# Patient Record
Sex: FEMALE | Race: BLACK OR AFRICAN AMERICAN | Employment: OTHER | URBAN - METROPOLITAN AREA
[De-identification: names, ages, dates, MRNs, and addresses within clinical notes are randomized per-mention and may not be internally consistent; named-entity substitution may affect disease eponyms.]

---

## 2017-01-17 ENCOUNTER — OFFICE VISIT (OUTPATIENT)
Dept: CARDIOLOGY CLINIC | Age: 82
End: 2017-01-17

## 2017-01-17 DIAGNOSIS — Z79.01 LONG TERM (CURRENT) USE OF ANTICOAGULANTS: ICD-10-CM

## 2017-01-17 DIAGNOSIS — I48.91 ATRIAL FIBRILLATION, UNSPECIFIED TYPE (HCC): Primary | ICD-10-CM

## 2017-02-08 ENCOUNTER — HOSPITAL ENCOUNTER (OUTPATIENT)
Dept: LAB | Age: 82
Discharge: HOME | End: 2017-02-08

## 2017-02-08 PROCEDURE — 99001 SPECIMEN HANDLING PT-LAB: CPT | Performed by: INTERNAL MEDICINE

## 2017-02-21 ENCOUNTER — OFFICE VISIT (OUTPATIENT)
Dept: CARDIOLOGY CLINIC | Age: 82
End: 2017-02-21

## 2017-02-21 DIAGNOSIS — Z95.0 CARDIAC PACEMAKER IN SITU: Primary | ICD-10-CM

## 2017-02-22 ENCOUNTER — CLINICAL SUPPORT (OUTPATIENT)
Dept: CARDIOLOGY CLINIC | Age: 82
End: 2017-02-22

## 2017-02-22 DIAGNOSIS — I48.91 ATRIAL FIBRILLATION, UNSPECIFIED TYPE (HCC): Primary | ICD-10-CM

## 2017-02-22 DIAGNOSIS — Z79.01 LONG TERM (CURRENT) USE OF ANTICOAGULANTS: ICD-10-CM

## 2017-02-22 LAB
INR BLD: NORMAL
INR, EXTERNAL: 3.7 (ref 2–3)
PT POC: NORMAL SEC
VALID INTERNAL CONTROL?: YES

## 2017-02-22 NOTE — PROGRESS NOTES

## 2017-03-18 ENCOUNTER — HOSPITAL ENCOUNTER (EMERGENCY)
Age: 82
Discharge: HOME | End: 2017-03-19
Attending: INTERNAL MEDICINE | Admitting: INTERNAL MEDICINE
Payer: MEDICARE

## 2017-03-18 ENCOUNTER — APPOINTMENT (OUTPATIENT)
Dept: GENERAL RADIOLOGY | Age: 82
End: 2017-03-18
Attending: PHYSICIAN ASSISTANT
Payer: MEDICARE

## 2017-03-18 DIAGNOSIS — J20.9 ACUTE BRONCHITIS, UNSPECIFIED ORGANISM: Primary | ICD-10-CM

## 2017-03-18 DIAGNOSIS — T45.511A COUMADIN TOXICITY, ACCIDENTAL OR UNINTENTIONAL, INITIAL ENCOUNTER: ICD-10-CM

## 2017-03-18 DIAGNOSIS — D68.9 COAGULOPATHY (HCC): ICD-10-CM

## 2017-03-18 LAB
ALBUMIN SERPL BCP-MCNC: 3.3 G/DL (ref 3.5–5)
ALBUMIN/GLOB SERPL: 1 {RATIO} (ref 1.1–2.2)
ALP SERPL-CCNC: 62 U/L (ref 45–117)
ALT SERPL-CCNC: 18 U/L (ref 12–78)
ANION GAP BLD CALC-SCNC: 7 MMOL/L (ref 5–15)
AST SERPL W P-5'-P-CCNC: 19 U/L (ref 15–37)
BASOPHILS # BLD AUTO: 0 K/UL (ref 0–0.1)
BASOPHILS # BLD: 0 % (ref 0–1)
BILIRUB SERPL-MCNC: 0.4 MG/DL (ref 0.2–1)
BNP SERPL-MCNC: 132 PG/ML (ref 0–450)
BUN SERPL-MCNC: 10 MG/DL (ref 6–20)
BUN/CREAT SERPL: 10 (ref 12–20)
CALCIUM SERPL-MCNC: 7.9 MG/DL (ref 8.5–10.1)
CHLORIDE SERPL-SCNC: 103 MMOL/L (ref 97–108)
CK MB CFR SERPL CALC: 1.5 % (ref 0–2.5)
CK MB SERPL-MCNC: 2.2 NG/ML (ref 5–25)
CK SERPL-CCNC: 144 U/L (ref 26–192)
CO2 SERPL-SCNC: 28 MMOL/L (ref 21–32)
CREAT SERPL-MCNC: 0.99 MG/DL (ref 0.55–1.02)
EOSINOPHIL # BLD: 0.1 K/UL (ref 0–0.4)
EOSINOPHIL NFR BLD: 2 % (ref 0–7)
ERYTHROCYTE [DISTWIDTH] IN BLOOD BY AUTOMATED COUNT: 14.8 % (ref 11.5–14.5)
FLUAV AG NPH QL IA: NEGATIVE
FLUBV AG NOSE QL IA: NEGATIVE
GLOBULIN SER CALC-MCNC: 3.4 G/DL (ref 2–4)
GLUCOSE SERPL-MCNC: 101 MG/DL (ref 65–100)
HCT VFR BLD AUTO: 34.8 % (ref 35–47)
HGB BLD-MCNC: 12.2 G/DL (ref 11.5–16)
INR PPP: 6.6 (ref 0.9–1.1)
LYMPHOCYTES # BLD AUTO: 33 % (ref 12–49)
LYMPHOCYTES # BLD: 1.8 K/UL (ref 0.8–3.5)
MCH RBC QN AUTO: 27.9 PG (ref 26–34)
MCHC RBC AUTO-ENTMCNC: 35.1 G/DL (ref 30–36.5)
MCV RBC AUTO: 79.5 FL (ref 80–99)
MONOCYTES # BLD: 0.8 K/UL (ref 0–1)
MONOCYTES NFR BLD AUTO: 15 % (ref 5–13)
NEUTS SEG # BLD: 2.8 K/UL (ref 1.8–8)
NEUTS SEG NFR BLD AUTO: 50 % (ref 32–75)
PLATELET # BLD AUTO: 184 K/UL (ref 150–400)
POTASSIUM SERPL-SCNC: 3.9 MMOL/L (ref 3.5–5.1)
PROT SERPL-MCNC: 6.7 G/DL (ref 6.4–8.2)
PROTHROMBIN TIME: 68.3 SEC (ref 9–11.1)
RBC # BLD AUTO: 4.38 M/UL (ref 3.8–5.2)
SODIUM SERPL-SCNC: 138 MMOL/L (ref 136–145)
TROPONIN I SERPL-MCNC: <0.04 NG/ML
WBC # BLD AUTO: 5.5 K/UL (ref 3.6–11)

## 2017-03-18 PROCEDURE — 82550 ASSAY OF CK (CPK): CPT | Performed by: PHYSICIAN ASSISTANT

## 2017-03-18 PROCEDURE — 93005 ELECTROCARDIOGRAM TRACING: CPT

## 2017-03-18 PROCEDURE — 85610 PROTHROMBIN TIME: CPT | Performed by: PHYSICIAN ASSISTANT

## 2017-03-18 PROCEDURE — 99283 EMERGENCY DEPT VISIT LOW MDM: CPT

## 2017-03-18 PROCEDURE — 83880 ASSAY OF NATRIURETIC PEPTIDE: CPT | Performed by: PHYSICIAN ASSISTANT

## 2017-03-18 PROCEDURE — 36415 COLL VENOUS BLD VENIPUNCTURE: CPT | Performed by: PHYSICIAN ASSISTANT

## 2017-03-18 PROCEDURE — 80053 COMPREHEN METABOLIC PANEL: CPT | Performed by: PHYSICIAN ASSISTANT

## 2017-03-18 PROCEDURE — 84484 ASSAY OF TROPONIN QUANT: CPT | Performed by: PHYSICIAN ASSISTANT

## 2017-03-18 PROCEDURE — 85025 COMPLETE CBC W/AUTO DIFF WBC: CPT | Performed by: PHYSICIAN ASSISTANT

## 2017-03-18 PROCEDURE — 87804 INFLUENZA ASSAY W/OPTIC: CPT | Performed by: PHYSICIAN ASSISTANT

## 2017-03-18 PROCEDURE — 71020 XR CHEST PA LAT: CPT

## 2017-03-18 RX ORDER — SODIUM CHLORIDE 0.9 % (FLUSH) 0.9 %
5-10 SYRINGE (ML) INJECTION AS NEEDED
Status: DISCONTINUED | OUTPATIENT
Start: 2017-03-18 | End: 2017-03-19 | Stop reason: HOSPADM

## 2017-03-19 VITALS
SYSTOLIC BLOOD PRESSURE: 128 MMHG | TEMPERATURE: 98.4 F | HEART RATE: 71 BPM | DIASTOLIC BLOOD PRESSURE: 71 MMHG | OXYGEN SATURATION: 99 % | RESPIRATION RATE: 18 BRPM

## 2017-03-19 DIAGNOSIS — I48.20 CHRONIC ATRIAL FIBRILLATION (HCC): ICD-10-CM

## 2017-03-19 PROCEDURE — 74011250637 HC RX REV CODE- 250/637: Performed by: INTERNAL MEDICINE

## 2017-03-19 RX ORDER — AMOXICILLIN 875 MG/1
875 TABLET, FILM COATED ORAL 2 TIMES DAILY
Qty: 14 TAB | Refills: 0 | Status: SHIPPED | OUTPATIENT
Start: 2017-03-19 | End: 2017-03-26

## 2017-03-19 RX ORDER — AMOXICILLIN AND CLAVULANATE POTASSIUM 875; 125 MG/1; MG/1
1 TABLET, FILM COATED ORAL
Status: COMPLETED | OUTPATIENT
Start: 2017-03-19 | End: 2017-03-19

## 2017-03-19 RX ADMIN — AMOXICILLIN AND CLAVULANATE POTASSIUM 1 TABLET: 875; 125 TABLET, FILM COATED ORAL at 00:20

## 2017-03-19 NOTE — DISCHARGE INSTRUCTIONS
Accidental Overdose of Medicine: Care Instructions  Your Care Instructions  Almost any medicine can cause harm if you take too much of it. You have had treatment to help your body get rid of an overdose of a medicine. This may have been an over-the-counter medicine. Or it might be one that a doctor prescribed. It may even have been a vitamin or a supplement. During treatment, the doctor may have given you fluids and medicine. You also may have had lab tests. Then the doctor made sure that you were well enough to go home. The doctor has checked you carefully, but problems can develop later. If you notice any problems or new symptoms, get medical treatment right away. Follow-up care is a key part of your treatment and safety. Be sure to make and go to all appointments, and call your doctor if you are having problems. It's also a good idea to know your test results and keep a list of the medicines you take. How can you care for yourself at home? Home care  · Drink plenty of fluids. If you have kidney, heart, or liver disease and have to limit fluids, talk with your doctor before you increase the amount of fluids you drink. · If you normally take medicines, ask your doctor when you can start taking them again. · Read the information that comes with any medicine. If you have questions, ask your doctor or pharmacist.  Prevention  · Be safe with medicines. Take your medicines exactly as prescribed or as the label directs. Call your doctor if you think you are having a problem with your medicine. · Keep your medicines in the containers they came in. Keep them with the original labels. · Find out what to do if you miss a dose of your medicine. When should you call for help? Call 911 anytime you think you may need emergency care. For example, call if:  · You passed out (lost consciousness). Call your doctor now or seek immediate medical care if:  · You have a sudden change in behavior.   · You are very confused or can't think clearly. · You have new symptoms, such as vomiting or belly pain. Watch closely for changes in your health, and be sure to contact your doctor if:  · You do not get better as expected. Where can you learn more? Go to http://saurav-roni.info/. Enter C165 in the search box to learn more about \"Accidental Overdose of Medicine: Care Instructions. \"  Current as of: August 14, 2016  Content Version: 11.1  © 2712-0985 [employer]. Care instructions adapted under license by [employer] (which disclaims liability or warranty for this information). If you have questions about a medical condition or this instruction, always ask your healthcare professional. [address] any warranty or liability for your use of this information. Bronchitis: Care Instructions  Your Care Instructions    Bronchitis is inflammation of the bronchial tubes, which carry air to the lungs. The tubes swell and produce mucus, or phlegm. The mucus and inflamed bronchial tubes make you cough. You may have trouble breathing. Most cases of bronchitis are caused by viruses like those that cause colds. Antibiotics usually do not help and they may be harmful. Bronchitis usually develops rapidly and lasts about 2 to 3 weeks in otherwise healthy people. Follow-up care is a key part of your treatment and safety. Be sure to make and go to all appointments, and call your doctor if you are having problems. It's also a good idea to know your test results and keep a list of the medicines you take. How can you care for yourself at home? · Take all medicines exactly as prescribed. Call your doctor if you think you are having a problem with your medicine. · Get some extra rest.  · Take an over-the-counter pain medicine, such as acetaminophen (Tylenol), ibuprofen (Advil, Motrin), or naproxen (Aleve) to reduce fever and relieve body aches.  Read and follow all instructions on the label. · Do not take two or more pain medicines at the same time unless the doctor told you to. Many pain medicines have acetaminophen, which is Tylenol. Too much acetaminophen (Tylenol) can be harmful. · Take an over-the-counter cough medicine that contains dextromethorphan to help quiet a dry, hacking cough so that you can sleep. Avoid cough medicines that have more than one active ingredient. Read and follow all instructions on the label. · Breathe moist air from a humidifier, hot shower, or sink filled with hot water. The heat and moisture will thin mucus so you can cough it out. · Do not smoke. Smoking can make bronchitis worse. If you need help quitting, talk to your doctor about stop-smoking programs and medicines. These can increase your chances of quitting for good. When should you call for help? Call 911 anytime you think you may need emergency care. For example, call if:  · You have severe trouble breathing. Call your doctor now or seek immediate medical care if:  · You have new or worse trouble breathing. · You cough up dark brown or bloody mucus (sputum). · You have a new or higher fever. · You have a new rash. Watch closely for changes in your health, and be sure to contact your doctor if:  · You cough more deeply or more often, especially if you notice more mucus or a change in the color of your mucus. · You are not getting better as expected. Where can you learn more? Go to http://saurav-roni.info/. Enter H333 in the search box to learn more about \"Bronchitis: Care Instructions. \"  Current as of: May 23, 2016  Content Version: 11.1  © 7767-9013 [employer]. Care instructions adapted under license by [employer] (which disclaims liability or warranty for this information).  If you have questions about a medical condition or this instruction, always ask your healthcare professional. Alon Canales any warranty or liability for your use of this information.

## 2017-03-19 NOTE — ED PROVIDER NOTES
Patient is a 80 y.o. female presenting with cough and hip pain. The history is provided by the patient. Cough   This is a new problem. Associated symptoms include myalgias (points to thighs as sight of \"muscles hurt\"). Pertinent negatives include no chest pain, no chills, no headaches, no rhinorrhea, no nausea, no vomiting and no confusion. Hip Injury    Pertinent negatives include no neck pain. 79 yo Female PMH Afib, CAD, cor pulmonale, gout, presents to ED with family member. She lives alone. Notes \"very bothersome\" non productive cough for past week. She feels generally poorly noting that her legs feel \"spasming and a little weak or shaky\". Denies any focal weakness. Has been getting around as usual with her cane. Denies any trauma. Sts has been taking her scheduled medication regularly. Denies h/o lung problems and heart failure. Notes  [facility] is her cardiologist. Has pacemaker. Past Medical History:   Diagnosis Date    Aortic valve disorders 4/29/2012    Atrial fibrillation ([address] 3/22/2011    CAD (coronary artery disease)     Cor pulmonale, chronic (HCC) 4/29/2012    Dyslipidemia (high LDL; low HDL) 4/29/2012    Gout 4/30/2012    Gout, arthritis 2/8/2012    Gout, joint     Hypertension     Hyperuricemia 4/30/2012    Obstructive sleep apnea 4/29/2012    Recurrent epistaxis 4/29/2012    Sinoatrial node dysfunction ([facility] [address] 3/22/2011       Past Surgical History:   Procedure Laterality Date    BREAST SURGERY PROCEDURE UNLISTED      lumpectomy    GEN INSERT/REPLACE ONLY DUAL  4/28/2014         HX PACEMAKER           History reviewed. No pertinent family history. Social History     Social History    Marital status:      Spouse name: N/A    Number of children: N/A    Years of education: N/A     Occupational History    Not on file.      Social History Main Topics    Smoking status: Never Smoker    Smokeless tobacco: Never Used    Alcohol use No    Drug use: No    Sexual activity: No     Other Topics Concern    Not on file     Social History Narrative         ALLERGIES: Review of patient's allergies indicates no known allergies. Review of Systems   Constitutional: Negative for chills, diaphoresis and fever. Appetite change: \"I am eating - just a little light\" Fatigue: \"a little tired\"   HENT: Positive for postnasal drip. Negative for congestion, mouth sores, rhinorrhea and voice change. Eyes: Visual disturbance: cataracts B with some chronic diminished vision. Respiratory: Positive for cough. Cardiovascular: Negative for chest pain, palpitations and leg swelling. Gastrointestinal: Negative for abdominal pain, diarrhea, nausea and vomiting. Genitourinary: Negative for dysuria and hematuria. Musculoskeletal: Positive for myalgias (points to thighs as sight of \"muscles hurt\"). Negative for neck pain. Neurological: Negative for speech difficulty and headaches. Weakness: generalized, but non focal weakness. Psychiatric/Behavioral: Negative for confusion. All other systems reviewed and are negative. Vitals:    03/18/17 2228   BP: 148/84   Pulse: 67   Resp: 20   Temp: 98.4 °F (36.9 °C)   SpO2: 99%            Physical Exam   Constitutional: She is oriented to person, place, and time. She appears well-developed and well-nourished. No distress. For age. HENT:   Head: Normocephalic. Right Ear: External ear normal.   Left Ear: External ear normal.   Mouth/Throat: Oropharynx is clear and moist.   Eyes: Pupils are equal, round, and reactive to light. Bilateral cataracts. Neck: Normal range of motion. No JVD present. No tracheal deviation present. Cardiovascular: Normal rate. Pulmonary/Chest: Effort normal and breath sounds normal. No respiratory distress. She has no wheezes. She has no rales. Abdominal: Soft. There is no tenderness. There is no rebound. Musculoskeletal:   Arthritic changes, enlarged joints to fingers B, knees.    Hips FROM, painless, B. Neurological: She is alert and oriented to person, place, and time. Skin: Skin is warm and dry. She is not diaphoretic. Psychiatric: She has a normal mood and affect. Her behavior is normal.   Nursing note and vitals reviewed. MDM  Number of Diagnoses or Management Options  Diagnosis management comments: DDX:  URI, bronchitis, influenza, PNA, CHF        Amount and/or Complexity of Data Reviewed  Clinical lab tests: ordered and reviewed  Tests in the radiology section of CPT®: ordered and reviewed  Discuss the patient with other providers: (D/W Dr. Sofia Wayne)      ED Course       Procedures    Discussed case with Dr. Sofia Wayne, care transferred to him.

## 2017-03-19 NOTE — ED NOTES
Patient presented to the ED today for complaints of moist sounding cough and chronic hip pain. Patient says cough  symptoms started a week ago. Respirations are even and unlabored. Skin is dry,warm, and intact.

## 2017-03-19 NOTE — ED NOTES
Patient educated on discharge instructions and two prescriptions . Patient verbalized understanding of eduction. Patient given discharge instructions and two prescriptions. Patient ambulated out of ED with her son. No acute distress noted. Emergency Department Nursing Plan of Care       The Nursing Plan of Care is developed from the Nursing assessment and Emergency Department Attending provider initial evaluation. The plan of care may be reviewed in the ED Provider note.     The Plan of Care was developed with the following considerations:   Patient / Family readiness to learn indicated by:verbalized understanding  Persons(s) to be included in education: patient and family  Barriers to Learning/Limitations:No    Signed     Gurdeep Emery RN    3/19/2017   12:34 AM

## 2017-03-20 LAB
ATRIAL RATE: 61 BPM
ATRIAL RATE: 65 BPM
CALCULATED P AXIS, ECG09: 1 DEGREES
CALCULATED P AXIS, ECG09: 13 DEGREES
CALCULATED R AXIS, ECG10: 40 DEGREES
CALCULATED R AXIS, ECG10: 43 DEGREES
CALCULATED T AXIS, ECG11: 11 DEGREES
CALCULATED T AXIS, ECG11: 31 DEGREES
DIAGNOSIS, 93000: NORMAL
DIAGNOSIS, 93000: NORMAL
P-R INTERVAL, ECG05: 202 MS
P-R INTERVAL, ECG05: 204 MS
Q-T INTERVAL, ECG07: 416 MS
Q-T INTERVAL, ECG07: 424 MS
QRS DURATION, ECG06: 72 MS
QRS DURATION, ECG06: 72 MS
QTC CALCULATION (BEZET), ECG08: 418 MS
QTC CALCULATION (BEZET), ECG08: 440 MS
VENTRICULAR RATE, ECG03: 61 BPM
VENTRICULAR RATE, ECG03: 65 BPM

## 2017-03-20 RX ORDER — ATENOLOL 100 MG/1
TABLET ORAL
Qty: 30 TAB | Refills: 4 | Status: SHIPPED | OUTPATIENT
Start: 2017-03-20 | End: 2017-10-09 | Stop reason: SDUPTHER

## 2017-03-23 ENCOUNTER — PATIENT OUTREACH (OUTPATIENT)
Dept: CARDIOLOGY CLINIC | Age: 82
End: 2017-03-23

## 2017-03-23 ENCOUNTER — CLINICAL SUPPORT (OUTPATIENT)
Dept: CARDIOLOGY CLINIC | Age: 82
End: 2017-03-23

## 2017-03-23 DIAGNOSIS — I48.0 PAROXYSMAL ATRIAL FIBRILLATION (HCC): Primary | ICD-10-CM

## 2017-03-23 DIAGNOSIS — Z79.01 LONG TERM (CURRENT) USE OF ANTICOAGULANTS: ICD-10-CM

## 2017-03-23 LAB
INR BLD: NORMAL
INR, EXTERNAL: 5.5 (ref 2–3)
PT POC: NORMAL SEC
VALID INTERNAL CONTROL?: YES

## 2017-03-23 NOTE — PROGRESS NOTES
This note will not be viewable in [address] Nurse navigator note - cardiology  Call to and reached pt - who went to the ER - for chest congestion and hip pain - she was given an antibiotic and a cough medicine and said she is doing better  -   Appetite is not good - eating oatmeal- encouraged fluids - she is using Tylenol - and she asked me to call her son - [phone]./ Angelica Gregg. Reached her son, Barbara Marino - at [phone] - who said, Catheline Fothergill seems better - she was diagnosed with bronchitis - she is still coughing up some congestion - she is on Amoxicillin and Robitussin- she had her INR check today -\"; the INR in the ER was 6.6 - and historically - her INR has been stable - Barbara Marino is not sure the issue to cause this - we discussed that he oversee - to make sure she is not taking extra -     He sets out her meds - and will watch - she is holding her Coumadin - had recheck today in office -  She will restart 2.5 mg Coumadin on Mendoza 3/26 - she takes her therapy in the morning. NN requested that coumadin clinic nurse call alisha with result today - and with appt for next week -  _______________________________________________________________________________________    Emergency room physician note -  Patient is a 80 y.o. female presenting with cough and hip pain. The history is provided by the patient. Cough   This is a new problem. Associated symptoms include myalgias (points to thighs as sight of \"muscles hurt\"). Pertinent negatives include no chest pain, no chills, no headaches, no rhinorrhea, no nausea, no vomiting and no confusion. Hip Injury    Pertinent negatives include no neck pain. 81 yo Female PMH Afib, CAD, cor pulmonale, gout, presents to ED with family member. She lives alone. Notes \"very bothersome\" non productive cough for past week. She feels generally poorly noting that her legs feel \"spasming and a little weak or shaky\". Denies any focal weakness.  Has been getting around as usual with her cane. Denies any trauma. Sts has been taking her scheduled medication regularly. Denies h/o lung problems and heart failure. Notes Dr. Malick Miller is her cardiologist. Has pacemaker. Ekg:atrial paced, rate=65, normal axis,ns t changes, normal qrs interval.  ______________________________________________________________________________________________    Plan: Rest/ fluids/ steam, warm fluids -    Call pcp for follow up if symptoms don't improve or worsen - time for bronchitis discussed   Son to monitor her medication administration - re: Coumadin    Moderate greens and steady intake - pt with stable INR until this. MD on call 24/7 at [phone].    Coumadin clinic nurse to call sonJuma Asp [phone]/ per his request.    Audra Anderson RN , [facility], [facility]   [address]  [phone]

## 2017-03-27 ENCOUNTER — TELEPHONE (OUTPATIENT)
Dept: CARDIOLOGY CLINIC | Age: 82
End: 2017-03-27

## 2017-03-27 NOTE — TELEPHONE ENCOUNTER
----- Message from Alcon Robbins LPN sent at 4/97/6429 10:51 AM EDT -----  Regarding: FW: I talked with her son, Barbara Counter - he could not remember INR today - I could not find      ----- Message -----     From: Shasta Dunn RN     Sent: 3/23/2017   5:37 PM       To: Tami Leyden, RN, Alcon Robbins LPN  Subject: I talked with her son, Barbara Counter - he could not#    Luiz Drafts - would you give him a quick call with INR today - and he wants to schedule time for check next week -   He said she is to hold Coumadin Fri, Sat - restart at 2.5 on Sunday -   His # [phone] -   Thanks!  t    Anticoagulation Summary as of 3/23/2017   INR goal 2.0-3.0  Today's INR   Maintenance plan 2.5 mg (2.5 mg x 1) every day  Weekly total 17.5 mg  Plan last modified Alcon Robbins LPN (9/10/4291)  Next INR check 3/28/2017  Target end date Indefinite

## 2017-03-29 ENCOUNTER — TELEPHONE (OUTPATIENT)
Dept: CARDIOLOGY CLINIC | Age: 82
End: 2017-03-29

## 2017-03-29 NOTE — TELEPHONE ENCOUNTER
I called Mr Mitzi Gutierrez because Mrs Robins,his mother, had an appointment with me this morning. His wife had a medical emergency this morning and he was unable to bring his mother. He isn't able to reschedule for sometime this week. He will call me on Monday to reschedule appointment. Recommended his mom stay on the current dosing she is on for now.

## 2017-03-30 ENCOUNTER — CLINICAL SUPPORT (OUTPATIENT)
Dept: CARDIOLOGY CLINIC | Age: 82
End: 2017-03-30

## 2017-03-30 DIAGNOSIS — I48.0 PAROXYSMAL ATRIAL FIBRILLATION (HCC): Primary | ICD-10-CM

## 2017-03-30 DIAGNOSIS — Z79.01 LONG TERM (CURRENT) USE OF ANTICOAGULANTS: ICD-10-CM

## 2017-03-30 LAB
INR BLD: NORMAL
INR, EXTERNAL: 1.6 (ref 2–3)
PT POC: NORMAL SEC
VALID INTERNAL CONTROL?: YES

## 2017-04-03 ENCOUNTER — CLINICAL SUPPORT (OUTPATIENT)
Dept: CARDIOLOGY CLINIC | Age: 82
End: 2017-04-03

## 2017-04-03 DIAGNOSIS — Z79.01 LONG TERM (CURRENT) USE OF ANTICOAGULANTS: ICD-10-CM

## 2017-04-03 DIAGNOSIS — I48.0 PAROXYSMAL ATRIAL FIBRILLATION (HCC): Primary | ICD-10-CM

## 2017-04-03 LAB
INR BLD: NORMAL
INR, EXTERNAL: 2 (ref 2–3)
PT POC: NORMAL SEC
VALID INTERNAL CONTROL?: YES

## 2017-05-22 ENCOUNTER — CLINICAL SUPPORT (OUTPATIENT)
Dept: CARDIOLOGY CLINIC | Age: 82
End: 2017-05-22

## 2017-05-22 DIAGNOSIS — I48.0 PAROXYSMAL ATRIAL FIBRILLATION (HCC): Primary | ICD-10-CM

## 2017-05-22 DIAGNOSIS — Z79.01 LONG TERM (CURRENT) USE OF ANTICOAGULANTS: ICD-10-CM

## 2017-05-22 LAB
INR BLD: NORMAL
INR, EXTERNAL: 2.8 (ref 2–3)
PT POC: NORMAL SECONDS
VALID INTERNAL CONTROL?: YES

## 2017-05-23 ENCOUNTER — OFFICE VISIT (OUTPATIENT)
Dept: CARDIOLOGY CLINIC | Age: 82
End: 2017-05-23

## 2017-05-23 DIAGNOSIS — Z95.0 CARDIAC PACEMAKER IN SITU: Primary | ICD-10-CM

## 2017-05-25 RX ORDER — AMLODIPINE BESYLATE 5 MG/1
5 TABLET ORAL DAILY
Qty: 30 TAB | Refills: 6 | Status: SHIPPED | OUTPATIENT
Start: 2017-05-25 | End: 2017-12-03 | Stop reason: SDUPTHER

## 2017-06-30 RX ORDER — SIMVASTATIN 20 MG/1
TABLET, FILM COATED ORAL
Qty: 30 TAB | Refills: 6 | Status: SHIPPED | OUTPATIENT
Start: 2017-06-30 | End: 2018-01-20 | Stop reason: SDUPTHER

## 2017-07-06 ENCOUNTER — CLINICAL SUPPORT (OUTPATIENT)
Dept: CARDIOLOGY CLINIC | Age: 82
End: 2017-07-06

## 2017-07-06 ENCOUNTER — TELEPHONE (OUTPATIENT)
Dept: CARDIOLOGY CLINIC | Age: 82
End: 2017-07-06

## 2017-07-06 DIAGNOSIS — I48.0 PAROXYSMAL ATRIAL FIBRILLATION (HCC): ICD-10-CM

## 2017-07-06 DIAGNOSIS — Z79.01 LONG TERM (CURRENT) USE OF ANTICOAGULANTS: Primary | ICD-10-CM

## 2017-07-06 LAB
INR BLD: NORMAL
INR, EXTERNAL: 2.6 (ref 2–3)
PT POC: NORMAL SECONDS
VALID INTERNAL CONTROL?: YES

## 2017-07-06 RX ORDER — WARFARIN 2.5 MG/1
TABLET ORAL
Qty: 90 TAB | Refills: 1 | Status: SHIPPED | OUTPATIENT
Start: 2017-07-06 | End: 2017-12-09 | Stop reason: SDUPTHER

## 2017-07-06 NOTE — PROGRESS NOTES

## 2017-07-06 NOTE — TELEPHONE ENCOUNTER
Patient's son called to verify that patient has coumadin 2.5 mg tablets. Currently taking  2.5 mg daily.

## 2017-07-06 NOTE — TELEPHONE ENCOUNTER
Requested Prescriptions     Signed Prescriptions Disp Refills    warfarin (COUMADIN) 2.5 mg tablet 90 Tab 1     Sig: TAKE 1 TABLET (2.5MG) DAILY     Authorizing Provider: HAY WALTON     Ordering User: Reynold Martin     Verbal order per Dr. Spike Shaikh.

## 2017-08-03 ENCOUNTER — OFFICE VISIT (OUTPATIENT)
Dept: CARDIOLOGY CLINIC | Age: 82
End: 2017-08-03

## 2017-08-03 DIAGNOSIS — I48.0 PAROXYSMAL ATRIAL FIBRILLATION (HCC): ICD-10-CM

## 2017-08-03 DIAGNOSIS — Z79.01 LONG TERM (CURRENT) USE OF ANTICOAGULANTS: Primary | ICD-10-CM

## 2017-08-03 LAB
INR BLD: NORMAL
INR, EXTERNAL: 2.6 (ref 2–3)
PT POC: NORMAL SECONDS
VALID INTERNAL CONTROL?: YES

## 2017-08-29 ENCOUNTER — OFFICE VISIT (OUTPATIENT)
Dept: CARDIOLOGY CLINIC | Age: 82
End: 2017-08-29

## 2017-08-29 DIAGNOSIS — Z95.0 CARDIAC PACEMAKER IN SITU: Primary | ICD-10-CM

## 2017-08-31 ENCOUNTER — CLINICAL SUPPORT (OUTPATIENT)
Dept: CARDIOLOGY CLINIC | Age: 82
End: 2017-08-31

## 2017-08-31 DIAGNOSIS — Z79.01 LONG TERM (CURRENT) USE OF ANTICOAGULANTS: Primary | ICD-10-CM

## 2017-08-31 DIAGNOSIS — I48.0 PAROXYSMAL ATRIAL FIBRILLATION (HCC): ICD-10-CM

## 2017-08-31 LAB
INR BLD: NORMAL
INR, EXTERNAL: 1 (ref 2–3)
INR, EXTERNAL: 1.9 (ref 2–3)
PT POC: NORMAL SECONDS
VALID INTERNAL CONTROL?: YES

## 2017-08-31 RX ORDER — ATENOLOL 50 MG/1
TABLET ORAL
Refills: 1 | COMMUNITY
Start: 2017-06-22 | End: 2017-08-31 | Stop reason: DRUGHIGH

## 2017-08-31 RX ORDER — TIMOLOL MALEATE 5 MG/ML
SOLUTION/ DROPS OPHTHALMIC
Refills: 1 | COMMUNITY
Start: 2017-07-31 | End: 2018-12-20

## 2017-08-31 NOTE — PROGRESS NOTES
A full discussion of the nature of anticoagulants has been carried out. A benefit risk analysis has been presented to the patient, so that they understand the justification for choosing anticoagulation at this time. The need for frequent and regular monitoring, precise dosage adjustment and compliance is stressed. Side effects of potential bleeding are discussed. The patient should avoid any OTC items containing aspirin or ibuprofen, and should avoid great swings in general diet. Avoid alcohol consumption. Call if any signs of abnormal bleeding. Next PT/INR test in 1 month. No change in dose. States possible increase in vitamin K rich foods. Discussed consistency/moderation and rationale. Son present.

## 2017-09-28 ENCOUNTER — CLINICAL SUPPORT (OUTPATIENT)
Dept: CARDIOLOGY CLINIC | Age: 82
End: 2017-09-28

## 2017-09-28 DIAGNOSIS — Z79.01 LONG TERM (CURRENT) USE OF ANTICOAGULANTS: Primary | ICD-10-CM

## 2017-09-28 DIAGNOSIS — I48.0 PAROXYSMAL ATRIAL FIBRILLATION (HCC): ICD-10-CM

## 2017-09-28 LAB — INR, EXTERNAL: 1.6 (ref 2–3)

## 2017-09-29 LAB
INR BLD: NORMAL
PT POC: NORMAL SECONDS
VALID INTERNAL CONTROL?: YES

## 2017-09-29 NOTE — PROGRESS NOTES
A full discussion of the nature of anticoagulants has been carried out. A benefit risk analysis has been presented to the patient, so that they understand the justification for choosing anticoagulation at this time. The need for frequent and regular monitoring, precise dosage adjustment and compliance is stressed. Side effects of potential bleeding are discussed. The patient should avoid any OTC items containing aspirin or ibuprofen, and should avoid great swings in general diet. Avoid alcohol consumption. Call if any signs of abnormal bleeding. Next PT/INR test in 10 days. Son with patient. To take extra 2.5 mg today then resume schedule of 2.5 mg daily.

## 2017-10-09 ENCOUNTER — CLINICAL SUPPORT (OUTPATIENT)
Dept: CARDIOLOGY CLINIC | Age: 82
End: 2017-10-09

## 2017-10-09 DIAGNOSIS — I48.20 CHRONIC ATRIAL FIBRILLATION (HCC): ICD-10-CM

## 2017-10-09 DIAGNOSIS — I48.0 PAROXYSMAL ATRIAL FIBRILLATION (HCC): ICD-10-CM

## 2017-10-09 DIAGNOSIS — Z79.01 LONG-TERM (CURRENT) USE OF ANTICOAGULANTS: Primary | ICD-10-CM

## 2017-10-09 LAB
INR BLD: NORMAL
INR, EXTERNAL: 2 (ref 2–3)
PT POC: NORMAL SECONDS
VALID INTERNAL CONTROL?: YES

## 2017-10-09 RX ORDER — ATENOLOL 100 MG/1
TABLET ORAL
Qty: 30 TAB | Refills: 0 | Status: SHIPPED | OUTPATIENT
Start: 2017-10-09 | End: 2017-10-14 | Stop reason: ALTCHOICE

## 2017-10-09 NOTE — PROGRESS NOTES

## 2017-10-14 DIAGNOSIS — I48.20 CHRONIC ATRIAL FIBRILLATION (HCC): ICD-10-CM

## 2017-10-14 DIAGNOSIS — I48.0 PAROXYSMAL ATRIAL FIBRILLATION (HCC): Primary | ICD-10-CM

## 2017-10-14 DIAGNOSIS — I10 ESSENTIAL HYPERTENSION, BENIGN: ICD-10-CM

## 2017-10-14 RX ORDER — ATENOLOL 25 MG/1
100 TABLET ORAL DAILY
Qty: 60 TAB | Refills: 12 | Status: SHIPPED | OUTPATIENT
Start: 2017-10-14 | End: 2018-02-07 | Stop reason: DRUGHIGH

## 2017-10-14 NOTE — PROGRESS NOTES
Unclear why pharmacy wants atenolol reordered as 50 mg twice daily (100 mg on back order). This substitution seems to be within their scope of practice.

## 2017-10-30 ENCOUNTER — CLINICAL SUPPORT (OUTPATIENT)
Dept: CARDIOLOGY CLINIC | Age: 82
End: 2017-10-30

## 2017-10-30 DIAGNOSIS — Z79.01 LONG-TERM (CURRENT) USE OF ANTICOAGULANTS: Primary | ICD-10-CM

## 2017-10-30 DIAGNOSIS — I48.0 PAROXYSMAL ATRIAL FIBRILLATION (HCC): ICD-10-CM

## 2017-10-30 LAB
INR BLD: NORMAL
INR, EXTERNAL: 2.3 (ref 2–3)
PT POC: NORMAL SECONDS
VALID INTERNAL CONTROL?: YES

## 2017-10-30 NOTE — PROGRESS NOTES
A full discussion of the nature of anticoagulants has been carried out. A benefit risk analysis has been presented to the patient, so that they understand the justification for choosing anticoagulation at this time. The need for frequent and regular monitoring, precise dosage adjustment and compliance is stressed. Side effects of potential bleeding are discussed. The patient should avoid any OTC items containing aspirin or ibuprofen, and should avoid great swings in general diet. Avoid alcohol consumption. Call if any signs of abnormal bleeding. Next PT/INR test on 11-30-17 with Dr. Sana Hairston and pacemaker appointment. No change in dosing regimen. Son with patient.

## 2017-11-30 ENCOUNTER — CLINICAL SUPPORT (OUTPATIENT)
Dept: CARDIOLOGY CLINIC | Age: 82
End: 2017-11-30

## 2017-11-30 ENCOUNTER — OFFICE VISIT (OUTPATIENT)
Dept: CARDIOLOGY CLINIC | Age: 82
End: 2017-11-30

## 2017-11-30 VITALS
BODY MASS INDEX: 29.98 KG/M2 | SYSTOLIC BLOOD PRESSURE: 144 MMHG | WEIGHT: 169.2 LBS | DIASTOLIC BLOOD PRESSURE: 70 MMHG | HEIGHT: 63 IN

## 2017-11-30 DIAGNOSIS — I48.0 PAROXYSMAL ATRIAL FIBRILLATION (HCC): ICD-10-CM

## 2017-11-30 DIAGNOSIS — I47.1 PAT (PAROXYSMAL ATRIAL TACHYCARDIA) (HCC): ICD-10-CM

## 2017-11-30 DIAGNOSIS — Z95.0 CARDIAC PACEMAKER IN SITU: Primary | ICD-10-CM

## 2017-11-30 DIAGNOSIS — Z79.01 ANTICOAGULATED ON COUMADIN: ICD-10-CM

## 2017-11-30 DIAGNOSIS — Z79.01 LONG-TERM (CURRENT) USE OF ANTICOAGULANTS: Primary | ICD-10-CM

## 2017-11-30 DIAGNOSIS — I49.5 SINOATRIAL NODE DYSFUNCTION (HCC): ICD-10-CM

## 2017-11-30 LAB
INR BLD: NORMAL
INR, EXTERNAL: 2 (ref 2–3)
PT POC: NORMAL SECONDS
VALID INTERNAL CONTROL?: YES

## 2017-11-30 NOTE — PROGRESS NOTES
A full discussion of the nature of anticoagulants has been carried out. A benefit risk analysis has been presented to the patient, so that they understand the justification for choosing anticoagulation at this time. The need for frequent and regular monitoring, precise dosage adjustment and compliance is stressed. Side effects of potential bleeding are discussed. The patient should avoid any OTC items containing aspirin or ibuprofen, and should avoid great swings in general diet. Avoid alcohol consumption. Call if any signs of abnormal bleeding. Next PT/INR test in 1 month. No change in dosing regimen. Son with patient. Office visit with Dr. Eldon Gasca and pacemaker check.

## 2017-11-30 NOTE — MR AVS SNAPSHOT
Visit Information Date & Time Provider Department Dept. Phone Encounter #  
 11/30/2017  3:20 PM Sharlene Melvin MD [facility] [phone] [id number] Follow-up Instructions Return in about 1 year (around 11/30/2018). Your Appointments 11/30/2017  3:40 PM  
COUMADIN CLINIC with KAREN HENNING [facility] (CHARI SCHEDULING) Appt Note: 1 month INR check [address] [facility] [address]  
[address] [facility] [address]  
  
    
 12/1/2017 11:00 AM  
New Patient with Lilia Cherry MD  
[address] [facility]) Appt Note: np est pcp 11.21.17  
 [address] [facility]  
  
   
 [address] [address]  
  
    
 12/20/2018  1:30 PM  
PACEMAKER with Rabia Yan [facility] (CHARI SCHEDULING) Appt Note: sjm threshold/rc/Britt annual b [facility] [address]  
[address] [facility]  
  
    
 12/20/2018  1:40 PM  
ESTABLISHED PATIENT with Sharlene Melvin MD  
[facility] ([facility]) Appt Note: sjm threshold/rc/Britt annual b [place] [address] [address]  
[address] [facility] [address]  
  
    
  
 3/5/2018  2:30 PM  
REMOTE OFFICE VISIT with Maame Montoya [facility] ([facility]I SCHEDULING) Appt Note: merlin PPI/rc b 11-30-17  
 [address]  
[address] [facility] [address] [address]  
  
    
 6/11/2018 11:30 AM  
REMOTE OFFICE VISIT with Maame Montoya [facility] (CHARI SCHEDULING) Appt Note: merlin PPI/rc b  
 [address] [place] [address]  
[phone]  
  
    
 9/17/2018  8:15 AM  
REMOTE OFFICE VISIT with Kt Lemus [facility] (CHARI SCHEDULING) Appt Note: merlin PPI/rc b  
 [address] [place] [address]  
[phone] Upcoming Health Maintenance Date Due DTaP/Tdap/Td series (1 - Tdap) 12/24/1941 ZOSTER VACCINE AGE 60> 10/24/1980 GLAUCOMA SCREENING Q2Y 12/24/1985 Pneumococcal 65+ Low/Medium Risk (1 of 2 - PCV13) 12/24/1985 MEDICARE YEARLY EXAM 12/24/1985 Influenza Age 5 to Adult 8/1/2017 Allergies as of 11/30/2017  Review Complete On: 11/30/2017 By: Jesús Sher MD  
 No Known Allergies Current Immunizations  Never Reviewed No immunizations on file. Not reviewed this visit You Were Diagnosed With   
  
 Codes Comments Cardiac pacemaker in situ    -  Primary ICD-10-CM: Z95.0 ICD-9-CM: V45.01 Paroxysmal atrial fibrillation (HCC)     ICD-10-CM: I48.0 ICD-9-CM: 427.31 Anticoagulated on Coumadin     ICD-10-CM: Z51.81, Z79.01 
ICD-9-CM: V58.83, V58.61 Sinoatrial node dysfunction (HCC)     ICD-10-CM: I49.5 ICD-9-CM: 427.81 PAT (paroxysmal atrial tachycardia) (HCC)     ICD-10-CM: I47.1 ICD-9-CM: 427.0 Vitals BP Height(growth percentile) Weight(growth percentile) BMI OB Status Smoking Status 144/70 (BP 1 Location: Left arm, BP Patient Position: Sitting) 5' 3\" (1.6 m) 169 lb 3.2 oz (76.7 kg) 29.97 kg/m2 Postmenopausal Never Smoker Vitals History BMI and BSA Data Body Mass Index Body Surface Area  
 29.97 kg/m 2 1.85 m 2 Preferred Pharmacy Pharmacy Name Phone [id number], [place] - [address] [phone] Your Updated Medication List  
  
   
This list is accurate as of: 11/30/17  3:23 PM.  Always use your most recent med list.  
  
  
  
  
 allopurinol 100 mg tablet Commonly known as:  Hamilton Minor  
 Take 1 Tab by mouth daily. ALPHAGAN P 0.1 % ophthalmic solution Generic drug:  brimonidine Administer 1 Drop to both eyes two (2) times a day. amLODIPine 5 mg tablet Commonly known as:  Jessica Spruce Take 1 Tab by mouth daily. atenolol 25 mg tablet Commonly known as:  TENORMIN Take 4 Tabs by mouth daily. dorzolamide 2 % ophthalmic solution Commonly known as:  TRUSOPT Administer 2 Drops to both eyes three (3) times daily. EVISTA 60 mg tablet Generic drug:  raloxifene TAKE ONE TABLET BY MOUTH DAILY  
  
 magnesium oxide 400 mg tablet Commonly known as:  MAG-OXIDE Take 1 Tab by mouth daily. At bedtime  
  
 simvastatin 20 mg tablet Commonly known as:  ZOCOR  
TAKE 1 TABLET BY MOUTH ONCE DAILY AT NIGHT  
  
 timolol 0.5 % ophthalmic solution Commonly known as:  TIMOPTIC INSTILL 1 DROP INTO BOTH EYES BID TYLENOL PO Take  by mouth as needed. 1 TABLET  
  
 warfarin 2.5 mg tablet Commonly known as:  COUMADIN  
TAKE 1 TABLET (2.5MG) DAILY Follow-up Instructions Return in about 1 year (around 11/30/2018). Patient Instructions You will need to follow up in clinic with Dr. Jennifer Mary in 1 year. Introducing [facility]! Alisa Burr introduces vivio patient portal. Now you can access parts of your medical record, email your doctor's office, and request medication refills online. 1. In your internet browser, go to [email or web address] 2. Click on the First Time User? Click Here link in the Sign In box. You will see the New Member Sign Up page. 3. Enter your vivio Access Code exactly as it appears below. You will not need to use this code after youve completed the sign-up process. If you do not sign up before the expiration date, you must request a new code. · vivio Access Code: [email or web address] Expires: 2/28/2018  3:20 PM 
 
4.  Enter the last four digits of your Social Security Number (xxxx) and Date of Birth (mm/dd/yyyy) as indicated and click Submit. You will be taken to the next sign-up page. 5. Create a SpotFodo ID. This will be your SpotFodo login ID and cannot be changed, so think of one that is secure and easy to remember. 6. Create a SpotFodo password. You can change your password at any time. 7. Enter your Password Reset Question and Answer. This can be used at a later time if you forget your password. 8. Enter your e-mail address. You will receive e-mail notification when new information is available in [address] 9. Click Sign Up. You can now view and download portions of your medical record. 10. Click the Download Summary menu link to download a portable copy of your medical information. If you have questions, please visit the Frequently Asked Questions section of the SpotFodo website. Remember, SpotFodo is NOT to be used for urgent needs. For medical emergencies, dial 911. Now available from your iPhone and Android! Please provide this summary of care documentation to your next provider. Your primary care clinician is listed as Baby Boning. If you have any questions after today's visit, please call [phone].

## 2017-11-30 NOTE — PROGRESS NOTES
Cardiac Electrophysiology Office Note     Subjective:      Elicia Sharma is a 80 y.o. woman who is here to follow up  Son is with her  The patient and her son said she is doing very well and turning 80 soon. She does have occasional palpitations and the [employer] pacemaker does show paroxysmal atrial fibrillation/PAT   The ventricular rate was controlled under 100 beats per minute most of the time by the pacemaker. Dr Sharon Cornell placed the original pacer 4/6/2006. It is replaced in 2014. It is [employer]. She has past medical history of sinus node dysfunction-s/p ppm (4/2006), atrial fibrillation, hypertension, hyperlipidemia, palpitations, and gout. Patient Active Problem List    Diagnosis Date Noted    Encounter for long-term (current) use of high-risk medication 04/07/2014    Gout 04/30/2012    Hyperuricemia 04/30/2012    Paroxysmal atrial fibrillation ([facility] 03/22/2011    Essential hypertension, benign 03/22/2011    Palpitations 03/22/2011    Sinoatrial node dysfunction ([facility] [address] [facility] 03/22/2011    Cardiac pacemaker in situ 11/17/2010     Current Outpatient Prescriptions   Medication Sig Dispense Refill    atenolol (TENORMIN) 25 mg tablet Take 4 Tabs by mouth daily. 60 Tab 12    timolol (TIMOPTIC) 0.5 % ophthalmic solution INSTILL 1 DROP INTO BOTH EYES BID  1    warfarin (COUMADIN) 2.5 mg tablet TAKE 1 TABLET (2.5MG) DAILY 90 Tab 1    simvastatin (ZOCOR) 20 mg tablet TAKE 1 TABLET BY MOUTH ONCE DAILY AT NIGHT 30 Tab 6    amLODIPine (NORVASC) 5 mg tablet Take 1 Tab by mouth daily. 30 Tab 6    ACETAMINOPHEN (TYLENOL PO) Take  by mouth as needed. 1 TABLET      allopurinol (ZYLOPRIM) 100 mg tablet Take 1 Tab by mouth daily. (Patient taking differently: Take 100 mg by mouth as needed.) 90 Tab 3    dorzolamide (TRUSOPT) 2 % ophthalmic solution Administer 2 Drops to both eyes three (3) times daily.  brimonidine (ALPHAGAN P) 0.1 % ophthalmic solution Administer 1 Drop to both eyes two (2) times a day.  magnesium oxide (MAG-OXIDE) 400 mg tablet Take 1 Tab by mouth daily. At bedtime 30 Tab 12    EVISTA 60 mg tablet TAKE ONE TABLET BY MOUTH DAILY 30 Tab 11    dextromethorphan-guaiFENesin (ROBITUSSIN-DM)  mg/5 mL syrup Take 5 mL by mouth every four (4) hours as needed for Cough. 1 Bottle 0     No Known Allergies  Past Medical History:   Diagnosis Date    Aortic valve disorders 4/29/2012    Atrial fibrillation ([facility] [address] 3/22/2011    CAD (coronary artery disease)     Cor pulmonale, chronic (HCC) 4/29/2012    Dyslipidemia (high LDL; low HDL) 4/29/2012    Gout 4/30/2012    Gout, arthritis 2/8/2012    Gout, joint     Hypertension     Hyperuricemia 4/30/2012    Obstructive sleep apnea 4/29/2012    Recurrent epistaxis 4/29/2012    [facility] 3/22/2011     Past Surgical History:   Procedure Laterality Date    BREAST SURGERY PROCEDURE UNLISTED      lumpectomy    GEN INSERT/REPLACE ONLY DUAL  4/28/2014         HX PACEMAKER         Social History   Substance Use Topics    Smoking status: Never Smoker    Smokeless tobacco: Never Used    Alcohol use No        Review of Systems:   Constitutional: Negative for fever, chills, weight loss, malaise/fatigue. HEENT: Negative for nosebleeds, vision changes. Respiratory: Negative for cough, hemoptysis, sputum production, and wheezing. Cardiovascular: Negative for chest pain, leg swelling, syncope, and PND. Gastrointestinal: Negative for nausea, vomiting, diarrhea, blood in stool and melena. Genitourinary: Negative for dysuria, and hematuria. Musculoskeletal: Negative for myalgias, + arthralgia. Skin: Negative for rash. Heme: Does not bleed or bruise easily.    Neurological: Negative for speech change and focal weakness     Objective:     Visit Vitals    /70 (BP 1 Location: Left arm, BP Patient Position: Sitting)    Ht 5' 3\" (1.6 m)    Wt 169 lb 3.2 oz (76.7 kg)    BMI 29.97 kg/m2      Physical Exam: Constitutional: well-developed and well-nourished. No distress. Head: Normocephalic and atraumatic. Eyes: Pupils are equal, round  Neck: supple. No JVD present. No bruits   Cardiovascular: Normal rate, regular rhythm and normal heart sounds. Exam reveals no gallop and no friction rub. No murmur heard. Pulmonary/Chest: Effort normal. No wheezes. Abdominal: Soft, no tenderness. Musculoskeletal: no edema. Neurological: alert,oriented. Skin: Skin is warm and dry. Left sided ppm incision healed   Psychiatric: normal mood and affect. Judgment and thought content normal.          Assessment/Plan:       ICD-10-CM ICD-9-CM    1. Cardiac pacemaker in situ Z95.0 V45.01    2. Paroxysmal atrial fibrillation (HCC) I48.0 427.31    3. Anticoagulated on Coumadin Z51.81 V58.83     Z79.01 V58.61    4. Sinoatrial node dysfunction (HCC) I49.5 427.81    5. PAT (paroxysmal atrial tachycardia) ([facility]) I47.1 427.0    As always, her son is with her and she is doing well. She walks with a cane but is very young for her stated age. I explained to her and her son about pacemaker function  The pacemaker check today showed proper function and the patient is mostly asymptomatic with her atrial fibrillation. She is taking Coumadin without bleeding, INR to be checked today. She and her son agree to continue with the same plan. Follow-up Disposition:  Return in about 1 year (around 11/30/2018). Thank you for involving me in this patient's care and please call with further concerns or questions. Leanna Orona M.D.   Electrophysiology/Cardiology  [facility] [place]  [address] [place] [address]  [phone]

## 2017-12-01 ENCOUNTER — OFFICE VISIT (OUTPATIENT)
Dept: INTERNAL MEDICINE CLINIC | Age: 82
End: 2017-12-01

## 2017-12-01 VITALS
HEART RATE: 66 BPM | TEMPERATURE: 97.8 F | DIASTOLIC BLOOD PRESSURE: 78 MMHG | SYSTOLIC BLOOD PRESSURE: 137 MMHG | OXYGEN SATURATION: 97 % | RESPIRATION RATE: 16 BRPM | BODY MASS INDEX: 29.86 KG/M2 | WEIGHT: 168.5 LBS | HEIGHT: 63 IN

## 2017-12-01 DIAGNOSIS — E79.0 HYPERURICEMIA: ICD-10-CM

## 2017-12-01 DIAGNOSIS — R79.9 ABNORMAL FINDING OF BLOOD CHEMISTRY: ICD-10-CM

## 2017-12-01 DIAGNOSIS — R00.2 PALPITATIONS: ICD-10-CM

## 2017-12-01 DIAGNOSIS — Z95.0 PACEMAKER: ICD-10-CM

## 2017-12-01 DIAGNOSIS — Z00.00 MEDICARE ANNUAL WELLNESS VISIT, SUBSEQUENT: Primary | ICD-10-CM

## 2017-12-01 DIAGNOSIS — Z95.0 CARDIAC PACEMAKER IN SITU: ICD-10-CM

## 2017-12-01 DIAGNOSIS — I48.0 PAROXYSMAL ATRIAL FIBRILLATION (HCC): ICD-10-CM

## 2017-12-01 DIAGNOSIS — Z13.31 SCREENING FOR DEPRESSION: ICD-10-CM

## 2017-12-01 DIAGNOSIS — I10 ESSENTIAL HYPERTENSION, BENIGN: ICD-10-CM

## 2017-12-01 DIAGNOSIS — Z13.39 SCREENING FOR ALCOHOLISM: ICD-10-CM

## 2017-12-01 DIAGNOSIS — I49.5 SINOATRIAL NODE DYSFUNCTION (HCC): ICD-10-CM

## 2017-12-01 NOTE — MR AVS SNAPSHOT
Visit Information Date & Time Provider Department Dept. Phone Encounter #  
 12/1/2017 11:00 AM Jesenia Hull 80 [facility] [phone] [id number] Follow-up Instructions Return in about 3 months (around 3/1/2018). Follow-up and Disposition History Your Appointments 12/28/2017 10:00 AM  
COUMADIN CLINIC with KAREN HENNING [facility] (CHARI SCHEDULING) Appt Note: 1 month INR check [address] [place] [address]  
[address] [address]  
  
    
 12/20/2018  1:30 PM  
PACEMAKER with Lilibeth Stone [facility] (CHARI SCHEDULING) Appt Note: sjm threshold/rc[facility]Britt [facility] [address] [place]  
[address] [facility]   
  
    
 12/20/2018  1:40 PM  
ESTABLISHED PATIENT with Hubert Cho MD  
[facility] ([facility]) Appt Note: sjm threshold/rc/Britt annual b [place] [address] [address]  
[address] [address]  
  
    
  
 3/5/2018  2:30 PM  
REMOTE OFFICE VISIT with Red Patino [facility] (CHARI SCHEDULING) Appt Note: merlin PPI/rc b 11-30-17  
 [address] [place] [address]  
[address] [address]  
  
    
 6/11/2018 11:30 AM  
REMOTE OFFICE VISIT with Red Patino [facility] (CHARI SCHEDULING) Appt Note: merlin PPI/rc b  
 [address] [place] [address]  
[phone]  
  
    
 9/17/2018  8:15 AM  
REMOTE OFFICE VISIT with Red Patino [facility] (CHARI SCHEDULING) Appt Note: merlin PPI/rc b  
 [address] [place] [address]  
[phone] Upcoming Health Maintenance Date Due DTaP/Tdap/Td series (1 - Tdap) [birth year]/24/1941 ZOSTER VACCINE AGE 60> 10/24/1980 GLAUCOMA SCREENING Q2Y 12/24/1985 Pneumococcal 65+ Low/Medium Risk (1 of 2 - PCV13) 12/24/1985 MEDICARE YEARLY EXAM 12/24/1985 Allergies as of 12/1/2017  Review Complete On: 12/1/2017 By: Barrington Orosco MD  
 No Known Allergies Current Immunizations  Never Reviewed No immunizations on file. Not reviewed this visit You Were Diagnosed With   
  
 Codes Comments Medicare annual wellness visit, subsequent    -  Primary ICD-10-CM: Z00.00 ICD-9-CM: V70.0 Screening for alcoholism     ICD-10-CM: Z13.89 ICD-9-CM: V79.1 Screening for depression     ICD-10-CM: Z13.89 ICD-9-CM: V79.0 Paroxysmal atrial fibrillation (HCC)     ICD-10-CM: I48.0 ICD-9-CM: 427.31 Sinoatrial node dysfunction (HCC)     ICD-10-CM: I49.5 ICD-9-CM: 427.81 Cardiac pacemaker in situ     ICD-10-CM: Z95.0 ICD-9-CM: V45.01 Essential hypertension, benign     ICD-10-CM: I10 
ICD-9-CM: 401.1 Palpitations     ICD-10-CM: R00.2 ICD-9-CM: 785.1 Hyperuricemia     ICD-10-CM: E79.0 ICD-9-CM: 790.6 Pacemaker     ICD-10-CM: Z95.0 ICD-9-CM: V45.01 Abnormal finding of blood chemistry     ICD-10-CM: R79.9 ICD-9-CM: 790.6 Vitals BP Pulse Temp Resp Height(growth percentile) Weight(growth percentile)  
 137/78 66 97.8 °F (36.6 °C) (Oral) 16 5' 3\" (1.6 m) 168 lb 8 oz (76.4 kg) SpO2 BMI OB Status Smoking Status 97% 29.85 kg/m2 Postmenopausal Never Smoker Vitals History BMI and BSA Data Body Mass Index Body Surface Area  
 29.85 kg/m 2 1.84 m 2 Preferred Pharmacy Pharmacy Name Phone [facility] [address] [facility] [phone] Your Updated Medication List  
  
   
 This list is accurate as of: 12/1/17  1:28 PM.  Always use your most recent med list.  
  
  
  
  
 allopurinol 100 mg tablet Commonly known as:  Gonzella Cotta Take 1 Tab by mouth daily. ALPHAGAN P 0.1 % ophthalmic solution Generic drug:  brimonidine Administer 1 Drop to both eyes two (2) times a day. amLODIPine 5 mg tablet Commonly known as:  Tad Viki Take 1 Tab by mouth daily. atenolol 25 mg tablet Commonly known as:  TENORMIN Take 4 Tabs by mouth daily. dorzolamide 2 % ophthalmic solution Commonly known as:  TRUSOPT Administer 2 Drops to both eyes three (3) times daily. EVISTA 60 mg tablet Generic drug:  raloxifene TAKE ONE TABLET BY MOUTH DAILY  
  
 magnesium oxide 400 mg tablet Commonly known as:  MAG-OXIDE Take 1 Tab by mouth daily. At bedtime  
  
 simvastatin 20 mg tablet Commonly known as:  ZOCOR  
TAKE 1 TABLET BY MOUTH ONCE DAILY AT NIGHT  
  
 timolol 0.5 % ophthalmic solution Commonly known as:  TIMOPTIC INSTILL 1 DROP INTO BOTH EYES BID TYLENOL PO Take  by mouth as needed. 1 TABLET  
  
 warfarin 2.5 mg tablet Commonly known as:  COUMADIN  
TAKE 1 TABLET (2.5MG) DAILY We Performed the Following CBC WITH AUTOMATED DIFF [32796 CPT(R)] [address] [[id number] HCPCS] HEMOGLOBIN A1C WITH EAG [01192 CPT(R)] LIPID PANEL [07789 CPT(R)] METABOLIC PANEL, COMPREHENSIVE [68001 CPT(R)] WI ANNUAL ALCOHOL SCREEN 15 MIN [id number] HCPCS] WI COLLECTION VENOUS BLOOD,VENIPUNCTURE [id number] CPT(R)] REFERRAL TO OPHTHALMOLOGY [REF57 Custom] TSH 3RD GENERATION [38428 CPT(R)] URINALYSIS W/ RFLX MICROSCOPIC [22628 CPT(R)] Follow-up Instructions Return in about 3 months (around 3/1/2018). To-Do List   
 12/01/2017 Imaging:  SERAFIN MAMMO BI SCREENING INCL CAD Referral Information  Referral ID Referred By Referred To  
  
 [id number] Arron Acevedo MD   
 [address] [place] [address] Miko Degroot Phone: [phone] Fax: [phone] Visits Status Start Date End Date 1 New Request 12/1/17 12/1/18 If your referral has a status of pending review or denied, additional information will be sent to support the outcome of this decision. Patient Instructions Medicare Wellness Visit, Female The best way to live healthy is to have a healthy lifestyle by eating a well-balanced diet, exercising regularly, limiting alcohol and stopping smoking. Regular physical exams and screening tests are another way to keep healthy. Preventive exams provided by your health care provider can find health problems before they become diseases or illnesses. Preventive services including immunizations, screening tests, monitoring and exams can help you take care of your own health. All people over age 72 should have a pneumovax  and and a prevnar shot to prevent pneumonia. These are once in a lifetime unless you and your provider decide differently. All people over 65 should have a yearly flu shot and a tetanus vaccine every 10 years. A bone mass density to screen for osteoporosis or thinning of the bones should be done every 2 years after 65. Screening for diabetes mellitus with a blood sugar test should be done every year. Glaucoma is a disease of the eye due to increased ocular pressure that can lead to blindness and it should be done every year by an eye professional. 
 
Cardiovascular screening tests that check for elevated lipids (fatty part of blood) which can lead to heart disease and strokes should be done every 5 years. Colorectal screening that evaluates for blood or polyps in your colon should be done yearly as a stool test or every five years as a flexible sigmoidoscope or every 10 years as a colonoscopy up to age 76.  
 
Breast cancer screening with a mammogram is recommended biennially  for women age 54-69. Screening for cervical cancer with a pap smear and pelvic exam is recommended for women after age 72 years every 2 years up to age 79 or when the provider and patient decide to stop. If there is a history of cervical abnormalities or other increased risk for cancer then the test is recommended yearly. Hepatitis C screening is also recommended for anyone born between 80 through [address]. A shingles vaccine is also recommended once in a lifetime after age 61. Your Medicare Wellness Exam is recommended annually. Here is a list of your current Health Maintenance items with a due date: 
Health Maintenance Due Topic Date Due  
 DTaP/Tdap/Td  (1 - Tdap) 12/24/1941  Shingles Vaccine  10/24/1980  Glaucoma Screening   12/24/1985  Pneumococcal Vaccine (1 of 2 - PCV13) 12/24/1985 [address] [place] [address] Annual Well Visit  12/24/1985 Introducing [facility]! Sheri Sanders introduces Crystalsol patient portal. Now you can access parts of your medical record, email your doctor's office, and request medication refills online. 1. In your internet browser, go to [email or web address] 2. Click on the First Time User? Click Here link in the Sign In box. You will see the New Member Sign Up page. 3. Enter your Crystalsol Access Code exactly as it appears below. You will not need to use this code after youve completed the sign-up process. If you do not sign up before the expiration date, you must request a new code. · Crystalsol Access Code: [email or web address] Expires: 2/28/2018  3:20 PM 
 
4. Enter the last four digits of your Social Security Number (xxxx) and Date of Birth (mm/dd/yyyy) as indicated and click Submit. You will be taken to the next sign-up page. 5. Create a Crystalsol ID. This will be your Crystalsol login ID and cannot be changed, so think of one that is secure and easy to remember. 6. Create a Crystalsol password. You can change your password at any time. 7. Enter your Password Reset Question and Answer. This can be used at a later time if you forget your password. 8. Enter your e-mail address. You will receive e-mail notification when new information is available in [address] 9. Click Sign Up. You can now view and download portions of your medical record. 10. Click the Download Summary menu link to download a portable copy of your medical information. If you have questions, please visit the Frequently Asked Questions section of the WellTrackOne website. Remember, WellTrackOne is NOT to be used for urgent needs. For medical emergencies, dial 911. Now available from your iPhone and Android! Please provide this summary of care documentation to your next provider. Your primary care clinician is listed as Jd Khan. If you have any questions after today's visit, please call [phone].

## 2017-12-01 NOTE — PROGRESS NOTES
This is a Subsequent Medicare Annual Wellness Exam (AWV) (Performed 12 months after IPPE or effective date of Medicare Part B enrollment)    I have reviewed the patient's medical history in detail and updated the computerized patient record. History     Past Medical History:   Diagnosis Date    Aortic valve disorders 4/29/2012    Atrial fibrillation ([facility] [address] 3/22/2011    CAD (coronary artery disease)     Cor pulmonale, chronic (HCC) 4/29/2012    Dyslipidemia (high LDL; low HDL) 4/29/2012    Gout 4/30/2012    Gout, arthritis 2/8/2012    Gout, joint     Hypertension     Hyperuricemia 4/30/2012    Obstructive sleep apnea 4/29/2012    Pacemaker 04/02/2006    Dr Funmi Merino placed the original pacer 4/6/2006. It is replaced in 2014. It is [employer]    Recurrent epistaxis 4/29/2012    Sinoatrial node dysfunction ([facility] [address] 3/22/2011      Past Surgical History:   Procedure Laterality Date    BREAST SURGERY PROCEDURE UNLISTED      lumpectomy    GEN INSERT/REPLACE ONLY DUAL  4/28/2014         HX PACEMAKER       Current Outpatient Prescriptions   Medication Sig Dispense Refill    atenolol (TENORMIN) 25 mg tablet Take 4 Tabs by mouth daily. 60 Tab 12    warfarin (COUMADIN) 2.5 mg tablet TAKE 1 TABLET (2.5MG) DAILY 90 Tab 1    simvastatin (ZOCOR) 20 mg tablet TAKE 1 TABLET BY MOUTH ONCE DAILY AT NIGHT 30 Tab 6    amLODIPine (NORVASC) 5 mg tablet Take 1 Tab by mouth daily. 30 Tab 6    ACETAMINOPHEN (TYLENOL PO) Take  by mouth as needed. 1 TABLET      allopurinol (ZYLOPRIM) 100 mg tablet Take 1 Tab by mouth daily. (Patient taking differently: Take 100 mg by mouth as needed.) 90 Tab 3    dorzolamide (TRUSOPT) 2 % ophthalmic solution Administer 2 Drops to both eyes three (3) times daily.  brimonidine (ALPHAGAN P) 0.1 % ophthalmic solution Administer 1 Drop to both eyes two (2) times a day.  magnesium oxide (MAG-OXIDE) 400 mg tablet Take 1 Tab by mouth daily.  At bedtime 30 Tab 12    EVISTA 60 mg tablet TAKE ONE TABLET BY MOUTH DAILY 30 Tab 11    timolol (TIMOPTIC) 0.5 % ophthalmic solution INSTILL 1 DROP INTO BOTH EYES BID  1     No Known Allergies  No family history on file. Social History   Substance Use Topics    Smoking status: Never Smoker    Smokeless tobacco: Never Used    Alcohol use No     Patient Active Problem List   Diagnosis Code    Cardiac pacemaker in situ Z95.0    Paroxysmal atrial fibrillation (HCC) I48.0    Essential hypertension, benign I10    Palpitations R00.2    Sinoatrial node dysfunction (HCC) I49.5    Gout M10.9    Hyperuricemia E79.0    Encounter for long-term (current) use of high-risk medication Z79.899    Pacemaker Z95.0       Depression Risk Factor Screening:     PHQ over the last two weeks 12/1/2017   Little interest or pleasure in doing things Not at all   Feeling down, depressed or hopeless Not at all   Total Score PHQ 2 0     Alcohol Risk Factor Screening: You do not drink alcohol or very rarely. Functional Ability and Level of Safety:   Hearing Loss  Hearing is good. Activities of Daily Living  The home contains: no safety equipment. Patient does total self care    Fall Risk  Fall Risk Assessment, last 12 mths 12/1/2017   Able to walk? Yes   Fall in past 12 months? No       Abuse Screen  Patient is not abused    Cognitive Screening   Evaluation of Cognitive Function:  Has your family/caregiver stated any concerns about your memory: no  Normal    Patient Care Team   Patient Care Team:  Jocelin Rogers MD as PCP - General (Internal Medicine)  Sheryle Miguel, MD as Physician (Cardiology)  Mic Aguilera RN as [address] [place] [address] (Cardiology)  Jesús Sher MD as Physician (Cardiology)    Assessment/Plan   Education and counseling provided:  Are appropriate based on today's review and evaluation    Diagnoses and all orders for this visit:    1. Medicare annual wellness visit, subsequent    2.  Screening for alcoholism  -     Annual  Alcohol Screen 15 min ()    3. Screening for depression  -     Depression Screen Annual    4. Paroxysmal atrial fibrillation (HCC)  -     REFERRAL TO OPHTHALMOLOGY  -     URINALYSIS W/ RFLX MICROSCOPIC  -     CBC WITH AUTOMATED DIFF  -     METABOLIC PANEL, COMPREHENSIVE  -     LIPID PANEL  -     TSH 3RD GENERATION  -     [place] COLLECTION VENOUS BLOOD,VENIPUNCTURE  -     HEMOGLOBIN A1C WITH EAG  -     SERAFIN MAMMO BI SCREENING INCL CAD; Future    5. Sinoatrial node dysfunction (HCC)    6. Cardiac pacemaker in situ    7. Essential hypertension, benign    8. Palpitations    9. Hyperuricemia    10. Pacemaker    11. Abnormal finding of blood chemistry   -     LIPID PANEL  -     HEMOGLOBIN A1C WITH EAG        Health Maintenance Due   Topic Date Due    DTaP/Tdap/Td series (1 - Tdap) 12/24/1941    ZOSTER VACCINE AGE 60>  10/24/1980    GLAUCOMA SCREENING Q2Y  12/24/1985    Pneumococcal 65+ Low/Medium Risk (1 of 2 - PCV13) 12/24/1985    MEDICARE YEARLY EXAM  12/24/1985   Advance Care Planning (ACP) Provider Conversation Snapshot    Date of ACP Conversation: 12/01/17  Persons included in Conversation:  patient  Length of ACP Conversation in minutes:  <16 minutes (Non-Billable)    Authorized Decision Maker (if patient is incapable of making informed decisions): This person is:   Healthcare Agent/Medical Power of  under Advance Directive  Son         For Patients with Decision Making Capacity:   Values/Goals: Exploration of values, goals, and preferences if recovery is not expected, even with continued medical treatment in the event of:  Imminent death  Severe, permanent brain injury  \"In these circumstances, what matters most to you? \"  Care focused more on comfort or quality of life.     Conversation Outcomes / Follow-Up Plan:   Recommended completion of Advance Directive form after review of ACP materials and conversation with prospective healthcare agent     [address], [place], [place], [place] [address] [facility] [address]  Phone:  [phone]  Fax: [phone]    Chief Complaint   Patient presents with    Annual Wellness Visit       SUBJECTIVE:    Diane Ca is a 80 y.o. female Patient comes in today as a new patient to us, who was previously followed by Kirk Cottrell. More recently, that is yesterday, she was seen by Grey Bain M.D., with her son, complaining of palpitations and the [employer] pacemaker revealed paroxysmal atrial fibrillation - PAT. Ventricular rate was controlled at 100 bpm most of the time by the pacemaker. We note that Dr. Darshana Diehl placed the original pacemaker on , and it was replaced in 2014 at [address] [place] [address]. She has a medical history of sinus node dysfunction, atrial fibrillation, hypertension, dyslipidemia, palpitations and gout. For the atrial fibrillation she is on Warfarin with therapeutic INR. She has a history of hypertension, dyslipidemia, coronary artery disease and is seen for evaluation. Current Outpatient Prescriptions   Medication Sig Dispense Refill    atenolol (TENORMIN) 25 mg tablet Take 4 Tabs by mouth daily. 60 Tab 12    warfarin (COUMADIN) 2.5 mg tablet TAKE 1 TABLET (2.5MG) DAILY 90 Tab 1    simvastatin (ZOCOR) 20 mg tablet TAKE 1 TABLET BY MOUTH ONCE DAILY AT NIGHT 30 Tab 6    amLODIPine (NORVASC) 5 mg tablet Take 1 Tab by mouth daily. 30 Tab 6    ACETAMINOPHEN (TYLENOL PO) Take  by mouth as needed. 1 TABLET      allopurinol (ZYLOPRIM) 100 mg tablet Take 1 Tab by mouth daily. (Patient taking differently: Take 100 mg by mouth as needed.) 90 Tab 3    dorzolamide (TRUSOPT) 2 % ophthalmic solution Administer 2 Drops to both eyes three (3) times daily.  brimonidine (ALPHAGAN P) 0.1 % ophthalmic solution Administer 1 Drop to both eyes two (2) times a day.  magnesium oxide (MAG-OXIDE) 400 mg tablet Take 1 Tab by mouth daily.  At bedtime 30 Tab 12    EVISTA 60 mg tablet TAKE ONE TABLET BY MOUTH DAILY 30 Tab 11    timolol (TIMOPTIC) 0.5 % ophthalmic solution INSTILL 1 DROP INTO BOTH EYES BID  1     Past Medical History:   Diagnosis Date    Aortic valve disorders 4/29/2012    Atrial fibrillation ([address] 3/22/2011    CAD (coronary artery disease)     Cor pulmonale, chronic (HCC) 4/29/2012    Dyslipidemia (high LDL; low HDL) 4/29/2012    Gout 4/30/2012    Gout, arthritis 2/8/2012    Gout, joint     Hypertension     Hyperuricemia 4/30/2012    Obstructive sleep apnea 4/29/2012    Pacemaker 04/02/2006    Dr Sylwia Rueda placed the original pacer 4/6/2006. It is replaced in 2014.   It is [facility] [employer]    Recurrent epistaxis 4/29/2012    Sinoatrial node dysfunction ([facility] [address] 3/22/2011     Past Surgical History:   Procedure Laterality Date    BREAST SURGERY PROCEDURE UNLISTED      lumpectomy    GEN INSERT/REPLACE ONLY DUAL  4/28/2014         HX PACEMAKER       No Known Allergies    REVIEW OF SYSTEMS:  General: negative for - chills or fever  ENT: negative for - headaches, nasal congestion or tinnitus  Respiratory: negative for - cough, hemoptysis, shortness of breath or wheezing  Cardiovascular : negative for - chest pain, edema, palpitations or shortness of breath  Gastrointestinal: negative for - abdominal pain, blood in stools, heartburn or nausea/vomiting  Genito-Urinary: no dysuria, trouble voiding, or hematuria  Musculoskeletal: negative for - gait disturbance, joint pain, joint stiffness or joint swelling  Neurological: no TIA or stroke symptoms  Hematologic: no bruises, no bleeding, no swollen glands  Integument: no lumps, mole changes, nail changes or rash  Endocrine:no malaise/lethargy or unexpected weight changes      Social History     Social History    Marital status:      Spouse name: N/A    Number of children: N/A    Years of education: N/A     Social History Main Topics    Smoking status: Never Smoker    Smokeless tobacco: Never Used    Alcohol use No    Drug use: No    Sexual activity: No     Other Topics Concern    Not on file     Social History Narrative     No family history on file. Habits:  She is a lifetime nonsmoker, non drinker, non drug abuser. Social History:  The patient is . She lives with her son. 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 She is a member of [employer]. Family History:  Father and mother , unknown ages and causes. Two sisters  of unknown causes. OBJECTIVE:     Visit Vitals    /78    Pulse 66    Temp 97.8 °F (36.6 °C) (Oral)    Resp 16    Ht 5' 3\" (1.6 m)    Wt 168 lb 8 oz (76.4 kg)    SpO2 97%    BMI 29.85 kg/m2     CONSTITUTIONAL: well , well nourished, appears age appropriate  EYES: perrla, eom intact  ENMT:moist mucous membranes, pharynx clear  NECK: supple. Thyroid normal  RESPIRATORY: Chest: clear bilaterally  CARDIOVASCULAR: Heart: regular rate and rhythm  GASTROINTESTINAL: Abdomen: soft, bowel sounds active  HEMATOLOGIC: no pathological lymph nodes palpated  MUSCULOSKELETAL: Extremities: no edema, pulse 1+   INTEGUMENT: No unusual rashes or suspicious skin lesions noted.  Nails appear normal.  NEUROLOGIC: non-focal exam   MENTAL STATUS: alert and oriented, appropriate affect     Clinical Support on 2017   Component Date Value Ref Range Status    INR, External 2017 2.0  2.0 - 3.0 Final    VALID INTERNAL CONTROL POC 2017 Yes   Final   Clinical Support on 10/30/2017   Component Date Value Ref Range Status    INR, External 10/30/2017 2.3  2.0 - 3.0 Final    VALID INTERNAL CONTROL POC 10/30/2017 Yes   Final   Clinical Support on 10/09/2017   Component Date Value Ref Range Status    INR, External 10/09/2017 2.0  2.0 - 3.0 Final    VALID INTERNAL CONTROL POC 10/09/2017 Yes   Final   Clinical Support on 2017   Component Date Value Ref Range Status    INR, External 2017 1.6* 2.0 - 3.0 Final    VALID INTERNAL CONTROL POC 2017 Yes   Final       ASSESSMENT:   1. Medicare annual wellness visit, subsequent    2. Screening for alcoholism    3. Screening for depression    4. Paroxysmal atrial fibrillation (HCC)    5. Sinoatrial node dysfunction (HCC)    6. Cardiac pacemaker in situ    7. Essential hypertension, benign    8. Palpitations    9. Hyperuricemia    10. Pacemaker    11. Abnormal finding of blood chemistry       Patient's medical status is stable, blood pressure control is at goal.  She has her INRs [facility] at [facility] through Dr. Blade Snow. Her BMI is acceptable. Will make arrangements for her to see Dr. Braxton Zarate for follow up of her vision. She'll return to see us every three or four months. Her son is reminded that she can walk in and see us at any time should she not be able to get an appointment if she has an urgency. PLAN:  .  Orders Placed This Encounter    Depression Screen Annual    SERAFIN MAMMO BI SCREENING INCL CAD    URINALYSIS W/ RFLX MICROSCOPIC    CBC WITH AUTOMATED DIFF    METABOLIC PANEL, COMPREHENSIVE    LIPID PANEL    TSH 3RD GENERATION    HEMOGLOBIN A1C WITH EAG    REFERRAL TO OPHTHALMOLOGY       Follow-up Disposition:  Return in about 3 months (around 3/1/2018). ATTENTION:   This medical record was transcribed using an electronic medical records system. Although proofread, it may and can contain electronic and spelling errors. Other human spelling and other errors may be present. Corrections may be executed at a later time. Please feel free to contact us for any clarifications as needed.

## 2017-12-01 NOTE — PATIENT INSTRUCTIONS

## 2017-12-02 LAB
ALBUMIN SERPL-MCNC: 4.1 G/DL (ref 3.2–4.6)
ALBUMIN/GLOB SERPL: 1.5 {RATIO} (ref 1.2–2.2)
ALP SERPL-CCNC: 78 IU/L (ref 39–117)
ALT SERPL-CCNC: 9 IU/L (ref 0–32)
APPEARANCE UR: CLEAR
AST SERPL-CCNC: 13 IU/L (ref 0–40)
BASOPHILS # BLD AUTO: 0 X10E3/UL (ref 0–0.2)
BASOPHILS NFR BLD AUTO: 1 %
BILIRUB SERPL-MCNC: 0.6 MG/DL (ref 0–1.2)
BILIRUB UR QL STRIP: NEGATIVE
BUN SERPL-MCNC: 14 MG/DL (ref 10–36)
BUN/CREAT SERPL: 15 (ref 12–28)
CALCIUM SERPL-MCNC: 9.1 MG/DL (ref 8.7–10.3)
CHLORIDE SERPL-SCNC: 101 MMOL/L (ref 96–106)
CHOLEST SERPL-MCNC: 134 MG/DL (ref 100–199)
CO2 SERPL-SCNC: 24 MMOL/L (ref 18–29)
COLOR UR: YELLOW
CREAT SERPL-MCNC: 0.94 MG/DL (ref 0.57–1)
EOSINOPHIL # BLD AUTO: 0.1 X10E3/UL (ref 0–0.4)
EOSINOPHIL NFR BLD AUTO: 2 %
ERYTHROCYTE [DISTWIDTH] IN BLOOD BY AUTOMATED COUNT: 16.7 % (ref 12.3–15.4)
EST. AVERAGE GLUCOSE BLD GHB EST-MCNC: 120 MG/DL
GFR SERPLBLD CREATININE-BSD FMLA CKD-EPI: 51 ML/MIN/1.73
GFR SERPLBLD CREATININE-BSD FMLA CKD-EPI: 59 ML/MIN/1.73
GLOBULIN SER CALC-MCNC: 2.8 G/DL (ref 1.5–4.5)
GLUCOSE SERPL-MCNC: 83 MG/DL (ref 65–99)
GLUCOSE UR QL: NEGATIVE
HBA1C MFR BLD: 5.8 % (ref 4.8–5.6)
HCT VFR BLD AUTO: 39 % (ref 34–46.6)
HDLC SERPL-MCNC: 64 MG/DL
HGB BLD-MCNC: 12.6 G/DL (ref 11.1–15.9)
HGB UR QL STRIP: NEGATIVE
IMM GRANULOCYTES # BLD: 0 X10E3/UL (ref 0–0.1)
IMM GRANULOCYTES NFR BLD: 0 %
KETONES UR QL STRIP: NEGATIVE
LDLC SERPL CALC-MCNC: 56 MG/DL (ref 0–99)
LEUKOCYTE ESTERASE UR QL STRIP: NEGATIVE
LYMPHOCYTES # BLD AUTO: 1.9 X10E3/UL (ref 0.7–3.1)
LYMPHOCYTES NFR BLD AUTO: 35 %
MCH RBC QN AUTO: 27.8 PG (ref 26.6–33)
MCHC RBC AUTO-ENTMCNC: 32.3 G/DL (ref 31.5–35.7)
MCV RBC AUTO: 86 FL (ref 79–97)
MICRO URNS: NORMAL
MONOCYTES # BLD AUTO: 0.6 X10E3/UL (ref 0.1–0.9)
MONOCYTES NFR BLD AUTO: 10 %
NEUTROPHILS # BLD AUTO: 2.8 X10E3/UL (ref 1.4–7)
NEUTROPHILS NFR BLD AUTO: 52 %
NITRITE UR QL STRIP: NEGATIVE
PH UR STRIP: 7 [PH] (ref 5–7.5)
PLATELET # BLD AUTO: 191 X10E3/UL (ref 150–379)
POTASSIUM SERPL-SCNC: 4.3 MMOL/L (ref 3.5–5.2)
PROT SERPL-MCNC: 6.9 G/DL (ref 6–8.5)
PROT UR QL STRIP: NEGATIVE
RBC # BLD AUTO: 4.54 X10E6/UL (ref 3.77–5.28)
SODIUM SERPL-SCNC: 141 MMOL/L (ref 134–144)
SP GR UR: 1.01 (ref 1–1.03)
TRIGL SERPL-MCNC: 72 MG/DL (ref 0–149)
TSH SERPL DL<=0.005 MIU/L-ACNC: 1.22 UIU/ML (ref 0.45–4.5)
UROBILINOGEN UR STRIP-MCNC: 0.2 MG/DL (ref 0.2–1)
VLDLC SERPL CALC-MCNC: 14 MG/DL (ref 5–40)
WBC # BLD AUTO: 5.4 X10E3/UL (ref 3.4–10.8)

## 2017-12-04 RX ORDER — AMLODIPINE BESYLATE 5 MG/1
TABLET ORAL
Qty: 30 TAB | Refills: 0 | Status: SHIPPED | OUTPATIENT
Start: 2017-12-04 | End: 2018-01-12 | Stop reason: SDUPTHER

## 2017-12-04 NOTE — TELEPHONE ENCOUNTER
Pt has not been seen by Dr. Katherine Manzo or in this office since 7/2016. Pt encouraged to make follow up for further medication refills.      Samantha Bashir PA-C

## 2017-12-11 RX ORDER — WARFARIN 2.5 MG/1
TABLET ORAL
Qty: 90 TAB | Refills: 0 | Status: SHIPPED | OUTPATIENT
Start: 2017-12-11 | End: 2018-03-20 | Stop reason: SDUPTHER

## 2017-12-11 NOTE — TELEPHONE ENCOUNTER
Request for coumadin 2.5mg as directed. Last INR 11/30/17, next INR 12/28/17. Refills per verbal order from Dr. Ivory Mcarthur.

## 2017-12-28 ENCOUNTER — CLINICAL SUPPORT (OUTPATIENT)
Dept: CARDIOLOGY CLINIC | Age: 82
End: 2017-12-28

## 2017-12-28 DIAGNOSIS — Z79.01 LONG-TERM (CURRENT) USE OF ANTICOAGULANTS: Primary | ICD-10-CM

## 2017-12-28 DIAGNOSIS — I48.0 PAROXYSMAL ATRIAL FIBRILLATION (HCC): ICD-10-CM

## 2017-12-28 LAB
INR BLD: NORMAL
INR, EXTERNAL: 2.3 (ref 2–3)
PT POC: NORMAL SECONDS
VALID INTERNAL CONTROL?: YES

## 2017-12-28 NOTE — PROGRESS NOTES
A full discussion of the nature of anticoagulants has been carried out. A benefit risk analysis has been presented to the patient, so that they understand the justification for choosing anticoagulation at this time. The need for frequent and regular monitoring, precise dosage adjustment and compliance is stressed. Side effects of potential bleeding are discussed. The patient should avoid any OTC items containing aspirin or ibuprofen, and should avoid great swings in general diet. Avoid alcohol consumption. Call if any signs of abnormal bleeding. Next PT/INR test in 1 month; telephone follow up thereafter. INR 2.3. Continue current dosing. Verbal instructions given to patient and son.

## 2018-01-04 ENCOUNTER — TELEPHONE (OUTPATIENT)
Dept: CARDIOLOGY CLINIC | Age: 83
End: 2018-01-04

## 2018-01-04 RX ORDER — AMLODIPINE BESYLATE 5 MG/1
TABLET ORAL
Qty: 30 TAB | Refills: 0 | OUTPATIENT
Start: 2018-01-04

## 2018-01-04 NOTE — TELEPHONE ENCOUNTER
Spoke to pt . Verified 2 pt id. Told pt request for medication refused by (VESTA morrison and she needs to schedule an appt to be seen . Pt verbalized understanding and states her son will call the office to make an appointment if I can call him at [phone] . Told pt I I would call her son and give him the number to call to book her appt. Spoke to pt son verified 2 pt id. Told pt to call [phone] to book an appt for his mother césar. Son verbalized understanding.

## 2018-01-08 ENCOUNTER — OFFICE VISIT (OUTPATIENT)
Dept: CARDIOLOGY CLINIC | Age: 83
End: 2018-01-08

## 2018-01-08 ENCOUNTER — APPOINTMENT (OUTPATIENT)
Dept: CT IMAGING | Age: 83
End: 2018-01-08
Attending: EMERGENCY MEDICINE
Payer: MEDICARE

## 2018-01-08 ENCOUNTER — HOSPITAL ENCOUNTER (EMERGENCY)
Age: 83
Discharge: HOME | End: 2018-01-08
Attending: EMERGENCY MEDICINE
Payer: MEDICARE

## 2018-01-08 VITALS
HEART RATE: 58 BPM | HEIGHT: 63 IN | SYSTOLIC BLOOD PRESSURE: 122 MMHG | DIASTOLIC BLOOD PRESSURE: 76 MMHG | BODY MASS INDEX: 29.59 KG/M2 | WEIGHT: 167 LBS | OXYGEN SATURATION: 99 % | RESPIRATION RATE: 16 BRPM

## 2018-01-08 VITALS
HEIGHT: 63 IN | DIASTOLIC BLOOD PRESSURE: 81 MMHG | SYSTOLIC BLOOD PRESSURE: 117 MMHG | WEIGHT: 168 LBS | TEMPERATURE: 98.4 F | BODY MASS INDEX: 29.77 KG/M2 | HEART RATE: 78 BPM | OXYGEN SATURATION: 100 % | RESPIRATION RATE: 15 BRPM

## 2018-01-08 DIAGNOSIS — R06.00 DYSPNEA, UNSPECIFIED TYPE: ICD-10-CM

## 2018-01-08 DIAGNOSIS — R25.1 OCCASIONAL TREMORS: Primary | ICD-10-CM

## 2018-01-08 DIAGNOSIS — M62.838 MUSCLE SPASM: ICD-10-CM

## 2018-01-08 DIAGNOSIS — I48.0 PAROXYSMAL ATRIAL FIBRILLATION (HCC): ICD-10-CM

## 2018-01-08 DIAGNOSIS — R00.2 PALPITATIONS: Primary | ICD-10-CM

## 2018-01-08 DIAGNOSIS — Z95.0 CARDIAC PACEMAKER IN SITU: ICD-10-CM

## 2018-01-08 DIAGNOSIS — I10 ESSENTIAL HYPERTENSION, BENIGN: ICD-10-CM

## 2018-01-08 DIAGNOSIS — Z79.01 LONG-TERM (CURRENT) USE OF ANTICOAGULANTS: ICD-10-CM

## 2018-01-08 LAB
ALBUMIN SERPL-MCNC: 3.6 G/DL (ref 3.5–5)
ALBUMIN/GLOB SERPL: 1 {RATIO} (ref 1.1–2.2)
ALP SERPL-CCNC: 79 U/L (ref 45–117)
ALT SERPL-CCNC: 15 U/L (ref 12–78)
ANION GAP SERPL CALC-SCNC: 6 MMOL/L (ref 5–15)
APPEARANCE UR: CLEAR
AST SERPL-CCNC: 16 U/L (ref 15–37)
BACTERIA URNS QL MICRO: NEGATIVE /HPF
BASOPHILS # BLD: 0 K/UL (ref 0–0.1)
BASOPHILS NFR BLD: 0 % (ref 0–1)
BILIRUB SERPL-MCNC: 0.5 MG/DL (ref 0.2–1)
BILIRUB UR QL: NEGATIVE
BUN SERPL-MCNC: 13 MG/DL (ref 6–20)
BUN/CREAT SERPL: 13 (ref 12–20)
CALCIUM SERPL-MCNC: 8.6 MG/DL (ref 8.5–10.1)
CHLORIDE SERPL-SCNC: 105 MMOL/L (ref 97–108)
CO2 SERPL-SCNC: 30 MMOL/L (ref 21–32)
COLOR UR: ABNORMAL
CREAT SERPL-MCNC: 0.98 MG/DL (ref 0.55–1.02)
EOSINOPHIL # BLD: 0.2 K/UL (ref 0–0.4)
EOSINOPHIL NFR BLD: 2 % (ref 0–7)
EPITH CASTS URNS QL MICRO: ABNORMAL /LPF
ERYTHROCYTE [DISTWIDTH] IN BLOOD BY AUTOMATED COUNT: 15.7 % (ref 11.5–14.5)
GLOBULIN SER CALC-MCNC: 3.6 G/DL (ref 2–4)
GLUCOSE SERPL-MCNC: 99 MG/DL (ref 65–100)
GLUCOSE UR STRIP.AUTO-MCNC: NEGATIVE MG/DL
HCT VFR BLD AUTO: 37.3 % (ref 35–47)
HGB BLD-MCNC: 12.6 G/DL (ref 11.5–16)
HGB UR QL STRIP: NEGATIVE
INR PPP: 2.1 (ref 0.9–1.1)
KETONES UR QL STRIP.AUTO: NEGATIVE MG/DL
LEUKOCYTE ESTERASE UR QL STRIP.AUTO: ABNORMAL
LYMPHOCYTES # BLD: 1.9 K/UL (ref 0.8–3.5)
LYMPHOCYTES NFR BLD: 29 % (ref 12–49)
MCH RBC QN AUTO: 27.5 PG (ref 26–34)
MCHC RBC AUTO-ENTMCNC: 33.8 G/DL (ref 30–36.5)
MCV RBC AUTO: 81.3 FL (ref 80–99)
MONOCYTES # BLD: 0.8 K/UL (ref 0–1)
MONOCYTES NFR BLD: 12 % (ref 5–13)
NEUTS SEG # BLD: 3.7 K/UL (ref 1.8–8)
NEUTS SEG NFR BLD: 57 % (ref 32–75)
NITRITE UR QL STRIP.AUTO: NEGATIVE
PH UR STRIP: 7 [PH] (ref 5–8)
PLATELET # BLD AUTO: 200 K/UL (ref 150–400)
POTASSIUM SERPL-SCNC: 3.9 MMOL/L (ref 3.5–5.1)
PROT SERPL-MCNC: 7.2 G/DL (ref 6.4–8.2)
PROT UR STRIP-MCNC: NEGATIVE MG/DL
PROTHROMBIN TIME: 21.4 SEC (ref 9–11.1)
RBC # BLD AUTO: 4.59 M/UL (ref 3.8–5.2)
RBC #/AREA URNS HPF: ABNORMAL /HPF (ref 0–5)
SODIUM SERPL-SCNC: 141 MMOL/L (ref 136–145)
SP GR UR REFRACTOMETRY: 1.01 (ref 1–1.03)
TROPONIN I SERPL-MCNC: <0.04 NG/ML
UA: UC IF INDICATED,UAUC: ABNORMAL
UROBILINOGEN UR QL STRIP.AUTO: 1 EU/DL (ref 0.2–1)
WBC # BLD AUTO: 6.6 K/UL (ref 3.6–11)
WBC URNS QL MICRO: ABNORMAL /HPF (ref 0–4)

## 2018-01-08 PROCEDURE — 85025 COMPLETE CBC W/AUTO DIFF WBC: CPT | Performed by: EMERGENCY MEDICINE

## 2018-01-08 PROCEDURE — 36415 COLL VENOUS BLD VENIPUNCTURE: CPT | Performed by: EMERGENCY MEDICINE

## 2018-01-08 PROCEDURE — 82652 VIT D 1 25-DIHYDROXY: CPT | Performed by: EMERGENCY MEDICINE

## 2018-01-08 PROCEDURE — 93005 ELECTROCARDIOGRAM TRACING: CPT

## 2018-01-08 PROCEDURE — 81001 URINALYSIS AUTO W/SCOPE: CPT | Performed by: EMERGENCY MEDICINE

## 2018-01-08 PROCEDURE — 96360 HYDRATION IV INFUSION INIT: CPT

## 2018-01-08 PROCEDURE — 74011250636 HC RX REV CODE- 250/636: Performed by: EMERGENCY MEDICINE

## 2018-01-08 PROCEDURE — 84484 ASSAY OF TROPONIN QUANT: CPT | Performed by: EMERGENCY MEDICINE

## 2018-01-08 PROCEDURE — 70450 CT HEAD/BRAIN W/O DYE: CPT

## 2018-01-08 PROCEDURE — 74176 CT ABD & PELVIS W/O CONTRAST: CPT

## 2018-01-08 PROCEDURE — 99284 EMERGENCY DEPT VISIT MOD MDM: CPT

## 2018-01-08 PROCEDURE — 80053 COMPREHEN METABOLIC PANEL: CPT | Performed by: EMERGENCY MEDICINE

## 2018-01-08 PROCEDURE — 85610 PROTHROMBIN TIME: CPT | Performed by: EMERGENCY MEDICINE

## 2018-01-08 RX ORDER — SODIUM CHLORIDE 0.9 % (FLUSH) 0.9 %
5-10 SYRINGE (ML) INJECTION AS NEEDED
Status: DISCONTINUED | OUTPATIENT
Start: 2018-01-08 | End: 2018-01-08 | Stop reason: HOSPADM

## 2018-01-08 RX ORDER — SODIUM CHLORIDE 9 MG/ML
125 INJECTION, SOLUTION INTRAVENOUS CONTINUOUS
Status: DISCONTINUED | OUTPATIENT
Start: 2018-01-08 | End: 2018-01-08 | Stop reason: HOSPADM

## 2018-01-08 RX ORDER — SODIUM CHLORIDE 0.9 % (FLUSH) 0.9 %
5-10 SYRINGE (ML) INJECTION EVERY 8 HOURS
Status: DISCONTINUED | OUTPATIENT
Start: 2018-01-08 | End: 2018-01-08 | Stop reason: HOSPADM

## 2018-01-08 RX ORDER — LEVOBUNOLOL HYDROCHLORIDE 5 MG/ML
SOLUTION/ DROPS OPHTHALMIC
Refills: 3 | COMMUNITY
Start: 2017-12-13

## 2018-01-08 RX ORDER — LATANOPROST 50 UG/ML
SOLUTION/ DROPS OPHTHALMIC
Refills: 2 | COMMUNITY
Start: 2017-12-12

## 2018-01-08 RX ADMIN — SODIUM CHLORIDE 125 ML/HR: 900 INJECTION, SOLUTION INTRAVENOUS at 18:24

## 2018-01-08 NOTE — MR AVS SNAPSHOT
[address] [facility] 
 
 
 [address] [place] 
[phone] Patient: Kentrell Cristina MRN: [id number] CCO:[id number]/81/[id number] Visit Information Date & Time Provider Department Dept. Phone Encounter #  
 1/8/2018  3:40 PM Jordan Navarrete MD [facility] at [address] [place] [address] [phone] Follow-up Instructions Routing History Your Appointments 2/9/2018 11:20 AM  
COUMADIN CLINIC with KAREN HENNING [facility] (CHARI SCHEDULING) Appt Note: INR check rescheduled. [address] Count includes the [facility] [address]  
[address] [place] [id number]  
  
    
 4/6/2018  9:45 AM  
Any with Ariadne Sinclair MD  
[address] [place] [address] and Primary Care [facility] [facility]) Appt Note: 4 month f/uhtn  
 [address] [facility] [place]  
  
   
 [address] [id number]  
  
    
 12/20/2018  1:30 PM  
PACEMAKER with Ricardo Davis [facility] (CHARI SCHEDULING) Appt Note: sjm threshold/rc/Britt annual b [address] [place] [address] [place]  
[address] [facility]  
  
    
 12/20/2018  1:40 PM  
ESTABLISHED PATIENT with Ed Bragg MD  
[facility] ([address]) Appt Note: sjm threshold/rc/Britt annual b [place] [address] [address]  
[address] [facility] [address] [address]  
  
    
  
 3/5/2018  2:30 PM  
REMOTE OFFICE VISIT with Cata Ortiz [facility] (CHARI SCHEDULING) Appt Note: merlin PPI/rc b 11-30-17  
 [address] [place]  
[address] [address]  
  
    
 6/11/2018 11:30 AM  
 REMOTE OFFICE VISIT with Cata Ortiz [facility] (CHARI SCHEDULING) Appt Note: merlin PPI/rc b  
 [address] [place] [address]  
[phone]  
  
    
 9/17/2018  8:15 AM  
REMOTE OFFICE VISIT with Cata Ortiz [facility] (CHARI SCHEDULING) Appt Note: merlin PPI/rc b  
 [address] [place] [address]  
[phone] Upcoming Health Maintenance Date Due DTaP/Tdap/Td series (1 - Tdap) 12/24/1941 ZOSTER VACCINE AGE 60> 10/24/1980 GLAUCOMA SCREENING Q2Y 12/24/1985 Pneumococcal 65+ Low/Medium Risk (1 of 2 - PCV13) 12/24/1985 MEDICARE YEARLY EXAM 12/2/2018 Allergies as of 1/8/2018  Review Complete On: 1/8/2018 By: Yogesh Carrasco RN No Known Allergies Current Immunizations  Never Reviewed No immunizations on file. Not reviewed this visit You Were Diagnosed With   
  
 Codes Comments Palpitations    -  Primary ICD-10-CM: R00.2 ICD-9-CM: 785.1 Long-term (current) use of anticoagulants     ICD-10-CM: Z79.01 
ICD-9-CM: V58.61 Paroxysmal atrial fibrillation (HCC)     ICD-10-CM: I48.0 ICD-9-CM: 427.31 Cardiac pacemaker in situ     ICD-10-CM: Z95.0 ICD-9-CM: V45.01 Essential hypertension, benign     ICD-10-CM: I10 
ICD-9-CM: 401.1 Dyspnea, unspecified type     ICD-10-CM: R06.00 
ICD-9-CM: 786.09   
 Muscle spasm     ICD-10-CM: A87.751 ICD-9-CM: 728.85 Vitals BP Pulse Resp Height(growth percentile) Weight(growth percentile) SpO2  
 122/76 (BP 1 Location: Left arm, BP Patient Position: Sitting) (!) 58 16 5' 3\" (1.6 m) 167 lb (75.8 kg) 99% BMI OB Status Smoking Status 29.58 kg/m2 Postmenopausal Never Smoker Vitals History BMI and BSA Data Body Mass Index Body Surface Area  
 29.58 kg/m 2 1.84 m 2 Preferred Pharmacy Pharmacy Name Phone [address] [facility] [phone] Your Updated Medication List  
  
   
This list is accurate as of: 1/8/18 11:59 PM.  Always use your most recent med list.  
  
  
  
  
 allopurinol 100 mg tablet Commonly known as:  [place] Felicita Take 1 Tab by mouth daily. ALPHAGAN P 0.1 % ophthalmic solution Generic drug:  brimonidine Administer 1 Drop to both eyes two (2) times a day. amLODIPine 5 mg tablet Commonly known as:  [place] TAKE 1 TABLET BY MOUTH EVERY DAY  
  
 atenolol 100 mg tablet Commonly known as:  TENORMIN  
TAKE 1 TABLET EVERY DAY  
  
 dorzolamide 2 % ophthalmic solution Commonly known as:  TRUSOPT Administer 2 Drops to both eyes three (3) times daily. EVISTA 60 mg tablet Generic drug:  raloxifene TAKE ONE TABLET BY MOUTH DAILY  
  
 latanoprost 0.005 % ophthalmic solution Commonly known as:  XALATAN  
INT 1 GTT IN OU QD HS  
  
 levobunolol 0.5 % ophthalmic solution Commonly known as:  BETAGAN  
INSTILL 1 GTT OU BID  
  
 magnesium oxide 400 mg tablet Commonly known as:  MAG-OXIDE Take 1 Tab by mouth daily. At bedtime  
  
 simvastatin 20 mg tablet Commonly known as:  ZOCOR  
TAKE 1 TABLET BY MOUTH ONCE DAILY AT NIGHT  
  
 timolol 0.5 % ophthalmic solution Commonly known as:  TIMOPTIC INSTILL 1 DROP INTO BOTH EYES BID TYLENOL PO Take  by mouth as needed. 1 TABLET  
  
 warfarin 2.5 mg tablet Commonly known as:  COUMADIN  
TAKE 1 TABLET(2.5 MG) BY MOUTH DAILY We Performed the Following AMB POC EKG ROUTINE W/ 12 LEADS, INTER & REP [27544 CPT(R)] Patient Instructions Based on the involuntary limb movements I will transport you to the emergency room for further assessment Introducing [facility]!    
 [employer] introduces [employer] patient portal. Now you can access parts of your medical record, email your doctor's office, and request medication refills online. 1. In your internet browser, go to [email or web address] 2. Click on the First Time User? Click Here link in the Sign In box. You will see the New Member Sign Up page. 3. Enter your [employer] Access Code exactly as it appears below. You will not need to use this code after youve completed the sign-up process. If you do not sign up before the expiration date, you must request a new code. · [employer] Access Code: [email or web address] Expires: 2/28/2018  3:20 PM 
 
4. Enter the last four digits of your Social Security Number (xxxx) and Date of Birth (mm/dd/yyyy) as indicated and click Submit. You will be taken to the next sign-up page. 5. Create a [employer] ID. This will be your [employer] login ID and cannot be changed, so think of one that is secure and easy to remember. 6. Create a [employer] password. You can change your password at any time. 7. Enter your Password Reset Question and Answer. This can be used at a later time if you forget your password. 8. Enter your e-mail address. You will receive e-mail notification when new information is available in [address] 9. Click Sign Up. You can now view and download portions of your medical record. 10. Click the Download Summary menu link to download a portable copy of your medical information. If you have questions, please visit the Frequently Asked Questions section of the [employer] website. Remember, [employer] is NOT to be used for urgent needs. For medical emergencies, dial 911. Now available from your iPhone and Android! Please provide this summary of care documentation to your next provider. Your primary care clinician is listed as Yoli Knapp. If you have any questions after today's visit, please call [phone].

## 2018-01-08 NOTE — CONSULTS
Cardiology consultation:    I referred Mrs. Samira Bennett from my office to the ED because of new onset about 7 days ago of painful episodes of muscle spasm with arching of her back, coming and going in a matter of seconds, accompanied by saggital headache, and by her account, intermittent facial muscle spasm, also momentary. She denies carpal spasm but describes episodes of numbness and tingling in all finger tips. In the office [place] was negative. She is areflexic from the waste down. Upper extremity tendon reflexes are normal without repetition. Gross neuro exam is symmetrical, speech is normal. Voice is at baseline. Lungs are clear, cardiac rhythm is regular. Pulses are intact. Chest auscultation is normal. BP is 131/74. The twitching and related pain disappeared after admission to the ED. 12 lead EKG shows sinus rhythm with appropriate absence of pacemaker activity. There is no acute change. T waves are diffusely flat. QTc is normal.     Serum albumin is 3.6. Serum calcium is 8.6. Calculated corrected calcium is almost 9. Cardiac markers are negative. CBC is normal. Renal function is stable with low creatinine. Brain CT is negative. She has been taking Evista for a long time. She has been taking a magnesium supplement but not calcium or vitamin D. Impression: Skeletal muscle irritability, source unclear. Possible vitamin D deficiency    No evidence of neurologic event and no acute metabolic derangement    Plan:  Follow up with Dr. Brittani Box (she [id number] after Dr. Aury Kang' snf.)    Hold Evista until she sees Dr. Tom Hill D level to be drawn before discharge from the ED    Continue all existing cardiac meds    Tums one tablet BID    OK to be discharged.     Kaz Howell MD [facility] [place]

## 2018-01-08 NOTE — ED PROVIDER NOTES
EMERGENCY DEPARTMENT HISTORY AND PHYSICAL EXAM      Date: 1/8/2018  Patient Name: Amairani Fajardo    History of Presenting Illness     Chief Complaint   Patient presents with    Spasms       History Provided By: Patient    HPI: Amairani Fajardo, 80 y.o. female with PMHx significant for a fib, with pacemaker and on Coumadin, HTN, HLD, and aortic valve disorders, presents to the ED, from an appointment with Dr. Arland Castleman, cardiology, with cc of intermittent, symmetrical spasms of her arms, that are painful in nature. Pt had an episode of this in Dr. Alonso Gonzáles office and he sent her to the ED. Pt endorses severe dizziness at times, and also intermittent left sided abdominal pain that improves with urination. She denies CP, SOB, HA. Pt denies any recent exposure to animals or animal bites. PCP: [place], MD    There are no other complaints, changes, or physical findings at this time. Current Facility-Administered Medications   Medication Dose Route Frequency Provider Last Rate Last Dose    sodium chloride (NS) flush 5-10 mL  5-10 mL IntraVENous Q8H Mray Redman MD        sodium chloride (NS) flush 5-10 mL  5-10 mL IntraVENous PRN Mary Redman MD        0.9% sodium chloride infusion  125 mL/hr IntraVENous CONTINUOUS Mary Redman  mL/hr at 01/08/18 1824 125 mL/hr at 01/08/18 1824     Current Outpatient Prescriptions   Medication Sig Dispense Refill    latanoprost (XALATAN) 0.005 % ophthalmic solution INT 1 GTT IN OU QD HS  2    levobunolol (BETAGAN) 0.5 % ophthalmic solution INSTILL 1 GTT OU BID  3    warfarin (COUMADIN) 2.5 mg tablet TAKE 1 TABLET(2.5 MG) BY MOUTH DAILY 90 Tab 0    amLODIPine (NORVASC) 5 mg tablet TAKE 1 TABLET BY MOUTH EVERY DAY 30 Tab 0    atenolol (TENORMIN) 25 mg tablet Take 4 Tabs by mouth daily.  60 Tab 12    timolol (TIMOPTIC) 0.5 % ophthalmic solution INSTILL 1 DROP INTO BOTH EYES BID  1    simvastatin (ZOCOR) 20 mg tablet TAKE 1 TABLET BY MOUTH ONCE DAILY AT NIGHT 30 Tab 6    ACETAMINOPHEN (TYLENOL PO) Take  by mouth as needed. 1 TABLET      allopurinol (ZYLOPRIM) 100 mg tablet Take 1 Tab by mouth daily. (Patient taking differently: Take 100 mg by mouth as needed.) 90 Tab 3    dorzolamide (TRUSOPT) 2 % ophthalmic solution Administer 2 Drops to both eyes three (3) times daily.  brimonidine (ALPHAGAN P) 0.1 % ophthalmic solution Administer 1 Drop to both eyes two (2) times a day.  magnesium oxide (MAG-OXIDE) 400 mg tablet Take 1 Tab by mouth daily. At bedtime (Patient taking differently: Take 250 mg by mouth every Monday, Wednesday, Friday. At bedtime  ) 30 Tab 12    EVISTA 60 mg tablet TAKE ONE TABLET BY MOUTH DAILY 30 Tab 11       Past History     Past Medical History:  Past Medical History:   Diagnosis Date    Aortic valve disorders 4/29/2012    Atrial fibrillation ([facility] [address] 3/22/2011    CAD (coronary artery disease)     Cor pulmonale, chronic (HCC) 4/29/2012    Dyslipidemia (high LDL; low HDL) 4/29/2012    Gout 4/30/2012    Gout, arthritis 2/8/2012    Gout, joint     Hypertension     Hyperuricemia 4/30/2012    Obstructive sleep apnea 4/29/2012    Pacemaker 04/02/2006    Dr Claudia Waggoner placed the original pacer 4/6/2006. It is replaced in 2014. It is [facility]    Recurrent epistaxis 4/29/2012    Sinoatrial node dysfunction ([facility] [address] 3/22/2011       Past Surgical History:  Past Surgical History:   Procedure Laterality Date    BREAST SURGERY PROCEDURE UNLISTED      lumpectomy    GEN INSERT/REPLACE ONLY DUAL  4/28/2014         HX PACEMAKER         Family History:  No family history on file. Social History:  Social History   Substance Use Topics    Smoking status: Never Smoker    Smokeless tobacco: Never Used    Alcohol use No       Allergies:  No Known Allergies      Review of Systems   Review of Systems   Constitutional: Negative for fever. HENT: Negative for sore throat. Eyes: Negative for photophobia and redness.    Respiratory: Negative for shortness of breath and wheezing. Cardiovascular: Negative for chest pain and leg swelling. Gastrointestinal: Positive for abdominal pain. Negative for blood in stool, nausea and vomiting. Genitourinary: Negative for difficulty urinating, dysuria, hematuria, menstrual problem and vaginal bleeding. Musculoskeletal: Negative for back pain and joint swelling. Neurological: Positive for dizziness and tremors. Negative for seizures, syncope, speech difficulty, weakness, numbness and headaches. Hematological: Negative for adenopathy. Psychiatric/Behavioral: Negative for agitation, confusion and suicidal ideas. The patient is not nervous/anxious. Physical Exam   Physical Exam   Constitutional: She is oriented to person, place, and time. She appears well-developed and well-nourished. No distress. HENT:   Head: Normocephalic and atraumatic. Mouth/Throat: Oropharynx is clear and moist. No oropharyngeal exudate. Eyes: Conjunctivae and EOM are normal. Pupils are equal, round, and reactive to light. Left eye exhibits no discharge. Neck: Normal range of motion. Neck supple. No JVD present. Cardiovascular: Normal rate, regular rhythm, normal heart sounds and intact distal pulses. Pulmonary/Chest: Effort normal and breath sounds normal. No respiratory distress. She has no wheezes. Abdominal: Soft. Bowel sounds are normal. She exhibits no distension. There is tenderness in the left lower quadrant. There is no rebound and no guarding. Musculoskeletal: Normal range of motion. She exhibits no edema or tenderness. Lymphadenopathy:     She has no cervical adenopathy. Neurological: She is alert and oriented to person, place, and time. She has normal reflexes. No cranial nerve deficit. Skin: Skin is warm and dry. No rash noted. Psychiatric: She has a normal mood and affect. Her behavior is normal.   Nursing note and vitals reviewed.         Diagnostic Study Results     Labs -     Recent Results (from the past 12 hour(s))   CBC WITH AUTOMATED DIFF    Collection Time: 01/08/18  4:57 PM   Result Value Ref Range    WBC 6.6 3.6 - 11.0 K/uL    RBC 4.59 3.80 - 5.20 M/uL    HGB 12.6 11.5 - 16.0 g/dL    HCT 37.3 35.0 - 47.0 %    MCV 81.3 80.0 - 99.0 FL    MCH 27.5 26.0 - 34.0 PG    MCHC 33.8 30.0 - 36.5 g/dL    RDW 15.7 (H) 11.5 - 14.5 %    PLATELET 703 429 - 510 K/uL    NEUTROPHILS 57 32 - 75 %    LYMPHOCYTES 29 12 - 49 %    MONOCYTES 12 5 - 13 %    EOSINOPHILS 2 0 - 7 %    BASOPHILS 0 0 - 1 %    ABS. NEUTROPHILS 3.7 1.8 - 8.0 K/UL    ABS. LYMPHOCYTES 1.9 0.8 - 3.5 K/UL    ABS. MONOCYTES 0.8 0.0 - 1.0 K/UL    ABS. EOSINOPHILS 0.2 0.0 - 0.4 K/UL    ABS. BASOPHILS 0.0 0.0 - 0.1 K/UL   METABOLIC PANEL, COMPREHENSIVE    Collection Time: 01/08/18  4:57 PM   Result Value Ref Range    Sodium 141 136 - 145 mmol/L    Potassium 3.9 3.5 - 5.1 mmol/L    Chloride 105 97 - 108 mmol/L    CO2 30 21 - 32 mmol/L    Anion gap 6 5 - 15 mmol/L    Glucose 99 65 - 100 mg/dL    BUN 13 6 - 20 MG/DL    Creatinine 0.98 0.55 - 1.02 MG/DL    BUN/Creatinine ratio 13 12 - 20      GFR est AA >60 >60 ml/min/1.73m2    GFR est non-AA 53 (L) >60 ml/min/1.73m2    Calcium 8.6 8.5 - 10.1 MG/DL    Bilirubin, total 0.5 0.2 - 1.0 MG/DL    ALT (SGPT) 15 12 - 78 U/L    AST (SGOT) 16 15 - 37 U/L    Alk.  phosphatase 79 45 - 117 U/L    Protein, total 7.2 6.4 - 8.2 g/dL    Albumin 3.6 3.5 - 5.0 g/dL    Globulin 3.6 2.0 - 4.0 g/dL    A-G Ratio 1.0 (L) 1.1 - 2.2     TROPONIN I    Collection Time: 01/08/18  4:57 PM   Result Value Ref Range    Troponin-I, Qt. <0.04 <0.05 ng/mL   PROTHROMBIN TIME + INR    Collection Time: 01/08/18  4:57 PM   Result Value Ref Range    INR 2.1 (H) 0.9 - 1.1      Prothrombin time 21.4 (H) 9.0 - 11.1 sec   EKG, 12 LEAD, INITIAL    Collection Time: 01/08/18  4:58 PM   Result Value Ref Range    Ventricular Rate 60 BPM    Atrial Rate 60 BPM    P-R Interval 226 ms    QRS Duration 72 ms    Q-T Interval 398 ms    QTC Calculation (Bezet) 398 ms Calculated P Axis 35 degrees    Calculated R Axis 45 degrees    Calculated T Axis 56 degrees    Diagnosis       Atrial-paced rhythm with prolonged AV conduction  T wave abnormality, consider anterior ischemia  Abnormal ECG  When compared with ECG of 18-MAR-2017 23:12,  premature atrial complexes are no longer present  Nonspecific T wave abnormality, worse in Lateral leads     URINALYSIS W/ REFLEX CULTURE    Collection Time: 01/08/18  5:33 PM   Result Value Ref Range    Color YELLOW/STRAW      Appearance CLEAR CLEAR      Specific gravity 1.015 1.003 - 1.030      pH (UA) 7.0 5.0 - 8.0      Protein NEGATIVE  NEG mg/dL    Glucose NEGATIVE  NEG mg/dL    Ketone NEGATIVE  NEG mg/dL    Bilirubin NEGATIVE  NEG      Blood NEGATIVE  NEG      Urobilinogen 1.0 0.2 - 1.0 EU/dL    Nitrites NEGATIVE  NEG      Leukocyte Esterase SMALL (A) NEG      WBC 0-4 0 - 4 /hpf    RBC 0-5 0 - 5 /hpf    Epithelial cells FEW FEW /lpf    Bacteria NEGATIVE  NEG /hpf    UA:UC IF INDICATED CULTURE NOT INDICATED BY UA RESULT CNI         Radiologic Studies -     CT Results  (Last 48 hours)               01/08/18 1753  CT HEAD WO CONT Final result    Impression:  IMPRESSION: No evidence of acute process               Narrative:  EXAM:  CT HEAD WO CONT       INDICATION:   dizzy       COMPARISON: 6/17/2013. CONTRAST:  None. TECHNIQUE: Unenhanced CT of the head was performed using 5 mm images. Brain and   bone windows were generated. CT dose reduction was achieved through use of a   standardized protocol tailored for this examination and automatic exposure   control for dose modulation. FINDINGS:   There is mild atrophy and compensatory dilatation of the ventricles that is   expected for age. . There is no significant white matter disease. There is no   intracranial hemorrhage, extra-axial collection, mass, mass effect or midline   shift. The basilar cisterns are open. No acute infarct is identified.  The bone   windows demonstrate no abnormalities. The visualized portions of the paranasal   sinuses and mastoid air cells are clear. 01/08/18 1753  CT ABD PELV WO CONT Final result    Impression:  IMPRESSION:   No evidence of acute process. Incidentals as above. Narrative:  EXAM:  CT ABD PELV WO CONT       INDICATION: LLQ pain       COMPARISON: None       CONTRAST:  None. TECHNIQUE:    Thin axial images were obtained through the abdomen and pelvis. Coronal and   sagittal reconstructions were generated. Oral contrast was not administered. CT   dose reduction was achieved through use of a standardized protocol tailored for   this examination and automatic exposure control for dose modulation. The absence of intravenous contrast material reduces the sensitivity for   evaluation of the solid parenchymal organs of the abdomen. FINDINGS:    LUNG BASES: Clear. INCIDENTALLY IMAGED HEART AND MEDIASTINUM: The heart is normal size. A pacemaker   is in place. LIVER: No mass or biliary dilatation. GALLBLADDER: Gallstones are present. No evidence of cholecystitis. SPLEEN: No mass. PANCREAS: There is partial atrophy of the pancreas. No mass or ductal   dilatation. ADRENALS: Unremarkable. KIDNEYS/URETERS: No mass, calculus, or hydronephrosis. STOMACH: Unremarkable. SMALL BOWEL: No dilatation or wall thickening. COLON: No dilatation or wall thickening. Several diverticula are present. No   evidence of diverticulitis. APPENDIX: The appendix is unremarkable. PERITONEUM: No ascites or pneumoperitoneum. RETROPERITONEUM: No lymphadenopathy or aortic aneurysm. REPRODUCTIVE ORGANS: There is a 2.0 cm partially calcified fibroid off the   dorsal uterine body. URINARY BLADDER: No mass or calculus. BONES: No destructive bone lesion. The bones are osteopenic. Multilevel   spondylosis is present.    ADDITIONAL COMMENTS: N/A                 Medical Decision Making   I am the first provider for this patient. I reviewed the vital signs, available nursing notes, past medical history, past surgical history, family history and social history. Vital Signs-Reviewed the patient's vital signs. Patient Vitals for the past 12 hrs:   Temp Pulse Resp BP SpO2   01/08/18 1821 - 73 16 131/74 99 %   01/08/18 1646 98.4 °F (36.9 °C) 88 16 152/79 100 %       Pulse Oximetry Analysis - 100% on RA    Cardiac Monitor:   Rate: 88 bpm  Rhythm: Paced      EKG interpretation: (Preliminary) 1658  Rhythm: atrial paced; and regular . Rate (approx.): 60; Axis: normal; SD interval: normal; QRS interval: normal ; ST/T wave: normal.  Written by Olvin Rincon ED Scribe, as dictated by Alma Palmer MD.    Records Reviewed: Nursing Notes    Provider Notes (Medical Decision Making):   DDx: diverticulitis, kidney stone, CVA, tremors, electrolyte abnormality    ED Course:   Initial assessment performed. The patients presenting problems have been discussed, and they are in agreement with the care plan formulated and outlined with them. I have encouraged them to ask questions as they arise throughout their visit. Progress Note:  6:40 PM  Pt with unremarkable labs and imaging. She was re-evaluated by Dr. Main Joshi, as well as myself. Dr. Main Joshi agrees that pt is ready for discharge home. Disposition:  DISCHARGE NOTE:  6:40 PM  Pt has been reexamined. Pt has no new complaints, changes, or physical findings. Care plan outlined and precautions discussed. All available results reviewed with pt. All medications reviewed with pt. All of pts questions and concerns addressed. Pt agrees to f/u as instructed and agrees to return to ED upon further deterioration. Pt is ready to go home. Written by Natalie Bedoya ED Scribe as dictated by Alma Palmer MD    PLAN:  1. Current Discharge Medication List        2.    Follow-up Information     Follow up With Details Comments Contact Info    Maria L Weiner MD In 1 week  [id number]  [address] [place] [facility]  [phone]          Return to ED if worse     Diagnosis     Clinical Impression:   1. Occasional tremors        Attestations:    Attestation: This note is prepared by Christine Alonso, acting as Scribe for MD Tish Tolbert MD: The scribe's documentation has been prepared under my direction and personally reviewed by me in its entirety. I confirm that the note above accurately reflects all work, treatment, procedures, and medical decision making performed by me.

## 2018-01-08 NOTE — PROGRESS NOTES
Chief Complaint   Patient presents with   [facility]     pt c/o dizziness. 1. Have you been to the ER, urgent care clinic since your last visit? Hospitalized since your last visit? Yes When: 3/18/17, [facility], cough hip pain    2. Have you seen or consulted any other health care providers outside of the [address] [place] [address] since your last visit? Include any pap smears or colon screening.  Yes When: 1/18, md.hodan, [employer] [facility] orthopedic surgeon ,cortisone injection

## 2018-01-08 NOTE — PROGRESS NOTES
Mrs. Cassie Hidalgo came in for a routine follow-up visit today, but she came in complaining of headache with radiation to the mid back. She is not unusually short of breath, but that is also a prominent complaint today. .  Twelve-lead EKG performed during symptoms shows sinus bradycardia with mild variation in the precordial T waves between studies. There is certainly no acute change. Left atrial conduction appears prolonged. CO interval is upper limit of normal.    As usual, she is a vague historian and it is difficult to pin down duration, location, and severity of symptoms. She often describes some form of chest discomfort by patting her left over chest over the location of her pacemaker and she seems unable to explain herself further in that respect. Her son who always attends the visits with her states that she becomes more anxious around office visits and does not speak of these symptoms the rest of the time. She does not sleep well but she is no longer having nightmares about  relatives as she had been for a while. She seems to have a diminished sleep requirement and averages 4-5 hours every 24. She denies syncope and laxmi chest pain. She is frequently lightheaded but it sounds postural when more complete description can be teased out. She keeps up with routine pacemaker checks and there are no problems. Her primary physician was Dr. Good Garcia who has retired, and she will be switching to the care of Dr. Landry Muñoz. On examination today, she weighs 168 pounds. At a height of 5 feet 3 inches this yields a body mass index of 29.76. She is alert and conversant with symmetrical motor function and clear speech. Blood pressure is 122/76. There are no audible carotid bruits on either side. Jugular veins are flat in a seated position. Lungs are clear to auscultation. There is a pacemaker pocket in the left shoulder which is stable without any form of swelling or irritation.   There is 1+ ankle edema with no sign of DVT. Peripheral pulses are intact. Gait is stable as long as she has a cane and is permitted to move slowly. Twelve-lead EKG performed today shows sinus rhythm with low-grade inter-atrial conduction abnormality and borderline first-degree AV block. There is variable T-wave inversion in the precordial leads without ST segment depression. Toward the end of the visit mishaps began to demonstrate symmetrical bilateral myoclonic jerks mostly involving upper extremities with some straightening of the legs and arching of the back, throwing her head back each time this happened and complaining that the involved muscles are painful. When questioned further she states this has been happening for several days prior to this visit and that she had neglected to mention it. Given the behavior loosely suggestive of opisthonos, I took her to the emergency room for further assessment to make sure there was not an acute abnormality of serum calcium.

## 2018-01-08 NOTE — Clinical Note
She has a number of complaints that are very difficult to assess. During the office visit she developed tonic contractions of her back and upper extremities and I took her to the emergency room to make sure that she did not have some form of acute calcium or other electrolyte abnormality. There were no abnormal findings after she got there.  Mannie Bishop

## 2018-01-09 LAB
ATRIAL RATE: 60 BPM
CALCULATED P AXIS, ECG09: 35 DEGREES
CALCULATED R AXIS, ECG10: 45 DEGREES
CALCULATED T AXIS, ECG11: 56 DEGREES
DIAGNOSIS, 93000: NORMAL
P-R INTERVAL, ECG05: 226 MS
Q-T INTERVAL, ECG07: 398 MS
QRS DURATION, ECG06: 72 MS
QTC CALCULATION (BEZET), ECG08: 398 MS
VENTRICULAR RATE, ECG03: 60 BPM

## 2018-01-09 NOTE — ED NOTES
Patient came in from Dr. Becky Amaya office following an appointment today, Dr. Hilda Miguel reports that the patient was arching her back with arms extended, he suspected hypocalcemia, sent her for labs and evaluation. I saw the patient shrug both shoulder twice in a row, one time. Patient reports this kind of involuntary muscle movement x 1 week. Emergency Department Nursing Plan of Care       The Nursing Plan of Care is developed from the Nursing assessment and Emergency Department Attending provider initial evaluation. The plan of care may be reviewed in the ED Provider note.     The Plan of Care was developed with the following considerations:   Patient / Family readiness to learn indicated by:verbalized understanding  Persons(s) to be included in education: patient and family  Barriers to Learning/Limitations:No    [address], RN    1/8/2018   7:25 PM

## 2018-01-10 LAB — 1,25(OH)2D3 SERPL-MCNC: 40.8 PG/ML (ref 19.9–79.3)

## 2018-01-15 RX ORDER — AMLODIPINE BESYLATE 5 MG/1
TABLET ORAL
Qty: 30 TAB | Refills: 11 | Status: SHIPPED | OUTPATIENT
Start: 2018-01-15 | End: 2019-01-02 | Stop reason: SDUPTHER

## 2018-01-22 RX ORDER — SIMVASTATIN 20 MG/1
TABLET, FILM COATED ORAL
Qty: 30 TAB | Refills: 11 | Status: SHIPPED | OUTPATIENT
Start: 2018-01-22 | End: 2019-01-10 | Stop reason: SDUPTHER

## 2018-02-01 ENCOUNTER — TELEPHONE (OUTPATIENT)
Dept: CARDIOLOGY CLINIC | Age: 83
End: 2018-02-01

## 2018-02-04 DIAGNOSIS — I48.20 CHRONIC ATRIAL FIBRILLATION (HCC): ICD-10-CM

## 2018-02-07 RX ORDER — ATENOLOL 100 MG/1
TABLET ORAL
Refills: 0 | COMMUNITY
Start: 2018-01-12 | End: 2018-04-12 | Stop reason: DRUGHIGH

## 2018-02-07 NOTE — PATIENT INSTRUCTIONS
Based on the involuntary limb movements I will transport you to the emergency room for further assessment

## 2018-02-09 ENCOUNTER — CLINICAL SUPPORT (OUTPATIENT)
Dept: CARDIOLOGY CLINIC | Age: 83
End: 2018-02-09

## 2018-02-09 DIAGNOSIS — I48.0 PAROXYSMAL ATRIAL FIBRILLATION (HCC): ICD-10-CM

## 2018-02-09 DIAGNOSIS — Z79.01 LONG-TERM (CURRENT) USE OF ANTICOAGULANTS: Primary | ICD-10-CM

## 2018-02-09 LAB
INR BLD: NORMAL
INR, EXTERNAL: 2.5
PT POC: NORMAL SECONDS
VALID INTERNAL CONTROL?: YES

## 2018-02-09 NOTE — PROGRESS NOTES
A full discussion of the nature of anticoagulants has been carried out. A benefit risk analysis has been presented to the patient, so that they understand the justification for choosing anticoagulation at this time. The need for frequent and regular monitoring, precise dosage adjustment and compliance is stressed. Side effects of potential bleeding are discussed. The patient should avoid any OTC items containing aspirin or ibuprofen, and should avoid great swings in general diet. Avoid alcohol consumption. Call if any signs of abnormal bleeding. Next PT/INR test in 1 month. The INR is stable and therapeutic. Continue same dose of coumadin and recheck in 1 month.

## 2018-03-05 ENCOUNTER — OFFICE VISIT (OUTPATIENT)
Dept: CARDIOLOGY CLINIC | Age: 83
End: 2018-03-05

## 2018-03-05 DIAGNOSIS — Z95.0 CARDIAC PACEMAKER IN SITU: Primary | ICD-10-CM

## 2018-03-09 ENCOUNTER — CLINICAL SUPPORT (OUTPATIENT)
Dept: CARDIOLOGY CLINIC | Age: 83
End: 2018-03-09

## 2018-03-09 DIAGNOSIS — Z79.01 LONG-TERM (CURRENT) USE OF ANTICOAGULANTS: Primary | ICD-10-CM

## 2018-03-09 DIAGNOSIS — I48.0 PAROXYSMAL ATRIAL FIBRILLATION (HCC): ICD-10-CM

## 2018-03-09 LAB
INR BLD: NORMAL
INR, EXTERNAL: 2.1
PT POC: NORMAL SECONDS
VALID INTERNAL CONTROL?: YES

## 2018-03-20 RX ORDER — WARFARIN 2.5 MG/1
TABLET ORAL
Qty: 90 TAB | Refills: 0 | Status: SHIPPED | OUTPATIENT
Start: 2018-03-20 | End: 2018-06-21 | Stop reason: SDUPTHER

## 2018-04-06 ENCOUNTER — OFFICE VISIT (OUTPATIENT)
Dept: INTERNAL MEDICINE CLINIC | Age: 83
End: 2018-04-06

## 2018-04-06 VITALS
HEART RATE: 60 BPM | WEIGHT: 175 LBS | TEMPERATURE: 97.3 F | HEIGHT: 63 IN | BODY MASS INDEX: 31.01 KG/M2 | RESPIRATION RATE: 16 BRPM | OXYGEN SATURATION: 97 % | DIASTOLIC BLOOD PRESSURE: 75 MMHG | SYSTOLIC BLOOD PRESSURE: 126 MMHG

## 2018-04-06 DIAGNOSIS — I48.0 PAROXYSMAL ATRIAL FIBRILLATION (HCC): ICD-10-CM

## 2018-04-06 DIAGNOSIS — Z51.81 ENCOUNTER FOR MONITORING COUMADIN THERAPY: Primary | ICD-10-CM

## 2018-04-06 DIAGNOSIS — Z78.0 POST-MENOPAUSAL: ICD-10-CM

## 2018-04-06 DIAGNOSIS — E79.0 HYPERURICEMIA: ICD-10-CM

## 2018-04-06 DIAGNOSIS — I10 ESSENTIAL HYPERTENSION, BENIGN: ICD-10-CM

## 2018-04-06 DIAGNOSIS — Z79.01 ENCOUNTER FOR MONITORING COUMADIN THERAPY: Primary | ICD-10-CM

## 2018-04-06 DIAGNOSIS — Z95.0 PACEMAKER: ICD-10-CM

## 2018-04-06 DIAGNOSIS — I49.5 SINOATRIAL NODE DYSFUNCTION (HCC): ICD-10-CM

## 2018-04-06 LAB
INR BLD: 1.9
PT POC: 23.3 SECONDS
VALID INTERNAL CONTROL?: YES

## 2018-04-06 NOTE — PATIENT INSTRUCTIONS
Body Mass Index: Care Instructions  Your Care Instructions    Body mass index (BMI) can help you see if your weight is raising your risk for health problems. It uses a formula to compare how much you weigh with how tall you are. · A BMI lower than 18.5 is considered underweight. · A BMI between 18.5 and 24.9 is considered healthy. · A BMI between 25 and 29.9 is considered overweight. A BMI of 30 or higher is considered obese. If your BMI is in the normal range, it means that you have a lower risk for weight-related health problems. If your BMI is in the overweight or obese range, you may be at increased risk for weight-related health problems, such as high blood pressure, heart disease, stroke, arthritis or joint pain, and diabetes. If your BMI is in the underweight range, you may be at increased risk for health problems such as fatigue, lower protection (immunity) against illness, muscle loss, bone loss, hair loss, and hormone problems. BMI is just one measure of your risk for weight-related health problems. You may be at higher risk for health problems if you are not active, you eat an unhealthy diet, or you drink too much alcohol or use tobacco products. Follow-up care is a key part of your treatment and safety. Be sure to make and go to all appointments, and call your doctor if you are having problems. It's also a good idea to know your test results and keep a list of the medicines you take. How can you care for yourself at home? · Practice healthy eating habits. This includes eating plenty of fruits, vegetables, whole grains, lean protein, and low-fat dairy. · If your doctor recommends it, get more exercise. Walking is a good choice. Bit by bit, increase the amount you walk every day. Try for at least 30 minutes on most days of the week. · Do not smoke. Smoking can increase your risk for health problems. If you need help quitting, talk to your doctor about stop-smoking programs and medicines. These can increase your chances of quitting for good. · Limit alcohol to 2 drinks a day for men and 1 drink a day for women. Too much alcohol can cause health problems. If you have a BMI higher than 25  · Your doctor may do other tests to check your risk for weight-related health problems. This may include measuring the distance around your waist. A waist measurement of more than 40 inches in men or 35 inches in women can increase the risk of weight-related health problems. · Talk with your doctor about steps you can take to stay healthy or improve your health. You may need to make lifestyle changes to lose weight and stay healthy, such as changing your diet and getting regular exercise. If you have a BMI lower than 18.5  · Your doctor may do other tests to check your risk for health problems. · Talk with your doctor about steps you can take to stay healthy or improve your health. You may need to make lifestyle changes to gain or maintain weight and stay healthy, such as getting more healthy foods in your diet and doing exercises to build muscle. Where can you learn more? Go to http://saurav-roni.info/. Enter S176 in the search box to learn more about \"Body Mass Index: Care Instructions. \"  Current as of: October 13, 2016  Content Version: 11.4  © 8214-5430 [employer]. Care instructions adapted under license by [employer] (which disclaims liability or warranty for this information). If you have questions about a medical condition or this instruction, always ask your healthcare professional. [address] any warranty or liability for your use of this information.

## 2018-04-06 NOTE — ASSESSMENT & PLAN NOTE
This condition is managed by Specialist.  Key CAD CHF Meds             warfarin (COUMADIN) 2.5 mg tablet  (Taking) TAKE 1 TABLET(2.5 MG) BY MOUTH DAILY    atenolol (TENORMIN) 100 mg tablet  (Taking) TAKE 1 TABLET EVERY DAY    simvastatin (ZOCOR) 20 mg tablet  (Taking) TAKE 1 TABLET BY MOUTH ONCE DAILY AT NIGHT    amLODIPine (NORVASC) 5 mg tablet  (Taking) TAKE 1 TABLET BY MOUTH EVERY DAY        [id number](BNP,BNPP,BNPPPOC,HSCRP,NA,NAPOC,K,KPOCT,CHOL,CHOLPOCT,HDL,HDLPOC,LDLCHOL,LDLPOCT,LDLCPOC,LDLC,LDL,LDLCEXT,TRIGL,TGLPOCT,INR,INREXT,INRPOC,PTP,PTINR,PTEXT,DIG)@

## 2018-04-06 NOTE — ASSESSMENT & PLAN NOTE
Key CAD CHF Meds             warfarin (COUMADIN) 2.5 mg tablet  (Taking) TAKE 1 TABLET(2.5 MG) BY MOUTH DAILY    atenolol (TENORMIN) 100 mg tablet  (Taking) TAKE 1 TABLET EVERY DAY    simvastatin (ZOCOR) 20 mg tablet  (Taking) TAKE 1 TABLET BY MOUTH ONCE DAILY AT NIGHT    amLODIPine (NORVASC) 5 mg tablet  (Taking) TAKE 1 TABLET BY MOUTH EVERY DAY        [id number](BNP,BNPP,BNPPPOC,HSCRP,NA,NAPOC,K,KPOCT,CHOL,CHOLPOCT,HDL,HDLPOC,LDLCHOL,LDLPOCT,LDLCPOC,LDLC,LDL,LDLCEXT,TRIGL,TGLPOCT,INR,INREXT,INRPOC,PTP,PTINR,PTEXT,DIG)@

## 2018-04-06 NOTE — MR AVS SNAPSHOT
[address] [facility]. [address] [address] 
[phone] Patient: Jp Cherry MRN: [id number] PIF:[birth year] Visit Information Date & Time Provider Department Dept. Phone Encounter #  
 4/6/2018  9:45 AM MD Chandrika Diaz [facility] [phone] [id number] Follow-up Instructions Return in about 3 months (around 7/6/2018). Follow-up and Disposition History Your Appointments 4/9/2018 10:00 AM  
COUMADIN CLINIC with KAREN HENN[facility] ([facility] SCHEDULING) Appt Note: 1 month inr check; 1 month inr check 3.12.18 pt son rs 4.6.18  
 [address]  
[address] [facility] [address]  
  
    
 12/20/2018  1:30 PM  
PACEMAKER with Kev Crocker [facility] ([facility] SCHEDULING) Appt Note: sjm threshold/rc/Britt annual b [place] [address]  
[address] [place]  
  
    
 12/20/2018  1:40 PM  
ESTABLISHED PATIENT with Shelley Washington MD  
[facility] ([address]) Appt Note: sjm threshold/rc/Britt annual b [place] [address]  
[address] [facility] [address]  
  
    
  
 6/11/2018 11:30 AM  
REMOTE OFFICE VISIT with Ranjit Lagunas [facility] ([facility] SCHEDULING) Appt Note: merlin PPI/rc b  
 [address] [phone]  
[address] [address]  
  
    
 9/17/2018  8:15 AM  
REMOTE OFFICE VISIT with Ranjit Lagunas [facility] ([facility] SCHEDULING) Appt Note: merlin PPI/rc b  
 [address]  
[phone] Upcoming Health Maintenance Date Due DTaP/Tdap/Td series (1 - Tdap) 12/24/1941 ZOSTER VACCINE AGE 60> 10/24/1980 GLAUCOMA SCREENING Q2Y 12/24/1985 Bone Densitometry (Dexa) Screening 12/24/1985 Pneumococcal 65+ Low/Medium Risk (1 of 2 - PCV13) 12/24/1985 MEDICARE YEARLY EXAM 12/2/2018 Allergies as of 4/6/2018  Review Complete On: 4/6/2018 By: Bill Yip MD  
 No Known Allergies Current Immunizations  Never Reviewed No immunizations on file. Not reviewed this visit You Were Diagnosed With   
  
 Codes Comments Encounter for monitoring coumadin therapy    -  Primary ICD-10-CM: Z51.81, Z79.01 
ICD-9-CM: V58.83, V58.61 Sinoatrial node dysfunction (HCC)     ICD-10-CM: I49.5 ICD-9-CM: 427.81 Paroxysmal atrial fibrillation (HCC)     ICD-10-CM: I48.0 ICD-9-CM: 427.31 Essential hypertension, benign     ICD-10-CM: I10 
ICD-9-CM: 401.1 Hyperuricemia     ICD-10-CM: E79.0 ICD-9-CM: 790.6 Pacemaker     ICD-10-CM: Z95.0 ICD-9-CM: V45.01 Post-menopausal     ICD-10-CM: Z78.0 ICD-9-CM: V49.81 Vitals BP Pulse Temp Resp Height(growth percentile) Weight(growth percentile) 126/75 60 97.3 °F (36.3 °C) (Oral) 16 5' 3\" (1.6 m) 175 lb (79.4 kg) SpO2 BMI OB Status Smoking Status 97% 31 kg/m2 Postmenopausal Never Smoker Vitals History BMI and BSA Data Body Mass Index Body Surface Area  
 31 kg/m 2 1.88 m 2 [facility]erred Pharm[facility] [address] [facility] [phone] [facility] [phone] Your Updated Medication List  
  
   
This list is accurate as of 4/6/18 11:24 AM.  Always use your most recent med list.  
  
  
  
  
 allopurinol 100 mg tablet Commonly known as:  Josefina Brand Take 1 Tab by mouth daily. ALPHAGAN P 0.1 % ophthalmic solution Generic drug:  brimonidine Administer 1 Drop to both eyes two (2) times a day. amLODIPine 5 mg tablet Commonly known as:  Alethea Pall TAKE 1 TABLET BY MOUTH EVERY DAY  
  
 atenolol 100 mg tablet Commonly known as:  TENORMIN  
TAKE 1 TABLET EVERY DAY  
  
 dorzolamide 2 % ophthalmic solution Commonly known as:  TRUSOPT Administer 2 Drops to both eyes three (3) times daily. EVISTA 60 mg tablet Generic drug:  raloxifene TAKE ONE TABLET BY MOUTH DAILY  
  
 latanoprost 0.005 % ophthalmic solution Commonly known as:  XALATAN  
INT 1 GTT IN OU QD HS  
  
 levobunolol 0.5 % ophthalmic solution Commonly known as:  BETAGAN  
INSTILL 1 GTT OU BID  
  
 magnesium oxide 400 mg tablet Commonly known as:  MAG-OXIDE Take 1 Tab by mouth daily. At bedtime  
  
 simvastatin 20 mg tablet Commonly known as:  ZOCOR  
TAKE 1 TABLET BY MOUTH ONCE DAILY AT NIGHT  
  
 timolol 0.5 % ophthalmic solution Commonly known as:  TIMOPTIC INSTILL 1 DROP INTO BOTH EYES BID TYLENOL PO Take  by mouth as needed. 1 TABLET  
  
 warfarin 2.5 mg tablet Commonly known as:  COUMADIN  
TAKE 1 TABLET(2.5 MG) BY MOUTH DAILY We Performed the Following AMB POC PT/INR [10778 CPT(R)] COLLECTION VENOUS BLOOD,VENIPUNCTURE [id number] CPT(R)] METABOLIC PANEL, BASIC [42075 CPT(R)] URIC ACID [id number] CPT(R)] Follow-up Instructions Return in about 3 months (around 7/6/2018). To-Do List   
 04/06/2018 Imaging:  DEXA BONE DENSITY APPENDICULAR Patient Instructions Body Mass Index: Care Instructions Your Care Instructions Body mass index (BMI) can help you see if your weight is raising your risk for health problems. It uses a formula to compare how much you weigh with how tall you are. · A BMI lower than 18.5 is considered underweight. · A BMI between 18.5 and 24.9 is considered healthy. · A BMI between 25 and 29.9 is considered overweight. A BMI of 30 or higher is considered obese. If your BMI is in the normal range, it means that you have a lower risk for weight-related health problems. If your BMI is in the overweight or obese range, you may be at increased risk for weight-related health problems, such as high blood pressure, heart disease, stroke, arthritis or joint pain, and diabetes. If your BMI is in the underweight range, you may be at increased risk for health problems such as fatigue, lower protection (immunity) against illness, muscle loss, bone loss, hair loss, and hormone problems. BMI is just one measure of your risk for weight-related health problems. You may be at higher risk for health problems if you are not active, you eat an unhealthy diet, or you drink too much alcohol or use tobacco products. Follow-up care is a key part of your treatment and safety. Be sure to make and go to all appointments, and call your doctor if you are having problems. It's also a good idea to know your test results and keep a list of the medicines you take. How can you care for yourself at home? · Practice healthy eating habits. This includes eating plenty of fruits, vegetables, whole grains, lean protein, and low-fat dairy. · If your doctor recommends it, get more exercise. Walking is a good choice. Bit by bit, increase the amount you walk every day. Try for at least 30 minutes on most days of the week. · Do not smoke. Smoking can increase your risk for health problems. If you need help quitting, talk to your doctor about stop-smoking programs and medicines. These can increase your chances of quitting for good. · Limit alcohol to 2 drinks a day for men and 1 drink a day for women. Too much alcohol can cause health problems. If you have a BMI higher than 25 · Your doctor may do other tests to check your risk for weight-related health problems.  This may include measuring the distance around your waist. A waist measurement of more than 40 inches in men or 35 inches in women can increase the risk of weight-related health problems. · Talk with your doctor about steps you can take to stay healthy or improve your health. You may need to make lifestyle changes to lose weight and stay healthy, such as changing your diet and getting regular exercise. If you have a BMI lower than 18.5 · Your doctor may do other tests to check your risk for health problems. · Talk with your doctor about steps you can take to stay healthy or improve your health. You may need to make lifestyle changes to gain or maintain weight and stay healthy, such as getting more healthy foods in your diet and doing exercises to build muscle. Where can you learn more? Go to http://saurav-roni.info/. Enter S176 in the search box to learn more about \"Body Mass Index: Care Instructions. \" Current as of: October 13, 2016 Content Version: 11.4 © 8504-7547 [employer]. Care instructions adapted under license by [employer] (which disclaims liability or warranty for this information). If you have questions about a medical condition or this instruction, always ask your healthcare professional. [address] any warranty or liability for your use of this information. Introducing [facility]! Semaj Calderon introduces [employer] patient portal. Now you can access parts of your medical record, email your doctor's office, and request medication refills online. 1. In your internet browser, go to [email or web address] 2. Click on the First Time User? Click Here link in the Sign In box. You will see the New Member Sign Up page. 3. Enter your [employer] Access Code exactly as it appears below. You will not need to use this code after youve completed the sign-up process. If you do not sign up before the expiration date, you must request a new code. · [employer] Access Code: [email or web address] Expires: 6/3/2018 12:37 PM 
 
 4. Enter the last four digits of your Social Security Number (xxxx) and Date of Birth (mm/dd/yyyy) as indicated and click Submit. You will be taken to the next sign-up page. 5. Create a Ku6 ID. This will be your [employer] login ID and cannot be changed, so think of one that is secure and easy to remember. 6. Create a Ku6 password. You can change your password at any time. 7. Enter your Password Reset Question and Answer. This can be used at a later time if you forget your password. 8. Enter your e-mail address. You will receive e-mail notification when new information is available in [address] 9. Click Sign Up. You can now view and download portions of your medical record. 10. Click the Download Summary menu link to download a portable copy of your medical information. If you have questions, please visit the Frequently Asked Questions section of the Ku[employer] website. Remember, [employer] is NOT to be used for urgent needs. For medical emergencies, dial 911. Now available from your iPhone and Android! Please provide this summary of care documentation to your next provider. Your primary care clinician is listed as Cosmo Farah. If you have any questions after today's visit, please call [phone].

## 2018-04-06 NOTE — PROGRESS NOTES
Diagnoses and all orders for this visit:    1. Encounter for monitoring coumadin therapy  -     AMB POC PT/INR    2. Sinoatrial node dysfunction (HCC)  Assessment & Plan:    Key CAD CHF Meds             warfarin (COUMADIN) 2.5 mg tablet  (Taking) TAKE 1 TABLET(2.5 MG) BY MOUTH DAILY    atenolol (TENORMIN) 100 mg tablet  (Taking) TAKE 1 TABLET EVERY DAY    simvastatin (ZOCOR) 20 mg tablet  (Taking) TAKE 1 TABLET BY MOUTH ONCE DAILY AT NIGHT    amLODIPine (NORVASC) 5 mg tablet  (Taking) TAKE 1 TABLET BY MOUTH EVERY DAY        [id number](BNP,BNPP,BNPPPOC,HSCRP,NA,NAPOC,K,KPOCT,CHOL,CHOLPOCT,HDL,HDLPOC,LDLCHOL,LDLPOCT,LDLCPOC,LDLC,LDL,LDLCEXT,TRIGL,TGLPOCT,INR,INREXT,INRPOC,PTP,PTINR,PTEXT,DIG)@      3. Paroxysmal atrial fibrillation [facility])  Assessment & Plan: This condition is managed by Specialist.  Key CAD CHF Meds             warfarin (COUMADIN) 2.5 mg tablet  (Taking) TAKE 1 TABLET(2.5 MG) BY MOUTH DAILY    atenolol (TENORMIN) 100 mg tablet  (Taking) TAKE 1 TABLET EVERY DAY    simvastatin (ZOCOR) 20 mg tablet  (Taking) TAKE 1 TABLET BY MOUTH ONCE DAILY AT NIGHT    amLODIPine (NORVASC) 5 mg tablet  (Taking) TAKE 1 TABLET BY MOUTH EVERY DAY        [id number](BNP,BNPP,BNPPPOC,HSCRP,NA,NAPOC,K,KPOCT,CHOL,CHOLPOCT,HDL,HDLPOC,LDLCHOL,LDLPOCT,LDLCPOC,LDLC,LDL,LDLCEXT,TRIGL,TGLPOCT,INR,INREXT,INRPOC,PTP,PTINR,PTEXT,DIG)@      4. Essential hypertension, benign    5. Hyperuricemia  -     URIC ACID  -     COLLECTION VENOUS BLOOD,VENIPUNCTURE  -     METABOLIC PANEL, BASIC    6. Pacemaker    7. Post-menopausal  -     DEXA BONE DENSITY APPENDICULAR; Future    SPORTS MEDICINE AND PRIMARY CARE  Gilbert Gale MD, [address] [address]  Phone:  [phone]  Fax: [phone]       Chief Complaint   Patient presents with    Hypertension     f/u   .       SUBJECTIVE:    Graciela Mahoney is a 80 y.o. female Patient returns today after a visit with Dr. Stefanie Kilgore on 01/08 with headaches, radiation to her mid back, and was taken to the ER with spasms and aching of her back. Her findings were that of skeletal muscle irritability of unclear source, possible vitamin D deficiency and it was recommended she hold the Evista and a vitamin D level was drawn and was found to be normal.  Patient is seen for evaluation. Patient has been followed by Dr. Belle Morocho for shoulder discomfort. She saw him on December 29th, as well as again on January 3rd, and had cortisone injections into her shoulders, where she complained of pain with some improvement. Patient also complains of her shoulder and leg on the right jumping at night sometimes. Since we last saw her Dr. Belle Morocho stopped Allopurinol for reasons that are unclear to her son. Will check a uric acid before starting the medication and if indeed the uric acid is elevated we'll send them a note and let them know to reinstitute the Allopurinol. Other new complaints denied. Son tells me the Evista was stopped because he was told the calcium was low and they'd rather have her take Tums. Review of the lab studies indicates calcium was at the lower limits of normal, but still within normal range. She'll continue the Tums. Current Outpatient Prescriptions   Medication Sig Dispense Refill    warfarin (COUMADIN) 2.5 mg tablet TAKE 1 TABLET(2.5 MG) BY MOUTH DAILY 90 Tab 0    atenolol (TENORMIN) 100 mg tablet TAKE 1 TABLET EVERY DAY  0    simvastatin (ZOCOR) 20 mg tablet TAKE 1 TABLET BY MOUTH ONCE DAILY AT NIGHT 30 Tab 11    amLODIPine (NORVASC) 5 mg tablet TAKE 1 TABLET BY MOUTH EVERY DAY 30 Tab 11    latanoprost (XALATAN) 0.005 % ophthalmic solution INT 1 GTT IN OU QD HS  2    levobunolol (BETAGAN) 0.5 % ophthalmic solution INSTILL 1 GTT OU BID  3    ACETAMINOPHEN (TYLENOL PO) Take  by mouth as needed. 1 TABLET      magnesium oxide (MAG-OXIDE) 400 mg tablet Take 1 Tab by mouth daily.  At bedtime (Patient taking differently: Take 250 mg by mouth every Monday, Wednesday, Friday. At bedtime  ) 30 Tab 12    timolol (TIMOPTIC) 0.5 % ophthalmic solution INSTILL 1 DROP INTO BOTH EYES BID  1    allopurinol (ZYLOPRIM) 100 mg tablet Take 1 Tab by mouth daily. (Patient taking differently: Take 100 mg by mouth as needed.) 90 Tab 3    dorzolamide (TRUSOPT) 2 % ophthalmic solution Administer 2 Drops to both eyes three (3) times daily.  brimonidine (ALPHAGAN P) 0.1 % ophthalmic solution Administer 1 Drop to both eyes two (2) times a day.  EVISTA 60 mg tablet TAKE ONE TABLET BY MOUTH DAILY 30 Tab 11     Past Medical History:   Diagnosis Date    Aortic valve disorders 4/29/2012    Atrial fibrillation ([facility] 3/22/2011    CAD (coronary artery disease)     Cor pulmonale, chronic (HCC) 4/29/2012    Dyslipidemia (high LDL; low HDL) 4/29/2012    Gout 4/30/2012    Gout, arthritis 2/8/2012    Gout, joint     Hypertension     Hyperuricemia 4/30/2012    Obstructive sleep apnea 4/29/2012    Pacemaker 04/02/2006    Dr Alyx Marie placed the original pacer 4/6/2006. It is replaced in 2014.   It is [facility]    Recurrent epistaxis 4/29/2012    Sinoatrial node dysfunction ([facility] 3/22/2011     Past Surgical History:   Procedure Laterality Date    BREAST SURGERY PROCEDURE UNLISTED      lumpectomy    GEN INSERT/REPLACE ONLY DUAL  4/28/2014         HX PACEMAKER       No Known Allergies      REVIEW OF SYSTEMS:  General: negative for - chills or fever  ENT: negative for - headaches, nasal congestion or tinnitus  Respiratory: negative for - cough, hemoptysis, shortness of breath or wheezing  Cardiovascular : negative for - chest pain, edema, palpitations or shortness of breath  Gastrointestinal: negative for - abdominal pain, blood in stools, heartburn or nausea/vomiting  Genito-Urinary: no dysuria, trouble voiding, or hematuria  Musculoskeletal: negative for - gait disturbance, joint pain, joint stiffness or joint swelling  Neurological: no TIA or stroke symptoms  Hematologic: no bruises, no bleeding, no swollen glands  Integument: no lumps, mole changes, nail changes or rash  Endocrine: no malaise/lethargy or unexpected weight changes      Social History     Social History    Marital status:      Spouse name: N/A    Number of children: N/A    Years of education: N/A     Social History Main Topics    Smoking status: Never Smoker    Smokeless tobacco: Never Used    Alcohol use No    Drug use: No    Sexual activity: No     Other Topics Concern    None     Social History Narrative    Habits:  She is a lifetime nonsmoker, non drinker, non drug abuser. Social History:  The patient is . She lives with her son. 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 She is a member of [employer]. Family History:  Father and mother , unknown ages and causes. Two sisters  of unknown caus     History reviewed. No pertinent family history. OBJECTIVE:    Visit Vitals    /75    Pulse 60    Temp 97.3 °F (36.3 °C) (Oral)    Resp 16    Ht 5' 3\" (1.6 m)    Wt 175 lb (79.4 kg)    SpO2 97%    BMI 31 kg/m2     CONSTITUTIONAL: well , well nourished, appears age appropriate  EYES: perrla, eom intact  ENMT:moist mucous membranes, pharynx clear  NECK: supple. Thyroid normal  RESPIRATORY: Chest: clear bilaterally   CARDIOVASCULAR: Heart: regular rate and rhythm  GASTROINTESTINAL: Abdomen: soft, bowel sounds active  HEMATOLOGIC: no pathological lymph nodes palpated  MUSCULOSKELETAL: Extremities: no edema, pulse 1+   INTEGUMENT: No unusual rashes or suspicious skin lesions noted. Nails appear normal.  NEUROLOGIC: non-focal exam   MENTAL STATUS: alert and oriented, appropriate affect           ASSESSMENT:  1. Encounter for monitoring coumadin therapy    2. Sinoatrial node dysfunction (HCC)    3. Paroxysmal atrial fibrillation ([address]    4. Essential hypertension, benign    5. Hyperuricemia    6. Pacemaker    7.  Post-menopausal      The question of whether to continue the Evista or not is raised. We suggest a bone DEXA scan to make that determination. If she does have osteoporosis certainly we can continue that or use a bisphosphonate. Will check a uric acid as noted above, and if the uric acid is elevated she'll reinstitute the Allopurinol. If it's completely in a normal range then there is no reason to continue the Allopurinol. Her blood pressure control is at goal.    We encouraged her in physical activity. She'll return to the office in 2-3 months, sooner if she needs to. Discussed the patient's BMI with her. The BMI follow up plan is as follows:     dietary management education, guidance, and counseling  encourage exercise  monitor weight  prescribed dietary intake    An After Visit Summary was printed and given to the patient. PLAN:  .  Orders Placed This Encounter    DEXA BONE DENSITY APPENDICULAR    URIC ACID    METABOLIC PANEL, BASIC    AMB POC PT/INR       Follow-up Disposition:  Return in about 3 months (around 7/6/2018). ATTENTION:   This medical record was transcribed using an electronic medical records system. Although proofread, it may and can contain electronic and spelling errors. Other human spelling and other errors may be present. Corrections may be executed at a later time. Please feel free to contact us for any clarifications as needed.

## 2018-04-06 NOTE — PROGRESS NOTES
1. Have you been to the ER, urgent care clinic since your last visit? Hospitalized since your last visit? Yes When: 1-8-18 Where: [facility] Reason for visit: dizziness    2. Have you seen or consulted any other health care providers outside of the [address] [place] [address] since your last visit? Include any pap smears or colon screening. No     Want to discuss swelling in feet, nerves, and allopurinoll  Pt.  Takes coumadin 2.5 mg 1 tab PO daily

## 2018-04-07 LAB
BUN SERPL-MCNC: 15 MG/DL (ref 10–36)
BUN/CREAT SERPL: 15 (ref 12–28)
CALCIUM SERPL-MCNC: 9.3 MG/DL (ref 8.7–10.3)
CHLORIDE SERPL-SCNC: 102 MMOL/L (ref 96–106)
CO2 SERPL-SCNC: 27 MMOL/L (ref 18–29)
CREAT SERPL-MCNC: 1 MG/DL (ref 0.57–1)
GFR SERPLBLD CREATININE-BSD FMLA CKD-EPI: 47 ML/MIN/1.73
GFR SERPLBLD CREATININE-BSD FMLA CKD-EPI: 55 ML/MIN/1.73
GLUCOSE SERPL-MCNC: 87 MG/DL (ref 65–99)
POTASSIUM SERPL-SCNC: 4.5 MMOL/L (ref 3.5–5.2)
SODIUM SERPL-SCNC: 142 MMOL/L (ref 134–144)
URATE SERPL-MCNC: 6.8 MG/DL (ref 2.5–7.1)

## 2018-04-09 ENCOUNTER — CLINICAL SUPPORT (OUTPATIENT)
Dept: CARDIOLOGY CLINIC | Age: 83
End: 2018-04-09

## 2018-04-09 DIAGNOSIS — I48.0 PAROXYSMAL ATRIAL FIBRILLATION (HCC): ICD-10-CM

## 2018-04-09 DIAGNOSIS — Z79.01 LONG TERM (CURRENT) USE OF ANTICOAGULANTS: Primary | ICD-10-CM

## 2018-04-09 LAB
INR BLD: NORMAL
INR, EXTERNAL: 2.3
PT POC: NORMAL SECONDS
VALID INTERNAL CONTROL?: YES

## 2018-04-10 ENCOUNTER — HOSPITAL ENCOUNTER (OUTPATIENT)
Dept: BONE DENSITY | Age: 83
Discharge: HOME | End: 2018-04-10
Attending: INTERNAL MEDICINE
Payer: MEDICARE

## 2018-04-10 DIAGNOSIS — Z78.0 POST-MENOPAUSAL: ICD-10-CM

## 2018-04-10 PROCEDURE — 77080 DXA BONE DENSITY AXIAL: CPT

## 2018-04-12 DIAGNOSIS — I48.20 CHRONIC ATRIAL FIBRILLATION (HCC): ICD-10-CM

## 2018-04-12 RX ORDER — ATENOLOL 25 MG/1
TABLET ORAL
Refills: 12 | COMMUNITY
Start: 2018-04-04 | End: 2018-04-12 | Stop reason: DRUGHIGH

## 2018-04-12 RX ORDER — ATENOLOL 100 MG/1
100 TABLET ORAL DAILY
Qty: 30 TAB | Refills: 12 | Status: SHIPPED | OUTPATIENT
Start: 2018-04-12 | End: 2019-05-03 | Stop reason: SDUPTHER

## 2018-04-12 RX ORDER — ATENOLOL 100 MG/1
TABLET ORAL
Qty: 30 TAB | Refills: 0 | Status: SHIPPED | OUTPATIENT
Start: 2018-04-12 | End: 2018-04-12 | Stop reason: SDUPTHER

## 2018-05-01 ENCOUNTER — CLINICAL SUPPORT (OUTPATIENT)
Dept: CARDIOLOGY CLINIC | Age: 83
End: 2018-05-01

## 2018-05-01 DIAGNOSIS — I48.0 PAROXYSMAL ATRIAL FIBRILLATION (HCC): ICD-10-CM

## 2018-05-01 DIAGNOSIS — Z79.01 LONG TERM (CURRENT) USE OF ANTICOAGULANTS: Primary | ICD-10-CM

## 2018-05-01 LAB
INR BLD: NORMAL
INR, EXTERNAL: 2.5
PT POC: NORMAL SECONDS
VALID INTERNAL CONTROL?: YES

## 2018-05-01 NOTE — PROGRESS NOTES
A full discussion of the nature of anticoagulants has been carried out. A benefit risk analysis has been presented to the patient, so that they understand the justification for choosing anticoagulation at this time. The need for frequent and regular monitoring, precise dosage adjustment and compliance is stressed. Side effects of potential bleeding are discussed. The patient should avoid any OTC items containing aspirin or ibuprofen, and should avoid great swings in general diet. Avoid alcohol consumption. Call if any signs of abnormal bleeding. Next PT/INR test in 1 month. Pt INR is stable and therapeutic no need for any changes at this time. Pt verbalized understanding and has no questions at this time. If they have any question or concerns they were directed to call the office.

## 2018-05-09 ENCOUNTER — OFFICE VISIT (OUTPATIENT)
Dept: INTERNAL MEDICINE CLINIC | Age: 83
End: 2018-05-09

## 2018-05-09 VITALS
DIASTOLIC BLOOD PRESSURE: 84 MMHG | RESPIRATION RATE: 17 BRPM | OXYGEN SATURATION: 98 % | TEMPERATURE: 97.7 F | SYSTOLIC BLOOD PRESSURE: 145 MMHG | BODY MASS INDEX: 31.27 KG/M2 | HEIGHT: 63 IN | WEIGHT: 176.5 LBS | HEART RATE: 62 BPM

## 2018-05-09 DIAGNOSIS — I87.2 VENOUS INSUFFICIENCY: ICD-10-CM

## 2018-05-09 DIAGNOSIS — Z95.0 CARDIAC PACEMAKER IN SITU: ICD-10-CM

## 2018-05-09 DIAGNOSIS — I10 ESSENTIAL HYPERTENSION, BENIGN: Primary | ICD-10-CM

## 2018-05-09 DIAGNOSIS — Z71.89 ACP (ADVANCE CARE PLANNING): ICD-10-CM

## 2018-05-09 DIAGNOSIS — E79.0 HYPERURICEMIA: ICD-10-CM

## 2018-05-09 NOTE — PROGRESS NOTES
Chief Complaint   Patient presents with    Leg Swelling     patient has edema in both legs and notes it has been going on for a while but it comes and goes     Gout     patient would like to get back on allopurinol      1. Have you been to the ER, urgent care clinic since your last visit? Hospitalized since your last visit? No    2. Have you seen or consulted any other health care providers outside of the [address] [place] [address] since your last visit? Include any pap smears or colon screening.  No

## 2018-05-09 NOTE — ACP (ADVANCE CARE PLANNING)

## 2018-05-09 NOTE — MR AVS SNAPSHOT
Isidoro Boggs 
 
 
 [address] [address] 
[phone] Patient: Alicia Pathak MRN: [id number] Visit Information Date & Time Provider Department Dept. Phone Encounter #  
 5/9/2018  4:45 PM Chantale Moreira  [facility] [phone] [id number] Your Appointments 6/4/2018 10:00 AM  
COUMADIN CLINIC with KAREN HENNING [facility] ([place] SCHEDULING) Appt Note: 1 month INR check [address] [facility] [address]  
[address] [address]  
  
    
 7/6/2018  9:45 AM  
Any with Chantale Moreira MD  
[address] [facility] Primary [facility]) Appt Note: 3 month follow up  
 [address] [facility]  
  
   
 [address] [id number]  
  
    
 12/20/2018  1:30 PM  
PACEMAKER with Joy Laird [facility] ([facility]) Appt Note: sjm threshold/rc/Britt annual b [facility] [address] [place]  
[address] [facility]  
  
    
 12/20/2018  1:40 PM  
ESTABLISHED PATIENT with Livan Barros MD  
[facility] ([facility]) Appt Note: sjm threshold/rc/Britt annual b [place] [address] [place] [address]  
[address] [address]  
  
    
  
 6/11/2018 11:30 AM  
REMOTE OFFICE VISIT with Amparo Post [facility] ([place] SCHEDULING) Appt Note: merlin PPI/rc b  
 [address] [address]  
[address] [address]  
  
    
 9/17/2018  8:15 AM  
REMOTE OFFICE VISIT with Amparo Post [facility] ([place] SCHEDULING) Appt Note: merlin PPI/rc b  
 [address] [place] [address]  
[phone] Upcoming Health Maintenance Date Due DTaP/Tdap/Td series (1 - Tdap) 12/24/1941 ZOSTER VACCINE AGE 60> 10/24/1980 GLAUCOMA SCREENING Q2Y 12/24/1985 Pneumococcal 65+ Low/Medium Risk (1 of 2 - PCV13) 12/24/1985 Influenza Age 5 to Adult 8/1/2018 MEDICARE YEARLY EXAM 12/2/2018 Allergies as of 5/9/2018  Review Complete On: 5/9/2018 By: Nelson Juárez No Known Allergies Current Immunizations  Never Reviewed No immunizations on file. Not reviewed this visit Vitals BP Pulse Temp Resp Height(growth percentile) Weight(growth percentile) 145/84 (BP 1 Location: Right arm, BP Patient Position: Sitting) 62 97.7 °F (36.5 °C) (Oral) 17 5' 3\" (1.6 m) 176 lb 8 oz (80.1 kg) SpO2 BMI OB Status Smoking Status 98% 31.27 kg/m2 Postmenopausal Never Smoker BMI and BSA Data Body Mass Index Body Surface Area  
 31.27 kg/m 2 1.89 m 2 Preferred Pharmacy Pharmacy Name Phone [address] [facility] [phone] Your Updated Medication List  
  
   
This list is accurate as of 5/9/18  5:47 PM.  Always use your most recent med list.  
  
  
  
  
 allopurinol 100 mg tablet Commonly known as:  Danielle Jing Take 1 Tab by mouth daily. ALPHAGAN P 0.1 % ophthalmic solution Generic drug:  brimonidine Administer 1 Drop to both eyes two (2) times a day. amLODIPine 5 mg tablet Commonly known as:  Sundra Keota TAKE 1 TABLET BY MOUTH EVERY DAY  
  
 atenolol 100 mg tablet Commonly known as:  TENORMIN Take 1 Tab by mouth daily. dorzolamide 2 % ophthalmic solution Commonly known as:  TRUSOPT Administer 2 Drops to both eyes three (3) times daily. EVISTA 60 mg tablet Generic drug:  raloxifene TAKE ONE TABLET BY MOUTH DAILY latanoprost 0.005 % ophthalmic solution Commonly known as:  XALATAN  
INT 1 GTT IN OU QD HS  
  
 levobunolol 0.5 % ophthalmic solution Commonly known as:  BETAGAN  
INSTILL 1 GTT OU BID  
  
 magnesium oxide 400 mg tablet Commonly known as:  MAG-OXIDE Take 1 Tab by mouth daily. At bedtime  
  
 simvastatin 20 mg tablet Commonly known as:  ZOCOR  
TAKE 1 TABLET BY MOUTH ONCE DAILY AT NIGHT  
  
 timolol 0.5 % ophthalmic solution Commonly known as:  TIMOPTIC INSTILL 1 DROP INTO BOTH EYES BID TYLENOL PO Take  by mouth as needed. 1 TABLET  
  
 warfarin 2.5 mg tablet Commonly known as:  COUMADIN  
TAKE 1 TABLET(2.5 MG) BY MOUTH DAILY Introducing \[facility]\"" & HEALTH SERVICES! Bridger Mcdaniels introduces [employer] patient portal. Now you can access parts of your medical record, email your doctor's office, and request medication refills online. 1. In your internet browser, go to [email or web address] 2. Click on the First Time User? Click Here link in the Sign In box. You will see the New Member Sign Up page. 3. Enter your [employer] Access Code exactly as it appears below. You will not need to use this code after youve completed the sign-up process. If you do not sign up before the expiration date, you must request a new code. · [employer] Access Code: [email or web address] Expires: 6/3/2018 12:37 PM 
 
4. Enter the last four digits of your Social Security Number (xxxx) and Date of Birth (mm/dd/yyyy) as indicated and click Submit. You will be taken to the next sign-up page. 5. Create a [employer] ID. This will be your [employer] login ID and cannot be changed, so think of one that is secure and easy to remember. 6. Create a [employer] password. You can change your password at any time. 7. Enter your Password Reset Question and Answer. This can be used at a later time if you forget your password. 8. Enter your e-mail address.  You will receive e-mail notification when new information is available in [employer]. 9. Click Sign Up. You can now view and download portions of your medical record. 10. Click the Download Summary menu link to download a portable copy of your medical information. If you have questions, please visit the Frequently Asked Questions section of the [employer] website. Remember, [employer] is NOT to be used for urgent needs. For medical emergencies, dial 911. Now available from your iPhone and Android! Please provide this summary of care documentation to your next provider. Your primary care clinician is listed as Sandra Seymour. If you have any questions after today's visit, please call [phone].

## 2018-05-09 NOTE — PROGRESS NOTES
SPORTS MEDICINE AND PRIMARY CARE  Chantell Burger MD, [address] [address]  Phone:  [phone]  Fax: [phone]       Chief Complaint   Patient presents with    Leg Swelling     patient has edema in both legs and notes it has been going on for a while but it comes and goes     Gout     patient would like to get back on allopurinol    . SUBJECTIVE:    Mitchel Pace is a 80 y.o. female Patient returns today with her son. She has a known history of atrial fibrillation, on Coumadin under cardiology auspices, S/P pacemaker insertion, gout, primary hypertension, obstructive sleep apnea, and is seen for evaluation. She complains of swelling of her ankles, particularly of the right, intermittently. She also continues to complain of a jerk on the right side involving her shoulder and leg, which reviewing the chart she's had for some time. Patient is seen for evaluation. Current Outpatient Prescriptions   Medication Sig Dispense Refill    atenolol (TENORMIN) 100 mg tablet Take 1 Tab by mouth daily. 30 Tab 12    warfarin (COUMADIN) 2.5 mg tablet TAKE 1 TABLET(2.5 MG) BY MOUTH DAILY 90 Tab 0    simvastatin (ZOCOR) 20 mg tablet TAKE 1 TABLET BY MOUTH ONCE DAILY AT NIGHT 30 Tab 11    amLODIPine (NORVASC) 5 mg tablet TAKE 1 TABLET BY MOUTH EVERY DAY 30 Tab 11    latanoprost (XALATAN) 0.005 % ophthalmic solution INT 1 GTT IN OU QD HS  2    levobunolol (BETAGAN) 0.5 % ophthalmic solution INSTILL 1 GTT OU BID  3    ACETAMINOPHEN (TYLENOL PO) Take  by mouth as needed. 1 TABLET      magnesium oxide (MAG-OXIDE) 400 mg tablet Take 1 Tab by mouth daily. At bedtime (Patient taking differently: Take 250 mg by mouth every Monday, Wednesday, Friday. At bedtime  ) 30 Tab 12    timolol (TIMOPTIC) 0.5 % ophthalmic solution INSTILL 1 DROP INTO BOTH EYES BID  1    allopurinol (ZYLOPRIM) 100 mg tablet Take 1 Tab by mouth daily.  (Patient taking differently: Take 100 mg by mouth as needed.) 90 Tab 3    dorzolamide (TRUSOPT) 2 % ophthalmic solution Administer 2 Drops to both eyes three (3) times daily.  brimonidine (ALPHAGAN P) 0.1 % ophthalmic solution Administer 1 Drop to both eyes two (2) times a day.  EVISTA 60 mg tablet TAKE ONE TABLET BY MOUTH DAILY 30 Tab 11     Past Medical History:   Diagnosis Date    Aortic valve disorders 4/29/2012    Atrial fibrillation ([facility] [address] [facility] 03/22/2011    on coumadin followed by cardiology - Dao Luis MD    CAD (coronary artery disease)     Cor pulmonale, chronic ([address] 4/29/2012    Dyslipidemia (high LDL; low HDL) 4/29/2012    Gout 4/30/2012    Gout, arthritis 2/8/2012    Gout, joint     Hypertension     Hyperuricemia 4/30/2012    Obstructive sleep apnea 4/29/2012    Pacemaker 04/02/2006    Dr Sharon Flynn placed the original pacer 4/6/2006. It is replaced in 2014.   It is [facility]    Recurrent epistaxis 4/29/2012    [facility] 3/22/2011    Venous insufficiency      Past Surgical History:   Procedure Laterality Date    BREAST SURGERY PROCEDURE UNLISTED      lumpectomy    GEN INSERT/REPLACE ONLY DUAL  4/28/2014         HX PACEMAKER       No Known Allergies      REVIEW OF SYSTEMS:  General: negative for - chills or fever  ENT: negative for - headaches, nasal congestion or tinnitus  Respiratory: negative for - cough, hemoptysis, shortness of breath or wheezing  Cardiovascular : negative for - chest pain, edema, palpitations or shortness of breath  Gastrointestinal: negative for - abdominal pain, blood in stools, heartburn or nausea/vomiting  Genito-Urinary: no dysuria, trouble voiding, or hematuria  Musculoskeletal: negative for - gait disturbance, joint pain, joint stiffness or joint swelling  Neurological: no TIA or stroke symptoms  Hematologic: no bruises, no bleeding, no swollen glands  Integument: no lumps, mole changes, nail changes or rash  Endocrine: no malaise/lethargy or unexpected weight changes      Social History     Social History    Marital status:      Spouse name: N/A    Number of children: N/A    Years of education: N/A     Social History Main Topics    Smoking status: Never Smoker    Smokeless tobacco: Never Used    Alcohol use No    Drug use: No    Sexual activity: No     Other Topics Concern    None     Social History Narrative    Habits:  She is a lifetime nonsmoker, non drinker, non drug abuser. Social History:  The patient is . She lives with her son. 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 She is a member of [employer]. Family History:  Father and mother , unknown ages and causes. Two sisters  of unknown caus     History reviewed. No pertinent family history. OBJECTIVE:    Visit Vitals    /84 (BP 1 Location: Right arm, BP Patient Position: Sitting)    Pulse 62    Temp 97.7 °F (36.5 °C) (Oral)    Resp 17    Ht 5' 3\" (1.6 m)    Wt 176 lb 8 oz (80.1 kg)    SpO2 98%    BMI 31.27 kg/m2     CONSTITUTIONAL: well , well nourished, appears age appropriate  EYES: perrla, eom intact  ENMT:moist mucous membranes, pharynx clear  NECK: supple. Thyroid normal  RESPIRATORY: Chest: clear bilaterally   CARDIOVASCULAR: Heart: regular rate and rhythm  GASTROINTESTINAL: Abdomen: soft, bowel sounds active  HEMATOLOGIC: no pathological lymph nodes palpated  MUSCULOSKELETAL: Extremities: no edema, pulse 1+   INTEGUMENT: No unusual rashes or suspicious skin lesions noted. Nails appear normal.  NEUROLOGIC: non-focal exam   MENTAL STATUS: alert and oriented, appropriate affect           ASSESSMENT:  1. Essential hypertension, benign    2. Venous insufficiency    3. Hyperuricemia    4. Cardiac pacemaker in situ    5. ACP (advance care planning)      The jerks described are consistent with a myotonic jerk and no further treatment is recommended.     Her blood pressure control is at goal.      The swelling of her right greater than left leg is related to chronic venous insufficiency. This is explained. Advanced care planning discussed with her and her son. She's not made a decision regarding that issue. She'll return to the office in July as previously scheduled. PLAN:  . No orders of the defined types were placed in this encounter. Follow-up Disposition:  Return in about 8 weeks (around 7/6/2018). ATTENTION:   This medical record was transcribed using an electronic medical records system. Although proofread, it may and can contain electronic and spelling errors. Other human spelling and other errors may be present. Corrections may be executed at a later time.   Please feel free to contact us for any clarifications as needed

## 2018-06-04 ENCOUNTER — CLINICAL SUPPORT (OUTPATIENT)
Dept: CARDIOLOGY CLINIC | Age: 83
End: 2018-06-04

## 2018-06-04 DIAGNOSIS — Z79.01 LONG TERM (CURRENT) USE OF ANTICOAGULANTS: Primary | ICD-10-CM

## 2018-06-04 DIAGNOSIS — I48.0 PAROXYSMAL ATRIAL FIBRILLATION (HCC): ICD-10-CM

## 2018-06-04 LAB
INR BLD: NORMAL
INR, EXTERNAL: 2.7
PT POC: NORMAL SECONDS
VALID INTERNAL CONTROL?: YES

## 2018-06-11 ENCOUNTER — OFFICE VISIT (OUTPATIENT)
Dept: CARDIOLOGY CLINIC | Age: 83
End: 2018-06-11

## 2018-06-11 DIAGNOSIS — Z95.0 CARDIAC PACEMAKER IN SITU: Primary | ICD-10-CM

## 2018-07-02 ENCOUNTER — CLINICAL SUPPORT (OUTPATIENT)
Dept: CARDIOLOGY CLINIC | Age: 83
End: 2018-07-02

## 2018-07-02 DIAGNOSIS — Z79.01 LONG TERM (CURRENT) USE OF ANTICOAGULANTS: Primary | ICD-10-CM

## 2018-07-02 DIAGNOSIS — I48.0 PAROXYSMAL ATRIAL FIBRILLATION (HCC): ICD-10-CM

## 2018-07-02 LAB
INR BLD: NORMAL
INR, EXTERNAL: 2.5
PT POC: NORMAL SECONDS
VALID INTERNAL CONTROL?: YES

## 2018-07-06 ENCOUNTER — OFFICE VISIT (OUTPATIENT)
Dept: INTERNAL MEDICINE CLINIC | Age: 83
End: 2018-07-06

## 2018-07-06 VITALS
SYSTOLIC BLOOD PRESSURE: 121 MMHG | HEART RATE: 60 BPM | WEIGHT: 178.5 LBS | DIASTOLIC BLOOD PRESSURE: 73 MMHG | OXYGEN SATURATION: 98 % | HEIGHT: 63 IN | TEMPERATURE: 97.6 F | RESPIRATION RATE: 18 BRPM | BODY MASS INDEX: 31.63 KG/M2

## 2018-07-06 DIAGNOSIS — Z51.81 ENCOUNTER FOR MONITORING COUMADIN THERAPY: Primary | ICD-10-CM

## 2018-07-06 DIAGNOSIS — M1A.9XX0 CHRONIC GOUT WITHOUT TOPHUS, UNSPECIFIED CAUSE, UNSPECIFIED SITE: ICD-10-CM

## 2018-07-06 DIAGNOSIS — I10 ESSENTIAL HYPERTENSION, BENIGN: ICD-10-CM

## 2018-07-06 DIAGNOSIS — I48.0 PAROXYSMAL ATRIAL FIBRILLATION (HCC): ICD-10-CM

## 2018-07-06 DIAGNOSIS — Z79.01 ENCOUNTER FOR MONITORING COUMADIN THERAPY: Primary | ICD-10-CM

## 2018-07-06 DIAGNOSIS — I87.2 VENOUS INSUFFICIENCY: ICD-10-CM

## 2018-07-06 DIAGNOSIS — M10.9 GOUT, ARTHRITIS: ICD-10-CM

## 2018-07-06 DIAGNOSIS — Z95.0 CARDIAC PACEMAKER IN SITU: ICD-10-CM

## 2018-07-06 DIAGNOSIS — I49.5 SINOATRIAL NODE DYSFUNCTION (HCC): ICD-10-CM

## 2018-07-06 LAB
INR BLD: 2.4
PT POC: 29.2 SECONDS
VALID INTERNAL CONTROL?: YES

## 2018-07-06 RX ORDER — ALLOPURINOL 100 MG/1
100 TABLET ORAL DAILY
Qty: 90 TAB | Refills: 3 | Status: SHIPPED | OUTPATIENT
Start: 2018-07-06 | End: 2018-12-20

## 2018-07-06 NOTE — MR AVS SNAPSHOT
[address] [facility] 
 
 
 [address] [id number] 
[phone] Patient: Christi Camejo MRN: [id number] Visit Information Date & Time Provider Department Dept. Phone Encounter #  
 7/6/2018  9:45 AM Sabino Harrell MD [facility] Sports Medicine and Primary Care [phone] [id number] Follow-up Instructions Return in about 4 months (around 11/6/2018). Follow-up and Disposition History Your Appointments 8/7/2018 10:00 AM  
COUMADIN CLINIC with KAREN HENNING [facility] (CHARI SCHEDULING) Appt Note: 1 month inr check; Pt r/s from 08/06/18 1 month inr check [place] [address] [address] [facility] [address]  
[address] [facility] [address]  
  
    
 10/19/2018 10:00 AM  
Any with Arlin Muhammad MD  
[address] [place] [address] and Primary Care [address] [place] [address]) Appt Note: 3 month f/u  
 [address] [facility]  
  
   
 [address] [id number]  
  
    
 12/20/2018  1:30 PM  
PACEMAKER with Osman Gallagher [facility] (CHARI SCHEDULING) Appt Note: sjm threshold/rc/Britt annual b [facility] [address] [place] [address]  
[address] [facility]  
  
    
 12/20/2018  1:40 PM  
ESTABLISHED PATIENT with Petar Soni MD  
[facility] ([address]) Appt Note: sjm threshold/rc/Britt annual b [facility] [address] [address]  
[address] [address]  
  
    
  
 9/17/2018  8:15 AM  
REMOTE OFFICE VISIT with Shaheen Rutherford [facility] (CHARI SCHEDULING) Appt Note: merlin PPI/rc b  
 [address] [facility] [address] [place]  
[phone] [address] [place]  [address] [place] [address] Upcoming Health Maintenance Date Due  
 GLAUCOMA SCREENING Q2Y 12/24/1985 ZOSTER VACCINE AGE 60> 7/6/2019* Pneumococcal 65+ Low/Medium Risk (1 of 2 - PCV13) 7/6/2019* DTaP/Tdap/Td series (1 - Tdap) 7/6/2019* Influenza Age 5 to Adult 8/1/2018 MEDICARE YEARLY EXAM 12/2/2018 *Topic was postponed. The date shown is not the original due date. Allergies as of 7/6/2018  Review Complete On: 7/6/2018 By: Aaron Cabrera MD  
 No Known Allergies Current Immunizations  Never Reviewed No immunizations on file. Not reviewed this visit You Were Diagnosed With   
  
 Codes Comments Encounter for monitoring coumadin therapy    -  Primary ICD-10-CM: Z51.81, Z79.01 
ICD-9-CM: V58.83, V58.61 Gout, arthritis     ICD-10-CM: M10.9 ICD-9-CM: 274.00 Essential hypertension, benign     ICD-10-CM: I10 
ICD-9-CM: 245. 1 Chronic gout without tophus, unspecified cause, unspecified site     ICD-10-CM: M1A. 9XX0 
ICD-9-CM: 274.02 Venous insufficiency     ICD-10-CM: I87.2 ICD-9-CM: 459.81 Cardiac pacemaker in situ     ICD-10-CM: Z95.0 ICD-9-CM: V45.01 Sinoatrial node dysfunction (HCC)     ICD-10-CM: I49.5 ICD-9-CM: 427.81 Paroxysmal atrial fibrillation (HCC)     ICD-10-CM: I48.0 ICD-9-CM: 427.31 Vitals BP Pulse Temp Resp Height(growth percentile) Weight(growth percentile) 121/73 60 97.6 °F (36.4 °C) (Oral) 18 5' 3\" (1.6 m) 178 lb 8 oz (81 kg) SpO2 BMI OB Status Smoking Status 98% 31.62 kg/m2 Postmenopausal Never Smoker Vitals History BMI and BSA Data Body Mass Index Body Surface Area  
 31.62 kg/m 2 1.9 m 2 Preferred Pharmacy Pharmacy Name Phone [place] [address] [facility], [address] [facility] [phone] Your Updated Medication List  
  
   
 This list is accurate as of 7/6/18 11:28 AM.  Always use your most recent med list.  
  
  
  
  
 allopurinol 100 mg tablet Commonly known as:  Risa Dakins Take 1 Tab by mouth daily. ALPHAGAN P 0.1 % ophthalmic solution Generic drug:  brimonidine Administer 1 Drop to both eyes two (2) times a day. amLODIPine 5 mg tablet Commonly known as:  Zaidi Mullet TAKE 1 TABLET BY MOUTH EVERY DAY  
  
 atenolol 100 mg tablet Commonly known as:  TENORMIN Take 1 Tab by mouth daily. dorzolamide 2 % ophthalmic solution Commonly known as:  TRUSOPT Administer 2 Drops to both eyes three (3) times daily. latanoprost 0.005 % ophthalmic solution Commonly known as:  XALATAN  
INT 1 GTT IN OU QD HS  
  
 levobunolol 0.5 % ophthalmic solution Commonly known as:  BETAGAN  
INSTILL 1 GTT OU BID  
  
 magnesium oxide 400 mg tablet Commonly known as:  MAG-OXIDE Take 1 Tab by mouth daily. At bedtime  
  
 simvastatin 20 mg tablet Commonly known as:  ZOCOR  
TAKE 1 TABLET BY MOUTH ONCE DAILY AT NIGHT  
  
 timolol 0.5 % ophthalmic solution Commonly known as:  TIMOPTIC INSTILL 1 DROP INTO BOTH EYES BID TYLENOL PO Take  by mouth as needed. 1 TABLET  
  
 warfarin 2.5 mg tablet Commonly known as:  COUMADIN  
TAKE 1 TABLET(2.5 MG) BY MOUTH DAILY Prescriptions Sent to Pharmacy Refills  
 allopurinol (ZYLOPRIM) 100 mg tablet 3 Sig: Take 1 Tab by mouth daily. Class: Normal  
 Pharmacy: [facility] [place] [address], [address] [place] [address] AT [address] [place] [address] Ph #: [phone] Route: Oral  
  
We Performed the Following AMB POC PT/INR [63507 CPT(R)] METABOLIC PANEL, BASIC [70129 CPT(R)] URIC ACID [id number] CPT(R)] Follow-up Instructions Return in about 4 months (around 11/6/2018). Introducing [facility]!    
 Yumiko Gomez introduces [employer] patient portal. Now you can access parts of your medical record, email your doctor's office, and request medication refills online. 1. In your internet browser, go to [email or web address] 2. Click on the First Time User? Click Here link in the Sign In box. You will see the New Member Sign Up page. 3. Enter your [employer] Access Code exactly as it appears below. You will not need to use this code after youve completed the sign-up process. If you do not sign up before the expiration date, you must request a new code. · [employer] Access Code: [email or web address] Expires: 9/9/2018  1:20 PM 
 
4. Enter the last four digits of your Social Security Number (xxxx) and Date of Birth (mm/dd/yyyy) as indicated and click Submit. You will be taken to the next sign-up page. 5. Create a [employer] ID. This will be your [employer] login ID and cannot be changed, so think of one that is secure and easy to remember. 6. Create a [employer] password. You can change your password at any time. 7. Enter your Password Reset Question and Answer. This can be used at a later time if you forget your password. 8. Enter your e-mail address. You will receive e-mail notification when new information is available in [address] 9. Click Sign Up. You can now view and download portions of your medical record. 10. Click the Download Summary menu link to download a portable copy of your medical information. If you have questions, please visit the Frequently Asked Questions section of the [employer] website. Remember, [employer] is NOT to be used for urgent needs. For medical emergencies, dial 911. Now available from your iPhone and Android! Please provide this summary of care documentation to your next provider. Your primary care clinician is listed as Gualberto Johnson. If you have any questions after today's visit, please call [phone].

## 2018-07-06 NOTE — PROGRESS NOTES
1. Have you been to the ER, urgent care clinic since your last visit? Hospitalized since your last visit? No    2. Have you seen or consulted any other health care providers outside of the [address] [place] [address] since your last visit? Include any pap smears or colon screening.  No     Wants to discuss pain in both shoulders

## 2018-07-06 NOTE — PROGRESS NOTES
SPORTS MEDICINE AND PRIMARY CARE  Arun Flannery MD, [address] [address]  Phone:  [phone]  Fax: [phone]       Chief Complaint   Patient presents with    Hypertension     f/u   . SUBJECTIVE:    Gaviota Carreno is a 80 y.o. female Patient returns today with her son with a known history of gout, atrial fibrillation, on Coumadin, followed by cardiology, coronary artery disease, dyslipidemia, primary hypertension, pacemaker implantation, obstructive sleep apnea, venous insufficiency, seen for evaluation. Complains of bilateral shoulder discomfort. Other new complaints denied and patient is seen for evaluation. Current Outpatient Prescriptions   Medication Sig Dispense Refill    allopurinol (ZYLOPRIM) 100 mg tablet Take 1 Tab by mouth daily. 90 Tab 3    warfarin (COUMADIN) 2.5 mg tablet TAKE 1 TABLET(2.5 MG) BY MOUTH DAILY 90 Tab 1    atenolol (TENORMIN) 100 mg tablet Take 1 Tab by mouth daily. 30 Tab 12    simvastatin (ZOCOR) 20 mg tablet TAKE 1 TABLET BY MOUTH ONCE DAILY AT NIGHT 30 Tab 11    amLODIPine (NORVASC) 5 mg tablet TAKE 1 TABLET BY MOUTH EVERY DAY 30 Tab 11    latanoprost (XALATAN) 0.005 % ophthalmic solution INT 1 GTT IN OU QD HS  2    timolol (TIMOPTIC) 0.5 % ophthalmic solution INSTILL 1 DROP INTO BOTH EYES BID  1    ACETAMINOPHEN (TYLENOL PO) Take  by mouth as needed. 1 TABLET      magnesium oxide (MAG-OXIDE) 400 mg tablet Take 1 Tab by mouth daily. At bedtime (Patient taking differently: Take 250 mg by mouth every Monday, Wednesday, Friday. At bedtime  ) 30 Tab 12    levobunolol (BETAGAN) 0.5 % ophthalmic solution INSTILL 1 GTT OU BID  3    dorzolamide (TRUSOPT) 2 % ophthalmic solution Administer 2 Drops to both eyes three (3) times daily.  brimonidine (ALPHAGAN P) 0.1 % ophthalmic solution Administer 1 Drop to both eyes two (2) times a day.        Past Medical History:   Diagnosis Date    Aortic valve disorders 4/29/2012    Atrial [facility] ([facility] 03/22/2011    on coumadin followed by cardiology - Johny Banks MD    CAD (coronary artery disease)     Cor pulmonale, chronic ([facility] 4/29/2012    Dyslipidemia (high LDL; low HDL) 4/29/2012    Gout 4/30/2012    Gout, arthritis 2/8/2012    Gout, joint     Hypertension     Hyperuricemia 4/30/2012    Obstructive sleep apnea 4/29/2012    Pacemaker 04/02/2006    Dr Meghann Salamanca placed the original pacer 4/6/2006. It is replaced in 2014.   It is [facility]    Recurrent epistaxis 4/29/2012    [facility] 3/22[facility]    Venous insufficiency      Past Surgical History:   Procedure Laterality Date    BREAST SURGERY PROCEDURE UNLISTED      lumpectomy    GEN INSERT/REPLACE ONLY DUAL  4/28/2014         HX PACEMAKER       No Known Allergies      REVIEW OF SYSTEMS:  General: negative for - chills or fever  ENT: negative for - headaches, nasal congestion or tinnitus  Respiratory: negative for - cough, hemoptysis, shortness of breath or wheezing  Cardiovascular : negative for - chest pain, edema, palpitations or shortness of breath  Gastrointestinal: negative for - abdominal pain, blood in stools, heartburn or nausea/vomiting  Genito-Urinary: no dysuria, trouble voiding, or hematuria  Musculoskeletal: negative for - gait disturbance, joint pain, joint stiffness or joint swelling  Neurological: no TIA or stroke symptoms  Hematologic: no bruises, no bleeding, no swollen glands  Integument: no lumps, mole changes, nail changes or rash  Endocrine: no malaise/lethargy or unexpected weight changes      Social History     Social History    Marital status:      Spouse name: N/A    Number of children: N/A    Years of education: N/A     Social History Main Topics    Smoking status: Never Smoker    Smokeless tobacco: Never Used    Alcohol use No    Drug use: No    Sexual activity: No     Other Topics Concern    None     Social History Narrative    Habits:  She is a lifetime nonsmoker, non drinker, non drug abuser. Social History:  The patient is . She lives with her son. 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 She is a member of [employer]. Family History:  Father and mother , unknown ages and causes. Two sisters  of unknown caus     History reviewed. No pertinent family history. OBJECTIVE:    Visit Vitals    /73    Pulse 60    Temp 97.6 °F (36.4 °C) (Oral)    Resp 18    Ht 5' 3\" (1.6 m)    Wt 178 lb 8 oz (81 kg)    SpO2 98%    BMI 31.62 kg/m2     CONSTITUTIONAL: well , well nourished, appears age appropriate  EYES: perrla, eom intact  ENMT:moist mucous membranes, pharynx clear  NECK: supple. Thyroid normal  RESPIRATORY: Chest: clear bilaterally   CARDIOVASCULAR: Heart: regular rate and rhythm  GASTROINTESTINAL: Abdomen: soft, bowel sounds active  HEMATOLOGIC: no pathological lymph nodes palpated  MUSCULOSKELETAL: Extremities: no edema, pulse 1+   INTEGUMENT: No unusual rashes or suspicious skin lesions noted. Nails appear normal.  NEUROLOGIC: non-focal exam   MENTAL STATUS: alert and oriented, appropriate affect           ASSESSMENT:  1. Encounter for monitoring coumadin therapy    2. Gout, arthritis    3. Essential hypertension, benign    4. Chronic gout without tophus, unspecified cause, unspecified site    5. Venous insufficiency    6. Cardiac pacemaker in situ    7. Sinoatrial node dysfunction (HCC)    8. Paroxysmal atrial fibrillation (HCC)      Patient's medical status is stable. Her son is using Biofreeze for the shoulder discomfort intermittently. She asks about Dr. Jared Diaz. We advised that he's retired and wonders if she needs to see an orthopedic doctor. We suggest not. BP control is adequate. INR is therapeutic and is followed by cardiology.   Since there is an interaction with the Coumadin and Allopurinol, she'd like to start the Allopurinol again for prophylaxis for gout and we suggest she start it three or four days before her next PT-INR. She's agreeable to the plan. She'll be back to see us in about 3-4 months, sooner if she needs to. We advised her and her son that they are welcome to walk in and see us at any time if they are unable to get an appointment and have an urgency. PLAN:  .  Orders Placed This Encounter    URIC ACID    METABOLIC PANEL, BASIC    AMB POC PT/INR    allopurinol (ZYLOPRIM) 100 mg tablet       Follow-up Disposition:  Return in about 4 months (around 11/6/2018). ATTENTION:   This medical record was transcribed using an electronic medical records system. Although proofread, it may and can contain electronic and spelling errors. Other human spelling and other errors may be present. Corrections may be executed at a later time. Please feel free to contact us for any clarifications as needed.

## 2018-07-07 LAB
BUN SERPL-MCNC: 12 MG/DL (ref 10–36)
BUN/CREAT SERPL: 12 (ref 12–28)
CALCIUM SERPL-MCNC: 9.2 MG/DL (ref 8.7–10.3)
CHLORIDE SERPL-SCNC: 104 MMOL/L (ref 96–106)
CO2 SERPL-SCNC: 26 MMOL/L (ref 20–29)
CREAT SERPL-MCNC: 0.98 MG/DL (ref 0.57–1)
GLUCOSE SERPL-MCNC: 83 MG/DL (ref 65–99)
POTASSIUM SERPL-SCNC: 4.3 MMOL/L (ref 3.5–5.2)
SODIUM SERPL-SCNC: 144 MMOL/L (ref 134–144)
URATE SERPL-MCNC: 6.3 MG/DL (ref 2.5–7.1)

## 2018-08-07 ENCOUNTER — CLINICAL SUPPORT (OUTPATIENT)
Dept: CARDIOLOGY CLINIC | Age: 83
End: 2018-08-07

## 2018-08-07 DIAGNOSIS — I48.0 PAROXYSMAL ATRIAL FIBRILLATION (HCC): ICD-10-CM

## 2018-08-07 DIAGNOSIS — Z79.01 LONG TERM (CURRENT) USE OF ANTICOAGULANTS: Primary | ICD-10-CM

## 2018-08-07 LAB
INR BLD: NORMAL
INR, EXTERNAL: 3.1
PT POC: NORMAL SECONDS
VALID INTERNAL CONTROL?: YES

## 2018-08-07 NOTE — PROGRESS NOTES
A full discussion of the nature of anticoagulants has been carried out. A benefit risk analysis has been presented to the patient, so that they understand the justification for choosing anticoagulation at this time. The need for frequent and regular monitoring, precise dosage adjustment and compliance is stressed. Side effects of potential bleeding are discussed. The patient should avoid any OTC items containing aspirin or ibuprofen, and should avoid great swings in general diet. Avoid alcohol consumption. Call if any signs of abnormal bleeding. Next PT/INR test in 3 weeks. The INR is 3.1. She was maintained therapeutic on same dose previously. Counseled patient on diet and Vit K, she endorses she did not eat any greens per her usual diet recently. She will make effort to be consistent with regard to the dietary Vit K and will eat a couple servings per week. She will return 3 weeks. The patient verbalized understanding and will call our office with any further questions or concerns.

## 2018-08-28 ENCOUNTER — CLINICAL SUPPORT (OUTPATIENT)
Dept: CARDIOLOGY CLINIC | Age: 83
End: 2018-08-28

## 2018-08-28 DIAGNOSIS — I48.0 PAROXYSMAL ATRIAL FIBRILLATION (HCC): ICD-10-CM

## 2018-08-28 DIAGNOSIS — Z79.01 LONG TERM (CURRENT) USE OF ANTICOAGULANTS: Primary | ICD-10-CM

## 2018-08-28 LAB
INR BLD: NORMAL
INR, EXTERNAL: 2
PT POC: NORMAL SECONDS
VALID INTERNAL CONTROL?: YES

## 2018-08-28 NOTE — PROGRESS NOTES
A full discussion of the nature of anticoagulants has been carried out. A benefit risk analysis has been presented to the patient, so that they understand the justification for choosing anticoagulation at this time. The need for frequent and regular monitoring, precise dosage adjustment and compliance is stressed. Side effects of potential bleeding are discussed. The patient should avoid any OTC items containing aspirin or ibuprofen, and should avoid great swings in general diet. Avoid alcohol consumption. Call if any signs of abnormal bleeding. Next PT/INR test in 3-4 weeks. The INR is therapeutic. Patient was increasing intake Vit K containing foods recently. Will eat usual diet, continue usual dose Coumadin. The patient verbalized understanding and will call our office with any further questions or concerns.

## 2018-09-17 ENCOUNTER — OFFICE VISIT (OUTPATIENT)
Dept: CARDIOLOGY CLINIC | Age: 83
End: 2018-09-17

## 2018-09-17 DIAGNOSIS — Z95.0 CARDIAC PACEMAKER IN SITU: Primary | ICD-10-CM

## 2018-10-01 ENCOUNTER — CLINICAL SUPPORT (OUTPATIENT)
Dept: CARDIOLOGY CLINIC | Age: 83
End: 2018-10-01

## 2018-10-01 DIAGNOSIS — I48.0 PAROXYSMAL ATRIAL FIBRILLATION (HCC): ICD-10-CM

## 2018-10-01 DIAGNOSIS — Z79.01 LONG TERM (CURRENT) USE OF ANTICOAGULANTS: Primary | ICD-10-CM

## 2018-10-01 LAB
INR BLD: NORMAL
INR, EXTERNAL: 2.7
PT POC: NORMAL SECONDS
VALID INTERNAL CONTROL?: YES

## 2018-10-19 ENCOUNTER — OFFICE VISIT (OUTPATIENT)
Dept: INTERNAL MEDICINE CLINIC | Age: 83
End: 2018-10-19

## 2018-10-19 VITALS
HEIGHT: 63 IN | TEMPERATURE: 97.3 F | WEIGHT: 180.4 LBS | RESPIRATION RATE: 18 BRPM | SYSTOLIC BLOOD PRESSURE: 125 MMHG | HEART RATE: 60 BPM | DIASTOLIC BLOOD PRESSURE: 71 MMHG | BODY MASS INDEX: 31.96 KG/M2 | OXYGEN SATURATION: 97 %

## 2018-10-19 DIAGNOSIS — Z79.01 ENCOUNTER FOR MONITORING COUMADIN THERAPY: Primary | ICD-10-CM

## 2018-10-19 DIAGNOSIS — R00.2 PALPITATIONS: ICD-10-CM

## 2018-10-19 DIAGNOSIS — Z95.0 PACEMAKER: ICD-10-CM

## 2018-10-19 DIAGNOSIS — I10 ESSENTIAL HYPERTENSION, BENIGN: ICD-10-CM

## 2018-10-19 DIAGNOSIS — Z51.81 ENCOUNTER FOR MONITORING COUMADIN THERAPY: Primary | ICD-10-CM

## 2018-10-19 DIAGNOSIS — M1A.9XX0 CHRONIC GOUT WITHOUT TOPHUS, UNSPECIFIED CAUSE, UNSPECIFIED SITE: ICD-10-CM

## 2018-10-19 DIAGNOSIS — I48.0 PAROXYSMAL ATRIAL FIBRILLATION (HCC): ICD-10-CM

## 2018-10-19 DIAGNOSIS — Z95.0 CARDIAC PACEMAKER IN SITU: ICD-10-CM

## 2018-10-19 LAB
INR BLD: 2.8
PT POC: 34.2 SECONDS
VALID INTERNAL CONTROL?: YES

## 2018-10-19 NOTE — PROGRESS NOTES
SPORTS MEDICINE AND PRIMARY CARE Leoncio Keyes MD, [place] [address] Phone:  [phone]  Fax: [phone] Chief Complaint Patient presents with  Shoulder Pain Krishshagufta Hammond SUBJECTIVE: 
  Katiuska Baez is a 80 y.o. female Comes in with known history of atrial fibrillation, on Coumadin, followed by cardiology, S/P pacemaker implantation for sinoatrial node dysfunction, gout, primary hypertension, and is seen for evaluation. Patient returns today with her son complaining of her right shoulder discomfort, right knee discomfort, for which she uses Tylenol, and at night sometimes she feels the sensation of her pacemaker. Other new complaints denied. Patient is seen for evaluation. She's had no falls. Current Outpatient Medications Medication Sig Dispense Refill  allopurinol (ZYLOPRIM) 100 mg tablet Take 1 Tab by mouth daily. 90 Tab 3  warfarin (COUMADIN) 2.5 mg tablet TAKE 1 TABLET(2.5 MG) BY MOUTH DAILY 90 Tab 1  
 atenolol (TENORMIN) 100 mg tablet Take 1 Tab by mouth daily. 30 Tab 12  
 simvastatin (ZOCOR) 20 mg tablet TAKE 1 TABLET BY MOUTH ONCE DAILY AT NIGHT 30 Tab 11  
 amLODIPine (NORVASC) 5 mg tablet TAKE 1 TABLET BY MOUTH EVERY DAY 30 Tab 11  
 latanoprost (XALATAN) 0.005 % ophthalmic solution INT 1 GTT IN OU QD HS  2  
 levobunolol (BETAGAN) 0.5 % ophthalmic solution INSTILL 1 GTT OU BID  3  
 timolol (TIMOPTIC) 0.5 % ophthalmic solution INSTILL 1 DROP INTO BOTH EYES BID  1  ACETAMINOPHEN (TYLENOL PO) Take  by mouth as needed. 1 TABLET    
 dorzolamide (TRUSOPT) 2 % ophthalmic solution Administer 2 Drops to both eyes three (3) times daily.  brimonidine (ALPHAGAN P) 0.1 % ophthalmic solution Administer 1 Drop to both eyes two (2) times a day.  magnesium oxide (MAG-OXIDE) 400 mg tablet Take 1 Tab by mouth daily. At bedtime (Patient taking differently: Take 250 mg by mouth every Monday, Wednesday, Friday. At bedtime ) 30 Tab 12 Past Medical History:  
Diagnosis Date  Aortic valve disorders 4/29/2012  Atrial fibrillation ([facility] 03/22/2011  
 on coumadin followed by cardiology - Libby Tyson MD  
 CAD (coronary artery disease)  Cor pulmonale, chronic ([facility] 4/29/2012  Dyslipidemia (high LDL; low HDL) 4/29/2012  Gout 4/30/2012  Gout, arthritis 2/8/2012  Gout, joint  Hypertension  Hyperuricemia 4/30/2012  Obstructive sleep apnea 4/29/2012  Pacemaker 04/02/2006 Dr Layla Pablo placed the original pacer 4/6/2006. It is replaced in 2014. It is [facility]  Recurrent epistaxis 4/29/2012  [facility] 3/22[facility]2011  Venous insufficiency Past Surgical History:  
Procedure Laterality Date  BREAST SURGERY PROCEDURE UNLISTED    
 lumpectomy  GEN INSERT/REPLACE ONLY DUAL  4/28/2014  HX PACEMAKER No Known Allergies REVIEW OF SYSTEMS: 
General: negative for - chills or fever ENT: negative for - headaches, nasal congestion or tinnitus Respiratory: negative for - cough, hemoptysis, shortness of breath or wheezing Cardiovascular : negative for - chest pain, edema, palpitations or shortness of breath Gastrointestinal: negative for - abdominal pain, blood in stools, heartburn or nausea/vomiting Genito-Urinary: no dysuria, trouble voiding, or hematuria Musculoskeletal: negative for - gait disturbance, joint pain, joint stiffness or joint swelling Neurological: no TIA or stroke symptoms Hematologic: no bruises, no bleeding, no swollen glands Integument: no lumps, mole changes, nail changes or rash Endocrine: no malaise/lethargy or unexpected weight changes Social History Socioeconomic History  Marital status:  Spouse name: Not on file  Number of children: Not on file  Years of education: Not on file  Highest education level: Not on file Social Needs  Financial resource strain: Not on file  Food insecurity - worry: Not on file  Food insecurity - inability: Not on file  Transportation needs - medical: Not on file  Transportation needs - non-medical: Not on file Occupational History  Not on file Tobacco Use  Smoking status: Never Smoker  Smokeless tobacco: Never Used Substance and Sexual Activity  Alcohol use: No  
 Drug use: No  
 Sexual activity: No  
Other Topics Concern  Not on file Social History Narrative Habits:  She is a lifetime nonsmoker, non drinker, non drug abuser. Social History:  The patient is . She lives with her son. 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 She is a member of [employer]. Family History:  Father and mother , unknown ages and causes. Two sisters  of unknown caus History reviewed. No pertinent family history. OBJECTIVE: 
 
Visit Vitals /71 Pulse 60 Temp 97.3 °F (36.3 °C) (Oral) Resp 18 Ht 5' 3\" (1.6 m) Wt 180 lb 6.4 oz (81.8 kg) SpO2 97% BMI 31.96 kg/m² CONSTITUTIONAL: well , well nourished, appears age appropriate EYES: perrla, eom intact ENMT:moist mucous membranes, pharynx clear NECK: supple. Thyroid normal 
RESPIRATORY: Chest: clear bilaterally CARDIOVASCULAR: Heart: regular rate and rhythm GASTROINTESTINAL: Abdomen: soft, bowel sounds active HEMATOLOGIC: no pathological lymph nodes palpated MUSCULOSKELETAL: Extremities: no edema, pulse 1+ INTEGUMENT: No unusual rashes or suspicious skin lesions noted. Nails appear normal. 
NEUROLOGIC: non-focal exam  
MENTAL STATUS: alert and oriented, appropriate affect ASSESSMENT: 
1. Encounter for monitoring Coumadin therapy 2. Paroxysmal atrial fibrillation (HCC) 3. Palpitations 4. Pacemaker 5. Chronic gout without tophus, unspecified cause, unspecified site 6. Essential hypertension, benign 7. Cardiac pacemaker in situ Full ROM of the shoulder, therefore Biofreeze, or her son has something that sounds like its equivalent, would be useful for her, in addition to using Tylenol. In addition to that she can also use some warm compresses, but they need to be careful to put a washcloth between her and the heating pad to make her more comfortable. We raise the question of PT, she's not interested at this time. Her cardiologist follows her Coumadin. Her INR continues therapeutic at 2.8. 
 
BP control is excellent at 125/71. In general at 80years old she's doing exceptionally well. She'll have her 89th birthday before long and we congratulate her in advance. She'll be back to see us in six months, sooner if needed. She's advised she can walk in to see us at any time should she have an urgency and unable to get an appointment. I have discussed the diagnosis with the patient and the intended plan as seen in the 
orders above. The patient understands and agees with the plan. The patient has  
received an after visit summary and questions were answered concerning 
future plans Patient labs and/or xrays were reviewed Past records were reviewed. PLAN: 
. Orders Placed This Encounter  AMB POC PT/INR Follow-up Disposition: 
Return in about 3 months (around 1/19/2019). ATTENTION:  
This medical record was transcribed using an electronic medical records system. Although proofread, it may and can contain electronic and spelling errors. Other human spelling and other errors may be present. Corrections may be executed at a later time. Please feel free to contact us for any clarifications as needed.

## 2018-10-19 NOTE — PATIENT INSTRUCTIONS
Body Mass Index: Care Instructions Your Care Instructions Body mass index (BMI) can help you see if your weight is raising your risk for health problems. It uses a formula to compare how much you weigh with how tall you are. · A BMI lower than 18.5 is considered underweight. · A BMI between 18.5 and 24.9 is considered healthy. · A BMI between 25 and 29.9 is considered overweight. A BMI of 30 or higher is considered obese. If your BMI is in the normal range, it means that you have a lower risk for weight-related health problems. If your BMI is in the overweight or obese range, you may be at increased risk for weight-related health problems, such as high blood pressure, heart disease, stroke, arthritis or joint pain, and diabetes. If your BMI is in the underweight range, you may be at increased risk for health problems such as fatigue, lower protection (immunity) against illness, muscle loss, bone loss, hair loss, and hormone problems. BMI is just one measure of your risk for weight-related health problems. You may be at higher risk for health problems if you are not active, you eat an unhealthy diet, or you drink too much alcohol or use tobacco products. Follow-up care is a key part of your treatment and safety. Be sure to make and go to all appointments, and call your doctor if you are having problems. It's also a good idea to know your test results and keep a list of the medicines you take. How can you care for yourself at home? · Practice healthy eating habits. This includes eating plenty of fruits, vegetables, whole grains, lean protein, and low-fat dairy. · If your doctor recommends it, get more exercise. Walking is a good choice. Bit by bit, increase the amount you walk every day. Try for at least 30 minutes on most days of the week. · Do not smoke. Smoking can increase your risk for health problems.  If you need help quitting, talk to your doctor about stop-smoking programs and medicines. These can increase your chances of quitting for good. · Limit alcohol to 2 drinks a day for men and 1 drink a day for women. Too much alcohol can cause health problems. If you have a BMI higher than 25 · Your doctor may do other tests to check your risk for weight-related health problems. This may include measuring the distance around your waist. A waist measurement of more than 40 inches in men or 35 inches in women can increase the risk of weight-related health problems. · Talk with your doctor about steps you can take to stay healthy or improve your health. You may need to make lifestyle changes to lose weight and stay healthy, such as changing your diet and getting regular exercise. If you have a BMI lower than 18.5 · Your doctor may do other tests to check your risk for health problems. · Talk with your doctor about steps you can take to stay healthy or improve your health. You may need to make lifestyle changes to gain or maintain weight and stay healthy, such as getting more healthy foods in your diet and doing exercises to build muscle. Where can you learn more? Go to http://saurav-roni.info/. Enter S176 in the search box to learn more about \"Body Mass Index: Care Instructions. \" Current as of: October 13, 2016 Content Version: 11.4 © 7063-5157 [employer]wise, [employer]orporated. Care instructions adapted under license by [employer] (which disclaims liability or warranty for this information). If you have questions about a medical condition or this instruction, always ask your healthcare professional. [address] any warranty or liability for your use of this information.

## 2018-10-19 NOTE — PROGRESS NOTES
1. Have you been to the ER, urgent care clinic since your last visit? Hospitalized since your last visit? No 
 
2. Have you seen or consulted any other health care providers outside of the [address] [place] [address] since your last visit? Include any pap smears or colon screening. No 
 
 
Complaints of L&R shoulder ache and numbness in finger tips

## 2018-11-05 ENCOUNTER — CLINICAL SUPPORT (OUTPATIENT)
Dept: CARDIOLOGY CLINIC | Age: 83
End: 2018-11-05

## 2018-11-05 DIAGNOSIS — I48.0 PAROXYSMAL ATRIAL FIBRILLATION (HCC): ICD-10-CM

## 2018-11-05 DIAGNOSIS — Z79.01 LONG TERM (CURRENT) USE OF ANTICOAGULANTS: ICD-10-CM

## 2018-11-05 LAB
INR BLD: 2.6
PT POC: NORMAL SECONDS
VALID INTERNAL CONTROL?: YES

## 2018-11-05 NOTE — PROGRESS NOTES
A full discussion of the nature of anticoagulants has been carried out. A benefit risk analysis has been presented to the patient, so that they understand the justification for choosing anticoagulation at this time. The need for frequent and regular monitoring, precise dosage adjustment and compliance is stressed. Side effects of potential bleeding are discussed. The patient should avoid any OTC items containing aspirin or ibuprofen, and should avoid great swings in general diet. Avoid alcohol consumption. Call if any signs of abnormal bleeding. Next PT/INR test in 1 month. Pt INR is stable and therapeutic no need for any changes at this time. Anticoagulation Episode Summary Current INR goal:   2.0-3.0  
TTR:   93.0 % (10.1 mo) Next INR check:   12/3/2018 INR from last check:   2.6 (11/5/2018) Weekly max warfarin dose:     
Target end date: Indefinite INR check location:     
Preferred lab:     
Send INR reminders to: Indications Paroxysmal atrial fibrillation (HCC) [I48.0] Long term (current) use of anticoagulants [Z79.01] Comments: Anticoagulation Care Providers Provider Role Specialty Phone number Iker Bain MD [facility] Cardiology [phone] Future Appointments Date Time Provider Monika Garcia 12/3/2018 10:40 AM COUMADIN, KAREN LI CHARI SCHED  
12/20/2018  1:30 PM PACEMAKER3, [address]  
12/20/2018  1:40 PM Iker Bain  [address]  
1/18/2019 10:30 AM Florence Iniguez MD [address]

## 2018-12-06 ENCOUNTER — CLINICAL SUPPORT (OUTPATIENT)
Dept: CARDIOLOGY CLINIC | Age: 83
End: 2018-12-06

## 2018-12-06 DIAGNOSIS — I48.0 PAROXYSMAL ATRIAL FIBRILLATION (HCC): ICD-10-CM

## 2018-12-06 DIAGNOSIS — Z79.01 LONG TERM (CURRENT) USE OF ANTICOAGULANTS: ICD-10-CM

## 2018-12-06 LAB
INR BLD: 3.2
PT POC: NORMAL SECONDS
VALID INTERNAL CONTROL?: YES

## 2018-12-06 NOTE — PROGRESS NOTES
A full discussion of the nature of anticoagulants has been carried out. A benefit risk analysis has been presented to the patient, so that they understand the justification for choosing anticoagulation at this time. The need for frequent and regular monitoring, precise dosage adjustment and compliance is stressed. Side effects of potential bleeding are discussed. The patient should avoid any OTC items containing aspirin or ibuprofen, and should avoid great swings in general diet. Avoid alcohol consumption. Call if any signs of abnormal bleeding. Next PT/INR test in 2 weeks. No change in dosing regimen. Patient to eat increased serving of vit K foods. Anticoagulation Summary  As of 12/6/2018 INR goal:   2.0-3.0  
TTR:   90.5 % (11.1 mo) INR used for dosing:   3.2! (12/6/2018) Warfarin maintenance plan:   2.5 mg (2.5 mg x 1) every day Weekly warfarin total:   17.5 mg  
Plan last modified:   Rubina Walton LPN (79/75/6639) Next INR check:   12/27/2018 Target end date: Indefinite Indications Paroxysmal atrial fibrillation (HCC) [I48.0] Long term (current) use of anticoagulants [Z79.01] Anticoagulation Episode Summary INR check location:     
 Preferred lab:     
 Send INR reminders to:     
 Comments: Anticoagulation Care Providers Provider Role Specialty Phone number Reza Goddard MD [facility] Cardiology [phone] Future Appointments Date Time Provider Monika Garcia 12/20/2018  2:00 PM PACEMAKER3, [address]  
12/20/2018  2:20 PM Anton Lemon,  [address]  
12/27/2018 10:40 AM COUMADINKAREN CHARI SCHED  
1/18/2019 10:30 AM Gina Iniguez MD [address] [facility]

## 2018-12-12 DIAGNOSIS — I48.0 PAROXYSMAL ATRIAL FIBRILLATION (HCC): Primary | ICD-10-CM

## 2018-12-13 RX ORDER — WARFARIN 2.5 MG/1
TABLET ORAL
Qty: 90 TAB | Refills: 0 | Status: SHIPPED | OUTPATIENT
Start: 2018-12-13 | End: 2019-03-28 | Stop reason: SDUPTHER

## 2018-12-13 NOTE — TELEPHONE ENCOUNTER
Requested Prescriptions     Signed Prescriptions Disp Refills    warfarin (COUMADIN) 2.5 mg tablet 90 Tab 0     Sig: TAKE 1 TABLET(2.5 MG) BY MOUTH DAILY     Authorizing Provider: HAY WALTON     Ordering User: Jaden Viveros       Per Dr. Skinny Blanco   Date Time Provider Monika Garcia   12/20/2018  2:00 PM Chari Yulisa, [address]   12/20/2018  2:20 PM Jemima Robledo  [address]   12/27/2018 10:40 AM KAREN HENNING SCHED   1/18/2019 10:30 AM Pedro Iniguez MD [facility]

## 2018-12-20 ENCOUNTER — CLINICAL SUPPORT (OUTPATIENT)
Dept: CARDIOLOGY CLINIC | Age: 83
End: 2018-12-20

## 2018-12-20 ENCOUNTER — OFFICE VISIT (OUTPATIENT)
Dept: CARDIOLOGY CLINIC | Age: 83
End: 2018-12-20

## 2018-12-20 VITALS
BODY MASS INDEX: 32.07 KG/M2 | DIASTOLIC BLOOD PRESSURE: 74 MMHG | HEART RATE: 60 BPM | HEIGHT: 63 IN | WEIGHT: 181 LBS | SYSTOLIC BLOOD PRESSURE: 132 MMHG

## 2018-12-20 DIAGNOSIS — I10 ESSENTIAL HYPERTENSION, BENIGN: ICD-10-CM

## 2018-12-20 DIAGNOSIS — Z95.0 CARDIAC PACEMAKER IN SITU: Primary | ICD-10-CM

## 2018-12-20 DIAGNOSIS — R60.0 LOWER EXTREMITY EDEMA: ICD-10-CM

## 2018-12-20 DIAGNOSIS — I47.1 PAT (PAROXYSMAL ATRIAL TACHYCARDIA) (HCC): ICD-10-CM

## 2018-12-20 DIAGNOSIS — Z79.01 LONG TERM (CURRENT) USE OF ANTICOAGULANTS: ICD-10-CM

## 2018-12-20 DIAGNOSIS — I48.20 CHRONIC ATRIAL FIBRILLATION (HCC): ICD-10-CM

## 2018-12-20 RX ORDER — ZINC GLUCONATE 10 MG
250 LOZENGE ORAL
COMMUNITY

## 2018-12-20 NOTE — PROGRESS NOTES
Cardiac Electrophysiology Office Note     Subjective:      Fatoumata Reid is a 80 y.o. woman who is here to follow up of atrial fibrillation & [employer] Isael dual chamber pacemaker (last gen change 04/28/2018). Son is with her today. Device check today shows proper lead & generator function. 9365 AMS episodes noted, no HVR. 18% AT/AF burden. She is 27% RV paced, 71% RA paced. Adequate generator longevity. She reports that she is doing well. She denies chest pain, SOB, PND, orthopnea, syncope, or lightheadedness. She notes intermittent lower extremity edema, has noted some chest congestion, cough recently. No fevers/chills. Occasional palpitations, mainly at night. Anticoagulated with warfarin, denies bleeding issues. INR checks done here at [facility] office. Previous:  PAF noted on previous device checks. Echo (04/08/2014): LVEF 60-65%, no RWMA, grade 1 diastolic dysfunction. LA mildly dilated. Mild TR. Dr Sujatha Murillo placed the original pacer 4/6/2006. It is replaced in 2014. It is St Isael. She has past medical history of sinus node dysfunction-s/p ppm (4/2006), atrial fibrillation, hypertension, hyperlipidemia, palpitations, and gout. Patient Active Problem List    Diagnosis Date Noted    Long term (current) use of anticoagulants 11/05/2018    Venous insufficiency     Pacemaker     Encounter for long-term (current) use of high-risk medication 04/07/2014    Gout 04/30/2012    Hyperuricemia 04/30/2012    Paroxysmal atrial fibrillation ([facility] 03/22/2011    Essential hypertension, benign 03/22/2011    Palpitations 03/22/2011    Sinoatrial node dysfunction ([facility] 03/22/2011    Cardiac pacemaker in situ 11/17/2010     Current Outpatient Medications   Medication Sig Dispense Refill    magnesium 250 mg tab Take 250 mg by mouth.  Mon, wed, fri only      warfarin (COUMADIN) 2.5 mg tablet TAKE 1 TABLET(2.5 MG) BY MOUTH DAILY 90 Tab 0    atenolol (TENORMIN) 100 mg tablet Take 1 Tab by mouth daily. 30 Tab 12    simvastatin (ZOCOR) 20 mg tablet TAKE 1 TABLET BY MOUTH ONCE DAILY AT NIGHT 30 Tab 11    amLODIPine (NORVASC) 5 mg tablet TAKE 1 TABLET BY MOUTH EVERY DAY 30 Tab 11    latanoprost (XALATAN) 0.005 % ophthalmic solution INT 1 GTT IN OU QD HS  2    levobunolol (BETAGAN) 0.5 % ophthalmic solution INSTILL 1 GTT OU BID  3    allopurinol (ZYLOPRIM) 100 mg tablet Take 1 Tab by mouth daily. 90 Tab 3    timolol (TIMOPTIC) 0.5 % ophthalmic solution INSTILL 1 DROP INTO BOTH EYES BID  1    ACETAMINOPHEN (TYLENOL PO) Take  by mouth as needed. 1 TABLET      dorzolamide (TRUSOPT) 2 % ophthalmic solution Administer 2 Drops to both eyes three (3) times daily.  brimonidine (ALPHAGAN P) 0.1 % ophthalmic solution Administer 1 Drop to both eyes two (2) times a day.  magnesium oxide (MAG-OXIDE) 400 mg tablet Take 1 Tab by mouth daily. At bedtime (Patient taking differently: Take 250 mg by mouth every Monday, Wednesday, Friday. At bedtime  ) 30 Tab 12     No Known Allergies  Past Medical History:   Diagnosis Date    Aortic valve disorders 4/29/2012    Atrial fibrillation ([address] 03/22/2011    on coumadin followed by cardiology - Tonna Shone, MD    CAD (coronary artery disease)     Cor pulmonale, chronic ([address] 4/29/2012    Dyslipidemia (high LDL; low HDL) 4/29/2012    Gout 4/30/2012    Gout, arthritis 2/8/2012    Gout, joint     Hypertension     Hyperuricemia 4/30/2012    Obstructive sleep apnea 4/29/2012    Pacemaker 04/02/2006    Dr Park Pérez placed the original pacer 4/6/2006. It is replaced in 2014.   It is [employer]    Recurrent epistaxis 4/29/2012    Sinoatrial node dysfunction ([address] 3/22/2011    Venous insufficiency      Past Surgical History:   Procedure Laterality Date    BREAST SURGERY PROCEDURE UNLISTED      lumpectomy    GEN INSERT/REPLACE ONLY DUAL  4/28/2014         HX PACEMAKER         Social History     Tobacco Use    Smoking status: Never Smoker    Smokeless tobacco: Never Used   Substance Use Topics    Alcohol use: No        Review of Systems:   Constitutional: Negative for fever, chills, weight loss, malaise/fatigue. HEENT: Negative for nosebleeds, vision changes. Respiratory: + cough & congestion, no hemoptysis, and no wheezing. Cardiovascular: Negative for chest pain, + leg swelling, no syncope, and no PND. Gastrointestinal: Negative for nausea, vomiting, diarrhea, blood in stool and melena. Genitourinary: Negative for dysuria, and hematuria. Musculoskeletal: Negative for myalgias, + chronic arthralgias. Skin: Negative for rash. Heme: Does not bleed or bruise easily. Neurological: Negative for speech change and focal weakness     Objective:     Visit Vitals  /74   Pulse 60   Ht 5' 3\" (1.6 m)   Wt 181 lb (82.1 kg)   BMI 32.06 kg/m²      Physical Exam:   Constitutional: well-developed and well-nourished. No distress. Head: Normocephalic and atraumatic. Eyes: Pupils are equal, round  Neck: supple. No JVD present. No bruits   Cardiovascular: Normal rate, regular rhythm and normal heart sounds. Exam reveals no gallop and no friction rub. No murmur heard. Pulmonary/Chest: Effort normal. No wheezes. Abdominal: Soft, no tenderness. Obese. Musculoskeletal: 1+ bilateral pitting edema bilaterally. .   Neurological: Alert,oriented. Skin: Skin is warm and dry. Left sided ppm incision healed   Psychiatric: normal mood and affect. Judgment and thought content normal.          Assessment/Plan:       ICD-10-CM ICD-9-CM    1. Cardiac pacemaker in situ Z95.0 V45.01    2. Long term (current) use of anticoagulants Z79.01 V58.61    3. Chronic atrial fibrillation (HCC) I48.2 427.31    4. Essential hypertension, benign I10 401.1    5. PAT (paroxysmal atrial tachycardia) (HCC) I47.1 427.0    As always, her son is with her and she is doing well. She walks with a cane but is very young for her stated age.      St. Isael dual chamber pacemaker check showed proper lead & generator function. 18% AF burden, not a new issue for her. No HVR. She is 27% RV paced, 71% RA paced. Mainly asymptomatic with AF. Occasional brief palpitations x 5 minutes at night. Activity tolerance has been stable, but she has noted lower extremity edema & some chest congestion. Last echo done in 2014, showed normal LVEF. Will repeat 2D echo. BP well controlled today. Anticoagulated with warfarin, tolerating well. Continue medications as previously prescribed. Remote checks q 3 months. Currently scheduled for follow up with Dr. Raj Dumont in 1 year. Depending on echo results, may have patient return sooner, but will keep at 1 year for now. Future Appointments   Date Time Provider Monika Garcia   12/26/2018 11:00 AM ECHOTWO, [address]   12/27/2018 10:40 AM COUMADIN, KAREN LI CHARI SCHED   1/18/2019 10:30 AM Lew Iniguez MD [facility] CHARI SCHED   4/3/2019 10:00 AM REMOTE1, JONES CAVREY CHARI SCHED   7/10/2019  8:15 AM REMOTE1, JONES CAVREY CHARI SCHED   10/30/2019  8:00 AM REMOTE1, [address]   1/16/2020 11:00 AM PACEMAKER3, [address]   1/16/2020 11:20 AM Becka Olvera  [address]       Thank you for involving me in this patient's care and please call with further concerns or questions.       GOLDEN Olvera  [facility] [place]  12/20/18

## 2018-12-26 ENCOUNTER — CLINICAL SUPPORT (OUTPATIENT)
Dept: CARDIOLOGY CLINIC | Age: 83
End: 2018-12-26

## 2018-12-26 ENCOUNTER — ANTI-COAG VISIT (OUTPATIENT)
Dept: CARDIOLOGY CLINIC | Age: 83
End: 2018-12-26

## 2018-12-26 DIAGNOSIS — R60.0 LOWER EXTREMITY EDEMA: ICD-10-CM

## 2018-12-26 DIAGNOSIS — I10 ESSENTIAL HYPERTENSION, BENIGN: ICD-10-CM

## 2018-12-26 DIAGNOSIS — Z95.0 CARDIAC PACEMAKER IN SITU: ICD-10-CM

## 2018-12-26 DIAGNOSIS — I47.1 PAT (PAROXYSMAL ATRIAL TACHYCARDIA) (HCC): ICD-10-CM

## 2018-12-26 DIAGNOSIS — Z79.01 LONG TERM (CURRENT) USE OF ANTICOAGULANTS: ICD-10-CM

## 2018-12-26 DIAGNOSIS — I48.20 CHRONIC ATRIAL FIBRILLATION (HCC): ICD-10-CM

## 2018-12-26 DIAGNOSIS — I48.0 PAROXYSMAL ATRIAL FIBRILLATION (HCC): ICD-10-CM

## 2018-12-26 LAB
INR BLD: 3.1
PT POC: NORMAL SECONDS
VALID INTERNAL CONTROL?: YES

## 2018-12-26 NOTE — PROGRESS NOTES
A full discussion of the nature of anticoagulants has been carried out. A benefit risk analysis has been presented to the patient, so that they understand the justification for choosing anticoagulation at this time. The need for frequent and regular monitoring, precise dosage adjustment and compliance is stressed. Side effects of potential bleeding are discussed. The patient should avoid any OTC items containing aspirin or ibuprofen, and should avoid great swings in general diet. Avoid alcohol consumption. Call if any signs of abnormal bleeding. Next PT/INR test in 3 weeks. Continue current dosing regimen. Patient verbalized understanding    Anticoagulation Summary  As of 12/26/2018    INR goal:   2.0-3.0   TTR:   85.4 % (11.8 mo)   INR used for dosing:   3.1! (12/26/2018)   Warfarin maintenance plan:   2.5 mg (2.5 mg x 1) every day   Weekly warfarin total:   17.5 mg   Plan last modified:   Electa , LPN (12/34/6357)   Next INR check:   1/16/2019   Target end date:    Indefinite    Indications    Paroxysmal atrial fibrillation ([address] [I48.0]  Long term (current) use of anticoagulants [Z79.01]             Anticoagulation Episode Summary     INR check location:       Preferred lab:       Send INR reminders to:       Comments:         Anticoagulation Care Providers     Provider Role Specialty Phone number    Brian Sandy MD [facility] Cardiology [phone]          Future Appointments   Date Time Provider Monika Garcia   12/26/2018  2:00 PM COUMADIN, [address]   1/16/2019 10:20 AM COUMADIN, JONES CAVREY CHARI SCHED   1/18/2019 10:30 AM Merlene Iniguez MD [facility] CHARI [address] [facility] [address]   4/3/2019 10:00 AM REMOTE1, JONES CAVREY CHARI SCHED   7/10/2019  8:15 AM REMOTE1, JONES CAVREY CHARI SCHED   10/30/2019  8:00 AM REMOTE1, [address]   1/16/2020 11:00 AM PACEMAKER3, [address]   1/16/2020 11:20 AM Brian Sandy MD [address]

## 2018-12-31 NOTE — PROGRESS NOTES
LVEF stable compared to previous, 69%. Grade 1 diastolic dysfunction. Mild MR, mild TR. No change in treatment plan based on results.

## 2018-12-31 NOTE — PROGRESS NOTES
Verified patient with two types of identifiers. Notified patient of results. Patient requests I contact patient's son to notify of results. Verified son's phone number and that he was on patient's HIPAA form. Patient verbalized understanding and will call with any other questions. Attempted to reach patient's son by telephone. A message was left for return call.

## 2019-01-02 RX ORDER — AMLODIPINE BESYLATE 5 MG/1
TABLET ORAL
Qty: 30 TAB | Refills: 11 | Status: SHIPPED | OUTPATIENT
Start: 2019-01-02 | End: 2019-08-13 | Stop reason: SDUPTHER

## 2019-01-14 DIAGNOSIS — E78.5 DYSLIPIDEMIA (HIGH LDL; LOW HDL): Primary | ICD-10-CM

## 2019-01-14 RX ORDER — SIMVASTATIN 20 MG/1
TABLET, FILM COATED ORAL
Qty: 30 TAB | Refills: 0 | Status: SHIPPED | OUTPATIENT
Start: 2019-01-14 | End: 2019-02-22 | Stop reason: SDUPTHER

## 2019-01-14 NOTE — TELEPHONE ENCOUNTER
I called the patient and left a message requesting a return call. I called to inform her that the PA wanted me to; Please advise pt that I renewed her cholesterol medication/simvastatin. She will need to get updated FASTING blood work done, as she has not had cholesterol checked since last year. She can get those drawn at her convenience and any changes to her meds can be addressed by phone.

## 2019-01-16 ENCOUNTER — ANTI-COAG VISIT (OUTPATIENT)
Dept: CARDIOLOGY CLINIC | Age: 84
End: 2019-01-16

## 2019-01-16 DIAGNOSIS — Z79.01 LONG TERM (CURRENT) USE OF ANTICOAGULANTS: ICD-10-CM

## 2019-01-16 DIAGNOSIS — I48.0 PAROXYSMAL ATRIAL FIBRILLATION (HCC): ICD-10-CM

## 2019-01-16 LAB
INR BLD: 3.6
PT POC: NORMAL SECONDS
VALID INTERNAL CONTROL?: YES

## 2019-01-16 NOTE — PROGRESS NOTES
A full discussion of the nature of anticoagulants has been carried out. A benefit risk analysis has been presented to the patient, so that they understand the justification for choosing anticoagulation at this time. The need for frequent and regular monitoring, precise dosage adjustment and compliance is stressed. Side effects of potential bleeding are discussed. The patient should avoid any OTC items containing aspirin or ibuprofen, and should avoid great swings in general diet. Avoid alcohol consumption. Call if any signs of abnormal bleeding. Next PT/INR test in 2 weeks. Patient reports not eating much vit k foods. Instructed to decrease dose to 2.5 mg daily except Thursday take 1/2 tab (1.25 mg). Already took dose today so will start 1/2 tab on Thursdays. Written and verbal instructions given to family. Patient/family verbalized understanding. Anticoagulation Summary  As of 1/16/2019 INR goal:   2.0-3.0  
TTR:   80.6 % (1 y) INR used for dosing:   3.6! (1/16/2019) Warfarin maintenance plan:   1.25 mg (2.5 mg x 0.5) on Thu; 2.5 mg (2.5 mg x 1) all other days Weekly warfarin total:   16.25 mg  
Plan last modified:   Jewels Sellers RN (1/16/2019) Next INR check:   1/30/2019 Target end date: Indefinite Indications Paroxysmal atrial fibrillation (HCC) [I48.0] Long term (current) use of anticoagulants [Z79.01] Anticoagulation Episode Summary INR check location:     
 Preferred lab:     
 Send INR reminders to:     
 Comments: Anticoagulation Care Providers Provider Role Specialty Phone number Saige Herrera MD [facility] Cardiology [phone] Future Appointments Date Time Provider Monika Garcia 1/18/2019 10:30 AM Tato Iniguez MD [facility] MAIN CHARI [address]  
1/30/2019 11:00 AM KAREN HENNING SCHED  
4/3/2019 10:00 AM REMOTE1, [address]  
 7/10/2019  8:15 AM REMOTE1, KAREN MARCELINO SCHED  
10/30/2019  8:00 AM REMOTE1, [address]  
1/16/2020 11:00 AM PACEMAKER3, [address]  
1/16/2020 11:20 AM Gennaro Boyer  [address]

## 2019-01-18 ENCOUNTER — OFFICE VISIT (OUTPATIENT)
Dept: INTERNAL MEDICINE CLINIC | Age: 84
End: 2019-01-18

## 2019-01-18 VITALS
WEIGHT: 180.3 LBS | SYSTOLIC BLOOD PRESSURE: 110 MMHG | HEIGHT: 63 IN | OXYGEN SATURATION: 95 % | RESPIRATION RATE: 18 BRPM | TEMPERATURE: 97.3 F | DIASTOLIC BLOOD PRESSURE: 71 MMHG | HEART RATE: 63 BPM | BODY MASS INDEX: 31.95 KG/M2

## 2019-01-18 DIAGNOSIS — Z95.0 CARDIAC PACEMAKER IN SITU: ICD-10-CM

## 2019-01-18 DIAGNOSIS — Z00.00 MEDICARE ANNUAL WELLNESS VISIT, SUBSEQUENT: Primary | ICD-10-CM

## 2019-01-18 DIAGNOSIS — R79.9 ABNORMAL FINDING OF BLOOD CHEMISTRY: ICD-10-CM

## 2019-01-18 DIAGNOSIS — I48.0 PAROXYSMAL ATRIAL FIBRILLATION (HCC): ICD-10-CM

## 2019-01-18 DIAGNOSIS — I87.2 VENOUS INSUFFICIENCY: ICD-10-CM

## 2019-01-18 DIAGNOSIS — Z13.31 SCREENING FOR DEPRESSION: ICD-10-CM

## 2019-01-18 DIAGNOSIS — I10 ESSENTIAL HYPERTENSION, BENIGN: ICD-10-CM

## 2019-01-18 DIAGNOSIS — Z13.39 SCREENING FOR ALCOHOLISM: ICD-10-CM

## 2019-01-18 NOTE — PROGRESS NOTES
1. Have you been to the ER, urgent care clinic since your last visit? Hospitalized since your last visit? No 
 
2. Have you seen or consulted any other health care providers outside of the [address] [place] [address] since your last visit? Include any pap smears or colon screening. No 
This is the Subsequent Medicare Annual Wellness Exam, performed 12 months or more after the Initial AWV or the last Subsequent AWV I have reviewed the patient's medical history in detail and updated the computerized patient record. History Past Medical History:  
Diagnosis Date  Aortic valve disorders 4/29/2012  Atrial fibrillation ([address] 03/22/2011  
 on coumadin followed by cardiology - Enedina Best MD  
 CAD (coronary artery disease)  Cor pulmonale, chronic ([facility] 4/29/2012  Dyslipidemia (high LDL; low HDL) 4/29/2012  Gout 4/30/2012  Gout, arthritis 2/8/2012  Gout, joint  Hypertension  Hyperuricemia 4/30/2012  Obstructive sleep apnea 4/29/2012  Pacemaker 04/02/2006 Dr Brooks Ramos placed the original pacer 4/6/2006. It is replaced in 2014. It is [employer]  Recurrent epistaxis 4/29/2012  Sinoatrial node dysfunction ([facility] [address] 3/22/2011  Venous insufficiency Past Surgical History:  
Procedure Laterality Date  BREAST SURGERY PROCEDURE UNLISTED    
 lumpectomy  GEN INSERT/REPLACE ONLY DUAL  4/28/2014  HX PACEMAKER Current Outpatient Medications Medication Sig Dispense Refill  simvastatin (ZOCOR) 20 mg tablet TAKE 1 TABLET BY MOUTH ONCE DAILY AT NIGHT 30 Tab 11  
 amLODIPine (NORVASC) 5 mg tablet TAKE 1 TABLET BY MOUTH EVERY DAY 30 Tab 11  
 magnesium 250 mg tab Take 250 mg by mouth. Mon, wed, fri only  warfarin (COUMADIN) 2.5 mg tablet TAKE 1 TABLET(2.5 MG) BY MOUTH DAILY (Patient taking differently: TAKE 1 TABLET(2.5 MG) BY MOUTH DAILY except Thursday take 1/2 tab) 90 Tab 0  
 atenolol (TENORMIN) 100 mg tablet Take 1 Tab by mouth daily. 30 Tab 12  latanoprost (XALATAN) 0.005 % ophthalmic solution INT 1 GTT IN OU QD HS  2  
 levobunolol (BETAGAN) 0.5 % ophthalmic solution INSTILL 1 GTT OU BID  3 No Known Allergies No family history on file. Social History Tobacco Use  Smoking status: Never Smoker  Smokeless tobacco: Never Used Substance Use Topics  Alcohol use: No  
 
Patient Active Problem List  
Diagnosis Code  Cardiac pacemaker in situ Z95.0  Paroxysmal atrial fibrillation (HCC) I48.0  Essential hypertension, benign I10  
 Palpitations R00.2  Sinoatrial node dysfunction (HCC) I49.5  Gout M10.9  Hyperuricemia E79.0  
 Encounter for long-term (current) use of high-risk medication Z79.899  Pacemaker Z95.0  Venous insufficiency I87.2  Long term (current) use of anticoagulants Z79.01 Depression Risk Factor Screening: PHQ over the last two weeks 1/18/2019 Little interest or pleasure in doing things Not at all Feeling down, depressed, irritable, or hopeless Not at all Total Score PHQ 2 0 Alcohol Risk Factor Screening: You do not drink alcohol or very rarely. Functional Ability and Level of Safety:  
Hearing Loss Hearing is good. Activities of Daily Living The home contains: handrails Patient does total self care Fall Risk Fall Risk Assessment, last 12 mths 1/18/2019 Able to walk? Yes Fall in past 12 months? No  
 
 
Abuse Screen Patient is not abused Cognitive Screening Evaluation of Cognitive Function: 
Has your family/caregiver stated any concerns about your memory: no 
Normal 
 
Patient Care Team  
Patient Care Team: 
Semaj Yousif MD as PCP - General (Internal Medicine) Carlos Nath MD as Physician (Cardiology) Philippe Gilbert, RN as Ambulatory Care Navigator (Cardiology) Justine Lazar MD as Physician (Cardiology) Assessment/Plan Education and counseling provided: 
Are appropriate based on today's review and evaluation Health Maintenance Due Topic Date Due  MEDICARE YEARLY EXAM  12/02/2018

## 2019-01-18 NOTE — PATIENT INSTRUCTIONS
Medicare Wellness Visit, Female The best way to live healthy is to have a lifestyle where you eat a well-balanced diet, exercise regularly, limit alcohol use, and quit all forms of tobacco/nicotine, if applicable. Regular preventive services are another way to keep healthy. Preventive services (vaccines, screening tests, monitoring & exams) can help personalize your care plan, which helps you manage your own care. Screening tests can find health problems at the earliest stages, when they are easiest to treat. [facility] follows the current, evidence-based guidelines published by the [facility] Tyrell Simeon ([facility]) when recommending preventive services for our patients. Because we follow these guidelines, sometimes recommendations change over time as research supports it. (For example, mammograms used to be recommended annually. Even though Medicare will still pay for an annual mammogram, the newer guidelines recommend a mammogram every two years for women of average risk.) Of course, you and your doctor may decide to screen more often for some diseases, based on your risk and your health status. Preventive services for you include: - Medicare offers their members a free annual wellness visit, which is time for you and your primary care provider to discuss and plan for your preventive service needs. Take advantage of this benefit every year! 
-All adults over the age of 72 should receive the recommended pneumonia vaccines. Current USPSTF guidelines recommend a series of two vaccines for the best pneumonia protection.  
-All adults should have a flu vaccine yearly and a tetanus vaccine every 10 years. All adults age 61 and older should receive a shingles vaccine once in their lifetime.   
-A bone mass density test is recommended when a woman turns 65 to screen for osteoporosis. This test is only recommended one time, as a screening. Some providers will use this same test as a disease monitoring tool if you already have osteoporosis. -All adults age 38-68 who are overweight should have a diabetes screening test once every three years.  
-Other screening tests and preventive services for persons with diabetes include: an eye exam to screen for diabetic retinopathy, a kidney function test, a foot exam, and stricter control over your cholesterol.  
-Cardiovascular screening for adults with routine risk involves an electrocardiogram (ECG) at intervals determined by your doctor.  
-Colorectal cancer screenings should be done for adults age 54-65 with no increased risk factors for colorectal cancer. There are a number of acceptable methods of screening for this type of cancer. Each test has its own benefits and drawbacks. Discuss with your doctor what is most appropriate for you during your annual wellness visit. The different tests include: colonoscopy (considered the best screening method), a fecal occult blood test, a fecal DNA test, and sigmoidoscopy. -Breast cancer screenings are recommended every other year for women of normal risk, age 54-69. 
-Cervical cancer screenings for women over age 72 are only recommended with certain risk factors.  
-All adults born between [facility] should be screened once for Hepatitis C. Here is a list of your current Health Maintenance items (your personalized list of preventive services) with a due date: 
Health Maintenance Due Topic Date Due  
 Annual Well Visit  12/02/2018

## 2019-01-18 NOTE — PROGRESS NOTES
SPORTS MEDICINE AND PRIMARY CARE Glenn Hutton MD, [place] [address] Phone:  [phone]  Fax: [phone] Chief Complaint Patient presents with Latin[place].Js Annual Wellness Visit SUBECTIVE: 
 
Tami More is a 80 y.o. female Patient returns today with her son with known history of atrial fibrillation, on Coumadin, followed by Dr. Ayleen De La Rosa, coronary artery disease, chronic cor pulmonale, dyslipidemia, gout, primary hypertension, obstructive sleep apnea, and status post AICD placement. She voices no new complaints today except for congestion and son wonders if there is something she can take for it without drug interactions. Current Outpatient Medications Medication Sig Dispense Refill  simvastatin (ZOCOR) 20 mg tablet TAKE 1 TABLET BY MOUTH ONCE DAILY AT NIGHT 30 Tab 11  
 amLODIPine (NORVASC) 5 mg tablet TAKE 1 TABLET BY MOUTH EVERY DAY 30 Tab 11  
 magnesium 250 mg tab Take 250 mg by mouth. Mon, wed, fri only  warfarin (COUMADIN) 2.5 mg tablet TAKE 1 TABLET(2.5 MG) BY MOUTH DAILY (Patient taking differently: TAKE 1 TABLET(2.5 MG) BY MOUTH DAILY except Thursday take 1/2 tab) 90 Tab 0  
 atenolol (TENORMIN) 100 mg tablet Take 1 Tab by mouth daily. 30 Tab 12  
 latanoprost (XALATAN) 0.005 % ophthalmic solution INT 1 GTT IN OU QD HS  2  
 levobunolol (BETAGAN) 0.5 % ophthalmic solution INSTILL 1 GTT OU BID  3 Past Medical History:  
Diagnosis Date  Aortic valve disorders 4/29/2012  Atrial fibrillation ([facility] [address] 03/22/2011  
 on coumadin followed by cardiology - Ayleen De La Rosa MD  
 CAD (coronary artery disease)  Cor pulmonale, chronic ([facility] [address] 4/29/2012  Dyslipidemia (high LDL; low HDL) 4/29/2012  Gout 4/30/2012  Gout, arthritis 2/8/2012  Gout, joint  Hypertension  Hyperuricemia 4/30/2012  Obstructive sleep apnea 4/29/2012  Pacemaker 04/02/2006 Dr Renato Funes placed the original pacer 2006. It is replaced in . It is [facility]  Recurrent epistaxis 2012  Sinoatrial node dysfunction ([address] 3/22/2011  Venous insufficiency Past Surgical History:  
Procedure Laterality Date  BREAST SURGERY PROCEDURE UNLISTED    
 lumpectomy  GEN INSERT/REPLACE ONLY DUAL  2014  HX PACEMAKER No Known Allergies REVIEW OF SYSTEMS: 
 No syncope, no loss of consciousness. Social History Socioeconomic History  Marital status:  Spouse name: Not on file  Number of children: Not on file  Years of education: Not on file  Highest education level: Not on file Tobacco Use  Smoking status: Never Smoker  Smokeless tobacco: Never Used Substance and Sexual Activity  Alcohol use: No  
 Drug use: No  
 Sexual activity: No  
Social History Narrative Habits:  She is a lifetime nonsmoker, non drinker, non drug abuser. Social History:  The patient is . She lives with her son. 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 She is a member of [employer]. Family History:  Father and mother , unknown ages and causes. Two sisters  of unknown caus  
r No family history on file. OBJECTIVE: 
Visit Vitals /71 Pulse 63 Temp 97.3 °F (36.3 °C) (Oral) Resp 18 Ht 5' 3\" (1.6 m) Wt 180 lb 4.8 oz (81.8 kg) SpO2 95% BMI 31.94 kg/m² ENT: perrla,  eom intact NECK: supple. Thyroid normal 
CHEST: clear to ascultation and percussion HEART: regular rate and rhythm ABD: soft, bowel sounds active EXTREMITIES: no edema, pulse 1+ Anti-Coag visit on 2019 Component Date Value Ref Range Status  VALID INTERNAL CONTROL POC 2019 Yes   Final  
 INR POC 2019 3.6   Final  
Anti-Coag visit on 2018 Component Date Value Ref Range Status  VALID INTERNAL CONTROL POC 2018 Yes   Final  
  INR POC 12/26/2018 3.1   Final  
Clinical Support on 12/06/2018 Component Date Value Ref Range Status  VALID INTERNAL CONTROL POC 12/06/2018 Yes   Final  
 INR POC 12/06/2018 3.2   Final  
Clinical Support on 11/05/2018 Component Date Value Ref Range Status  VALID INTERNAL CONTROL POC 11/05/2018 Yes   Final  
 INR POC 11/05/2018 2.6   Final  
Office Visit on 10/19/2018 Component Date Value Ref Range Status  VALID INTERNAL CONTROL POC 10/19/2018 Yes   Final  
 Prothrombin time (POC) 10/19/2018 34.2  seconds Final  
 INR POC 10/19/2018 2.8   Final  
 
  
 
ASSESSMENT: 
1. Medicare annual wellness visit, subsequent 2. Screening for alcoholism 3. Screening for depression 4. Essential hypertension, benign 5. Paroxysmal atrial fibrillation (HCC) 6. Cardiac pacemaker in situ 7. Venous insufficiency 8. Abnormal finding of blood chemistry Patient's medical status is stable. We congratulate her. She has passed over into her 89th year. She remains very alert, appropriate and active. Her blood pressure control remains at goal. 
 
BMI is in the obesity category, but I think at her age she should maintain her weight, although she has lost a few pounds since we last saw her. She will be back to see us in three to four months, sooner if she has any problems. Appropriate laboratory studies have been requested and will be sent to them in the mail. I have discussed the diagnosis with the patient and the intended plan as seen in the 
orders above. The patient understands and agees with the plan. The patient has  
received an after visit summary and questions were answered concerning 
future plans Patient labs and/or xrays were reviewed Past records were reviewed. PLAN: 
. Orders Placed This Encounter  Depression Screen Annual  
 URINALYSIS W/ RFLX MICROSCOPIC  CBC WITH AUTOMATED DIFF  
 METABOLIC PANEL, COMPREHENSIVE  
 TSH 3RD GENERATION  
  HEMOGLOBIN A1C WITH EAG Follow-up Disposition: 
Return in about 4 months (around 5/18/2019). ATTENTION:  
This medical record was transcribed using an electronic medical records system. Although proofread, it may and can contain electronic and spelling errors. Other human spelling and other errors may be present. Corrections may be executed at a later time. Please feel free to contact us for any clarifications as needed.

## 2019-01-19 LAB
ALBUMIN SERPL-MCNC: 4.2 G/DL (ref 3.2–4.6)
ALBUMIN/GLOB SERPL: 1.6 {RATIO} (ref 1.2–2.2)
ALP SERPL-CCNC: 74 IU/L (ref 39–117)
ALT SERPL-CCNC: 13 IU/L (ref 0–32)
APPEARANCE UR: CLEAR
AST SERPL-CCNC: 17 IU/L (ref 0–40)
BASOPHILS # BLD AUTO: 0 X10E3/UL (ref 0–0.2)
BASOPHILS NFR BLD AUTO: 0 %
BILIRUB SERPL-MCNC: 0.6 MG/DL (ref 0–1.2)
BILIRUB UR QL STRIP: NEGATIVE
BUN SERPL-MCNC: 12 MG/DL (ref 10–36)
BUN/CREAT SERPL: 13 (ref 12–28)
CALCIUM SERPL-MCNC: 9.4 MG/DL (ref 8.7–10.3)
CHLORIDE SERPL-SCNC: 107 MMOL/L (ref 96–106)
CO2 SERPL-SCNC: 23 MMOL/L (ref 20–29)
COLOR UR: YELLOW
CREAT SERPL-MCNC: 0.9 MG/DL (ref 0.57–1)
EOSINOPHIL # BLD AUTO: 0.2 X10E3/UL (ref 0–0.4)
EOSINOPHIL NFR BLD AUTO: 3 %
ERYTHROCYTE [DISTWIDTH] IN BLOOD BY AUTOMATED COUNT: 16.6 % (ref 12.3–15.4)
EST. AVERAGE GLUCOSE BLD GHB EST-MCNC: 123 MG/DL
GLOBULIN SER CALC-MCNC: 2.6 G/DL (ref 1.5–4.5)
GLUCOSE SERPL-MCNC: 89 MG/DL (ref 65–99)
GLUCOSE UR QL: NEGATIVE
HBA1C MFR BLD: 5.9 % (ref 4.8–5.6)
HCT VFR BLD AUTO: 40.2 % (ref 34–46.6)
HGB BLD-MCNC: 13.4 G/DL (ref 11.1–15.9)
HGB UR QL STRIP: NEGATIVE
IMM GRANULOCYTES # BLD AUTO: 0 X10E3/UL (ref 0–0.1)
IMM GRANULOCYTES NFR BLD AUTO: 0 %
KETONES UR QL STRIP: NEGATIVE
LEUKOCYTE ESTERASE UR QL STRIP: NEGATIVE
LYMPHOCYTES # BLD AUTO: 1.7 X10E3/UL (ref 0.7–3.1)
LYMPHOCYTES NFR BLD AUTO: 34 %
MCH RBC QN AUTO: 28 PG (ref 26.6–33)
MCHC RBC AUTO-ENTMCNC: 33.3 G/DL (ref 31.5–35.7)
MCV RBC AUTO: 84 FL (ref 79–97)
MICRO URNS: NORMAL
MONOCYTES # BLD AUTO: 0.4 X10E3/UL (ref 0.1–0.9)
MONOCYTES NFR BLD AUTO: 8 %
NEUTROPHILS # BLD AUTO: 2.7 X10E3/UL (ref 1.4–7)
NEUTROPHILS NFR BLD AUTO: 55 %
NITRITE UR QL STRIP: NEGATIVE
PH UR STRIP: 7.5 [PH] (ref 5–7.5)
PLATELET # BLD AUTO: 190 X10E3/UL (ref 150–379)
POTASSIUM SERPL-SCNC: 4.1 MMOL/L (ref 3.5–5.2)
PROT SERPL-MCNC: 6.8 G/DL (ref 6–8.5)
PROT UR QL STRIP: NEGATIVE
RBC # BLD AUTO: 4.79 X10E6/UL (ref 3.77–5.28)
SODIUM SERPL-SCNC: 145 MMOL/L (ref 134–144)
SP GR UR: 1.01 (ref 1–1.03)
TSH SERPL DL<=0.005 MIU/L-ACNC: 0.96 UIU/ML (ref 0.45–4.5)
UROBILINOGEN UR STRIP-MCNC: 0.2 MG/DL (ref 0.2–1)
WBC # BLD AUTO: 5 X10E3/UL (ref 3.4–10.8)

## 2019-02-15 ENCOUNTER — ANTI-COAG VISIT (OUTPATIENT)
Dept: CARDIOLOGY CLINIC | Age: 84
End: 2019-02-15

## 2019-02-15 DIAGNOSIS — Z79.01 LONG TERM (CURRENT) USE OF ANTICOAGULANTS: ICD-10-CM

## 2019-02-15 DIAGNOSIS — I48.0 PAROXYSMAL ATRIAL FIBRILLATION (HCC): ICD-10-CM

## 2019-02-15 LAB
INR BLD: 2.2
PT POC: NORMAL SECONDS
VALID INTERNAL CONTROL?: YES

## 2019-02-15 NOTE — PROGRESS NOTES
A full discussion of the nature of anticoagulants has been carried out. A benefit risk analysis has been presented to the patient, so that they understand the justification for choosing anticoagulation at this time. The need for frequent and regular monitoring, precise dosage adjustment and compliance is stressed. Side effects of potential bleeding are discussed. The patient should avoid any OTC items containing aspirin or ibuprofen, and should avoid great swings in general diet. Avoid alcohol consumption. Call if any signs of abnormal bleeding. Next PT/INR test in 2 weeks. Appt made per sons request who is . Continue current dosing regimen. Patient verbalized understanding Anticoagulation Summary  As of 2/15/2019 INR goal:   2.0-3.0  
TTR:   78.9 % (1.1 y) INR used for dosin.2 (2/15/2019) Warfarin maintenance plan:   1.25 mg (2.5 mg x 0.5) on Thu; 2.5 mg (2.5 mg x 1) all other days Weekly warfarin total:   16.25 mg  
Plan last modified:   Oneal Medina RN (2019) Next INR check:   3/15/2019 Target end date: Indefinite Indications Paroxysmal atrial fibrillation (HCC) [I48.0] Long term (current) use of anticoagulants [Z79.01] Anticoagulation Episode Summary INR check location:     
 Preferred lab:     
 Send INR reminders to:     
 Comments: Anticoagulation Care Providers Provider Role Specialty Phone number Karuna Ramírez MD Responsible Cardiology [phone] Future Appointments Date Time Provider Monika Garcia 3/15/2019 10:20 AM COUMADIN, JONES CAVREY CHARI SCHED  
4/3/2019 10:00 AM REMOTE1, JONES CAVREY CHARI SCHED  
2019 10:30 AM Tracy Iniguez MD [facility] MAIN CHARI SCHED  
7/10/2019  8:15 AM REMOTE1, JONES CAVREY CHARI SCHED  
10/30/2019  8:00 AM REMOTE1,  Mame Ying  
2020 11:00 AM PACEMAKER3,  [address]  
 1/16/2020 11:20 AM Jorge L Rasheed  [address]

## 2019-02-22 RX ORDER — SIMVASTATIN 20 MG/1
TABLET, FILM COATED ORAL
Qty: 30 TAB | Refills: 0 | Status: SHIPPED | OUTPATIENT
Start: 2019-02-22 | End: 2019-08-13 | Stop reason: SDUPTHER

## 2019-03-15 ENCOUNTER — ANTI-COAG VISIT (OUTPATIENT)
Dept: CARDIOLOGY CLINIC | Age: 84
End: 2019-03-15

## 2019-03-15 DIAGNOSIS — Z79.01 LONG TERM (CURRENT) USE OF ANTICOAGULANTS: ICD-10-CM

## 2019-03-15 DIAGNOSIS — I48.0 PAROXYSMAL ATRIAL FIBRILLATION (HCC): ICD-10-CM

## 2019-03-15 LAB
INR BLD: 2.2
PT POC: NORMAL SECONDS
VALID INTERNAL CONTROL?: YES

## 2019-03-15 NOTE — PROGRESS NOTES
A full discussion of the nature of anticoagulants has been carried out. A benefit risk analysis has been presented to the patient, so that they understand the justification for choosing anticoagulation at this time. The need for frequent and regular monitoring, precise dosage adjustment and compliance is stressed. Side effects of potential bleeding are discussed. The patient should avoid any OTC items containing aspirin or ibuprofen, and should avoid great swings in general diet. Avoid alcohol consumption. Call if any signs of abnormal bleeding. Next PT/INR test in 1 month. Continue current dosing regimen. Patient verbalized understanding      Anticoagulation Summary  As of 3/15/2019    INR goal:   2.0-3.0   TTR:   80.2 % (1.2 y)   INR used for dosin.2 (3/15/2019)   Warfarin maintenance plan:   1.25 mg (2.5 mg x 0.5) on Thu; 2.5 mg (2.5 mg x 1) all other days   Weekly warfarin total:   16.25 mg   Plan last modified:   Miriam Montoya RN (2019)   Next INR check:   2019   Target end date:    Indefinite    Indications    Paroxysmal atrial fibrillation ([facility] [address] [I48.0]  Long term (current) use of anticoagulants [Z79.01]             Anticoagulation Episode Summary     INR check location:       Preferred lab:       Send INR reminders to:       Comments:         Anticoagulation Care Providers     Provider Role Specialty Phone number    Luis Daniel Colon MD [facility] Cardiology [phone]          Future Appointments   Date Time Provider Monika Garcia   4/3/2019 10:00 AM REMOTE1,  [address]   2019 10:20 AM COUMADIN, JONES WENDYREY CHARI SCHED   2019 10:30 AM Carlo Iniguez MD [place] [id number]   7/10/2019  8:15 AM REMOTE1,  [address]   10/30/2019  8:00 AM REMOTE1,  [address]   2020 11:00 AM PACEMAKER3,  [address]   2020 11:20 AM Luis Daniel Colon  [address]

## 2019-03-28 DIAGNOSIS — I48.0 PAROXYSMAL ATRIAL FIBRILLATION (HCC): ICD-10-CM

## 2019-03-29 RX ORDER — WARFARIN 2.5 MG/1
TABLET ORAL
Qty: 90 TAB | Refills: 0 | Status: SHIPPED | OUTPATIENT
Start: 2019-03-29 | End: 2019-07-09 | Stop reason: SDUPTHER

## 2019-03-29 NOTE — TELEPHONE ENCOUNTER
Requested Prescriptions     Signed Prescriptions Disp Refills    warfarin (COUMADIN) 2.5 mg tablet 90 Tab 0     Si.25 mg (2.5 mg x 0.5) on Thu; 2.5 mg (2.5 mg x 1) all other days     Authorizing Provider: Ingrid Rodriguez     Ordering User: Quincy Recinos NP Verbal Order     Future Appointments   Date Time Provider Monika Rhoadesi   4/3/2019 10:00 AM REMOTE1,  [address]   2019 10:20 AM KAREN HENNING [facility]   2019 10:30 AM Haithcock, Fredie Skiff, MD [facility]   7/10/2019  8:15 AM REMOTE1,  [address]   10/30/2019  8:00 AM REMOTE1,  [address]   2020 11:00 AM PACEMAKER3,  [address]   2020 11:20 AM Sienna Diaz  [address]

## 2019-04-03 ENCOUNTER — OFFICE VISIT (OUTPATIENT)
Dept: CARDIOLOGY CLINIC | Age: 84
End: 2019-04-03

## 2019-04-03 DIAGNOSIS — Z95.0 CARDIAC PACEMAKER IN SITU: Primary | ICD-10-CM

## 2019-04-12 ENCOUNTER — ANTI-COAG VISIT (OUTPATIENT)
Dept: CARDIOLOGY CLINIC | Age: 84
End: 2019-04-12

## 2019-04-12 DIAGNOSIS — Z79.01 LONG TERM (CURRENT) USE OF ANTICOAGULANTS: ICD-10-CM

## 2019-04-12 DIAGNOSIS — I48.0 PAROXYSMAL ATRIAL FIBRILLATION (HCC): ICD-10-CM

## 2019-04-12 LAB
INR BLD: 2.4
PT POC: NORMAL SECONDS
VALID INTERNAL CONTROL?: YES

## 2019-04-12 NOTE — PROGRESS NOTES
A full discussion of the nature of anticoagulants has been carried out. A benefit risk analysis has been presented to the patient, so that they understand the justification for choosing anticoagulation at this time. The need for frequent and regular monitoring, precise dosage adjustment and compliance is stressed. Side effects of potential bleeding are discussed. The patient should avoid any OTC items containing aspirin or ibuprofen, and should avoid great swings in general diet. Avoid alcohol consumption. Call if any signs of abnormal bleeding. Next PT/INR test in 1 month. Continue current dosing regimen. Patient verbalized understanding Anticoagulation Summary  As of 2019 INR goal:   2.0-3.0  
TTR:   81.4 % (1.3 y) INR used for dosin.4 (2019) Warfarin maintenance plan:   1.25 mg (2.5 mg x 0.5) every Thu; 2.5 mg (2.5 mg x 1) all other days Weekly warfarin total:   16.25 mg  
Plan last modified:   Shraddha Mejia RN (2019) Next INR check:   5/10/2019 Target end date: Indefinite Indications Paroxysmal atrial fibrillation (HCC) [I48.0] Long term (current) use of anticoagulants [Z79.01] Anticoagulation Episode Summary INR check location:     
 Preferred lab:     
 Send INR reminders to:     
 Comments: Anticoagulation Care Providers Provider Role Specialty Phone number Keara Hope MD [facility] Cardiology [phone] Future Appointments Date Time Provider Monika Garcia 2019 10:30 AM Yolanda Iniguez MD [address] [facility]  
5/10/2019 10:20 AM COUMADIN, KAREN LI CHARI SCHED  
7/10/2019  8:15 AM REMOTE1, JONES CAVREY CHARI SCHED  
10/30/2019  8:00 AM REMOTE1,  [address]  
2020 11:00 AM PACEMAKER3,  [address]  
2020 11:20 AM Keara Hope  [address]

## 2019-04-23 ENCOUNTER — OFFICE VISIT (OUTPATIENT)
Dept: INTERNAL MEDICINE CLINIC | Age: 84
End: 2019-04-23

## 2019-04-23 VITALS
SYSTOLIC BLOOD PRESSURE: 143 MMHG | DIASTOLIC BLOOD PRESSURE: 75 MMHG | OXYGEN SATURATION: 97 % | BODY MASS INDEX: 32.2 KG/M2 | TEMPERATURE: 97.7 F | HEIGHT: 63 IN | HEART RATE: 60 BPM | RESPIRATION RATE: 16 BRPM | WEIGHT: 181.7 LBS

## 2019-04-23 DIAGNOSIS — E79.0 HYPERURICEMIA: ICD-10-CM

## 2019-04-23 DIAGNOSIS — Z95.0 CARDIAC PACEMAKER IN SITU: ICD-10-CM

## 2019-04-23 DIAGNOSIS — M1A.9XX0 CHRONIC GOUT WITHOUT TOPHUS, UNSPECIFIED CAUSE, UNSPECIFIED SITE: ICD-10-CM

## 2019-04-23 DIAGNOSIS — Z51.81 ENCOUNTER FOR MONITORING COUMADIN THERAPY: Primary | ICD-10-CM

## 2019-04-23 DIAGNOSIS — I10 ESSENTIAL HYPERTENSION, BENIGN: ICD-10-CM

## 2019-04-23 DIAGNOSIS — Z79.01 ENCOUNTER FOR MONITORING COUMADIN THERAPY: Primary | ICD-10-CM

## 2019-04-23 DIAGNOSIS — I48.0 PAROXYSMAL ATRIAL FIBRILLATION (HCC): ICD-10-CM

## 2019-04-23 DIAGNOSIS — I87.2 VENOUS INSUFFICIENCY: ICD-10-CM

## 2019-04-23 LAB
INR BLD: 2.8
PT POC: 34.1 SECONDS
VALID INTERNAL CONTROL?: YES

## 2019-04-23 NOTE — PROGRESS NOTES
SPORTS MEDICINE AND PRIMARY CARE Prashant Aguirre MD, [address] [address] Phone:  [phone]  Fax: [phone] Chief Complaint Patient presents with  Hypertension  
  f/u Ethelene Gauss SUBJECTIVE: 
  Nieves Zhu is a 80 y.o. female Patient returns today with known history of pacemaker, primary hypertension, gout, obstructive sleep apnea, paroxysmal atrial fibrillation, chronic venous insufficiency, and is seen for evaluation. Patient returns today with her son, still having concerns about numbness in her fingers bilaterally in the median nerve distribution. She uses Epsom salts and green alcohol to try and make herself more comfortable. Minimal allergy symptoms with some rhinorrhea and is seen for evaluation. Current Outpatient Medications Medication Sig Dispense Refill  warfarin (COUMADIN) 2.5 mg tablet 1.25 mg (2.5 mg x 0.5) on Thu; 2.5 mg (2.5 mg x 1) all other days 90 Tab 0  
 simvastatin (ZOCOR) 20 mg tablet TAKE 1 TABLET BY MOUTH ONCE DAILY AT NIGHT 30 Tab 0  
 simvastatin (ZOCOR) 20 mg tablet TAKE 1 TABLET BY MOUTH ONCE DAILY AT NIGHT 30 Tab 11  
 amLODIPine (NORVASC) 5 mg tablet TAKE 1 TABLET BY MOUTH EVERY DAY 30 Tab 11  
 magnesium 250 mg tab Take 250 mg by mouth. Mon, wed, fri only  atenolol (TENORMIN) 100 mg tablet Take 1 Tab by mouth daily. 30 Tab 12  
 latanoprost (XALATAN) 0.005 % ophthalmic solution INT 1 GTT IN OU QD HS  2  
 levobunolol (BETAGAN) 0.5 % ophthalmic solution INSTILL 1 GTT OU BID  3 Past Medical History:  
Diagnosis Date  Aortic valve disorders 4/29/2012  Atrial fibrillation ([facility] [address] 03/22/2011  
 on coumadin followed by cardiology - Aparna Mares MD  
 CAD (coronary artery disease)  Cor pulmonale, chronic ([address] 4/29/2012  Dyslipidemia (high LDL; low HDL) 4/29/2012  Gout 4/30/2012  Gout, arthritis 2/8/2012  Gout, joint  Hypertension  Hyperuricemia 4/30/2012  Obstructive sleep apnea 4/29/2012  Pacemaker 04/02/2006 Dr Opal Gee placed the original pacer 4/6/2006. It is replaced in 2014. It is [facility]  Recurrent epistaxis 4/29/2012  Sinoatrial node dysfunction ([facility] [address] 3/22/2011  Venous insufficiency Past Surgical History:  
Procedure Laterality Date  BREAST SURGERY PROCEDURE UNLISTED    
 lumpectomy  GEN INSERT/REPLACE ONLY DUAL  4/28/2014  HX PACEMAKER No Known Allergies REVIEW OF SYSTEMS: 
General: negative for - chills or fever ENT: negative for - headaches, nasal congestion or tinnitus Respiratory: negative for - cough, hemoptysis, shortness of breath or wheezing Cardiovascular : negative for - chest pain, edema, palpitations or shortness of breath Gastrointestinal: negative for - abdominal pain, blood in stools, heartburn or nausea/vomiting Genito-Urinary: no dysuria, trouble voiding, or hematuria Musculoskeletal: negative for - gait disturbance, joint pain, joint stiffness or joint swelling Neurological: no TIA or stroke symptoms Hematologic: no bruises, no bleeding, no swollen glands Integument: no lumps, mole changes, nail changes or rash Endocrine: no malaise/lethargy or unexpected weight changes Social History Socioeconomic History  Marital status:  Spouse name: Not on file  Number of children: Not on file  Years of education: Not on file  Highest education level: Not on file Tobacco Use  Smoking status: Never Smoker  Smokeless tobacco: Never Used Substance and Sexual Activity  Alcohol use: No  
 Drug use: No  
 Sexual activity: Never Social History Narrative Habits:  She is a lifetime nonsmoker, non drinker, non drug abuser. Social History:  The patient is . She lives with her son. 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 She is a member of [employer]. Family History:  Father and mother , unknown ages and causes. Two sisters  of unknown caus History reviewed. No pertinent family history. OBJECTIVE: 
 
Visit Vitals /75 Pulse 60 Temp 97.7 °F (36.5 °C) (Oral) Resp 16 Ht 5' 3\" (1.6 m) Wt 181 lb 11.2 oz (82.4 kg) SpO2 97% BMI 32.19 kg/m² CONSTITUTIONAL: well , well nourished, appears age appropriate EYES: perrla, eom intact ENMT:moist mucous membranes, pharynx clear NECK: supple. Thyroid normal 
RESPIRATORY: Chest: clear bilaterally CARDIOVASCULAR: Heart: regular rate and rhythm GASTROINTESTINAL: Abdomen: soft, bowel sounds active HEMATOLOGIC: no pathological lymph nodes palpated MUSCULOSKELETAL: Extremities: no edema, pulse 1+ INTEGUMENT: No unusual rashes or suspicious skin lesions noted. Nails appear normal. 
NEUROLOGIC: non-focal exam  
MENTAL STATUS: alert and oriented, appropriate affect ASSESSMENT: 
1. Encounter for monitoring Coumadin therapy 2. Cardiac pacemaker in situ 3. Essential hypertension, benign 4. Chronic gout without tophus, unspecified cause, unspecified site 5. Hyperuricemia 6. Paroxysmal atrial fibrillation (HCC) 7. Venous insufficiency Patient is exceptional as discussed previously. She remains active, she remains mentally sharp. Arnav is monitoring her INR. We checked it today and it was therapeutic at 2.8. 
 
BP control is adequate. There has been no significant change in her BMI. We encourage her to stay active, which her son tells me she is. She will be back to see us in three to four months or as needed. I have discussed the diagnosis with the patient and the intended plan as seen in the 
orders above. The patient understands and agees with the plan. The patient has  
received an after visit summary and questions were answered concerning 
future plans Patient labs and/or xrays were reviewed Past records were reviewed. PLAN: 
. Orders Placed This Encounter  AMB POC PT/INR Follow-up and Dispositions · Return in about 4 months (around 8/23/2019). ATTENTION:  
This medical record was transcribed using an electronic medical records system. Although proofread, it may and can contain electronic and spelling errors. Other human spelling and other errors may be present. Corrections may be executed at a later time. Please feel free to contact us for any clarifications as needed.

## 2019-04-23 NOTE — PROGRESS NOTES
1. Have you been to the ER, urgent care clinic since your last visit? Hospitalized since your last visit? No 
 
2. Have you seen or consulted any other health care providers outside of the [address] [place] [address] since your last visit? Include any pap smears or colon screening.  No  
 
Wants to discuss tingling and numbness in hands and wrist

## 2019-05-03 DIAGNOSIS — I48.20 CHRONIC ATRIAL FIBRILLATION (HCC): ICD-10-CM

## 2019-05-07 RX ORDER — ATENOLOL 100 MG/1
TABLET ORAL
Qty: 30 TAB | Refills: 0 | Status: SHIPPED | OUTPATIENT
Start: 2019-05-07 | End: 2019-06-05 | Stop reason: SDUPTHER

## 2019-05-10 ENCOUNTER — ANTI-COAG VISIT (OUTPATIENT)
Dept: CARDIOLOGY CLINIC | Age: 84
End: 2019-05-10

## 2019-05-10 DIAGNOSIS — Z79.01 LONG TERM (CURRENT) USE OF ANTICOAGULANTS: ICD-10-CM

## 2019-05-10 DIAGNOSIS — I48.0 PAROXYSMAL ATRIAL FIBRILLATION (HCC): ICD-10-CM

## 2019-05-10 LAB
INR BLD: 2.6
PT POC: NORMAL SECONDS
VALID INTERNAL CONTROL?: YES

## 2019-05-10 NOTE — PROGRESS NOTES
A full discussion of the nature of anticoagulants has been carried out. A benefit risk analysis has been presented to the patient, so that they understand the justification for choosing anticoagulation at this time. The need for frequent and regular monitoring, precise dosage adjustment and compliance is stressed. Side effects of potential bleeding are discussed. The patient should avoid any OTC items containing aspirin or ibuprofen, and should avoid great swings in general diet. Avoid alcohol consumption. Call if any signs of abnormal bleeding. Next PT/INR test in 1 month. Continue current dosing regimen. Patient verbalized understanding Anticoagulation Summary  As of 5/10/2019 INR goal:   2.0-3.0  
TTR:   82.5 % (1.3 y) INR used for dosin.6 (5/10/2019) Warfarin maintenance plan:   1.25 mg (2.5 mg x 0.5) every Thu; 2.5 mg (2.5 mg x 1) all other days Weekly warfarin total:   16.25 mg  
Plan last modified:   Colleen Lewis RN (2019) Next INR check:   2019 Target end date: Indefinite Indications Paroxysmal atrial fibrillation (HCC) [I48.0] Long term (current) use of anticoagulants [Z79.01] Anticoagulation Episode Summary INR check location:     
 Preferred lab:     
 Send INR reminders to:     
 Comments: Anticoagulation Care Providers Provider Role Specialty Phone number Jonathan Mclain MD [facility] Cardiology [phone] Future Appointments Date Time Provider Monika Garcia 2019 10:40 AM COUMADIN, KAREN LI CHARI SCHED  
7/10/2019  8:15 AM REMOTE1, JONES CAVREY CHARI SCHED  
2019  9:30 AM Theresa Iniguez MD [facility] MAIN CHARI SCHED  
10/30/2019  8:00 AM REMOTE1,  [address]  
2020 11:00 AM PACEMAKER3,  [address]  
2020 11:20 AM Jonathan Mclain MD [facility]

## 2019-06-05 DIAGNOSIS — I48.20 CHRONIC ATRIAL FIBRILLATION (HCC): ICD-10-CM

## 2019-06-05 RX ORDER — ATENOLOL 100 MG/1
TABLET ORAL
Qty: 30 TAB | Refills: 0 | Status: SHIPPED | OUTPATIENT
Start: 2019-06-05 | End: 2019-08-13 | Stop reason: SDUPTHER

## 2019-06-05 RX ORDER — ATENOLOL 100 MG/1
TABLET ORAL
Qty: 30 TAB | Refills: 0 | Status: SHIPPED | OUTPATIENT
Start: 2019-06-05 | End: 2019-07-07 | Stop reason: SDUPTHER

## 2019-06-07 ENCOUNTER — ANTI-COAG VISIT (OUTPATIENT)
Dept: CARDIOLOGY CLINIC | Age: 84
End: 2019-06-07

## 2019-06-07 DIAGNOSIS — Z79.01 LONG TERM (CURRENT) USE OF ANTICOAGULANTS: ICD-10-CM

## 2019-06-07 DIAGNOSIS — I48.0 PAROXYSMAL ATRIAL FIBRILLATION (HCC): ICD-10-CM

## 2019-06-07 LAB
INR BLD: 2
PT POC: NORMAL SECONDS
VALID INTERNAL CONTROL?: YES

## 2019-06-07 NOTE — PROGRESS NOTES
A full discussion of the nature of anticoagulants has been carried out. A benefit risk analysis has been presented to the patient, so that they understand the justification for choosing anticoagulation at this time. The need for frequent and regular monitoring, precise dosage adjustment and compliance is stressed. Side effects of potential bleeding are discussed. The patient should avoid any OTC items containing aspirin or ibuprofen, and should avoid great swings in general diet. Avoid alcohol consumption. Call if any signs of abnormal bleeding. Next PT/INR test in 1 month. Continue current dosing regimen. Patient verbalized understanding      Anticoagulation Summary  As of 2019    INR goal:   2.0-3.0   TTR:   83.5 % (1.4 y)   INR used for dosin.0 (2019)   Warfarin maintenance plan:   1.25 mg (2.5 mg x 0.5) every Thu; 2.5 mg (2.5 mg x 1) all other days   Weekly warfarin total:   16.25 mg   Plan last modified:   Susanna Slater RN (2019)   Next INR check:   2019   Target end date:    Indefinite    Indications    Paroxysmal atrial fibrillation ([facility] [address] [I48.0]  Long term (current) use of anticoagulants [Z79.01]             Anticoagulation Episode Summary     INR check location:       Preferred lab:       Send INR reminders to:       Comments:         Anticoagulation Care Providers     Provider Role Specialty Phone number    Yun Jaramillo MD Responsible Cardiology [phone]          Future Appointments   Date Time Provider Monika Garcia   2019 10:40 AM COUMADIN, KAREN REHMANENA SCHED   7/10/2019  8:15 AM REMOTE [address]   2019  9:30 AM Angeline Iniguez MD [facility] MAIN CHARI Yeni Gammons   10/30/2019  8:00 AM REMOTE1,  [address]   2020 11:00 AM PACEMAKER3,  [address]   2020 11:20 AM Yun Jaramillo  [address]

## 2019-07-07 DIAGNOSIS — I48.20 CHRONIC ATRIAL FIBRILLATION (HCC): ICD-10-CM

## 2019-07-07 RX ORDER — ATENOLOL 100 MG/1
TABLET ORAL
Qty: 30 TAB | Refills: 0 | Status: SHIPPED | OUTPATIENT
Start: 2019-07-07 | End: 2019-08-13 | Stop reason: SDUPTHER

## 2019-07-09 DIAGNOSIS — I48.0 PAROXYSMAL ATRIAL FIBRILLATION (HCC): ICD-10-CM

## 2019-07-10 ENCOUNTER — OFFICE VISIT (OUTPATIENT)
Dept: CARDIOLOGY CLINIC | Age: 84
End: 2019-07-10

## 2019-07-10 DIAGNOSIS — Z95.0 CARDIAC PACEMAKER IN SITU: Primary | ICD-10-CM

## 2019-07-11 RX ORDER — WARFARIN 2.5 MG/1
TABLET ORAL
Qty: 30 TAB | Refills: 0 | Status: SHIPPED | OUTPATIENT
Start: 2019-07-11 | End: 2019-08-01 | Stop reason: SDUPTHER

## 2019-07-11 NOTE — TELEPHONE ENCOUNTER
Requested Prescriptions     Signed Prescriptions Disp Refills    warfarin (COUMADIN) 2.5 mg tablet 30 Tab 0     Sig: Take 1 tab daily except Thursdays take 1/2 tab or as directed by coumadin clinic- NEEDS INR CHECK     Authorizing Provider: Rakel Dunn     Ordering User: Julien Feliciano       Last office visit 12/20/18 with Omari Smith NP    Per Dr. Justen Faye    Patient needs INR check-given 1 month supply     Future Appointments   Date Time Provider Monika Garcia   8/13/2019  9:30 AM Logan Serna MD [address]   10/30/2019  8:00 AM REMOTE1, [address]   1/16/2020 11:00 AM PACEMAKER3, [address]   1/16/2020 11:20 AM Connie Delacruz  [address]

## 2019-07-22 ENCOUNTER — ANTI-COAG VISIT (OUTPATIENT)
Dept: CARDIOLOGY CLINIC | Age: 84
End: 2019-07-22

## 2019-07-22 DIAGNOSIS — I48.0 PAROXYSMAL ATRIAL FIBRILLATION (HCC): ICD-10-CM

## 2019-07-22 DIAGNOSIS — Z79.01 LONG TERM (CURRENT) USE OF ANTICOAGULANTS: ICD-10-CM

## 2019-07-22 LAB
INR BLD: 2.7
PT POC: NORMAL
VALID INTERNAL CONTROL?: YES

## 2019-07-22 NOTE — PROGRESS NOTES
A full discussion of the nature of anticoagulants has been carried out. A benefit risk analysis has been presented to the patient, so that they understand the justification for choosing anticoagulation at this time. The need for frequent and regular monitoring, precise dosage adjustment and compliance is stressed. Side effects of potential bleeding are discussed. The patient should avoid any OTC items containing aspirin or ibuprofen, and should avoid great swings in general diet. Avoid alcohol consumption. Call if any signs of abnormal bleeding. Next PT/INR test in 1 month. Continue current dosing regimen. Patient verbalized understanding      Anticoagulation Summary  As of 2019    INR goal:   2.0-3.0   TTR:   84.8 % (1.5 y)   INR used for dosin.7 (2019)   Warfarin maintenance plan:   1.25 mg (2.5 mg x 0.5) every Thu; 2.5 mg (2.5 mg x 1) all other days   Weekly warfarin total:   16.25 mg   Plan last modified:   Jacky Valentine RN (2019)   Next INR check:   2019   Target end date:    Indefinite    Indications    Paroxysmal atrial fibrillation ([facility] [address] [I48.0]  Long term (current) use of anticoagulants [Z79.01]             Anticoagulation Episode Summary     INR check location:       Preferred lab:       Send INR reminders to:       Comments:         Anticoagulation Care Providers     Provider Role Specialty Phone number    Marguerite Robison MD [facility] Cardiology [phone]          Future Appointments   Date Time Provider Monika Garcia   2019  1:40 PM COUMADIN,  [address]   2019  9:30 AM Dominick Iniguez MD [address]   2019 10:40 AM COUMADIN, JONES CAVREY CHARI SCHED   10/30/2019  8:00 AM REMOTE1,  [address]   2020 11:00 AM PACEMAKER3,  [address]   2020 11:20 AM Marguerite Robison  [address]

## 2019-08-01 DIAGNOSIS — I48.0 PAROXYSMAL ATRIAL FIBRILLATION (HCC): ICD-10-CM

## 2019-08-05 RX ORDER — WARFARIN 2.5 MG/1
TABLET ORAL
Qty: 30 TAB | Refills: 1 | Status: SHIPPED | OUTPATIENT
Start: 2019-08-05 | End: 2019-10-02 | Stop reason: SDUPTHER

## 2019-08-05 NOTE — TELEPHONE ENCOUNTER
Requested Prescriptions     Signed Prescriptions Disp Refills    warfarin (COUMADIN) 2.5 mg tablet 30 Tab 1     Sig: TAKE 1 TABLET BY MOUTH EVERY EXCEPT [address] OR AS DIRECTED BY COUMADIN CLINIC     Authorizing Provider: [facility], [address] [place] [address]     Ordering User: Edwin Gilmar       Last office visit 12/20/18    Per Dr. Delvin Holloway   Date Time Provider Monika Garcia   8/13/2019  9:30 AM Katie Iniguez MD [place] [address]   8/26/2019 10:40 AM KAREN HENNING [facility]   10/30/2019  8:00 AM REMOTE1, [address]   1/16/2020 11:00 AM PACEMAKER3, [address]   1/16/2020 11:20 AM Joe Moon  [address]

## 2019-08-13 ENCOUNTER — OFFICE VISIT (OUTPATIENT)
Dept: INTERNAL MEDICINE CLINIC | Age: 84
End: 2019-08-13

## 2019-08-13 VITALS
OXYGEN SATURATION: 94 % | BODY MASS INDEX: 31.98 KG/M2 | RESPIRATION RATE: 18 BRPM | HEART RATE: 60 BPM | HEIGHT: 63 IN | WEIGHT: 180.5 LBS | SYSTOLIC BLOOD PRESSURE: 122 MMHG | TEMPERATURE: 97.6 F | DIASTOLIC BLOOD PRESSURE: 74 MMHG

## 2019-08-13 DIAGNOSIS — I48.0 PAROXYSMAL ATRIAL FIBRILLATION (HCC): ICD-10-CM

## 2019-08-13 DIAGNOSIS — Z95.0 PACEMAKER: ICD-10-CM

## 2019-08-13 DIAGNOSIS — I87.2 VENOUS INSUFFICIENCY: ICD-10-CM

## 2019-08-13 DIAGNOSIS — I49.5 SINOATRIAL NODE DYSFUNCTION (HCC): ICD-10-CM

## 2019-08-13 DIAGNOSIS — M1A.9XX0 CHRONIC GOUT WITHOUT TOPHUS, UNSPECIFIED CAUSE, UNSPECIFIED SITE: ICD-10-CM

## 2019-08-13 DIAGNOSIS — Z51.81 ENCOUNTER FOR MONITORING COUMADIN THERAPY: Primary | ICD-10-CM

## 2019-08-13 DIAGNOSIS — Z79.01 ENCOUNTER FOR MONITORING COUMADIN THERAPY: Primary | ICD-10-CM

## 2019-08-13 DIAGNOSIS — I48.20 CHRONIC ATRIAL FIBRILLATION (HCC): ICD-10-CM

## 2019-08-13 LAB
INR BLD: 2.4
PT POC: 28.8 SECONDS
VALID INTERNAL CONTROL?: YES

## 2019-08-13 RX ORDER — SIMVASTATIN 20 MG/1
TABLET, FILM COATED ORAL
Qty: 30 TAB | Refills: 0 | Status: SHIPPED | OUTPATIENT
Start: 2019-08-13 | End: 2020-03-05

## 2019-08-13 RX ORDER — ATENOLOL 100 MG/1
TABLET ORAL
Qty: 30 TAB | Refills: 11 | Status: SHIPPED | OUTPATIENT
Start: 2019-08-13 | End: 2020-08-14

## 2019-08-13 NOTE — PATIENT INSTRUCTIONS
Body Mass Index: Care Instructions Your Care Instructions Body mass index (BMI) can help you see if your weight is raising your risk for health problems. It uses a formula to compare how much you weigh with how tall you are. · A BMI lower than 18.5 is considered underweight. · A BMI between 18.5 and 24.9 is considered healthy. · A BMI between 25 and 29.9 is considered overweight. A BMI of 30 or higher is considered obese. If your BMI is in the normal range, it means that you have a lower risk for weight-related health problems. If your BMI is in the overweight or obese range, you may be at increased risk for weight-related health problems, such as high blood pressure, heart disease, stroke, arthritis or joint pain, and diabetes. If your BMI is in the underweight range, you may be at increased risk for health problems such as fatigue, lower protection (immunity) against illness, muscle loss, bone loss, hair loss, and hormone problems. BMI is just one measure of your risk for weight-related health problems. You may be at higher risk for health problems if you are not active, you eat an unhealthy diet, or you drink too much alcohol or use tobacco products. Follow-up care is a key part of your treatment and safety. Be sure to make and go to all appointments, and call your doctor if you are having problems. It's also a good idea to know your test results and keep a list of the medicines you take. How can you care for yourself at home? · Practice healthy eating habits. This includes eating plenty of fruits, vegetables, whole grains, lean protein, and low-fat dairy. · If your doctor recommends it, get more exercise. Walking is a good choice. Bit by bit, increase the amount you walk every day. Try for at least 30 minutes on most days of the week. · Do not smoke. Smoking can increase your risk for health problems.  If you need help quitting, talk to your doctor about stop-smoking programs and medicines. These can increase your chances of quitting for good. · Limit alcohol to 2 drinks a day for men and 1 drink a day for women. Too much alcohol can cause health problems. If you have a BMI higher than 25 · Your doctor may do other tests to check your risk for weight-related health problems. This may include measuring the distance around your waist. A waist measurement of more than 40 inches in men or 35 inches in women can increase the risk of weight-related health problems. · Talk with your doctor about steps you can take to stay healthy or improve your health. You may need to make lifestyle changes to lose weight and stay healthy, such as changing your diet and getting regular exercise. If you have a BMI lower than 18.5 · Your doctor may do other tests to check your risk for health problems. · Talk with your doctor about steps you can take to stay healthy or improve your health. You may need to make lifestyle changes to gain or maintain weight and stay healthy, such as getting more healthy foods in your diet and doing exercises to build muscle. Where can you learn more? Go to http://saurav-roni.info/. Enter S176 in the search box to learn more about \"Body Mass Index: Care Instructions. \" Current as of: October 13, 2016 Content Version: 11.4 © 0724-5406 [employer]. Care instructions adapted under license by [employer] (which disclaims liability or warranty for this information). If you have questions about a medical condition or this instruction, always ask your healthcare professional. [address] any warranty or liability for your use of this information.

## 2019-08-13 NOTE — PROGRESS NOTES
SPORTS MEDICINE AND PRIMARY CARE  Chiquita Campbell MD, [address] [address]  Phone:  [phone]  Fax: [phone]       Chief Complaint   Patient presents with    Hypertension     f/u   . SUBJECTIVE:    Candis Norwood is a 80 y.o. female Patient returns today with known history of paroxysmal atrial fibrillation, on Coumadin, followed by Shilpa Tan MD, S/P pacemaker implantation, sinoatrial node dissection, chronic venous insufficiency, gout, chronic venous insufficiency, and is seen for evaluation. Current Outpatient Medications   Medication Sig Dispense Refill    warfarin (COUMADIN) 2.5 mg tablet TAKE 1 TABLET BY MOUTH EVERY EXCEPT THURSDAYS TAKE ONE-HALF OR AS DIRECTED BY COUMADIN CLINIC 30 Tab 1    atenolol (TENORMIN) 100 mg tablet TAKE 1 TABLET BY MOUTH DAILY 30 Tab 0    simvastatin (ZOCOR) 20 mg tablet TAKE 1 TABLET BY MOUTH ONCE DAILY AT NIGHT 30 Tab 0    simvastatin (ZOCOR) 20 mg tablet TAKE 1 TABLET BY MOUTH ONCE DAILY AT NIGHT 30 Tab 11    magnesium 250 mg tab Take 250 mg by mouth. Mon, wed, fri only      latanoprost (XALATAN) 0.005 % ophthalmic solution INT 1 GTT IN OU QD HS  2    levobunolol (BETAGAN) 0.5 % ophthalmic solution INSTILL 1 GTT OU BID  3     Past Medical History:   Diagnosis Date    Aortic valve disorders 4/29/2012    Atrial fibrillation ([address] 03/22/2011    on coumadin followed by cardiology - Shilpa Tan MD    CAD (coronary artery disease)     Cor pulmonale, chronic ([address] 4/29/2012    Dyslipidemia (high LDL; low HDL) 4/29/2012    Gout 4/30/2012    Gout, arthritis 2/8/2012    Gout, joint     Hypertension     Hyperuricemia 4/30/2012    Obstructive sleep apnea 4/29/2012    Pacemaker 04/02/2006    Dr Fco Mcmanus placed the original pacer 4/6/2006. It is replaced in 2014.   It is [facility]    Recurrent epistaxis 4/29/2012    Sinoatrial node dysfunction ([address] 3/22/2011    Venous insufficiency      Past Surgical History:   Procedure Laterality Date    BREAST SURGERY PROCEDURE UNLISTED      lumpectomy    GEN INSERT/REPLACE ONLY DUAL  2014         HX PACEMAKER       No Known Allergies      REVIEW OF SYSTEMS:  General: negative for - chills or fever  ENT: negative for - headaches, nasal congestion or tinnitus  Respiratory: negative for - cough, hemoptysis, shortness of breath or wheezing  Cardiovascular : negative for - chest pain, edema, palpitations or shortness of breath  Gastrointestinal: negative for - abdominal pain, blood in stools, heartburn or nausea/vomiting  Genito-Urinary: no dysuria, trouble voiding, or hematuria  Musculoskeletal: negative for - gait disturbance, joint pain, joint stiffness or joint swelling  Neurological: no TIA or stroke symptoms  Hematologic: no bruises, no bleeding, no swollen glands  Integument: no lumps, mole changes, nail changes or rash  Endocrine: no malaise/lethargy or unexpected weight changes      Social History     Socioeconomic History    Marital status:      Spouse name: Not on file    Number of children: Not on file    Years of education: Not on file    Highest education level: Not on file   Tobacco Use    Smoking status: Never Smoker    Smokeless tobacco: Never Used   Substance and Sexual Activity    Alcohol use: No    Drug use: No    Sexual activity: Never   Social History Narrative    Habits:  She is a lifetime nonsmoker, non drinker, non drug abuser. Social History:  The patient is . She lives with her son. 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 She is a member of [employer]. Family History:  Father and mother , unknown ages and causes. Two sisters  of unknown caus     History reviewed. No pertinent family history.     OBJECTIVE:    Visit Vitals  /74   Pulse 60   Temp 97.6 °F (36.4 °C) (Oral)   Resp 18   Ht 5' 3\" (1.6 m)   Wt 180 lb 8 oz (81.9 kg)   SpO2 94%   BMI 31.97 kg/m²     CONSTITUTIONAL: well , well nourished, appears age appropriate  EYES: perrla, eom intact  ENMT:moist mucous membranes, pharynx clear  NECK: supple. Thyroid normal  RESPIRATORY: Chest: clear bilaterally   CARDIOVASCULAR: Heart: regular rate and rhythm  GASTROINTESTINAL: Abdomen: soft, bowel sounds active  HEMATOLOGIC: no pathological lymph nodes palpated  MUSCULOSKELETAL: Extremities: no edema, pulse 1+   INTEGUMENT: No unusual rashes or suspicious skin lesions noted. Nails appear normal.  NEUROLOGIC: non-focal exam   MENTAL STATUS: alert and oriented, appropriate affect           ASSESSMENT:  1. Encounter for monitoring Coumadin therapy    2. Paroxysmal atrial fibrillation (HCC)    3. Venous insufficiency    4. Chronic gout without tophus, unspecified cause, unspecified site    5. Pacemaker    6. Sinoatrial node dysfunction (HCC)    7. Chronic atrial fibrillation (HCC)      Patient with paroxysmal a-fib, on Coumadin under Dr. Hema Everett. INR today was 2.4, no adjustment obviously is needed. She has chronic venous insufficiency, is asymptomatic currently. No episodes of gout since we last saw her. She reminds me she has a pacemaker in place in the left upper chest for sinoatrial node dysfunction. She had some complaints of discomfort in her wrist area. This actually started in her shoulder, which I think is related to a cervical radiculopathy and Biofreeze or topicals I think are completely appropriate. She also has numbness and tingling in her fingers, but this seems to be getting better. 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 She will be back to see us in three to four months, sooner if she needs to. I have discussed the diagnosis with the patient and the intended plan as seen in the  orders above. The patient understands and agees with the plan.   The patient has   received an after visit summary and questions were answered concerning  future plans  Patient labs and/or xrays were reviewed  Past records were reviewed. PLAN:  .  Orders Placed This Encounter    AMB POC PT/INR       Follow-up and Dispositions    · Return in about 4 months (around 12/13/2019). ATTENTION:   This medical record was transcribed using an electronic medical records system. Although proofread, it may and can contain electronic and spelling errors. Other human spelling and other errors may be present. Corrections may be executed at a later time. Please feel free to contact us for any clarifications as needed. Discussed the patient's BMI with her. The BMI follow up plan is as follows:     dietary management education, guidance, and counseling  encourage exercise  monitor weight  prescribed dietary intake    An After Visit Summary was printed and given to the patient.

## 2019-08-13 NOTE — PROGRESS NOTES
1. Have you been to the ER, urgent care clinic since your last visit? Hospitalized since your last visit? No    2. Have you seen or consulted any other health care providers outside of the [address] [place] [address] since your last visit? Include any pap smears or colon screening.  No       Wants to discuss left wrist pain

## 2019-08-26 ENCOUNTER — ANTI-COAG VISIT (OUTPATIENT)
Dept: CARDIOLOGY CLINIC | Age: 84
End: 2019-08-26

## 2019-08-26 DIAGNOSIS — I48.0 PAROXYSMAL ATRIAL FIBRILLATION (HCC): ICD-10-CM

## 2019-08-26 DIAGNOSIS — Z79.01 LONG TERM (CURRENT) USE OF ANTICOAGULANTS: ICD-10-CM

## 2019-08-26 LAB
INR BLD: 2.3
PT POC: NORMAL
VALID INTERNAL CONTROL?: YES

## 2019-08-26 NOTE — PROGRESS NOTES
A full discussion of the nature of anticoagulants has been carried out. A benefit risk analysis has been presented to the patient, so that they understand the justification for choosing anticoagulation at this time. The need for frequent and regular monitoring, precise dosage adjustment and compliance is stressed. Side effects of potential bleeding are discussed. The patient should avoid any OTC items containing aspirin or ibuprofen, and should avoid great swings in general diet. Avoid alcohol consumption. Call if any signs of abnormal bleeding. Next PT/INR test in 1 month. Continue current dosing regimen. Patient verbalized understanding      Anticoagulation Summary  As of 2019    INR goal:   2.0-3.0   TTR:   85.7 % (1.6 y)   INR used for dosin.3 (2019)   Warfarin maintenance plan:   1.25 mg (2.5 mg x 0.5) every Thu; 2.5 mg (2.5 mg x 1) all other days   Weekly warfarin total:   16.25 mg   Plan last modified:   Jassi Brito RN (2019)   Next INR check:   2019   Target end date:    Indefinite    Indications    Paroxysmal atrial fibrillation ([facility] [address] [I48.0]  Long term (current) use of anticoagulants [Z79.01]             Anticoagulation Episode Summary     INR check location:       Preferred lab:       Send INR reminders to:       Comments:         Anticoagulation Care Providers     Provider Role Specialty Phone number    Alyson Santiago MD [facility] Cardiology [phone]          Future Appointments   Date Time Provider Monika Garcia   2019 10:40 AM COUMADIN, KAREN LI CHARI SCHED   10/30/2019  8:00 AM REMOTE1, KAREN LI CHARI SCHED   2019 10:45 AM Jie Iniguez MD [facility] CHARI [address]   2020 11:00 AM PACEMAKER3, [address]   2020 11:20 AM Alyson Santiago  [address]

## 2019-09-02 DIAGNOSIS — I48.20 CHRONIC ATRIAL FIBRILLATION (HCC): ICD-10-CM

## 2019-09-02 RX ORDER — ATENOLOL 100 MG/1
TABLET ORAL
Qty: 30 TAB | Refills: 0 | Status: SHIPPED | OUTPATIENT
Start: 2019-09-02 | End: 2020-01-16

## 2019-09-30 ENCOUNTER — ANTI-COAG VISIT (OUTPATIENT)
Dept: CARDIOLOGY CLINIC | Age: 84
End: 2019-09-30

## 2019-09-30 DIAGNOSIS — I48.0 PAROXYSMAL ATRIAL FIBRILLATION (HCC): ICD-10-CM

## 2019-09-30 DIAGNOSIS — Z79.01 LONG TERM (CURRENT) USE OF ANTICOAGULANTS: ICD-10-CM

## 2019-09-30 LAB
INR BLD: 2.5
PT POC: NORMAL
VALID INTERNAL CONTROL?: YES

## 2019-09-30 NOTE — PROGRESS NOTES
A full discussion of the nature of anticoagulants has been carried out. A benefit risk analysis has been presented to the patient, so that they understand the justification for choosing anticoagulation at this time. The need for frequent and regular monitoring, precise dosage adjustment and compliance is stressed. Side effects of potential bleeding are discussed. The patient should avoid any OTC items containing aspirin or ibuprofen, and should avoid great swings in general diet. Avoid alcohol consumption. Call if any signs of abnormal bleeding. Next PT/INR test in 1 month. Continue current dosing regimen. Patient verbalized understanding      Anticoagulation Summary  As of 2019    INR goal:   2.0-3.0   TTR:   86.5 % (1.7 y)   INR used for dosin.5 (2019)   Warfarin maintenance plan:   1.25 mg (2.5 mg x 0.5) every Thu; 2.5 mg (2.5 mg x 1) all other days   Weekly warfarin total:   16.25 mg   Plan last modified:   Joaquín Salvador RN (2019)   Next INR check:   2019   Target end date:    Indefinite    Indications    Paroxysmal atrial fibrillation ([facility] [address] [I48.0]  Long term (current) use of anticoagulants [Z79.01]             Anticoagulation Episode Summary     INR check location:       Preferred lab:       Send INR reminders to:       Comments:         Anticoagulation Care Providers     Provider Role Specialty Phone number    Mona Vásquez MD [facility] Cardiology [phone]          Future Appointments   Date Time Provider Monika Radha   10/30/2019  8:00 AM REMOTE1,  [address]   2019 11:00 AM COUMADIN, JONES CAVREY CHARI SCHED   2019 10:45 AM Argelia Iniguez MD [facility] CHARI [address]   2020 11:00 AM PACEMAKER3,  [address]   2020 11:20 AM Mona Vásquez  [address]

## 2019-10-02 DIAGNOSIS — I48.0 PAROXYSMAL ATRIAL FIBRILLATION (HCC): ICD-10-CM

## 2019-10-02 RX ORDER — WARFARIN 2.5 MG/1
TABLET ORAL
Qty: 90 TAB | Refills: 0 | Status: SHIPPED | OUTPATIENT
Start: 2019-10-02 | End: 2019-12-19

## 2019-10-02 NOTE — TELEPHONE ENCOUNTER
Requested Prescriptions     Signed Prescriptions Disp Refills    warfarin (COUMADIN) 2.5 mg tablet 90 Tab 0     Sig: TAKE 1 TABLET BY MOUTH EVERY EXCEPT [address] OR AS DIRECTED BY COUMADIN CLINIC     Authorizing Provider: [facility], [address]     Ordering User: Veronica Monique       Last office visit 12/20/18    Per Dr. Rosibel Dixon   Date Time Provider Monika Garcia   10/30/2019  8:00 AM REMOTE1, [address]   11/4/2019 11:00 AM COUMADINKAREN SCHED   12/12/2019 10:45 AM Bentley Iniguez MD [facility] MAIN CHARI [address]   1/16/2020 11:00 AM PACEMAKER3, [address]   1/16/2020 11:20 AM Myriam Cobos  [address]

## 2019-10-30 ENCOUNTER — OFFICE VISIT (OUTPATIENT)
Dept: CARDIOLOGY CLINIC | Age: 84
End: 2019-10-30

## 2019-10-30 DIAGNOSIS — Z95.0 CARDIAC PACEMAKER IN SITU: Primary | ICD-10-CM

## 2019-11-04 ENCOUNTER — ANTI-COAG VISIT (OUTPATIENT)
Dept: CARDIOLOGY CLINIC | Age: 84
End: 2019-11-04

## 2019-11-04 DIAGNOSIS — Z79.01 LONG TERM (CURRENT) USE OF ANTICOAGULANTS: ICD-10-CM

## 2019-11-04 DIAGNOSIS — I48.0 PAROXYSMAL ATRIAL FIBRILLATION (HCC): ICD-10-CM

## 2019-11-04 LAB
INR BLD: 2.8
PT POC: NORMAL
VALID INTERNAL CONTROL?: YES

## 2019-12-09 ENCOUNTER — ANTI-COAG VISIT (OUTPATIENT)
Dept: CARDIOLOGY CLINIC | Age: 84
End: 2019-12-09

## 2019-12-09 DIAGNOSIS — I48.0 PAROXYSMAL ATRIAL FIBRILLATION (HCC): ICD-10-CM

## 2019-12-09 DIAGNOSIS — Z79.01 LONG TERM (CURRENT) USE OF ANTICOAGULANTS: ICD-10-CM

## 2019-12-09 LAB
INR BLD: 2.8
PT POC: NORMAL
VALID INTERNAL CONTROL?: YES

## 2019-12-09 NOTE — PROGRESS NOTES
A full discussion of the nature of anticoagulants has been carried out. A benefit risk analysis has been presented to the patient, so that they understand the justification for choosing anticoagulation at this time. The need for frequent and regular monitoring, precise dosage adjustment and compliance is stressed. Side effects of potential bleeding are discussed. The patient should avoid any OTC items containing aspirin or ibuprofen, and should avoid great swings in general diet. Avoid alcohol consumption. Call if any signs of abnormal bleeding. Next PT/INR test in 1 month with pacemaker/Dr. arreguin appt. Continue current dosing regimen. Patient verbalized understanding      Anticoagulation Summary  As of 2019    INR goal:   2.0-3.0   TTR:   87.8 % (1.9 y)   INR used for dosin.8 (2019)   Warfarin maintenance plan:   1.25 mg (2.5 mg x 0.5) every Thu; 2.5 mg (2.5 mg x 1) all other days   Weekly warfarin total:   16.25 mg   Plan last modified:   Karla Gtz RN (2019)   Next INR check:   2020   Target end date:    Indefinite    Indications    Paroxysmal atrial fibrillation ([facility] [address] [I48.0]  Long term (current) use of anticoagulants [Z79.01]             Anticoagulation Episode Summary     INR check location:       Preferred lab:       Send INR reminders to:       Comments:         Anticoagulation Care Providers     Provider Role Specialty Phone number    Amaury Shah MD Responsible Cardiology [phone]          Future Appointments   Date Time Provider Monika Garcia   2019 10:45 AM Manisha Iniguez MD [address]   2020 11:00 AM COUMADIN,  [address]   2020 11:00 AM PACEMAKER3,  [address]   2020 11:20 AM Amaury Shah  [address]

## 2019-12-12 ENCOUNTER — OFFICE VISIT (OUTPATIENT)
Dept: INTERNAL MEDICINE CLINIC | Age: 84
End: 2019-12-12

## 2019-12-12 VITALS
HEIGHT: 63 IN | BODY MASS INDEX: 31.5 KG/M2 | DIASTOLIC BLOOD PRESSURE: 72 MMHG | SYSTOLIC BLOOD PRESSURE: 122 MMHG | OXYGEN SATURATION: 95 % | WEIGHT: 177.8 LBS | TEMPERATURE: 97.3 F | RESPIRATION RATE: 16 BRPM | HEART RATE: 60 BPM

## 2019-12-12 DIAGNOSIS — M1A.9XX0 CHRONIC GOUT WITHOUT TOPHUS, UNSPECIFIED CAUSE, UNSPECIFIED SITE: ICD-10-CM

## 2019-12-12 DIAGNOSIS — Z95.0 CARDIAC PACEMAKER IN SITU: ICD-10-CM

## 2019-12-12 DIAGNOSIS — I48.0 PAROXYSMAL ATRIAL FIBRILLATION (HCC): ICD-10-CM

## 2019-12-12 DIAGNOSIS — I10 ESSENTIAL HYPERTENSION, BENIGN: Primary | ICD-10-CM

## 2019-12-12 DIAGNOSIS — I87.2 VENOUS INSUFFICIENCY: ICD-10-CM

## 2019-12-12 RX ORDER — DORZOLAMIDE HCL 20 MG/ML
SOLUTION/ DROPS OPHTHALMIC
Refills: 2 | COMMUNITY
Start: 2019-11-26

## 2019-12-12 NOTE — PROGRESS NOTES
SPORTS MEDICINE AND PRIMARY CARE  Rebecca Emery MD, [address] [address]  Phone:  [phone]  Fax: [phone]       Chief Complaint   Patient presents with    Hypertension   . SUBJECTIVE:    Johnny Walker is a 80 y.o. female Patient returns today with her son. Appropriately has the capacity to give an accurate history. She has a known history of primary hypertension, dual chamber pacemaker, paroxysmal atrial fibrillation, on Coumadin through the Coumadin clinic, venous insufficiency, gout, and is seen for evaluation. Since we last saw her she was at the Coumadin clinic on Monday, had an INR of 2.8, her son tells us. She is going on a monthly basis. She has occasional back pain she attributes to arthritis. Other new complaints denied. She is seen for evaluation. She is active around the house and sometimes outside. Current Outpatient Medications   Medication Sig Dispense Refill    dorzolamide (TRUSOPT) 2 % ophthalmic solution INSTILL 1 DROP IN BOTH EYES BID  2    warfarin (COUMADIN) 2.5 mg tablet TAKE 1 TABLET BY MOUTH EVERY EXCEPT THURSDAYS TAKE ONE-HALF OR AS DIRECTED BY COUMADIN CLINIC 90 Tab 0    atenolol (TENORMIN) 100 mg tablet TAKE 1 TABLET BY MOUTH DAILY 30 Tab 0    atenolol (TENORMIN) 100 mg tablet TAKE 1 TABLET BY MOUTH DAILY 30 Tab 11    simvastatin (ZOCOR) 20 mg tablet TAKE 1 TABLET BY MOUTH ONCE DAILY AT NIGHT 30 Tab 0    magnesium 250 mg tab Take 250 mg by mouth.  Mon, wed, fri only      latanoprost (XALATAN) 0.005 % ophthalmic solution INT 1 GTT IN OU QD HS  2    levobunolol (BETAGAN) 0.5 % ophthalmic solution INSTILL 1 GTT OU BID  3     Past Medical History:   Diagnosis Date    Aortic valve disorders 4/29/2012    Atrial fibrillation ([facility] [address] 03/22/2011    on coumadin followed by cardiology - Ella Garcia MD    CAD (coronary artery disease)     Cor pulmonale, chronic ([facility] [address] 4/29/2012    Dyslipidemia (high LDL; low HDL) 4/29/2012    Gout 4/30/2012    Gout, arthritis 2/8/2012    Gout, joint     Hypertension     Hyperuricemia 4/30/2012    Obstructive sleep apnea 4/29/2012    Pacemaker 04/02/2006    Dr Rajan Albert placed the original pacer 4/6/2006. It is replaced in 2014. It is [employer]    Recurrent epistaxis 4/29/2012    Sinoatrial node dysfunction ([address] 3/22/2011    Venous insufficiency      Past Surgical History:   Procedure Laterality Date    BREAST SURGERY PROCEDURE UNLISTED      lumpectomy    GEN INSERT/REPLACE ONLY DUAL  4/28/2014         HX PACEMAKER       No Known Allergies      REVIEW OF SYSTEMS:  General: negative for - chills or fever  ENT: negative for - headaches, nasal congestion or tinnitus  Respiratory: negative for - cough, hemoptysis, shortness of breath or wheezing  Cardiovascular : negative for - chest pain, edema, palpitations or shortness of breath  Gastrointestinal: negative for - abdominal pain, blood in stools, heartburn or nausea/vomiting  Genito-Urinary: no dysuria, trouble voiding, or hematuria  Musculoskeletal: negative for - gait disturbance, joint pain, joint stiffness or joint swelling  Neurological: no TIA or stroke symptoms  Hematologic: no bruises, no bleeding, no swollen glands  Integument: no lumps, mole changes, nail changes or rash  Endocrine: no malaise/lethargy or unexpected weight changes      Social History     Socioeconomic History    Marital status:      Spouse name: Not on file    Number of children: Not on file    Years of education: Not on file    Highest education level: Not on file   Tobacco Use    Smoking status: Never Smoker    Smokeless tobacco: Never Used   Substance and Sexual Activity    Alcohol use: No    Drug use: No    Sexual activity: Never   Social History Narrative    Habits:  She is a lifetime nonsmoker, non drinker, non drug abuser. Social History:  The patient is . She lives with her son. 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 She is a member of [employer]. Family History:  Father and mother , unknown ages and causes. Two sisters  of unknown caus     History reviewed. No pertinent family history. OBJECTIVE:    Visit Vitals  /72   Pulse 60   Temp 97.3 °F (36.3 °C) (Oral)   Resp 16   Ht 5' 3\" (1.6 m)   Wt 177 lb 12.8 oz (80.6 kg)   SpO2 95%   BMI 31.50 kg/m²     CONSTITUTIONAL: well , well nourished, appears age appropriate  EYES: perrla, eom intact  ENMT:moist mucous membranes, pharynx clear  NECK: supple. Thyroid normal  RESPIRATORY: Chest: clear bilaterally   CARDIOVASCULAR: Heart: regular rate and rhythm  GASTROINTESTINAL: Abdomen: soft, bowel sounds active  HEMATOLOGIC: no pathological lymph nodes palpated  MUSCULOSKELETAL: Extremities: no edema, pulse 1+   INTEGUMENT: No unusual rashes or suspicious skin lesions noted. Nails appear normal.  NEUROLOGIC: non-focal exam   MENTAL STATUS: alert and oriented, appropriate affect           ASSESSMENT:  1. Essential hypertension, benign    2. Cardiac pacemaker in situ    3. Paroxysmal atrial fibrillation (HCC)    4. Chronic gout without tophus, unspecified cause, unspecified site    5. Venous insufficiency      She takes her medications regularly as reflected by a blood pressure that is normotensive. On auscultation she is in sinus rhythm or has a paced rhythm or her normal rhythm is not clear as we do not need to do an EKG today. She has a history of chronic gout, which is currently quiescent on appropriate medications. Some venous insufficiency that is not as problematic in the wintertime as it is in the summertime. Her BMI is reviewed. At 80, going to be 80 Leon Kathryn, I think she is doing exceptionally well and we encouraged her to continue physical activity, activity in the [employer] and her quiet time with the [employer] in her closet. She will be back to see us in about four months.   She is reminded she can walk in to see us any time should she be unable to get an appointment and needs to be seen, and that we use [facility] [place]. I have discussed the diagnosis with the patient and the intended plan as seen in the  orders above. The patient understands and agees with the plan. The patient has   received an after visit summary and questions were answered concerning  future plans  Patient labs and/or xrays were reviewed  Past records were reviewed. PLAN:  .  Orders Placed This Encounter    dorzolamide (TRUSOPT) 2 % ophthalmic solution       Follow-up and Dispositions    · Return in about 4 months (around 4/12/2020). ATTENTION:   This medical record was transcribed using an electronic medical records system. Although proofread, it may and can contain electronic and spelling errors. Other human spelling and other errors may be present. Corrections may be executed at a later time. Please feel free to contact us for any clarifications as needed.

## 2019-12-12 NOTE — PROGRESS NOTES
1. Have you been to the ER, urgent care clinic since your last visit? Hospitalized since your last visit? No    2. Have you seen or consulted any other health care providers outside of the [address] [place] [address] since your last visit? Include any pap smears or colon screening.  No

## 2019-12-18 DIAGNOSIS — I48.0 PAROXYSMAL ATRIAL FIBRILLATION (HCC): ICD-10-CM

## 2019-12-19 RX ORDER — WARFARIN 2.5 MG/1
TABLET ORAL
Qty: 90 TAB | Refills: 0 | Status: SHIPPED | OUTPATIENT
Start: 2019-12-19 | End: 2020-03-13

## 2019-12-19 NOTE — TELEPHONE ENCOUNTER
Requested Prescriptions     Signed Prescriptions Disp Refills    warfarin (COUMADIN) 2.5 mg tablet 90 Tab 0     Sig: TAKE 1 TABLET BY MOUTH EVERY DAY EXCEPT THURSDAY, THURSDAY TAKE ONE-HALF TABLET BY MOUTH OR AS DIRECTED BY CLINIC     Authorizing Provider: [facility], [address] [place] [address]     Ordering User: Radha Banks       Last office visit 12/20/18    Per Dr. Berlin Aguero   Date Time Provider Monika Garcia   1/16/2020 11:00 AM COUMADIN, [address]   1/16/2020 11:00 AM PACEMAKER3, [address]   1/16/2020 11:20 AM Elvira Farmer  [address]   4/16/2020 10:30 AM Yajaira Iniguez MD [address] [facility]

## 2019-12-28 RX ORDER — AMLODIPINE BESYLATE 5 MG/1
TABLET ORAL
Qty: 30 TAB | Refills: 11 | Status: SHIPPED | OUTPATIENT
Start: 2019-12-28 | End: 2020-01-16

## 2019-12-28 RX ORDER — AMLODIPINE BESYLATE 5 MG/1
TABLET ORAL
Qty: 30 TAB | Refills: 11 | Status: SHIPPED | OUTPATIENT
Start: 2019-12-28 | End: 2020-12-15

## 2020-01-16 ENCOUNTER — OFFICE VISIT (OUTPATIENT)
Dept: CARDIOLOGY CLINIC | Age: 85
End: 2020-01-16

## 2020-01-16 ENCOUNTER — CLINICAL SUPPORT (OUTPATIENT)
Dept: CARDIOLOGY CLINIC | Age: 85
End: 2020-01-16

## 2020-01-16 ENCOUNTER — ANTI-COAG VISIT (OUTPATIENT)
Dept: CARDIOLOGY CLINIC | Age: 85
End: 2020-01-16

## 2020-01-16 VITALS
SYSTOLIC BLOOD PRESSURE: 110 MMHG | WEIGHT: 177 LBS | BODY MASS INDEX: 31.36 KG/M2 | HEIGHT: 63 IN | RESPIRATION RATE: 14 BRPM | DIASTOLIC BLOOD PRESSURE: 72 MMHG | HEART RATE: 60 BPM

## 2020-01-16 DIAGNOSIS — I49.5 SINOATRIAL NODE DYSFUNCTION (HCC): ICD-10-CM

## 2020-01-16 DIAGNOSIS — I48.0 PAROXYSMAL ATRIAL FIBRILLATION (HCC): ICD-10-CM

## 2020-01-16 DIAGNOSIS — I10 ESSENTIAL HYPERTENSION, BENIGN: ICD-10-CM

## 2020-01-16 DIAGNOSIS — Z95.0 CARDIAC PACEMAKER IN SITU: Primary | ICD-10-CM

## 2020-01-16 DIAGNOSIS — I47.1 PAT (PAROXYSMAL ATRIAL TACHYCARDIA) (HCC): ICD-10-CM

## 2020-01-16 DIAGNOSIS — Z79.01 LONG TERM (CURRENT) USE OF ANTICOAGULANTS: ICD-10-CM

## 2020-01-16 DIAGNOSIS — R60.0 LOWER EXTREMITY EDEMA: ICD-10-CM

## 2020-01-16 LAB
INR BLD: 2.8
PT POC: NORMAL
VALID INTERNAL CONTROL?: YES

## 2020-01-16 NOTE — PROGRESS NOTES
A full discussion of the nature of anticoagulants has been carried out. A benefit risk analysis has been presented to the patient, so that they understand the justification for choosing anticoagulation at this time. The need for frequent and regular monitoring, precise dosage adjustment and compliance is stressed. Side effects of potential bleeding are discussed. The patient should avoid any OTC items containing aspirin or ibuprofen, and should avoid great swings in general diet. Avoid alcohol consumption. Call if any signs of abnormal bleeding. Next PT/INR test in 1 month. Continue current dosing regimen. Patient verbalized understanding      Anticoagulation Summary  As of 2020    INR goal:   2.0-3.0   TTR:   88.4 % (2 y)   INR used for dosin.8 (2020)   Warfarin maintenance plan:   1.25 mg (2.5 mg x 0.5) every Thu; 2.5 mg (2.5 mg x 1) all other days   Weekly warfarin total:   16.25 mg   Plan last modified:   Tasia Rodriguez RN (2019)   Next INR check:      Target end date:    Indefinite    Indications    Paroxysmal atrial fibrillation ([facility] [address] [I48.0]  Long term (current) use of anticoagulants [Z79.01]             Anticoagulation Episode Summary     INR check location:       Preferred lab:       Send INR reminders to:       Comments:         Anticoagulation Care Providers     Provider Role Specialty Phone number    Silvino Cuevas MD [facility] Cardiology [phone]          Future Appointments   Date Time Provider Monika Garcia   2020 11:00 AM COUMADIN,  [address]   2020 11:00 AM PACEMAKER3,  [address]   2020 11:20 AM Silvino Cuevas  [address]   2020 10:30 AM Madie Iniguez MD [address] [facility]

## 2020-01-16 NOTE — PROGRESS NOTES
Cardiac Electrophysiology Office Note     Subjective:      Jenna Tillman is a 80 y.o. woman who is here to follow up  She is missing Dr Silvia Martinez  Son is with her. He lives with her  She walks with cane  Right leg spasm so Dr Goran Luis gave her magnesium  No chest pain   She does have occasional palpitations with dreams at night and the [employer] pacemaker does show paroxysmal atrial fibrillation/PAT      Dr Kiara Snow placed the original pacer 4/6/2006. It is replaced in 2014. It is [employer]. She has past medical history of sinus node dysfunction-s/p ppm (4/2006), atrial fibrillation, hypertension, hyperlipidemia, palpitations, and gout. Patient Active Problem List    Diagnosis Date Noted    Hyperuricemia 04/30/2012     Priority: 1 - One    Gout 04/30/2012     Priority: 2 - Two    Long term (current) use of anticoagulants 11/05/2018    Venous insufficiency     Pacemaker     Encounter for long-term (current) use of high-risk medication 04/07/2014    Paroxysmal atrial fibrillation ([facility] [address] 03/22/2011    Essential hypertension, benign 03/22/2011    Palpitations 03/22/2011    Sinoatrial node dysfunction ([facility] [address] 03/22/2011    Cardiac pacemaker in situ 11/17/2010     Current Outpatient Medications   Medication Sig Dispense Refill    amLODIPine (NORVASC) 5 mg tablet TAKE 1 TABLET BY MOUTH EVERY DAY 30 Tab 11    warfarin (COUMADIN) 2.5 mg tablet TAKE 1 TABLET BY MOUTH EVERY DAY EXCEPT THURSDAY, THURSDAY TAKE ONE-HALF TABLET BY MOUTH OR AS DIRECTED BY CLINIC 90 Tab 0    dorzolamide (TRUSOPT) 2 % ophthalmic solution INSTILL 1 DROP IN BOTH EYES BID  2    atenolol (TENORMIN) 100 mg tablet TAKE 1 TABLET BY MOUTH DAILY 30 Tab 11    simvastatin (ZOCOR) 20 mg tablet TAKE 1 TABLET BY MOUTH ONCE DAILY AT NIGHT 30 Tab 0    magnesium 250 mg tab Take 250 mg by mouth.  Mon, wed, fri only      latanoprost (XALATAN) 0.005 % ophthalmic solution INT 1 GTT IN OU QD HS  2    levobunolol (BETAGAN) 0.5 % ophthalmic solution INSTILL 1 GTT OU BID  3    amLODIPine (NORVASC) 5 mg tablet TAKE 1 TABLET BY MOUTH EVERY DAY 30 Tab 11    atenolol (TENORMIN) 100 mg tablet TAKE 1 TABLET BY MOUTH DAILY 30 Tab 0     No Known Allergies  Past Medical History:   Diagnosis Date    Aortic valve disorders 4/29/2012    Atrial fibrillation ([facility] [address] 03/22/2011    on coumadin followed by cardiology - Petar Soni MD    CAD (coronary artery disease)     Cor pulmonale, chronic ([address] 4/29/2012    Dyslipidemia (high LDL; low HDL) 4/29/2012    Gout 4/30/2012    Gout, arthritis 2/8/2012    Gout, joint     Hypertension     Hyperuricemia 4/30/2012    Obstructive sleep apnea 4/29/2012    Pacemaker 04/02/2006    Dr Claudia Hendricks placed the original pacer 4/6/2006. It is replaced in 2014. It is [employer]    Recurrent epistaxis 4/29/2012    Sinoatrial node dysfunction ([facility] [address] 3/22/2011    Venous insufficiency      Past Surgical History:   Procedure Laterality Date    BREAST SURGERY PROCEDURE UNLISTED      lumpectomy    GEN INSERT/REPLACE ONLY DUAL  4/28/2014         HX PACEMAKER         Social History     Tobacco Use    Smoking status: Never Smoker    Smokeless tobacco: Never Used   Substance Use Topics    Alcohol use: No        Review of Systems:   Constitutional: Negative for fever, chills, weight loss, malaise/fatigue. HEENT: Negative for nosebleeds, vision changes. Respiratory: Negative for cough, hemoptysis, sputum production, and wheezing. Cardiovascular: Negative for chest pain, leg swelling, syncope, and PND. Gastrointestinal: Negative for nausea, vomiting, diarrhea, blood in stool and melena. Genitourinary: Negative for dysuria, and hematuria. Musculoskeletal: Negative for myalgias, + arthralgia right knee  Skin: Negative for rash. Heme: Does not bleed or bruise easily.    Neurological: Negative for speech change and focal weakness     Objective:     Visit Vitals  /72 (BP 1 Location: Left arm, BP Patient Position: Sitting)   Pulse 60 Resp 14   Ht 5' 3\" (1.6 m)   Wt 177 lb (80.3 kg)   BMI 31.35 kg/m²      Physical Exam:   Constitutional: well-developed and well-nourished. No distress. Head: Normocephalic and atraumatic. Eyes: Pupils are equal, round  Neck: supple. No JVD present. No bruits   Cardiovascular: Normal rate, regular rhythm and normal heart sounds. Exam reveals no gallop and no friction rub. No murmur heard. Pulmonary/Chest: Effort normal. No wheezes. Abdominal: Soft, no tenderness. Musculoskeletal: no edema. Neurological: alert, oriented. Skin: Skin is warm and dry. Left sided ppm incision healed   Psychiatric: normal mood and affect. Judgment and thought content normal.          Assessment/Plan:       ICD-10-CM ICD-9-CM    1. Cardiac pacemaker in situ Z95.0 V45.01    2. Essential hypertension, benign I10 401.1    3. Paroxysmal atrial fibrillation ([facility]) I48.0 427.31    4. Sinoatrial node dysfunction ([facility]) I49.5 427.81    5. PAT (paroxysmal atrial tachycardia) ([facility]) I47.1 427.0    6. Lower extremity edema R60.0 782. 3    she is young looking for 80 and is still active going shopping  [employer] pacer RV lead threshold 2.75V @ 0.8 ms but she is RV pacing 18% and RA pacing 80%  4256 AMS with 4 high V rate and 4.4% burden  I explained to her and her son about pacemaker function  Battery 5 year and 8 month left      Follow-up and Dispositions    · Return in about 1 year (around 1/16/2021). Thank you for involving me in this patient's care and please call with further concerns or questions. Surjit Mujica M.D.   Electrophysiology/Cardiology  [facility]  [address] , [place] [address] [place] [address]  [phone]

## 2020-02-20 ENCOUNTER — ANTI-COAG VISIT (OUTPATIENT)
Dept: CARDIOLOGY CLINIC | Age: 85
End: 2020-02-20

## 2020-02-20 DIAGNOSIS — Z79.01 LONG TERM (CURRENT) USE OF ANTICOAGULANTS: ICD-10-CM

## 2020-02-20 DIAGNOSIS — I48.0 PAROXYSMAL ATRIAL FIBRILLATION (HCC): ICD-10-CM

## 2020-02-20 LAB
INR BLD: 2.8
PT POC: NORMAL
VALID INTERNAL CONTROL?: YES

## 2020-02-20 NOTE — PROGRESS NOTES
A full discussion of the nature of anticoagulants has been carried out. A benefit risk analysis has been presented to the patient, so that they understand the justification for choosing anticoagulation at this time. The need for frequent and regular monitoring, precise dosage adjustment and compliance is stressed. Side effects of potential bleeding are discussed. The patient should avoid any OTC items containing aspirin or ibuprofen, and should avoid great swings in general diet. Avoid alcohol consumption. Call if any signs of abnormal bleeding. Next PT/INR test in 1 month. Continue current dosing regimen. Patient verbalized understanding      Anticoagulation Summary  As of 2020    INR goal:   2.0-3.0   TTR:   89.0 % (2.1 y)   INR used for dosin.8 (2020)   Warfarin maintenance plan:   1.25 mg (2.5 mg x 0.5) every Thu; 2.5 mg (2.5 mg x 1) all other days   Weekly warfarin total:   16.25 mg   Plan last modified:   Sharon Barajas RN (2019)   Next INR check:   3/26/2020   Target end date:    Indefinite    Indications    Paroxysmal atrial fibrillation ([facility] [address] [I48.0]  Long term (current) use of anticoagulants [Z79.01]             Anticoagulation Episode Summary     INR check location:       Preferred lab:       Send INR reminders to:       Comments:         Anticoagulation Care Providers     Provider Role Specialty Phone number    Ramona Morgan MD [facility] Cardiology [phone]          Future Appointments   Date Time Provider Monika Garcia   3/26/2020 11:00 AM COUMADIN,  [address]   2020 10:30 AM Javi Iniguez MD [facility]   2020  1:45 PM REMOTE1, [place] SCHED   8/3/2020 11:45 AM REMOTE1,  [address]   2020 10:30 AM REMOTE1,  [address]   2021 11:00 AM PACEMAKER3,  [address]   2021 11:20 AM Ramona Morgan  [address]

## 2020-03-12 DIAGNOSIS — I48.0 PAROXYSMAL ATRIAL FIBRILLATION (HCC): ICD-10-CM

## 2020-03-13 RX ORDER — WARFARIN 2.5 MG/1
TABLET ORAL
Qty: 90 TAB | Refills: 0 | Status: SHIPPED | OUTPATIENT
Start: 2020-03-13 | End: 2020-06-17

## 2020-03-13 NOTE — TELEPHONE ENCOUNTER
Requested Prescriptions     Signed Prescriptions Disp Refills    warfarin (COUMADIN) 2.5 mg tablet 90 Tab 0     Sig: TAKE 1 TABLET BY MOUTH EVERY DAY EXCEPT ON THURSDAY, THURSDAY TAKE ONE-HALF TABLET BY MOUTH AS DIRECTED     Authorizing Provider: [facility]     Ordering User: Sherif Needs       Last office visit 1/16/2020    Per Dr. Jayleen Magallanes   Date Time Provider Monika Garcia   3/26/2020 11:00 AM COUMADIN, [address]   4/16/2020 10:30 AM Daniel Iniguez MD [facility] [address] [place] [id number] [facility] [address]   4/27/2020  1:45 PM REMOTE1, [facility] SCHED   8/3/2020 11:45 AM REMOTE1, [address]   11/11/2020 10:30 AM REMOTE1, [address]   2/11/2021 11:00 AM PACEMAKER3, [address]   2/11/2021 11:20 AM Mckayla Alonso  [address]

## 2020-03-23 ENCOUNTER — TELEPHONE (OUTPATIENT)
Dept: CARDIOLOGY CLINIC | Age: 85
End: 2020-03-23

## 2020-03-23 NOTE — TELEPHONE ENCOUNTER
Spoke with patients son, identifiers x2. Son will speak with mom and call back to reschedule Coumadin check. Son was talking about limiting her vit K intake. Informed him that without testing we dont know what her level is and dont suggest drastically changing her diet as well as the risks involved not knowing her number. Verbalized understanding.

## 2020-04-16 ENCOUNTER — VIRTUAL VISIT (OUTPATIENT)
Dept: INTERNAL MEDICINE CLINIC | Age: 85
End: 2020-04-16

## 2020-04-16 ENCOUNTER — OFFICE VISIT (OUTPATIENT)
Dept: INTERNAL MEDICINE CLINIC | Age: 85
End: 2020-04-16

## 2020-04-16 ENCOUNTER — TELEPHONE ANTICOAG (OUTPATIENT)
Dept: CARDIOLOGY CLINIC | Age: 85
End: 2020-04-16

## 2020-04-16 VITALS
SYSTOLIC BLOOD PRESSURE: 121 MMHG | RESPIRATION RATE: 16 BRPM | HEIGHT: 63 IN | DIASTOLIC BLOOD PRESSURE: 71 MMHG | HEART RATE: 60 BPM | OXYGEN SATURATION: 99 % | WEIGHT: 174 LBS | BODY MASS INDEX: 30.83 KG/M2 | TEMPERATURE: 97.7 F

## 2020-04-16 VITALS
OXYGEN SATURATION: 99 % | BODY MASS INDEX: 30.83 KG/M2 | SYSTOLIC BLOOD PRESSURE: 121 MMHG | WEIGHT: 174 LBS | TEMPERATURE: 97.7 F | DIASTOLIC BLOOD PRESSURE: 71 MMHG | HEIGHT: 63 IN | HEART RATE: 60 BPM

## 2020-04-16 DIAGNOSIS — Z95.0 PACEMAKER: ICD-10-CM

## 2020-04-16 DIAGNOSIS — Z79.01 LONG TERM (CURRENT) USE OF ANTICOAGULANTS: ICD-10-CM

## 2020-04-16 DIAGNOSIS — Z13.31 SCREENING FOR DEPRESSION: ICD-10-CM

## 2020-04-16 DIAGNOSIS — Z13.39 SCREENING FOR ALCOHOLISM: ICD-10-CM

## 2020-04-16 DIAGNOSIS — Z23 ENCOUNTER FOR IMMUNIZATION: ICD-10-CM

## 2020-04-16 DIAGNOSIS — Z23 ENCOUNTER FOR IMMUNIZATION: Primary | ICD-10-CM

## 2020-04-16 DIAGNOSIS — I48.0 PAROXYSMAL ATRIAL FIBRILLATION (HCC): ICD-10-CM

## 2020-04-16 DIAGNOSIS — I49.5 SINOATRIAL NODE DYSFUNCTION (HCC): ICD-10-CM

## 2020-04-16 DIAGNOSIS — I10 ESSENTIAL HYPERTENSION, BENIGN: ICD-10-CM

## 2020-04-16 DIAGNOSIS — Z00.00 MEDICARE ANNUAL WELLNESS VISIT, SUBSEQUENT: ICD-10-CM

## 2020-04-16 DIAGNOSIS — I87.2 VENOUS INSUFFICIENCY: ICD-10-CM

## 2020-04-16 LAB
INR BLD: 2.1
PT POC: 28 SECONDS
VALID INTERNAL CONTROL?: YES

## 2020-04-16 NOTE — PROGRESS NOTES
This is the Subsequent Medicare Annual Wellness Exam, performed 12 months or more after the Initial AWV or the last Subsequent AWV    I have reviewed the patient's medical history in detail and updated the computerized patient record. History     Patient Active Problem List   Diagnosis Code    Cardiac pacemaker in situ Z95.0    Paroxysmal atrial fibrillation (HCC) I48.0    Essential hypertension, benign I10    Palpitations R00.2    Sinoatrial node dysfunction (HCC) I49.5    Gout M10.9    Hyperuricemia E79.0    Encounter for long-term (current) use of high-risk medication Z79.899    Pacemaker Z95.0    Venous insufficiency I87.2    Long term (current) use of anticoagulants Z79.01     Past Medical History:   Diagnosis Date    Aortic valve disorders 4/29/2012    Atrial fibrillation ([facility] [address] 03/22/2011    on coumadin followed by cardiology - Jami Canales MD    CAD (coronary artery disease)     Cor pulmonale, chronic ([facility] [address] 4/29/2012    Dyslipidemia (high LDL; low HDL) 4/29/2012    Gout 4/30/2012    Gout, arthritis 2/8/2012    Gout, joint     Hypertension     Hyperuricemia 4/30/2012    Obstructive sleep apnea 4/29/2012    Pacemaker 04/02/2006    Dr Krish Perez placed the original pacer 4/6/2006. It is replaced in 2014.   It is [employer]    Recurrent epistaxis 4/29/2012    Sinoatrial node dysfunction (HCC) 3/22/2011    Venous insufficiency       Past Surgical History:   Procedure Laterality Date    BREAST SURGERY PROCEDURE UNLISTED      lumpectomy    GEN INSERT/REPLACE ONLY DUAL  4/28/2014         HX PACEMAKER       Current Outpatient Medications   Medication Sig Dispense Refill    warfarin (COUMADIN) 2.5 mg tablet TAKE 1 TABLET BY MOUTH EVERY DAY EXCEPT ON THURSDAY, THURSDAY TAKE ONE-HALF TABLET BY MOUTH AS DIRECTED 90 Tab 0    simvastatin (ZOCOR) 20 mg tablet TAKE 1 TABLET BY MOUTH ONCE DAILY AT NIGHT 30 Tab 11    amLODIPine (NORVASC) 5 mg tablet TAKE 1 TABLET BY MOUTH EVERY DAY 30 Tab 11    dorzolamide (TRUSOPT) 2 % ophthalmic solution INSTILL 1 DROP IN BOTH EYES BID  2    atenolol (TENORMIN) 100 mg tablet TAKE 1 TABLET BY MOUTH DAILY 30 Tab 11    magnesium 250 mg tab Take 250 mg by mouth. Mon, wed, fri only      latanoprost (XALATAN) 0.005 % ophthalmic solution INT 1 GTT IN OU QD HS  2    levobunolol (BETAGAN) 0.5 % ophthalmic solution INSTILL 1 GTT OU BID  3     No Known Allergies    History reviewed. No pertinent family history. Social History     Tobacco Use    Smoking status: Never Smoker    Smokeless tobacco: Never Used   Substance Use Topics    Alcohol use: No       Depression Risk Factor Screening:     3 most recent PHQ Screens 4/16/2020   Little interest or pleasure in doing things Not at all   Feeling down, depressed, irritable, or hopeless Not at all   Total Score PHQ 2 0       Alcohol Risk Factor Screening:   Do you average 1 drink per night or more than 7 drinks a week:  No    On any one occasion in the past three months have you have had more than 3 drinks containing alcohol:  No      Functional Ability and Level of Safety:   Hearing: Hearing is good. Activities of Daily Living: The home contains: no safety equipment. Patient does total self care    Ambulation: with no difficulty    Fall Risk:  Fall Risk Assessment, last 12 mths 4/16/2020   Able to walk? Yes   Fall in past 12 months? No       Abuse Screen:  Patient is not abused    Cognitive Screening   Has your family/caregiver stated any concerns about your memory: no  Cognitive Screening: not necessary    Patient Care Team   Patient Care Team:  Dana Mcfarland MD as PCP - General (Internal Medicine)  Dana Mcfarland MD as PCP - REHABILITATION [facility]  Vangie Sigala MD as Physician (Cardiology)  Jarrett Lubin MD as Physician (Cardiology)    Assessment/Plan   Education and counseling provided:  Are appropriate based on today's review and evaluation       Diagnoses and all orders for this visit:    1. Encounter for immunization  -     AMB POC PT/INR        Health Maintenance Due   Topic Date Due    Shingrix Vaccine Age 49> (1 of 2) 12/24/1970    Lipid Screen  12/01/2018    Medicare Yearly Exam  01/19/2020    GLAUCOMA SCREENING Q2Y  03/28/2020

## 2020-04-16 NOTE — PROGRESS NOTES
Received call from patients son. INR done at PCP. Instructed to continue same dosing regimen and recheck levels in 1 month. Son states he will call for appt. Anticoagulation Summary  As of 2020    INR goal:   2.0-3.0   TTR:   89.7 % (2.3 y)   INR used for dosin.1 (2020)   Warfarin maintenance plan:   1.25 mg (2.5 mg x 0.5) every Thu; 2.5 mg (2.5 mg x 1) all other days   Weekly warfarin total:   16.25 mg   Plan last modified:   Drake Noyola RN (2019)   Next INR check:   2020   Target end date:    Indefinite    Indications    Paroxysmal atrial fibrillation ([facility] [address] [I48.0]  Long term (current) use of anticoagulants [Z79.01]             Anticoagulation Episode Summary     INR check location:       Preferred lab:       Send INR reminders to:       Comments:         Anticoagulation Care Providers     Provider Role Specialty Phone number    Gale Jones MD [facility] Cardiology [phone]          Future Appointments   Date Time Provider Monika Garcia   2020  1:45 PM Sallyann Goldmann ATHENA SCHED   8/3/2020 11:45 AM REMOTE1,  [address]   2020 10:30 AM REMOTE1,  [address]   2021 11:00 AM PACEMAKER3,  [address]   2021 11:20 AM Gale Jones  [address] [place] [address]

## 2020-04-16 NOTE — PATIENT INSTRUCTIONS
Medicare Wellness Visit, Female The best way to live healthy is to have a lifestyle where you eat a well-balanced diet, exercise regularly, limit alcohol use, and quit all forms of tobacco/nicotine, if applicable. Regular preventive services are another way to keep healthy. Preventive services (vaccines, screening tests, monitoring & exams) can help personalize your care plan, which helps you manage your own care. Screening tests can find health problems at the earliest stages, when they are easiest to treat. [facility] follows the current, evidence-based guidelines published by the [facility] Tyrell Hendrix ([facility]) when recommending preventive services for our patients. Because we follow these guidelines, sometimes recommendations change over time as research supports it. (For example, mammograms used to be recommended annually. Even though Medicare will still pay for an annual mammogram, the newer guidelines recommend a mammogram every two years for women of average risk). Of course, you and your doctor may decide to screen more often for some diseases, based on your risk and your co-morbidities (chronic disease you are already diagnosed with). Preventive services for you include: - Medicare offers their members a free annual wellness visit, which is time for you and your primary care provider to discuss and plan for your preventive service needs. Take advantage of this benefit every year! 
-All adults over the age of 72 should receive the recommended pneumonia vaccines. Current USPSTF guidelines recommend a series of two vaccines for the best pneumonia protection.  
-All adults should have a flu vaccine yearly and a tetanus vaccine every 10 years.  
-All adults age 48 and older should receive the shingles vaccines (series of two vaccines). -All adults age 38-68 who are overweight should have a diabetes screening test once every three years. -All adults born between [birth year] and 1965 should be screened once for Hepatitis C. 
-Other screening tests and preventive services for persons with diabetes include: an eye exam to screen for diabetic retinopathy, a kidney function test, a foot exam, and stricter control over your cholesterol.  
-Cardiovascular screening for adults with routine risk involves an electrocardiogram (ECG) at intervals determined by your doctor.  
-Colorectal cancer screenings should be done for adults age 54-65 with no increased risk factors for colorectal cancer. There are a number of acceptable methods of screening for this type of cancer. Each test has its own benefits and drawbacks. Discuss with your doctor what is most appropriate for you during your annual wellness visit. The different tests include: colonoscopy (considered the best screening method), a fecal occult blood test, a fecal DNA test, and sigmoidoscopy. 
 
-A bone mass density test is recommended when a woman turns 65 to screen for osteoporosis. This test is only recommended one time, as a screening. Some providers will use this same test as a disease monitoring tool if you already have osteoporosis. -Breast cancer screenings are recommended every other year for women of normal risk, age 54-69. 
-Cervical cancer screenings for women over age 72 are only recommended with certain risk factors. Here is a list of your current Health Maintenance items (your personalized list of preventive services) with a due date: 
Health Maintenance Due Topic Date Due  Shingles Vaccine (1 of 2) [birth year]/24/1970  Cholesterol Test   12/01/2018 [facility] Annual Well Visit  01/19/2020  Glaucoma Screening   03/28/2020

## 2020-04-16 NOTE — PROGRESS NOTES
SPORTS MEDICINE AND PRIMARY CARE  Ramses Bueno MD, [address] [address]  Phone:  [phone]  Fax: [phone]      Chief Complaint   Patient presents with    Annual Wellness Visit         SUBECTIVE:    Kika Washington is a 80 y.o. female patient is seen in her car with her son for checkup and annual wellness visit. We advised the son to bring to the office so that a PT/INR could be completed. He was agreeable if we come out to the car to check her. He does not want her brought into the office. Patient has a long history of venous insufficiency sinoatrial node dysfunction paroxysmal atrial fibrillation, long-term use of anticoagulants, pacemaker, primary hypertension is seen for evaluation. She voices no new complaints. Current Outpatient Medications   Medication Sig Dispense Refill    warfarin (COUMADIN) 2.5 mg tablet TAKE 1 TABLET BY MOUTH EVERY DAY EXCEPT ON THURSDAY, THURSDAY TAKE ONE-HALF TABLET BY MOUTH AS DIRECTED 90 Tab 0    simvastatin (ZOCOR) 20 mg tablet TAKE 1 TABLET BY MOUTH ONCE DAILY AT NIGHT 30 Tab 11    amLODIPine (NORVASC) 5 mg tablet TAKE 1 TABLET BY MOUTH EVERY DAY 30 Tab 11    dorzolamide (TRUSOPT) 2 % ophthalmic solution INSTILL 1 DROP IN BOTH EYES BID  2    atenolol (TENORMIN) 100 mg tablet TAKE 1 TABLET BY MOUTH DAILY 30 Tab 11    magnesium 250 mg tab Take 250 mg by mouth.  Mon, wed, fri only      latanoprost (XALATAN) 0.005 % ophthalmic solution INT 1 GTT IN OU QD HS  2    levobunolol (BETAGAN) 0.5 % ophthalmic solution INSTILL 1 GTT OU BID  3     Past Medical History:   Diagnosis Date    Aortic valve disorders 4/29/2012    Atrial fibrillation ([facility] [address] 03/22/2011    on coumadin followed by cardiology - Michele Aguirre MD    CAD (coronary artery disease)     Cor pulmonale, chronic ([address] 4/29/2012    Dyslipidemia (high LDL; low HDL) 4/29/2012    Gout 4/30/2012    Gout, arthritis 2/8/2012    Gout, joint     Hypertension     Hyperuricemia 2012    Obstructive sleep apnea 2012    Pacemaker 2006    Dr Indy Chávez placed the original pacer 2006. It is replaced in . It is [employer]    Recurrent epistaxis 2012    Sinoatrial node dysfunction ([facility] [address] 3/22/2011    Venous insufficiency      Past Surgical History:   Procedure Laterality Date    BREAST SURGERY PROCEDURE UNLISTED      lumpectomy    GEN INSERT/REPLACE ONLY DUAL  2014         HX PACEMAKER       No Known Allergies    REVIEW OF SYSTEMS:  No chest pain no shortness of breath    Social History     Socioeconomic History    Marital status:      Spouse name: Not on file    Number of children: Not on file    Years of education: Not on file    Highest education level: Not on file   Tobacco Use    Smoking status: Never Smoker    Smokeless tobacco: Never Used   Substance and Sexual Activity    Alcohol use: No    Drug use: No    Sexual activity: Not Currently   Social History Narrative    Habits:  She is a lifetime nonsmoker, non drinker, non drug abuser. Social History:  The patient is . She lives with her son. 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 She is a member of [email or web address]. Family History:  Father and mother , unknown ages and causes. Two sisters  of unknown caus   r  History reviewed. No pertinent family history. OBJECTIVE:  Visit Vitals  /71   Pulse 60   Temp 97.7 °F (36.5 °C) (Oral)   Resp 16   Ht 5' 3\" (1.6 m)   Wt 174 lb (78.9 kg)   SpO2 99%   BMI 30.82 kg/m²     ENT: perrla,  eom intact  NECK: supple.  Thyroid normal  CHEST: clear to ascultation and percussion   HEART: regular rate and rhythm  ABD: soft, bowel sounds active  EXTREMITIES: no edema, pulse 1+     Office Visit on 2020   Component Date Value Ref Range Status    VALID INTERNAL CONTROL POC 2020 Yes   Final    Prothrombin time (POC) 2020 28.0  seconds Final    INR POC 2020 2.1   Final Anti-Coag visit on 02/20/2020   Component Date Value Ref Range Status    VALID INTERNAL CONTROL POC 02/20/2020 Yes   Final    INR POC 02/20/2020 2.8   Final          ASSESSMENT:  1. Encounter for immunization    2. Medicare annual wellness visit, subsequent    3. Screening for alcoholism    4. Screening for depression    5. Essential hypertension, benign    6. Pacemaker    7. Paroxysmal atrial fibrillation (HCC)    8. Sinoatrial node dysfunction (HCC)    9. Venous insufficiency    10. Long term (current) use of anticoagulants          We completed annual Medicare wellness visit today. Blood pressure is recorded from her sons and are accurate and acceptable. She checks her pacemaker remotely with her cardiologist.    On auscultation today she is in sinus rhythm. Venous insufficiency is not an issue currently. INR is 2.3 which is therapeutic she will have a PT/INR checked in 1 month. 7 and patient are practicing coronavirus precautions. I have discussed the diagnosis with the patient and the intended plan as seen in the  orders above. The patient understands and agees with the plan. The patient has   received an after visit summary and questions were answered concerning  future plans  Patient labs and/or xrays were reviewed  Past records were reviewed. PLAN:  .  Orders Placed This Encounter    Depression Screen Annual    AMB POC PT/INR       Follow-up and Dispositions    · Return in about 4 weeks (around 5/14/2020) for pt/inr. Please note that portions of this dictation may have been recorded with voice recognition software. Some unanticipated grammatical, syntax, homophones, and other interpretive errors are inadvertently transcribed by the computer software. An attempt at proof reading has been made to minimize errors and omissions. Please disregard these errors. Thank you. ATTENTION:   This medical record was transcribed using an electronic medical records system. Although proofread, it may and can contain electronic and spelling errors. Other human spelling and other errors may be present. Corrections may be executed at a later time. Please feel free to contact us for any clarifications as needed.

## 2020-04-27 ENCOUNTER — OFFICE VISIT (OUTPATIENT)
Dept: CARDIOLOGY CLINIC | Age: 85
End: 2020-04-27

## 2020-04-27 DIAGNOSIS — Z95.0 CARDIAC PACEMAKER IN SITU: Primary | ICD-10-CM

## 2020-05-20 ENCOUNTER — CLINICAL SUPPORT (OUTPATIENT)
Dept: INTERNAL MEDICINE CLINIC | Age: 85
End: 2020-05-20

## 2020-05-20 VITALS
WEIGHT: 170 LBS | HEIGHT: 63 IN | HEART RATE: 68 BPM | OXYGEN SATURATION: 98 % | DIASTOLIC BLOOD PRESSURE: 68 MMHG | SYSTOLIC BLOOD PRESSURE: 105 MMHG | TEMPERATURE: 98.1 F | BODY MASS INDEX: 30.12 KG/M2

## 2020-05-20 DIAGNOSIS — Z23 ENCOUNTER FOR IMMUNIZATION: ICD-10-CM

## 2020-05-20 DIAGNOSIS — I48.20 CHRONIC ATRIAL FIBRILLATION (HCC): Primary | ICD-10-CM

## 2020-05-20 LAB
INR BLD: 2.1
PT POC: 24.9 SECONDS
VALID INTERNAL CONTROL?: YES

## 2020-05-20 NOTE — PROGRESS NOTES
Tammie Campbell is a 80 y.o. female who presents today for Anticoagulation monitoring. Current dose:  Coumadin 2.5  Mg 1 tab PO every day except on Thursday 1/2 tab PO  Medication Changes:  no  Dietary Changes:  no    Latest INR:  Results for orders placed or performed in visit on 05/20/20   AMB POC PT/INR   Result Value Ref Range    VALID INTERNAL CONTROL POC Yes     Prothrombin time (POC) 24.9 seconds    INR POC 2.1          New Coumadin dose:.current treatment plan is effective, no change in therapy. Next check to be scheduled for  4 weeks.   Per Dr. Gustavo Wang, SYLVIAN

## 2020-06-15 ENCOUNTER — TELEPHONE (OUTPATIENT)
Dept: CARDIOLOGY CLINIC | Age: 85
End: 2020-06-15

## 2020-06-15 NOTE — TELEPHONE ENCOUNTER
Spoke with patients son. Patient has been having INR checks with PCP for the past 2 months. Son reports he will call PCP again to try to schedule next INR check. Last check was 5/20/2020 and result was 2.1.

## 2020-06-15 NOTE — TELEPHONE ENCOUNTER
Patients son would like to know when the patient should have a coumadin check again, please advise.      Phone; [phone]

## 2020-06-17 DIAGNOSIS — I48.0 PAROXYSMAL ATRIAL FIBRILLATION (HCC): ICD-10-CM

## 2020-06-17 RX ORDER — WARFARIN 2.5 MG/1
TABLET ORAL
Qty: 90 TAB | Refills: 0 | Status: SHIPPED | OUTPATIENT
Start: 2020-06-17 | End: 2020-09-28

## 2020-06-17 NOTE — TELEPHONE ENCOUNTER
Requested Prescriptions     Signed Prescriptions Disp Refills    warfarin (COUMADIN) 2.5 mg tablet 90 Tab 0     Sig: TAKE 1 TABLET BY MOUTH EVERY DAY EXCEPT ON THURSDAY TAKE 1/2 TABLET AS DIRECTED     Authorizing Provider: HAY WALTON     Ordering User: Glory Arellano       Last office visit 1/16/2020    Per Dr. Dorota Moralez   Date Time Provider Monika Rhoadesi   8/3/2020 11:45 AM REMOTE1, [address]   11/11/2020 10:30 AM REMOTE1, [address]   2/11/2021 11:00 AM PACEMAKER3, [address]   2/11/2021 11:20 AM Rashawn Drake  [address]

## 2020-06-24 ENCOUNTER — CLINICAL SUPPORT (OUTPATIENT)
Dept: INTERNAL MEDICINE CLINIC | Age: 85
End: 2020-06-24

## 2020-06-24 DIAGNOSIS — I48.20 CHRONIC ATRIAL FIBRILLATION (HCC): Primary | ICD-10-CM

## 2020-06-24 LAB
INR BLD: 2.4
PT POC: 28.3 SECONDS
VALID INTERNAL CONTROL?: YES

## 2020-06-24 NOTE — PROGRESS NOTES
Raymond Vogt is a 80 y.o. female who presents today for Anticoagulation monitoring. Current dose:  Coumadin 2.5 mg 1 tab PO everyday except on Thursday 1/2 tab PO  Medication Changes:  no  Dietary Changes:  no    Latest INR:  Results for orders placed or performed in visit on 06/24/20   AMB POC PT/INR   Result Value Ref Range    VALID INTERNAL CONTROL POC Yes     Prothrombin time (POC) 28.3 seconds    INR POC 2.4          New Coumadin dose:.current treatment plan is effective, no change in therapy. Next check to be scheduled for  4 weeks.   Per Dr. Imani Mendoza, SYLVIAN

## 2020-07-27 ENCOUNTER — CLINICAL SUPPORT (OUTPATIENT)
Dept: INTERNAL MEDICINE CLINIC | Age: 85
End: 2020-07-27

## 2020-07-27 DIAGNOSIS — Z79.01 LONG TERM (CURRENT) USE OF ANTICOAGULANTS: Primary | ICD-10-CM

## 2020-07-27 LAB
INR BLD: 2.6
PT POC: 31.2 SECONDS
VALID INTERNAL CONTROL?: YES

## 2020-08-18 NOTE — PROGRESS NOTES
Patient was in office today for pt/inr check. Patient is taking coumadin 5 mg 1 tab everyday except Thursday 1/2 tab. Patient's pt was 31.2 and INR 2.6. Patient was advised to continue doing what she is doing treatment wise and return in 1 month for pt./inr check.

## 2020-08-27 ENCOUNTER — CLINICAL SUPPORT (OUTPATIENT)
Dept: INTERNAL MEDICINE CLINIC | Age: 85
End: 2020-08-27
Payer: MEDICARE

## 2020-08-27 DIAGNOSIS — I48.20 CHRONIC ATRIAL FIBRILLATION (HCC): Primary | ICD-10-CM

## 2020-08-27 LAB
INR BLD: 2.5
PT POC: 30.1 SECONDS
VALID INTERNAL CONTROL?: YES

## 2020-08-27 PROCEDURE — 85610 PROTHROMBIN TIME: CPT | Performed by: INTERNAL MEDICINE

## 2020-08-27 NOTE — PROGRESS NOTES
Iker Restrepo is a 80 y.o. female who presents today for Anticoagulation monitoring. Current dose:  Coumadin 5 mg 1/2 tab PO daily. Medication Changes:  no  Dietary Changes:  no    Latest INR:  Results for orders placed or performed in visit on 08/27/20   AMB POC PT/INR   Result Value Ref Range    VALID INTERNAL CONTROL POC Yes     Prothrombin time (POC) 30.1 seconds    INR POC 2.5          New Coumadin dose:.current treatment plan is effective, no change in therapy. Next check to be scheduled for  4 weeks.   Per Dr. Constantin Oliveira, SYLVIAN

## 2020-09-17 ENCOUNTER — OFFICE VISIT (OUTPATIENT)
Dept: CARDIOLOGY CLINIC | Age: 85
End: 2020-09-17
Payer: MEDICARE

## 2020-09-17 VITALS
DIASTOLIC BLOOD PRESSURE: 72 MMHG | HEIGHT: 63 IN | BODY MASS INDEX: 29.95 KG/M2 | WEIGHT: 169 LBS | HEART RATE: 66 BPM | SYSTOLIC BLOOD PRESSURE: 122 MMHG

## 2020-09-17 DIAGNOSIS — R60.0 LOWER EXTREMITY EDEMA: ICD-10-CM

## 2020-09-17 DIAGNOSIS — Z79.01 ANTICOAGULATED ON COUMADIN: ICD-10-CM

## 2020-09-17 DIAGNOSIS — R00.2 PALPITATIONS: ICD-10-CM

## 2020-09-17 DIAGNOSIS — I10 ESSENTIAL HYPERTENSION, BENIGN: ICD-10-CM

## 2020-09-17 DIAGNOSIS — Z79.01 LONG TERM (CURRENT) USE OF ANTICOAGULANTS: ICD-10-CM

## 2020-09-17 DIAGNOSIS — I48.0 PAROXYSMAL ATRIAL FIBRILLATION (HCC): ICD-10-CM

## 2020-09-17 DIAGNOSIS — Z95.0 CARDIAC PACEMAKER IN SITU: Primary | ICD-10-CM

## 2020-09-17 DIAGNOSIS — I47.1 PAT (PAROXYSMAL ATRIAL TACHYCARDIA) (HCC): ICD-10-CM

## 2020-09-17 DIAGNOSIS — I87.2 VENOUS INSUFFICIENCY: ICD-10-CM

## 2020-09-17 DIAGNOSIS — I49.5 SINOATRIAL NODE DYSFUNCTION (HCC): ICD-10-CM

## 2020-09-17 PROCEDURE — G0463 HOSPITAL OUTPT CLINIC VISIT: HCPCS | Performed by: INTERNAL MEDICINE

## 2020-09-17 PROCEDURE — 1101F PT FALLS ASSESS-DOCD LE1/YR: CPT | Performed by: INTERNAL MEDICINE

## 2020-09-17 PROCEDURE — 99214 OFFICE O/P EST MOD 30 MIN: CPT | Performed by: INTERNAL MEDICINE

## 2020-09-17 PROCEDURE — G8427 DOCREV CUR MEDS BY ELIG CLIN: HCPCS | Performed by: INTERNAL MEDICINE

## 2020-09-17 PROCEDURE — 1090F PRES/ABSN URINE INCON ASSESS: CPT | Performed by: INTERNAL MEDICINE

## 2020-09-17 PROCEDURE — G8432 DEP SCR NOT DOC, RNG: HCPCS | Performed by: INTERNAL MEDICINE

## 2020-09-17 PROCEDURE — G8417 CALC BMI ABV UP PARAM F/U: HCPCS | Performed by: INTERNAL MEDICINE

## 2020-09-17 PROCEDURE — G8536 NO DOC ELDER MAL SCRN: HCPCS | Performed by: INTERNAL MEDICINE

## 2020-09-17 NOTE — PROGRESS NOTES
Cardiac Electrophysiology Office Note     Subjective:      Milly Washington is a 80 y.o. woman who is here to follow up, is s/p [employer] dual chamber pacemaker (gen change 04/28/2014, leads 04/06/2006). Last device check on 08/03/2020 showed proper lead & generator function. Generator longevity estimated 3.25 years. RA 83%, RV 19%. Since 04/27/2020, 5547 AMS episodes. EGMs showed AT/AF with 6% atrial burden. Ventricular rate has been controlled. She does report intermittent palpitations during the night that awaken her. Usually brief. Denies chest pain, PND, orthopnea, SOB, syncope, or edema. BP well controlled. Anticoagulated with warfarin, denies bleeding issues. INR monitored by Dr. Dayday Giordano. Previous:  Echo (12/26/2018): LVEF 69%, no RWMA, grade 1 diastolic dysfunction. Mild MR. Mild TR. [employer]ael dual chamber pacemaker originally implanted by Dr. Kemar Scanlon 04/06/2006, had gen change in 2014. Previously saw Dr. Lilyan Phalen. He had prescribed Mg for leg spasm.         Patient Active Problem List    Diagnosis Date Noted    Hyperuricemia 04/30/2012     Priority: 1 - One    Gout 04/30/2012     Priority: 2 - Two    Long term (current) use of anticoagulants 11/05/2018    Venous insufficiency     Pacemaker     Encounter for long-term (current) use of high-risk medication 04/07/2014    Paroxysmal atrial fibrillation ([facility] [address] 03/22/2011    Essential hypertension, benign 03/22/2011    Palpitations 03/22/2011    Sinoatrial node dysfunction ([facility] [address] 03/22/2011    Cardiac pacemaker in situ 11/17/2010     Current Outpatient Medications   Medication Sig Dispense Refill    atenoloL (TENORMIN) 100 mg tablet TAKE 1 TABLET BY MOUTH DAILY 90 Tab 11    warfarin (COUMADIN) 2.5 mg tablet TAKE 1 TABLET BY MOUTH EVERY DAY EXCEPT ON THURSDAY TAKE 1/2 TABLET AS DIRECTED 90 Tab 0    simvastatin (ZOCOR) 20 mg tablet TAKE 1 TABLET BY MOUTH ONCE DAILY AT NIGHT 30 Tab 11    amLODIPine (NORVASC) 5 mg tablet TAKE 1 TABLET BY MOUTH EVERY DAY 30 Tab 11    dorzolamide (TRUSOPT) 2 % ophthalmic solution INSTILL 1 DROP IN BOTH EYES BID  2    magnesium 250 mg tab Take 250 mg by mouth. Mon, wed, fri only      latanoprost (XALATAN) 0.005 % ophthalmic solution INT 1 GTT IN OU QD HS  2    levobunolol (BETAGAN) 0.5 % ophthalmic solution INSTILL 1 GTT OU BID  3     No Known Allergies  Past Medical History:   Diagnosis Date    Aortic valve disorders 4/29/2012    Atrial fibrillation ([address] 03/22/2011    on coumadin followed by cardiology - Vaughn Jeter MD    CAD (coronary artery disease)     Cor pulmonale, chronic ([address] 4/29/2012    Dyslipidemia (high LDL; low HDL) 4/29/2012    Gout 4/30/2012    Gout, arthritis 2/8/2012    Gout, joint     Hypertension     Hyperuricemia 4/30/2012    Obstructive sleep apnea 4/29/2012    Pacemaker 04/02/2006    Dr Adeola Best placed the original pacer 4/6/2006. It is replaced in 2014. It is [employer]    Recurrent epistaxis 4/29/2012    Sinoatrial node dysfunction ([facility] [address] 3/22/2011    Venous insufficiency      Past Surgical History:   Procedure Laterality Date    BREAST SURGERY PROCEDURE UNLISTED      lumpectomy    GEN INSERT/REPLACE ONLY DUAL  4/28/2014         HX PACEMAKER         Social History     Tobacco Use    Smoking status: Never Smoker    Smokeless tobacco: Never Used   Substance Use Topics    Alcohol use: No        Review of Systems: All other review of systems otherwise negative. Constitutional: Negative for fever, chills, weight loss, malaise/fatigue. HEENT: Negative for nosebleeds, vision changes. Respiratory: Negative for cough, hemoptysis, sputum production, and wheezing. Cardiovascular: Negative for chest pain, leg swelling, syncope, and PND. Gastrointestinal: Negative for nausea, vomiting, diarrhea, blood in stool and melena. Genitourinary: Negative for dysuria, and hematuria.  + polyuria, urgency  Musculoskeletal: Negative for myalgias, + arthralgia right knee, mild discomfort left wrist with trace swelling. Skin: Negative for rash. Heme: Does not bleed or bruise easily. Neurological: Negative for speech change and focal weakness. Objective:     Visit Vitals  /72   Pulse 66   Ht 5' 3\" (1.6 m)   Wt 169 lb (76.7 kg)   BMI 29.94 kg/m²      Physical Exam:   Constitutional: Well-developed and well-nourished. No distress. Head: Normocephalic and atraumatic. Eyes: Pupils are equal, round  Neck: Supple. No JVD present. No bruits   Cardiovascular: Normal rate, regular rhythm and normal heart sounds. Exam reveals no gallop and no friction rub. No murmur heard. Pulmonary/Chest: Effort normal. No wheezes. Abdominal: Soft, no tenderness. Musculoskeletal: Moves all extremities independently. No bony or soft tissue abnormality noted in left wrist.  Radial pulses 2+ bilaterally. Vasc/lymphatic: No edema. Neurological: Alert, oriented. Skin: Skin is warm and dry. Left sided ppm incision healed. Psychiatric: Normal mood and affect. Judgment and thought content normal.          Assessment/Plan:       ICD-10-CM ICD-9-CM    1. Cardiac pacemaker in situ  Z95.0 V45.01    2. Paroxysmal atrial fibrillation (HCC)  I48.0 427.31    3. Long term (current) use of anticoagulants  Z79.01 V58.61    4. Essential hypertension, benign  I10 401.1    5. Sinoatrial node dysfunction (HCC)  I49.5 427.81    6. PAT (paroxysmal atrial tachycardia) ([facility])  I47.1 427.0       [employer]ael dual chamber pacemaker (gen change 04/28/2014, leads 04/06/2006) check on 08/03/2020 showed proper lead & generator function. Generator longevity estimated 3.25 years. RA 83%, RV 19%. Since 04/27/2020, 5547 AMS episodes. EGMs showed AT/AF with 6% atrial burden. Ventricular rate has been controlled. Intermittent palpitations likely due to PAF/PAT. Rate mainly controlled. At her age, she is high risk for av node ablation.   Discussed addition of digoxin, but she agrees to remain on current medications without changes at this time. Discussed that frequent urination that could be due to overactive bladder, but she declines medication for this. BP well controlled on current regimen. Continue anticoagulation with warfarin, denies bleeding issues. INR monitored by Dr. Marnie Sutton. Remote pacer checks q 3 months. EP clinic follow up as noted below. Future Appointments   Date Time Provider Monika Garcia   11/11/2020 10:30 AM REMOTE1, KAREN WEBBER AMB   2/11/2021 11:00 AM PACEMAKER3, KAREN WEBBER AMB   2/11/2021 11:20 AM MD GUILLERMO Pepe BS AMB       Thank you for involving me in this patient's care and please call with further concerns or questions. Joi Bender M.D.   Electrophysiology/Cardiology  [facility] [place]  [facility] [facility], [address]  [phone]

## 2020-09-23 DIAGNOSIS — I48.0 PAROXYSMAL ATRIAL FIBRILLATION (HCC): ICD-10-CM

## 2020-09-24 ENCOUNTER — OFFICE VISIT (OUTPATIENT)
Dept: INTERNAL MEDICINE CLINIC | Age: 85
End: 2020-09-24
Payer: MEDICARE

## 2020-09-24 ENCOUNTER — CLINICAL SUPPORT (OUTPATIENT)
Dept: INTERNAL MEDICINE CLINIC | Age: 85
End: 2020-09-24

## 2020-09-24 VITALS
RESPIRATION RATE: 16 BRPM | SYSTOLIC BLOOD PRESSURE: 125 MMHG | DIASTOLIC BLOOD PRESSURE: 70 MMHG | BODY MASS INDEX: 29.94 KG/M2 | TEMPERATURE: 97.9 F | HEIGHT: 63 IN | OXYGEN SATURATION: 97 % | HEART RATE: 60 BPM

## 2020-09-24 DIAGNOSIS — Z23 ENCOUNTER FOR IMMUNIZATION: ICD-10-CM

## 2020-09-24 DIAGNOSIS — R82.71 BACTERIURIA: ICD-10-CM

## 2020-09-24 DIAGNOSIS — I48.20 CHRONIC ATRIAL FIBRILLATION (HCC): Primary | ICD-10-CM

## 2020-09-24 DIAGNOSIS — I87.2 VENOUS INSUFFICIENCY: ICD-10-CM

## 2020-09-24 DIAGNOSIS — Z95.0 CARDIAC PACEMAKER IN SITU: ICD-10-CM

## 2020-09-24 DIAGNOSIS — R79.9 ABNORMAL FINDING OF BLOOD CHEMISTRY, UNSPECIFIED: ICD-10-CM

## 2020-09-24 DIAGNOSIS — M1A.9XX0 CHRONIC GOUT WITHOUT TOPHUS, UNSPECIFIED CAUSE, UNSPECIFIED SITE: ICD-10-CM

## 2020-09-24 DIAGNOSIS — I48.0 PAROXYSMAL ATRIAL FIBRILLATION (HCC): ICD-10-CM

## 2020-09-24 DIAGNOSIS — I10 ESSENTIAL HYPERTENSION, BENIGN: ICD-10-CM

## 2020-09-24 PROBLEM — N39.0 URINARY TRACT INFECTION WITHOUT HEMATURIA: Status: ACTIVE | Noted: 2020-09-24

## 2020-09-24 LAB
INR BLD: 2.8
PT POC: 33.3 SECONDS
VALID INTERNAL CONTROL?: YES

## 2020-09-24 PROCEDURE — 99214 OFFICE O/P EST MOD 30 MIN: CPT

## 2020-09-24 PROCEDURE — 90653 IIV ADJUVANT VACCINE IM: CPT

## 2020-09-24 PROCEDURE — G8427 DOCREV CUR MEDS BY ELIG CLIN: HCPCS

## 2020-09-24 PROCEDURE — 1101F PT FALLS ASSESS-DOCD LE1/YR: CPT

## 2020-09-24 PROCEDURE — G8510 SCR DEP NEG, NO PLAN REQD: HCPCS

## 2020-09-24 PROCEDURE — 90000 COLLECTION VENOUS BLOOD,VENIPUNCTURE: CPT

## 2020-09-24 PROCEDURE — G8417 CALC BMI ABV UP PARAM F/U: HCPCS

## 2020-09-24 PROCEDURE — 85610 PROTHROMBIN TIME: CPT

## 2020-09-24 PROCEDURE — 1090F PRES/ABSN URINE INCON ASSESS: CPT

## 2020-09-24 PROCEDURE — G0008 ADMIN INFLUENZA VIRUS VAC: HCPCS

## 2020-09-24 PROCEDURE — G8536 NO DOC ELDER MAL SCRN: HCPCS

## 2020-09-24 NOTE — PROGRESS NOTES
SPORTS MEDICINE AND PRIMARY CARE  Kyle Shrestha MD, [address] [address]  Phone:  [phone]  Fax: [phone]       Chief Complaint   Patient presents with    Hypertension   . SUBJECTIVE:    Crystal Mckinney is a 80 y.o. female Patient wonders if she can have anything to boost her immune system. They are eating a heart healthy diet and I encouraged her to continue to do that. She has a known history of gout, venous insufficiency, paroxysmal a-fib, primary hypertension and just saw Dr. Navid Alonso the other day for her pacemaker, and apparently got a good report. She complains of urinary frequency. Denies other complaints and is seen for evaluation with her son. Current Outpatient Medications   Medication Sig Dispense Refill    atenoloL (TENORMIN) 100 mg tablet TAKE 1 TABLET BY MOUTH DAILY 90 Tab 11    warfarin (COUMADIN) 2.5 mg tablet TAKE 1 TABLET BY MOUTH EVERY DAY EXCEPT ON THURSDAY TAKE 1/2 TABLET AS DIRECTED 90 Tab 0    simvastatin (ZOCOR) 20 mg tablet TAKE 1 TABLET BY MOUTH ONCE DAILY AT NIGHT 30 Tab 11    amLODIPine (NORVASC) 5 mg tablet TAKE 1 TABLET BY MOUTH EVERY DAY 30 Tab 11    dorzolamide (TRUSOPT) 2 % ophthalmic solution INSTILL 1 DROP IN BOTH EYES BID  2    magnesium 250 mg tab Take 250 mg by mouth.  Mon, wed, fri only      latanoprost (XALATAN) 0.005 % ophthalmic solution INT 1 GTT IN OU QD HS  2    levobunolol (BETAGAN) 0.5 % ophthalmic solution INSTILL 1 GTT OU BID  3     Past Medical History:   Diagnosis Date    Aortic valve disorders 4/29/2012    Atrial fibrillation ([facility] 03/22/2011    on coumadin followed by cardiology - Vaughn Jeter MD    CAD (coronary artery disease)     Cor pulmonale, chronic ([address] 4/29/2012    Dyslipidemia (high LDL; low HDL) 4/29/2012    Gout 4/30/2012    Gout, arthritis 2/8/2012    Gout, joint     Hypertension     Hyperuricemia 4/30/2012    Obstructive sleep apnea 4/29/2012    Pacemaker 04/02/2006     Kin Smith placed the original pacer 2006. It is replaced in . It is [employer]    Recurrent epistaxis 2012    Sinoatrial node dysfunction ([address] 3/22/2011    Venous insufficiency      Past Surgical History:   Procedure Laterality Date    BREAST SURGERY PROCEDURE UNLISTED      lumpectomy    GEN INSERT/REPLACE ONLY DUAL  2014         HX PACEMAKER       No Known Allergies      REVIEW OF SYSTEMS:  General: negative for - chills or fever  ENT: negative for - headaches, nasal congestion or tinnitus  Respiratory: negative for - cough, hemoptysis, shortness of breath or wheezing  Cardiovascular : negative for - chest pain, edema, palpitations or shortness of breath  Gastrointestinal: negative for - abdominal pain, blood in stools, heartburn or nausea/vomiting  Genito-Urinary: no dysuria, trouble voiding, or hematuria  Musculoskeletal: negative for - gait disturbance, joint pain, joint stiffness or joint swelling  Neurological: no TIA or stroke symptoms  Hematologic: no bruises, no bleeding, no swollen glands  Integument: no lumps, mole changes, nail changes or rash  Endocrine: no malaise/lethargy or unexpected weight changes      Social History     Socioeconomic History    Marital status:      Spouse name: Not on file    Number of children: Not on file    Years of education: Not on file    Highest education level: Not on file   Tobacco Use    Smoking status: Never Smoker    Smokeless tobacco: Never Used   Substance and Sexual Activity    Alcohol use: No    Drug use: No    Sexual activity: Not Currently   Social History Narrative    Habits:  She is a lifetime nonsmoker, non drinker, non drug abuser. Social History:  The patient is . She lives with her son. 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 She is a member of [employer]. Family History:  Father and mother , unknown ages and causes.  Two sisters  of unknown caus     History reviewed. No pertinent family history. OBJECTIVE:    Visit Vitals  /70   Pulse 60   Temp 97.9 °F (36.6 °C) (Oral)   Resp 16   Ht 5' 3\" (1.6 m)   SpO2 97%   BMI 29.94 kg/m²     CONSTITUTIONAL: well , well nourished, appears age appropriate  EYES: perrla, eom intact  ENMT:moist mucous membranes, pharynx clear  NECK: supple. Thyroid normal  RESPIRATORY: Chest: clear bilaterally   CARDIOVASCULAR: Heart: regular rate and rhythm  GASTROINTESTINAL: Abdomen: soft, bowel sounds active  HEMATOLOGIC: no pathological lymph nodes palpated  MUSCULOSKELETAL: Extremities: no edema, pulse 1+   INTEGUMENT: No unusual rashes or suspicious skin lesions noted. Nails appear normal.  NEUROLOGIC: non-focal exam   MENTAL STATUS: alert and oriented, appropriate affect           ASSESSMENT:  1. Chronic atrial fibrillation ([address]    2. Encounter for immunization    3. Essential hypertension, benign    4. Paroxysmal atrial fibrillation (HCC)    5. Venous insufficiency    6. Chronic gout without tophus, unspecified cause, unspecified site    7. Cardiac pacemaker in situ    8. Abnormal finding of blood chemistry, unspecified     9. Bacteriuria       Patient remains in chronic atrial fibrillation, on Coumadin, with INR therapeutic. Ideally I would like to see the INR between 1.8 and 2.2. It is usually in the low 2 range. We suggest increased greens. She will be back every two months during this coronavirus for P-T checks since she has been stable for several months and we have not made any changes in the Coumadin. After much discussion, she agrees to get the flu shot today. She was concerned that there was mercury in the vaccine. Unknown history of primary hypertension, for which BP is well into goal range. She has chronic venous insufficiency, but it is aggravated in the summertime and is currently quiescent. No recent gouty attacks.     As noted above, since we last saw her, she saw Dr. Sachin Damian, who noted that the intermittent palpitations were likely due to PAF/PAT, and she was mainly rate controlled. She was at her age at higher risk for miranda ablation and he discussed the addition of Digoxin, but she was not particularly interested. She complains of urinary frequency, which I suspect is related to neurogenic bladder, however we will check a UA and urine culture to rule out UTI. She does have some pelvic discomfort, which would suggest that maybe she does have a UTI. We did not put her on medication today, we will wait for the culture results, with which her and her son agreed. She will be back in two months for P-T check, four months to see me. I have discussed the diagnosis with the patient and the intended plan as seen in the  Orders. The patient understands and agees with the plan. The patient has   received an after visit summary and questions were answered concerning  future plans  Patient labs and/or xrays were reviewed  Past records were reviewed. PLAN:  .  Orders Placed This Encounter    CULTURE, URINE    INFLUENZA VIRUS VACCINE, HIGH DOSE SEASONAL, PRESERVATIVE FREE    URINALYSIS W/ RFLX MICROSCOPIC    CBC WITH AUTOMATED DIFF    METABOLIC PANEL, COMPREHENSIVE    LIPID PANEL    TSH 3RD GENERATION    HEMOGLOBIN A1C WITH EAG    AMB POC PT/INR       Follow-up and Dispositions    · Return in about 2 months (around 11/24/2020) for pt/inr. ATTENTION:   This medical record was transcribed using an electronic medical records system. Although proofread, it may and can contain electronic and spelling errors. Other human spelling and other errors may be present. Corrections may be executed at a later time. Please feel free to contact us for any clarifications as needed.

## 2020-09-24 NOTE — PROGRESS NOTES
1. Have you been to the ER, urgent care clinic since your last visit? Hospitalized since your last visit? No    2. Have you seen or consulted any other health care providers outside of the [address] [place] [address] since your last visit? Include any pap smears or colon screening.  No     Frequent urination  mamogram

## 2020-09-26 LAB
ALBUMIN SERPL-MCNC: 4.3 G/DL (ref 3.5–4.6)
ALBUMIN/GLOB SERPL: 1.5 {RATIO} (ref 1.2–2.2)
ALP SERPL-CCNC: 77 IU/L (ref 39–117)
ALT SERPL-CCNC: 13 IU/L (ref 0–32)
APPEARANCE UR: CLEAR
AST SERPL-CCNC: 18 IU/L (ref 0–40)
BACTERIA UR CULT: NORMAL
BASOPHILS # BLD AUTO: 0 X10E3/UL (ref 0–0.2)
BASOPHILS NFR BLD AUTO: 1 %
BILIRUB SERPL-MCNC: 0.5 MG/DL (ref 0–1.2)
BILIRUB UR QL STRIP: NEGATIVE
BUN SERPL-MCNC: 13 MG/DL (ref 10–36)
BUN/CREAT SERPL: 15 (ref 12–28)
CALCIUM SERPL-MCNC: 9.6 MG/DL (ref 8.7–10.3)
CHLORIDE SERPL-SCNC: 101 MMOL/L (ref 96–106)
CHOLEST SERPL-MCNC: 143 MG/DL (ref 100–199)
CO2 SERPL-SCNC: 25 MMOL/L (ref 20–29)
COLOR UR: YELLOW
CREAT SERPL-MCNC: 0.88 MG/DL (ref 0.57–1)
EOSINOPHIL # BLD AUTO: 0.1 X10E3/UL (ref 0–0.4)
EOSINOPHIL NFR BLD AUTO: 2 %
ERYTHROCYTE [DISTWIDTH] IN BLOOD BY AUTOMATED COUNT: 15.8 % (ref 11.7–15.4)
EST. AVERAGE GLUCOSE BLD GHB EST-MCNC: 126 MG/DL
GLOBULIN SER CALC-MCNC: 2.9 G/DL (ref 1.5–4.5)
GLUCOSE SERPL-MCNC: 85 MG/DL (ref 65–99)
GLUCOSE UR QL: NEGATIVE
HBA1C MFR BLD: 6 % (ref 4.8–5.6)
HCT VFR BLD AUTO: 39 % (ref 34–46.6)
HDLC SERPL-MCNC: 70 MG/DL
HGB BLD-MCNC: 12.8 G/DL (ref 11.1–15.9)
HGB UR QL STRIP: NEGATIVE
IMM GRANULOCYTES # BLD AUTO: 0 X10E3/UL (ref 0–0.1)
IMM GRANULOCYTES NFR BLD AUTO: 0 %
KETONES UR QL STRIP: NEGATIVE
LDLC SERPL CALC-MCNC: 57 MG/DL (ref 0–99)
LEUKOCYTE ESTERASE UR QL STRIP: NEGATIVE
LYMPHOCYTES # BLD AUTO: 1.8 X10E3/UL (ref 0.7–3.1)
LYMPHOCYTES NFR BLD AUTO: 30 %
MCH RBC QN AUTO: 28.1 PG (ref 26.6–33)
MCHC RBC AUTO-ENTMCNC: 32.8 G/DL (ref 31.5–35.7)
MCV RBC AUTO: 86 FL (ref 79–97)
MICRO URNS: NORMAL
MONOCYTES # BLD AUTO: 0.6 X10E3/UL (ref 0.1–0.9)
MONOCYTES NFR BLD AUTO: 11 %
NEUTROPHILS # BLD AUTO: 3.5 X10E3/UL (ref 1.4–7)
NEUTROPHILS NFR BLD AUTO: 56 %
NITRITE UR QL STRIP: NEGATIVE
PH UR STRIP: 7.5 [PH] (ref 5–7.5)
PLATELET # BLD AUTO: 192 X10E3/UL (ref 150–450)
POTASSIUM SERPL-SCNC: 3.9 MMOL/L (ref 3.5–5.2)
PROT SERPL-MCNC: 7.2 G/DL (ref 6–8.5)
PROT UR QL STRIP: NEGATIVE
RBC # BLD AUTO: 4.55 X10E6/UL (ref 3.77–5.28)
SODIUM SERPL-SCNC: 140 MMOL/L (ref 134–144)
SP GR UR: 1.01 (ref 1–1.03)
TRIGL SERPL-MCNC: 83 MG/DL (ref 0–149)
TSH SERPL DL<=0.005 MIU/L-ACNC: 1.24 UIU/ML (ref 0.45–4.5)
UROBILINOGEN UR STRIP-MCNC: 0.2 MG/DL (ref 0.2–1)
VLDLC SERPL CALC-MCNC: 16 MG/DL (ref 5–40)
WBC # BLD AUTO: 6.1 X10E3/UL (ref 3.4–10.8)

## 2020-09-28 RX ORDER — WARFARIN 2.5 MG/1
TABLET ORAL
Qty: 90 TAB | Refills: 0 | Status: SHIPPED | OUTPATIENT
Start: 2020-09-28 | End: 2021-01-04

## 2020-09-28 NOTE — TELEPHONE ENCOUNTER
Received refill request for warfarin 2.5 mg tabs. Refill authorized.     Future Appointments   Date Time Provider Monika Garcia   11/11/2020 10:30 AM REMOTE1, KAREN LI BS AMB   11/24/2020 10:45 AM NURSE [facility] ERMA MORA BS AMB   2/11/2021 11:00 AM PACEMAKER3, KAREN WEBBER AMB   2/11/2021 11:20 AM MD GUILLERMO Bales BS AMB

## 2020-11-11 ENCOUNTER — OFFICE VISIT (OUTPATIENT)
Dept: CARDIOLOGY CLINIC | Age: 85
End: 2020-11-11
Payer: MEDICARE

## 2020-11-11 DIAGNOSIS — Z95.0 CARDIAC PACEMAKER IN SITU: Primary | ICD-10-CM

## 2020-11-11 PROCEDURE — 93294 REM INTERROG EVL PM/LDLS PM: CPT | Performed by: INTERNAL MEDICINE

## 2020-11-11 PROCEDURE — 93296 REM INTERROG EVL PM/IDS: CPT | Performed by: INTERNAL MEDICINE

## 2020-11-24 ENCOUNTER — CLINICAL SUPPORT (OUTPATIENT)
Dept: INTERNAL MEDICINE CLINIC | Age: 85
End: 2020-11-24
Payer: MEDICARE

## 2020-11-24 DIAGNOSIS — I48.20 CHRONIC ATRIAL FIBRILLATION (HCC): Primary | ICD-10-CM

## 2020-11-24 LAB
INR BLD: 2.9
PT POC: 34.3 SECONDS
VALID INTERNAL CONTROL?: YES

## 2020-11-24 PROCEDURE — 85610 PROTHROMBIN TIME: CPT | Performed by: INTERNAL MEDICINE

## 2020-11-24 PROCEDURE — 93793 ANTICOAG MGMT PT WARFARIN: CPT | Performed by: INTERNAL MEDICINE

## 2020-11-24 NOTE — PROGRESS NOTES
Michelle Courser is a 80 y.o. female who presents today for Anticoagulation monitoring. Current dose:  Coumadin 5 mg 1 tab PO everyday except on Thursday 1/2 tab PO  Medication Changes:  no  Dietary Changes:  no    Latest INR:  Results for orders placed or performed in visit on 11/24/20   AMB POC PT/INR   Result Value Ref Range    VALID INTERNAL CONTROL POC Yes     Prothrombin time (POC) 34.3 seconds    INR POC 2.9          New Coumadin dose:.current treatment plan is effective, no change in therapy. Next check to be scheduled for  4 weeks.   Per Donna Choi LPN

## 2020-12-15 RX ORDER — AMLODIPINE BESYLATE 5 MG/1
5 TABLET ORAL DAILY
Qty: 30 TAB | Refills: 2 | Status: SHIPPED | OUTPATIENT
Start: 2020-12-15 | End: 2021-05-14

## 2020-12-21 ENCOUNTER — ANTI-COAG VISIT (OUTPATIENT)
Dept: CARDIOLOGY CLINIC | Age: 85
End: 2020-12-21
Payer: MEDICARE

## 2020-12-21 DIAGNOSIS — Z79.01 LONG TERM (CURRENT) USE OF ANTICOAGULANTS: ICD-10-CM

## 2020-12-21 DIAGNOSIS — I48.0 PAROXYSMAL ATRIAL FIBRILLATION (HCC): ICD-10-CM

## 2020-12-21 LAB
INR BLD: 2.3
PT POC: 27.6 SECONDS
VALID INTERNAL CONTROL?: YES

## 2020-12-21 PROCEDURE — 85610 PROTHROMBIN TIME: CPT | Performed by: INTERNAL MEDICINE

## 2020-12-21 NOTE — PATIENT INSTRUCTIONS
A full discussion of the nature of anticoagulants has been carried out. A benefit risk analysis has been presented to the patient, so that they understand the justification for choosing anticoagulation at this time. The need for frequent and regular monitoring, precise dosage adjustment and compliance is stressed. Side effects of potential bleeding are discussed. The patient should avoid any OTC items containing aspirin or ibuprofen, and should avoid great swings in general diet. Avoid alcohol consumption. Call if any signs of abnormal bleeding. Next PT/INR test in 1 month. Patient's son requested that patient resume INR checks at our office due to convenience. No change in diet or medications. No change in dosing.

## 2020-12-31 ENCOUNTER — TELEPHONE (OUTPATIENT)
Dept: CARDIOLOGY CLINIC | Age: 85
End: 2020-12-31

## 2020-12-31 NOTE — TELEPHONE ENCOUNTER
Called pt son Mr. Robins two patient identifiers confirmed. Notified Mr Teddy Gleason that I received a call from Dr. Petty Davis office wanting us to reach out to him. Mr Teddy Gleason was not sure why That office called him and will not answer the phone if they call again. Mr Teddy Gleason verbalized understanding of information discussed w/ no further questions at this time.

## 2020-12-31 NOTE — TELEPHONE ENCOUNTER
Guera from [facility] is requesting a call back. She states Dr. Kike Recinos used to be patient's cardiologist. They are still getting refill request bit because patient has not been seen since 2018 they can't fill them. Guera reached out to patient's son to try and schedule a follow up.  She states patient's son was very confused and did know why patient needs to see a cardiologist. She would like to discuss this further with Dr. Collin Treviño team. Please reach back out at [phone]

## 2021-01-01 DIAGNOSIS — I48.0 PAROXYSMAL ATRIAL FIBRILLATION (HCC): ICD-10-CM

## 2021-01-04 RX ORDER — WARFARIN 2.5 MG/1
TABLET ORAL
Qty: 90 TAB | Refills: 0 | Status: SHIPPED | OUTPATIENT
Start: 2021-01-04 | End: 2021-04-19

## 2021-01-04 NOTE — TELEPHONE ENCOUNTER
Received refill request for warfarin 2.5 mg tabs. Refill authorized.     Future Appointments   Date Time Provider Monika Garcia   1/18/2021 11:40 AM COUMADIN, KAREN WEBBER AMB   2/11/2021 11:00 AM PACEMAKER3, KAREN WEBBER AMB   2/11/2021 11:20 AM MD GUILLERMO Kim AMB

## 2021-01-18 ENCOUNTER — ANTI-COAG VISIT (OUTPATIENT)
Dept: CARDIOLOGY CLINIC | Age: 86
End: 2021-01-18
Payer: MEDICARE

## 2021-01-18 DIAGNOSIS — I48.0 PAROXYSMAL ATRIAL FIBRILLATION (HCC): ICD-10-CM

## 2021-01-18 DIAGNOSIS — Z79.01 LONG TERM (CURRENT) USE OF ANTICOAGULANTS: ICD-10-CM

## 2021-01-18 LAB
INR BLD: 2.6
PT POC: NORMAL
VALID INTERNAL CONTROL?: YES

## 2021-01-18 PROCEDURE — 85610 PROTHROMBIN TIME: CPT | Performed by: INTERNAL MEDICINE

## 2021-01-18 NOTE — PROGRESS NOTES
A full discussion of the nature of anticoagulants has been carried out. A benefit risk analysis has been presented to the patient, so that they understand the justification for choosing anticoagulation at this time. The need for frequent and regular monitoring, precise dosage adjustment and compliance is stressed. Side effects of potential bleeding are discussed. The patient should avoid any OTC items containing aspirin or ibuprofen, and should avoid great swings in general diet. Avoid alcohol consumption. Call if any signs of abnormal bleeding. Next PT/INR test in 1 month. Pt dose was unchanged. Anticoagulation Summary  As of 2021    INR goal:  2.0-3.0   TTR:  92.3 % (3 y)   INR used for dosin.6 (2021)   Warfarin maintenance plan:  1.25 mg (2.5 mg x 0.5) every Thu; 2.5 mg (2.5 mg x 1) all other days   Weekly warfarin total:  16.25 mg   Plan last modified:  Leni Reyes RN (2021)   Next INR check:  2021   Target end date:   Indefinite    Indications    Paroxysmal atrial fibrillation ([facility] [address] [I48.0]  Long term (current) use of anticoagulants [Z79.01]             Anticoagulation Episode Summary     INR check location:      Preferred lab:      Send INR reminders to:      Comments:        Anticoagulation Care Providers     Provider Role Specialty Phone number    Alanis Osuna MD [facility] [phone]          Future Appointments   Date Time Provider Monika Garcia   2021 11:00 AM [email or web address] AMB   2021 11:20 AM MD GUILLERMO Eason AMB   2021 11:40 AM COUMADINKAREN AMB

## 2021-02-11 ENCOUNTER — CLINICAL SUPPORT (OUTPATIENT)
Dept: CARDIOLOGY CLINIC | Age: 86
End: 2021-02-11
Payer: MEDICARE

## 2021-02-11 ENCOUNTER — OFFICE VISIT (OUTPATIENT)
Dept: CARDIOLOGY CLINIC | Age: 86
End: 2021-02-11
Payer: MEDICARE

## 2021-02-11 ENCOUNTER — ANTI-COAG VISIT (OUTPATIENT)
Dept: CARDIOLOGY CLINIC | Age: 86
End: 2021-02-11
Payer: MEDICARE

## 2021-02-11 VITALS
HEART RATE: 66 BPM | RESPIRATION RATE: 18 BRPM | HEIGHT: 63 IN | OXYGEN SATURATION: 100 % | BODY MASS INDEX: 30.16 KG/M2 | SYSTOLIC BLOOD PRESSURE: 120 MMHG | DIASTOLIC BLOOD PRESSURE: 60 MMHG | WEIGHT: 170.2 LBS

## 2021-02-11 DIAGNOSIS — Z79.01 LONG TERM (CURRENT) USE OF ANTICOAGULANTS: ICD-10-CM

## 2021-02-11 DIAGNOSIS — I47.1 PAT (PAROXYSMAL ATRIAL TACHYCARDIA) (HCC): ICD-10-CM

## 2021-02-11 DIAGNOSIS — Z95.0 CARDIAC PACEMAKER IN SITU: Primary | ICD-10-CM

## 2021-02-11 DIAGNOSIS — Z79.01 ANTICOAGULATED ON COUMADIN: ICD-10-CM

## 2021-02-11 DIAGNOSIS — I48.0 PAROXYSMAL ATRIAL FIBRILLATION (HCC): ICD-10-CM

## 2021-02-11 DIAGNOSIS — I49.5 SINOATRIAL NODE DYSFUNCTION (HCC): ICD-10-CM

## 2021-02-11 DIAGNOSIS — I10 ESSENTIAL HYPERTENSION, BENIGN: ICD-10-CM

## 2021-02-11 LAB
INR BLD: 2.6
PT POC: NORMAL
VALID INTERNAL CONTROL?: YES

## 2021-02-11 PROCEDURE — 1090F PRES/ABSN URINE INCON ASSESS: CPT | Performed by: INTERNAL MEDICINE

## 2021-02-11 PROCEDURE — 85610 PROTHROMBIN TIME: CPT | Performed by: INTERNAL MEDICINE

## 2021-02-11 PROCEDURE — 1101F PT FALLS ASSESS-DOCD LE1/YR: CPT | Performed by: INTERNAL MEDICINE

## 2021-02-11 PROCEDURE — 99214 OFFICE O/P EST MOD 30 MIN: CPT | Performed by: INTERNAL MEDICINE

## 2021-02-11 PROCEDURE — G8417 CALC BMI ABV UP PARAM F/U: HCPCS | Performed by: INTERNAL MEDICINE

## 2021-02-11 PROCEDURE — 93280 PM DEVICE PROGR EVAL DUAL: CPT | Performed by: INTERNAL MEDICINE

## 2021-02-11 PROCEDURE — G8536 NO DOC ELDER MAL SCRN: HCPCS | Performed by: INTERNAL MEDICINE

## 2021-02-11 PROCEDURE — G8510 SCR DEP NEG, NO PLAN REQD: HCPCS | Performed by: INTERNAL MEDICINE

## 2021-02-11 PROCEDURE — G0463 HOSPITAL OUTPT CLINIC VISIT: HCPCS | Performed by: INTERNAL MEDICINE

## 2021-02-11 PROCEDURE — G8427 DOCREV CUR MEDS BY ELIG CLIN: HCPCS | Performed by: INTERNAL MEDICINE

## 2021-02-11 NOTE — PROGRESS NOTES
Cardiac Electrophysiology Office Note     Subjective:      Natanael Slaughter is a 80 y.o. woman who is here to follow up, is s/p [employer] dual chamber pacemaker (gen change 04/28/2014, leads 04/06/2006). She is here with her son     Occasional brief palpitations that awaken her at night. She is active and no falls even at age 80    Denies chest pain, PND, orthopnea, SOB, syncope, or edema. Anticoagulated with warfarin, denies bleeding issues. She denies falls. INR monitored her at [facility]. Previous:  [employer] dual chamber pacemaker originally implanted by Dr. Dennis Wing 04/06/2006, had gen change in 2014. Previously saw Dr. Aye Voss. He had prescribed Mg for leg spasm. Patient Active Problem List    Diagnosis Date Noted    Hyperuricemia 04/30/2012     Priority: 1 - One    Gout 04/30/2012     Priority: 2 - Two    Urinary tract infection without hematuria 09/24/2020    Long term (current) use of anticoagulants 11/05/2018    Venous insufficiency     Pacemaker     Encounter for long-term (current) use of high-risk medication 04/07/2014    Paroxysmal atrial fibrillation ([facility] [address] 03/22/2011    Essential hypertension, benign 03/22/2011    Palpitations 03/22/2011    [facility] 03/22/2011    Cardiac pacemaker in situ 11/17/2010     Current Outpatient Medications   Medication Sig Dispense Refill    warfarin (COUMADIN) 2.5 mg tablet TAKE 1 TABLET BY MOUTH DAILY EXCEPT ON TAKE ONE-HALF OF A TABLET ON THURSDAYS AS DIRECTED 90 Tab 0    amLODIPine (NORVASC) 5 mg tablet Take 1 Tab by mouth daily. 30 Tab 2    atenoloL (TENORMIN) 100 mg tablet TAKE 1 TABLET BY MOUTH DAILY 90 Tab 11    simvastatin (ZOCOR) 20 mg tablet TAKE 1 TABLET BY MOUTH ONCE DAILY AT NIGHT 30 Tab 11    dorzolamide (TRUSOPT) 2 % ophthalmic solution INSTILL 1 DROP IN BOTH EYES BID  2    magnesium 250 mg tab Take 250 mg by mouth.  Mon, wed, fri only      latanoprost (XALATAN) 0.005 % ophthalmic solution INT 1 GTT IN OU QD HS  2    levobunolol (BETAGAN) 0.5 % ophthalmic solution INSTILL 1 GTT OU BID  3     No Known Allergies  Past Medical History:   Diagnosis Date    Aortic valve disorders 4/29/2012    Atrial fibrillation ([facility] [address] 03/22/2011    on coumadin followed by cardiology - Elise Jackson MD    CAD (coronary artery disease)     Cor pulmonale, chronic ([address] 4/29/2012    Dyslipidemia (high LDL; low HDL) 4/29/2012    Gout 4/30/2012    Gout, arthritis 2/8/2012    Gout, joint     Hypertension     Hyperuricemia 4/30/2012    Obstructive sleep apnea 4/29/2012    Pacemaker 04/02/2006    Dr Leyla Bautista placed the original pacer 4/6/2006. It is replaced in 2014. It is [employer]    Recurrent epistaxis 4/29/2012    Sinoatrial node dysfunction ([facility] [address] 3/22/2011    Venous insufficiency      Past Surgical History:   Procedure Laterality Date    GEN INSERT/REPLACE ONLY DUAL  4/28/2014         HX PACEMAKER      TN BREAST SURGERY PROCEDURE UNLISTED      lumpectomy       Social History     Tobacco Use    Smoking status: Never Smoker    Smokeless tobacco: Never Used   Substance Use Topics    Alcohol use: No        Review of Systems: All other review of systems otherwise negative. Constitutional: Negative for fever, chills, weight loss, malaise/fatigue. HEENT: Negative for nosebleeds, vision changes. Respiratory: Negative for cough, hemoptysis, sputum production, and wheezing. Cardiovascular: Negative for chest pain, leg swelling, syncope, and PND. + occasional brief palpitations. Leg edema  Gastrointestinal: Negative for nausea, vomiting, diarrhea, blood in stool and melena. Genitourinary: Negative for dysuria, and hematuria. + polyuria, urgency  Musculoskeletal: Negative for myalgias, + arthralgia right knee, mild discomfort left wrist with trace swelling. Skin: Negative for rash. Heme: Does not bleed but bruise when hitting some thing  Neurological: Negative for speech change and focal weakness. Objective:     Visit Vitals  /60 (BP 1 Location: Left upper arm, BP Patient Position: Sitting, BP Cuff Size: Adult)   Pulse 66   Resp 18   Ht 5' 3\" (1.6 m)   Wt 170 lb 3.2 oz (77.2 kg)   SpO2 100%   BMI 30.15 kg/m²      Physical Exam:   Constitutional: Well-developed and well-nourished. No distress. Head: Normocephalic and atraumatic. Eyes: Pupils are equal, round  Neck: Supple. No JVD present. No bruits   Cardiovascular: Normal rate, regular rhythm and normal heart sounds. Exam reveals no gallop and no friction rub. No murmur heard. Pulmonary/Chest: Effort normal. No wheezes. Abdominal: Soft, no tenderness. Musculoskeletal: Moves all extremities independently. Ambulates with cane. Vasc/lymphatic: 1+ leg edema. she wears compression stockings  Neurological: Alert, oriented. Skin: Skin is warm and dry. Left sided ppm incision healed. Psychiatric: Normal mood and affect. Judgment and thought content normal.        Imaging/Studies:  Echo (12/26/2018): LVEF 69%, no RWMA, grade 1 diastolic dysfunction. Mild MR. Mild TR. Assessment/Plan:       ICD-10-CM ICD-9-CM    1. Cardiac pacemaker in situ  Z95.0 V45.01    2. Paroxysmal atrial fibrillation (HCC)  I48.0 427.31    3. Long term (current) use of anticoagulants  Z79.01 V58.61    4. Essential hypertension, benign  I10 401.1    5. Sinoatrial node dysfunction (HCC)  I49.5 427.81    6. PAT (paroxysmal atrial tachycardia) (HCC)  I47.1 427.0    7. Anticoagulated on Coumadin  Z79.01 V58.61         [employer] dual chamber pacemaker (gen change 04/28/2014, leads 04/06/2006):  Device check today shows proper lead & generator function. Generator longevity estimated 5.25 yrs. RA 81%, RV 16%. Since 11/11/2020, 1051 AMS episodes noted. EGMs show AF/AFL, longest 1 hr 50 minutes on 01/23/2021. AT/AF burden 3.5%. She feels palpitations at times    PAF/PAT: [place] 3.5%, ventricular rate controlled. Likely responsible for intermittent palpitations.   No significant RVR to consider or recommend  AV node ablation for now. Continue current medications as previously prescribed. She is on high dose atenolol already  Refill when needed    HTN:  BP controlled. Continue current regimen including norvasc but expect leg edema    Anticoagulation:  Continue warfarin for embolic CVA prophylaxis. INR monitored here at [facility]. No falls or bleeding issues. Venous insufficiency: more with norvasc but ok to use compression stockings    Remote pacer checks q 3 months. EP clinic follow up in 1 year. Future Appointments   Date Time Provider Monika Garcia   3/11/2021 10:20 AM COUMADIN, KAREN LI BS AMB   6/9/2021  2:45 PM REMOTE1, KAREN LI BS AMB   9/13/2021  1:45 PM REMOTE1, KAREN LI BS AMB   12/13/2021  9:15 AM REMOTE1, KAREN LI BS AMB   3/15/2022 11:20 AM PACEMAKER3, KAREN LI BS AMB   3/15/2022 11:40 AM MD GUILLERMO Hennessy BS AMB       Thank you for involving me in this patient's care and please call with further concerns or questions. Luisa Fung M.D.   Electrophysiology/Cardiology  [facility] [place]  [facility] , [facility] [address]  [facility], [facility] [address]                             [address] [place] [address]  [phone]

## 2021-02-11 NOTE — PROGRESS NOTES
A full discussion of the nature of anticoagulants has been carried out. A benefit risk analysis has been presented to the patient, so that they understand the justification for choosing anticoagulation at this time. The need for frequent and regular monitoring, precise dosage adjustment and compliance is stressed. Side effects of potential bleeding are discussed. The patient should avoid any OTC items containing aspirin or ibuprofen, and should avoid great swings in general diet. Avoid alcohol consumption. Call if any signs of abnormal bleeding. Next PT/INR test in 1 month. Pt dose was unchanged. Anticoagulation Summary  As of 2021    INR goal:  2.0-3.0   TTR:  92.4 % (3.1 y)   INR used for dosin.6 (2021)   Warfarin maintenance plan:  1.25 mg (2.5 mg x 0.5) every Thu; 2.5 mg (2.5 mg x 1) all other days   Weekly warfarin total:  16.25 mg   No change documented:  Liza Hobbs RN   Plan last modified:  Liza Hobbs RN (2021)   Next INR check:  3/11/2021   Target end date:   Indefinite    Indications    Paroxysmal atrial fibrillation ([facility] [address] [I48.0]  Long term (current) use of anticoagulants [Z79.01]             Anticoagulation Episode Summary     INR check location:      Preferred lab:      Send INR reminders to:      Comments:        Anticoagulation Care Providers     Provider Role Specialty Phone number    Florencia Brasher MD [facility] Card[facility] [phone]          Future Appointments   Date Time Provider Monika Garcia   3/11/2021 10:20 AM COUMADINKAREN BS AMB   2021  2:45 PM REMOTE1, KAREN WEBBER AMB   2021  1:45 PM REMOTE1, KAREN WEBBER AMB   2021  9:15 AM REMOTE1, KAREN WEBBER AMB   3/15/2022 11:20 AM PACEMAKER3, KAREN WEBBER AMB   3/15/2022 11:40 AM MD GUILLERMO Jara BS AMB

## 2021-03-01 ENCOUNTER — TELEPHONE (OUTPATIENT)
Dept: CARDIOLOGY CLINIC | Age: 86
End: 2021-03-01

## 2021-03-01 NOTE — TELEPHONE ENCOUNTER
Patient's son, Olivia Morales, states patient will be receiving the vaccine tomorrow. States patient wants to know if she can get the vaccine even with age and pacemaker. States a message can be left if no answer.      Phone: [phone]

## 2021-03-01 NOTE — TELEPHONE ENCOUNTER
Attempted to reach patient by telephone. A message was left notifying that MD recommends patients get vaccine if/when available. Patient to return call with any further questions.

## 2021-03-02 ENCOUNTER — IMMUNIZATION (OUTPATIENT)
Dept: INTERNAL MEDICINE CLINIC | Age: 86
End: 2021-03-02
Payer: MEDICARE

## 2021-03-02 DIAGNOSIS — Z23 ENCOUNTER FOR IMMUNIZATION: Primary | ICD-10-CM

## 2021-03-02 PROCEDURE — 0001A COVID-19, MRNA, LNP-S, PF, 30MCG/0.3ML DOSE(PFIZER): CPT | Performed by: FAMILY MEDICINE

## 2021-03-02 PROCEDURE — 91300 COVID-19, MRNA, LNP-S, PF, 30MCG/0.3ML DOSE(PFIZER): CPT | Performed by: FAMILY MEDICINE

## 2021-03-11 ENCOUNTER — ANTI-COAG VISIT (OUTPATIENT)
Dept: CARDIOLOGY CLINIC | Age: 86
End: 2021-03-11
Payer: MEDICARE

## 2021-03-11 DIAGNOSIS — Z79.01 LONG TERM (CURRENT) USE OF ANTICOAGULANTS: ICD-10-CM

## 2021-03-11 DIAGNOSIS — I48.0 PAROXYSMAL ATRIAL FIBRILLATION (HCC): ICD-10-CM

## 2021-03-11 LAB
INR BLD: 2.9
PT POC: NORMAL
VALID INTERNAL CONTROL?: YES

## 2021-03-11 PROCEDURE — 85610 PROTHROMBIN TIME: CPT | Performed by: INTERNAL MEDICINE

## 2021-03-11 RX ORDER — AMLODIPINE BESYLATE 5 MG/1
5 TABLET ORAL DAILY
Qty: 30 TAB | Refills: 2 | OUTPATIENT
Start: 2021-03-11

## 2021-03-11 NOTE — PROGRESS NOTES
A full discussion of the nature of anticoagulants has been carried out. A benefit risk analysis has been presented to the patient, so that they understand the justification for choosing anticoagulation at this time. The need for frequent and regular monitoring, precise dosage adjustment and compliance is stressed. Side effects of potential bleeding are discussed. The patient should avoid any OTC items containing aspirin or ibuprofen, and should avoid great swings in general diet. Avoid alcohol consumption. Call if any signs of abnormal bleeding. Next PT/INR test in 1 month. Pt dose was unchanged. Anticoagulation Summary  As of 3/11/2021    INR goal:  2.0-3.0   TTR:  92.6 % (3.2 y)   INR used for dosin.9 (3/11/2021)   Warfarin maintenance plan:  1.25 mg (2.5 mg x 0.5) every Thu; 2.5 mg (2.5 mg x 1) all other days   Weekly warfarin total:  16.25 mg   No change documented:  Ad Lam RN   Plan last modified:  Ad Lam RN (2021)   Next INR check:  2021   Target end date:   Indefinite    Indications    Paroxysmal atrial fibrillation ([facility] [address] [I48.0]  Long term (current) use of anticoagulants [Z79.01]             Anticoagulation Episode Summary     INR check location:      Preferred lab:      Send INR reminders to:      Comments:        Anticoagulation Care Providers     Provider Role Specialty Phone number    Katharine Calvillo MD Responsible Cardiology [phone]          Future Appointments   Date Time Provider Monika Garcia   3/23/2021 11:10 AM [facility] COVID VAC 21 DAY [facility] MAIN BS AMB   2021 11:20 AM COUMADIN, KAREN CAVREY BS AMB   2021  2:45 PM REMOTE1, JONES CAVREY BS AMB   2021  1:45 PM REMOTE1, KAREN CAVREY BS AMB   2021  9:15 AM REMOTE1, JONES CAVREY BS AMB   3/15/2022 11:20 AM PACEMAKER3, JONES CAVREY BS AMB   3/15/2022 11:40 AM MD GUILLERMO Leahy BS AMB

## 2021-03-23 ENCOUNTER — IMMUNIZATION (OUTPATIENT)
Dept: INTERNAL MEDICINE CLINIC | Age: 86
End: 2021-03-23
Payer: MEDICARE

## 2021-03-23 DIAGNOSIS — Z23 ENCOUNTER FOR IMMUNIZATION: Primary | ICD-10-CM

## 2021-03-23 PROCEDURE — 91300 COVID-19, MRNA, LNP-S, PF, 30MCG/0.3ML DOSE(PFIZER): CPT | Performed by: FAMILY MEDICINE

## 2021-03-23 PROCEDURE — 0002A COVID-19, MRNA, LNP-S, PF, 30MCG/0.3ML DOSE(PFIZER): CPT | Performed by: FAMILY MEDICINE

## 2021-04-08 ENCOUNTER — ANTI-COAG VISIT (OUTPATIENT)
Dept: CARDIOLOGY CLINIC | Age: 86
End: 2021-04-08
Payer: MEDICARE

## 2021-04-08 DIAGNOSIS — I48.0 PAROXYSMAL ATRIAL FIBRILLATION (HCC): ICD-10-CM

## 2021-04-08 DIAGNOSIS — Z79.01 LONG TERM (CURRENT) USE OF ANTICOAGULANTS: ICD-10-CM

## 2021-04-08 LAB
INR BLD: 2.4
PT POC: NORMAL
VALID INTERNAL CONTROL?: YES

## 2021-04-08 PROCEDURE — 85610 PROTHROMBIN TIME: CPT | Performed by: INTERNAL MEDICINE

## 2021-04-08 NOTE — PROGRESS NOTES
A full discussion of the nature of anticoagulants has been carried out. A benefit risk analysis has been presented to the patient, so that they understand the justification for choosing anticoagulation at this time. The need for frequent and regular monitoring, precise dosage adjustment and compliance is stressed. Side effects of potential bleeding are discussed. The patient should avoid any OTC items containing aspirin or ibuprofen, and should avoid great swings in general diet. Avoid alcohol consumption. Call if any signs of abnormal bleeding. Next PT/INR test in 1 month. Pt dose was unchanged. Written and verbal instructions given. Pt verbalized understanding and denies further questions. Anticoagulation Summary  As of 2021    INR goal:  2.0-3.0   TTR:  92.8 % (3.3 y)   INR used for dosin.4 (2021)   Warfarin maintenance plan:  1.25 mg (2.5 mg x 0.5) every Thu; 2.5 mg (2.5 mg x 1) all other days   Weekly warfarin total:  16.25 mg   No change documented:  Barbara Alexis RN   Plan last modified:  Barbara Alexis RN (2021)   Next INR check:  2021   Target end date:   Indefinite    Indications    Paroxysmal atrial fibrillation ([facility] [address] [I48.0]  Long term (current) use of anticoagulants [Z79.01]             Anticoagulation Episode Summary     INR check location:      Preferred lab:      Send INR reminders to:      Comments:        Anticoagulation Care Providers     Provider Role Specialty Phone number    Yaz Bashir MD [facility] Cardiology [phone]          Future Appointments   Date Time Provider Monika Garcia   2021 11:20 AM COUMADIN, KAREN CAVREY BS AMB   2021 11:20 AM COUMADIN, JONES CAVREY BS AMB   2021  2:45 PM REMOTE1, KAREN CAVREY BS AMB   2021  1:45 PM REMOTE1, KAREN CAVREY BS AMB   2021  9:15 AM REMOTE1, KAREN CAVREY BS AMB   3/15/2022 11:20 AM PACEMAKER3, JONES CAVREY BS AMB   3/15/2022 11:40 AM MD GUILLERMO Newman BS AMB

## 2021-04-16 DIAGNOSIS — I48.0 PAROXYSMAL ATRIAL FIBRILLATION (HCC): ICD-10-CM

## 2021-04-19 RX ORDER — WARFARIN 2.5 MG/1
TABLET ORAL
Qty: 90 TAB | Refills: 0 | Status: SHIPPED | OUTPATIENT
Start: 2021-04-19 | End: 2021-05-04

## 2021-04-19 NOTE — TELEPHONE ENCOUNTER
Received refill request for warfarin 2.5 mg tabs. Refill authorized.     Future Appointments   Date Time Provider Monika Garcia   5/6/2021 11:20 AM COUMADIN, KAREN WEBBER AMB   6/9/2021  2:45 PM REMOTE1, KAREN WEBBER AMB   9/13/2021  1:45 PM REMOTE1, KAREN WEBBER AMB   12/13/2021  9:15 AM REMOTE1, KAREN WEBBER AMB   3/15/2022 11:20 AM PACEMAKER3, KAREN WEBBER AMB   3/15/2022 11:40 AM MD GUILLERMO Hall AMB

## 2021-04-30 RX ORDER — SIMVASTATIN 20 MG/1
TABLET, FILM COATED ORAL
Qty: 30 TAB | Refills: 11 | Status: SHIPPED | OUTPATIENT
Start: 2021-04-30 | End: 2022-05-24

## 2021-05-04 DIAGNOSIS — I48.0 PAROXYSMAL ATRIAL FIBRILLATION (HCC): ICD-10-CM

## 2021-05-04 RX ORDER — WARFARIN 2.5 MG/1
TABLET ORAL
Qty: 90 TAB | Refills: 0 | Status: SHIPPED | OUTPATIENT
Start: 2021-05-04 | End: 2021-11-02

## 2021-05-06 ENCOUNTER — ANTI-COAG VISIT (OUTPATIENT)
Dept: CARDIOLOGY CLINIC | Age: 86
End: 2021-05-06
Payer: MEDICARE

## 2021-05-06 DIAGNOSIS — I48.0 PAROXYSMAL ATRIAL FIBRILLATION (HCC): ICD-10-CM

## 2021-05-06 DIAGNOSIS — Z79.01 LONG TERM (CURRENT) USE OF ANTICOAGULANTS: ICD-10-CM

## 2021-05-06 LAB
INR BLD: 2.4
PT POC: NORMAL
VALID INTERNAL CONTROL?: YES

## 2021-05-06 PROCEDURE — 85610 PROTHROMBIN TIME: CPT | Performed by: INTERNAL MEDICINE

## 2021-05-06 NOTE — PROGRESS NOTES
A full discussion of the nature of anticoagulants has been carried out. A benefit risk analysis has been presented to the patient, so that they understand the justification for choosing anticoagulation at this time. The need for frequent and regular monitoring, precise dosage adjustment and compliance is stressed. Side effects of potential bleeding are discussed. The patient should avoid any OTC items containing aspirin or ibuprofen, and should avoid great swings in general diet. Avoid alcohol consumption. Call if any signs of abnormal bleeding. Next PT/INR test in 1 month. Pt dose was unchanged. Written and verbal instructions given. Pt verbalized understanding and denies further questions. Anticoagulation Summary  As of 2021    INR goal:  2.0-3.0   TTR:  93.0 % (3.3 y)   INR used for dosin.4 (2021)   Warfarin maintenance plan:  1.25 mg (2.5 mg x 0.5) every Thu; 2.5 mg (2.5 mg x 1) all other days   Weekly warfarin total:  16.25 mg   No change documented:  Yuriy Duran RN   Plan last modified:  Yuriy Duran RN (2021)   Next INR check:  6/3/2021   Target end date:   Indefinite    Indications    Paroxysmal atrial fibrillation ([facility] [address] [I48.0]  Long term (current) use of anticoagulants [Z79.01]             Anticoagulation Episode Summary     INR check location:      Preferred lab:      Send INR reminders to:      Comments:        Anticoagulation Care Providers     Provider Role Specialty Phone number    Marcial Jackson MD [facility] Cardiology [phone]          Future Appointments   Date Time Provider Monika Garcia   6/3/2021 11:00 AM COUMADIN, KAREN LI BS AMB   2021  2:45 PM REMOTE1, KAREN WEBBER AMB   2021  1:45 PM REMOTE1, KAREN LI BS AMB   2021  9:15 AM REMOTE1, KAREN LI BS AMB   3/15/2022 11:20 AM PACEMAKER3, KAREN LI BS AMB   3/15/2022 11:40 AM MD GUILLERMO Keith BS AMB

## 2021-05-14 RX ORDER — AMLODIPINE BESYLATE 5 MG/1
TABLET ORAL
Qty: 30 TAB | Refills: 2 | Status: SHIPPED | OUTPATIENT
Start: 2021-05-14 | End: 2021-08-04

## 2021-05-14 NOTE — TELEPHONE ENCOUNTER
Received refill request for amlodipine 5 mg po tabs. Refill authorized.       Future Appointments   Date Time Provider Monika Garcia   6/3/2021 11:00 AM COUMADIN, KAREN WEBBER AMB   6/9/2021  2:45 PM REMOTE1, KAREN WEBBER AMB   9/13/2021  1:45 PM REMOTE1, KAREN WEBBER AMB   12/13/2021  9:15 AM REMOTE1, KAREN WEBBER AMB   3/15/2022 11:20 AM PACEMAKER3, KAREN WEBBER AMB   3/15/2022 11:40 AM MD GUILLERMO Keith BS AMB

## 2021-06-03 ENCOUNTER — ANTI-COAG VISIT (OUTPATIENT)
Dept: CARDIOLOGY CLINIC | Age: 86
End: 2021-06-03
Payer: MEDICARE

## 2021-06-03 DIAGNOSIS — I48.0 PAROXYSMAL ATRIAL FIBRILLATION (HCC): Primary | ICD-10-CM

## 2021-06-03 DIAGNOSIS — Z79.01 LONG TERM (CURRENT) USE OF ANTICOAGULANTS: ICD-10-CM

## 2021-06-03 LAB
INR BLD: 2.3
PT POC: NORMAL
VALID INTERNAL CONTROL?: YES

## 2021-06-03 PROCEDURE — 85610 PROTHROMBIN TIME: CPT | Performed by: INTERNAL MEDICINE

## 2021-06-03 NOTE — PROGRESS NOTES
A full discussion of the nature of anticoagulants has been carried out. A benefit risk analysis has been presented to the patient, so that they understand the justification for choosing anticoagulation at this time. The need for frequent and regular monitoring, precise dosage adjustment and compliance is stressed. Side effects of potential bleeding are discussed. The patient should avoid any OTC items containing aspirin or ibuprofen, and should avoid great swings in general diet. Avoid alcohol consumption. Call if any signs of abnormal bleeding. Next PT/INR test in 1 month. Pt dose was unchanged. Pt verbalized understanding and denies questions at this time. Anticoagulation Summary  As of 6/3/2021    INR goal:  2.0-3.0   TTR:  93.1 % (3.4 y)   INR used for dosin.3 (6/3/2021)   Warfarin maintenance plan:  1.25 mg (2.5 mg x 0.5) every Thu; 2.5 mg (2.5 mg x 1) all other days   Weekly warfarin total:  16.25 mg   Plan last modified:  Samreen Deshpande RN (6/3/2021)   Next INR check:  2021   Target end date:   Indefinite    Indications    Paroxysmal atrial fibrillation ([facility] [address] [I48.0]  Long term (current) use of anticoagulants [Z79.01]             Anticoagulation Episode Summary     INR check location:      Preferred lab:      Send INR reminders to:      Comments:        Anticoagulation Care Providers     Provider Role Specialty Phone number    Grady Eagle MD [facility] Cardiology [phone]          Future Appointments   Date Time Provider Monika Garcia   2021  2:45 PM Adline Sever CAVREY BS AMB   2021 10:40 AM COUMADINKAREN AMB   2021  1:45 PM REMOTE1KAREN AMB   2021  9:15 AM REMOTE1KAREN AMB   3/15/2022 11:20 AM PACEMAKER3KAREN AMB   3/15/2022 11:40 AM MD GUILLERMO Jones BS AMB

## 2021-06-09 ENCOUNTER — OFFICE VISIT (OUTPATIENT)
Dept: CARDIOLOGY CLINIC | Age: 86
End: 2021-06-09
Payer: MEDICARE

## 2021-06-09 DIAGNOSIS — Z95.0 CARDIAC PACEMAKER IN SITU: Primary | ICD-10-CM

## 2021-06-09 PROCEDURE — 93294 REM INTERROG EVL PM/LDLS PM: CPT | Performed by: INTERNAL MEDICINE

## 2021-06-09 PROCEDURE — 93296 REM INTERROG EVL PM/IDS: CPT | Performed by: INTERNAL MEDICINE

## 2021-06-09 NOTE — LETTER
6/9/2021 3:33 PM 
 
Ms. vEie CARVALHO Florence [address] After careful review of your remote check of your implated device on [id number]. I have concluded that your device is working properly. Your next automatic remote check ( from home) is scheduled for  9-. If you have any questions, please call [address] at [phone]. Additional Comments: ________________________________________________ 
 
__________________________________________________________________ 
 
__________________________________________________________________ Debby Braun MD [facility] [place]

## 2021-06-17 NOTE — ED NOTES
Pt's son given printed discharge instructions and 0 script(s). Pt's son verbalized understanding of instructions and script(s). Pt's son verbalized importance of following up with PCP. Pt alert and oriented, in no acute distress, ambulatory with her son. Patient offered wheelchair from treatment area to hospital entrance, patient escorted in wheelchair. [place] Pregnancy And Lactation Text: This medication is not safe during pregnancy. It is unknown if this medication is excreted in breast milk.

## 2021-07-01 ENCOUNTER — ANTI-COAG VISIT (OUTPATIENT)
Dept: CARDIOLOGY CLINIC | Age: 86
End: 2021-07-01
Payer: MEDICARE

## 2021-07-01 VITALS — BODY MASS INDEX: 30.82 KG/M2 | WEIGHT: 174 LBS

## 2021-07-01 DIAGNOSIS — Z79.01 LONG TERM (CURRENT) USE OF ANTICOAGULANTS: ICD-10-CM

## 2021-07-01 DIAGNOSIS — I48.0 PAROXYSMAL ATRIAL FIBRILLATION (HCC): Primary | ICD-10-CM

## 2021-07-01 LAB
INR BLD: 1.9
PT POC: NORMAL
VALID INTERNAL CONTROL?: YES

## 2021-07-01 PROCEDURE — 85610 PROTHROMBIN TIME: CPT | Performed by: INTERNAL MEDICINE

## 2021-07-01 NOTE — PROGRESS NOTES
A full discussion of the nature of anticoagulants has been carried out. A benefit risk analysis has been presented to the patient, so that they understand the justification for choosing anticoagulation at this time. The need for frequent and regular monitoring, precise dosage adjustment and compliance is stressed. Side effects of potential bleeding are discussed. The patient should avoid any OTC items containing aspirin or ibuprofen, and should avoid great swings in general diet. Avoid alcohol consumption. Call if any signs of abnormal bleeding. Next PT/INR test in 1 month. Pt dose was unchanged. Written and verbal instructions given. Pt verbalized understanding and denies further questions. Anticoagulation Summary  As of 2021    INR goal:  2.0-3.0   TTR:  92.7 % (3.5 y)   INR used for dosin.9 (2021)   Warfarin maintenance plan:  1.25 mg (2.5 mg x 0.5) every Thu; 2.5 mg (2.5 mg x 1) all other days   Weekly warfarin total:  16.25 mg   Plan last modified:  Jose Chakraborty RN (2021)   Next INR check:  2021   Target end date:   Indefinite    Indications    Paroxysmal atrial fibrillation ([facility] [address] [I48.0]  Long term (current) use of anticoagulants [Z79.01]             Anticoagulation Episode Summary     INR check location:      Preferred lab:      Send INR reminders to:      Comments:        Anticoagulation Care Providers     Provider Role Specialty Phone number    Laura Sigala MD Responsible Cardiology [phone]          Future Appointments   Date Time Provider Monika Garcia   2021 10:40 AM COUMADINKAREN BS AMB   2021  1:45 PM REMOTE1, KAREN WEBBER AMB   2021  9:15 AM REMOTE1KAREN AMB   3/15/2022 11:20 AM PACEMAKER3, KAREN WEBBER AMB   3/15/2022 11:40 AM MD GUILLERMO Benavidez BS AMB

## 2021-07-29 ENCOUNTER — ANTI-COAG VISIT (OUTPATIENT)
Dept: CARDIOLOGY CLINIC | Age: 86
End: 2021-07-29
Payer: MEDICARE

## 2021-07-29 DIAGNOSIS — I48.0 PAROXYSMAL ATRIAL FIBRILLATION (HCC): Primary | ICD-10-CM

## 2021-07-29 DIAGNOSIS — Z79.01 LONG TERM (CURRENT) USE OF ANTICOAGULANTS: ICD-10-CM

## 2021-07-29 LAB
INR BLD: 3
PT POC: NORMAL
VALID INTERNAL CONTROL?: YES

## 2021-07-29 PROCEDURE — 85610 PROTHROMBIN TIME: CPT | Performed by: INTERNAL MEDICINE

## 2021-07-29 NOTE — PROGRESS NOTES
A full discussion of the nature of anticoagulants has been carried out. A benefit risk analysis has been presented to the patient, so that they understand the justification for choosing anticoagulation at this time. The need for frequent and regular monitoring, precise dosage adjustment and compliance is stressed. Side effects of potential bleeding are discussed. The patient should avoid any OTC items containing aspirin or ibuprofen, and should avoid great swings in general diet. Avoid alcohol consumption. Call if any signs of abnormal bleeding. Next PT/INR test in 1 month. Pt dose was unchanged. Anticoagulation Summary  As of 7/29/2021    INR goal:  2.0-3.0   TTR:  92.7 % (3.6 y)   INR used for dosing:  3.0 (7/29/2021)   Warfarin maintenance plan:  1.25 mg (2.5 mg x 0.5) every Thu; 2.5 mg (2.5 mg x 1) all other days   Weekly warfarin total:  16.25 mg   Plan last modified:  Bal Bartlett RN (7/1/2021)   Next INR check:  8/27/2021   Target end date:   Indefinite    Indications    Paroxysmal atrial fibrillation ([facility] [address] [I48.0]  Long term (current) use of anticoagulants [Z79.01]             Anticoagulation Episode Summary     INR check location:      Preferred lab:      Send INR reminders to:      Comments:        Anticoagulation Care Providers     Provider Role Specialty Phone number    Kristine Granda MD [facility] Cardiology [phone]          Future Appointments   Date Time Provider Monika Garcia   8/26/2021 11:00 AM COUMADINKAREN AMB   9/13/2021  1:45 PM REMOTE1, KAREN WEBBER AMB   12/13/2021  9:15 AM REMOTE1KRAEN AMB   3/15/2022 11:20 AM PACEMAKER3, KAREN WEBBER AMB   3/15/2022 11:40 AM MD GUILLERMO Resendez AMB

## 2021-08-02 RX ORDER — AMLODIPINE BESYLATE 5 MG/1
TABLET ORAL
Qty: 30 TABLET | Refills: 2 | Status: CANCELLED | OUTPATIENT
Start: 2021-08-02

## 2021-08-04 RX ORDER — AMLODIPINE BESYLATE 5 MG/1
TABLET ORAL
Qty: 30 TABLET | Refills: 2 | Status: SHIPPED | OUTPATIENT
Start: 2021-08-04 | End: 2021-09-02

## 2021-08-04 NOTE — TELEPHONE ENCOUNTER
Received refill request for amlodipine 5 mg po tabs. Refill authorized.     Future Appointments   Date Time Provider Monika Garcia   8/26/2021 11:00 AM COUMADIN, KAREN WEBBER AMB   9/13/2021  1:45 PM REMOTE1, KAREN WEBBER AMB   12/13/2021  9:15 AM REMOTE1, KAREN WEBBER AMB   3/15/2022 11:20 AM PACEMAKER3, KAREN WEBBER AMB   3/15/2022 11:40 AM MD GUILLERMO Stein AMB

## 2021-08-26 ENCOUNTER — ANTI-COAG VISIT (OUTPATIENT)
Dept: CARDIOLOGY CLINIC | Age: 86
End: 2021-08-26
Payer: MEDICARE

## 2021-08-26 DIAGNOSIS — I48.0 PAROXYSMAL ATRIAL FIBRILLATION (HCC): Primary | ICD-10-CM

## 2021-08-26 DIAGNOSIS — Z79.01 LONG TERM (CURRENT) USE OF ANTICOAGULANTS: ICD-10-CM

## 2021-08-26 LAB
INR BLD: 2.5
PT POC: NORMAL
VALID INTERNAL CONTROL?: YES

## 2021-08-26 PROCEDURE — 85610 PROTHROMBIN TIME: CPT | Performed by: INTERNAL MEDICINE

## 2021-08-26 NOTE — PROGRESS NOTES
A full discussion of the nature of anticoagulants has been carried out. A benefit risk analysis has been presented to the patient, so that they understand the justification for choosing anticoagulation at this time. The need for frequent and regular monitoring, precise dosage adjustment and compliance is stressed. Side effects of potential bleeding are discussed. The patient should avoid any OTC items containing aspirin or ibuprofen, and should avoid great swings in general diet. Avoid alcohol consumption. Call if any signs of abnormal bleeding. Next PT/INR test in 1 month. Pt dose was not changed     Anticoagulation Summary  As of 2021    INR goal:  2.0-3.0   TTR:  92.8 % (3.6 y)   INR used for dosin.5 (2021)   Warfarin maintenance plan:  1.25 mg (2.5 mg x 0.5) every Thu; 2.5 mg (2.5 mg x 1) all other days   Weekly warfarin total:  16.25 mg   Plan last modified:  Indy Norman RN (2021)   Next INR check:     Target end date:   Indefinite    Indications    Paroxysmal atrial fibrillation ([facility] [address] [I48.0]  Long term (current) use of anticoagulants [Z79.01]             Anticoagulation Episode Summary     INR check location:      Preferred lab:      Send INR reminders to:      Comments:        Anticoagulation Care Providers     Provider Role Specialty Phone number    Yris Gonsalez MD Responsible Cardiology [phone]          Future Appointments   Date Time Provider Monika Garcia   2021  1:45 PM REMOTE1, KAREN WEBBER AMB   2021  9:15 AM REMOTE1, KAREN WEBBER AMB   3/15/2022 11:20 AM PACEMAKER3, KAREN WEBBER AMB   3/15/2022 11:40 AM MD GUILLERMO Wagner AMB

## 2021-09-02 RX ORDER — AMLODIPINE BESYLATE 5 MG/1
TABLET ORAL
Qty: 30 TABLET | Refills: 2 | Status: SHIPPED | OUTPATIENT
Start: 2021-09-02 | End: 2021-11-30

## 2021-09-02 NOTE — TELEPHONE ENCOUNTER
Received refill request for amlodipine 5 mg po daily. Refill request authorized.     Future Appointments   Date Time Provider Monika Radha   9/13/2021  1:45 PM REMOTE1, KAREN WEBBER AMB   9/27/2021 10:40 AM COUMADIN, KAREN WEBBER AMB   12/13/2021  9:15 AM REMOTE1, KAREN WEBBER AMB   3/15/2022 11:20 AM PACEMAKER3, KAREN WEBBER AMB   3/15/2022 11:40 AM MD GUILLERMO Smith AMB

## 2021-09-07 DIAGNOSIS — I48.20 CHRONIC ATRIAL FIBRILLATION (HCC): ICD-10-CM

## 2021-09-07 RX ORDER — ATENOLOL 100 MG/1
100 TABLET ORAL DAILY
Qty: 90 TABLET | Refills: 3 | Status: SHIPPED | OUTPATIENT
Start: 2021-09-07 | End: 2022-08-27

## 2021-09-09 DIAGNOSIS — I48.20 CHRONIC ATRIAL FIBRILLATION (HCC): ICD-10-CM

## 2021-09-09 RX ORDER — ATENOLOL 100 MG/1
100 TABLET ORAL DAILY
Qty: 90 TABLET | Refills: 3 | Status: CANCELLED | OUTPATIENT
Start: 2021-09-09

## 2021-09-09 NOTE — TELEPHONE ENCOUNTER
Verified patient with two types of identifiers. Spoke with patient's son verified on PHI. Notified that Dr. Burch Doctor sent in a year supply to pharmacy on 9/7/21. Patient's son verbalized understanding and will call with any other questions.

## 2021-09-13 ENCOUNTER — OFFICE VISIT (OUTPATIENT)
Dept: CARDIOLOGY CLINIC | Age: 86
End: 2021-09-13
Payer: MEDICARE

## 2021-09-13 DIAGNOSIS — Z95.0 CARDIAC PACEMAKER IN SITU: Primary | ICD-10-CM

## 2021-09-13 PROCEDURE — 93296 REM INTERROG EVL PM/IDS: CPT | Performed by: INTERNAL MEDICINE

## 2021-09-13 NOTE — LETTER
9/13/2021 4:49 PM    Ms. Evie CARVALHO Shimon  [address]          This letter confirms that we have received your scheduled remote check of your implanted     device on 9-13-21 . Our EP team will contact you via phone if there are significant abnormal    findings. Your next remote check from home is scheduled for 12-13-21 . If you have any questions, please call [address] at [phone].                Sincerely,    Glennis Epley, MD [facility] [place]

## 2021-09-27 ENCOUNTER — ANTI-COAG VISIT (OUTPATIENT)
Dept: CARDIOLOGY CLINIC | Age: 86
End: 2021-09-27
Payer: MEDICARE

## 2021-09-27 DIAGNOSIS — Z79.01 LONG TERM (CURRENT) USE OF ANTICOAGULANTS: ICD-10-CM

## 2021-09-27 DIAGNOSIS — I48.0 PAROXYSMAL ATRIAL FIBRILLATION (HCC): Primary | ICD-10-CM

## 2021-09-27 LAB
INR BLD: 2.1
PT POC: NORMAL
VALID INTERNAL CONTROL?: YES

## 2021-09-27 PROCEDURE — 85610 PROTHROMBIN TIME: CPT | Performed by: INTERNAL MEDICINE

## 2021-09-27 NOTE — PROGRESS NOTES
A full discussion of the nature of anticoagulants has been carried out. A benefit risk analysis has been presented to the patient, so that they understand the justification for choosing anticoagulation at this time. The need for frequent and regular monitoring, precise dosage adjustment and compliance is stressed. Side effects of potential bleeding are discussed. The patient should avoid any OTC items containing aspirin or ibuprofen, and should avoid great swings in general diet. Avoid alcohol consumption. Call if any signs of abnormal bleeding. Next PT/INR test in 1 month. Pt dose was not changed     Anticoagulation Summary  As of 2021    INR goal:  2.0-3.0   TTR:  93.0 % (3.7 y)   INR used for dosin.1 (2021)   Warfarin maintenance plan:  1.25 mg (2.5 mg x 0.5) every Thu; 2.5 mg (2.5 mg x 1) all other days   Weekly warfarin total:  16.25 mg   Plan last modified:  Armando Schafer RN (2021)   Next INR check:  10/25/2021   Target end date:   Indefinite    Indications    Paroxysmal atrial fibrillation ([facility] [address] [I48.0]  Long term (current) use of anticoagulants [Z79.01]             Anticoagulation Episode Summary     INR check location:      Preferred lab:      Send INR reminders to:      Comments:        Anticoagulation Care Providers     Provider Role Specialty Phone number    Harjit Hagan MD Responsible Cardiology [phone]          Future Appointments   Date Time Provider Monika Garcia   10/14/2021  1:00 PM Pedro Espinoza MD [facility] MAIN BS AMB   10/25/2021 11:00 AM COUMADIN, KAREN LI BS AMB   2021  9:15 AM REMOTE1KAREN BS AMB   3/15/2022 11:20 AM PACEMAKER3, KAREN LI BS AMB   3/15/2022 11:40 AM MD GUILLERMO Spaulding BS AMB

## 2021-10-05 ENCOUNTER — OFFICE VISIT (OUTPATIENT)
Dept: INTERNAL MEDICINE CLINIC | Age: 86
End: 2021-10-05
Payer: MEDICARE

## 2021-10-05 VITALS
RESPIRATION RATE: 16 BRPM | OXYGEN SATURATION: 98 % | HEART RATE: 60 BPM | HEIGHT: 63 IN | TEMPERATURE: 98 F | DIASTOLIC BLOOD PRESSURE: 84 MMHG | WEIGHT: 174 LBS | BODY MASS INDEX: 30.83 KG/M2 | SYSTOLIC BLOOD PRESSURE: 128 MMHG

## 2021-10-05 DIAGNOSIS — R68.89 OTHER GENERAL SYMPTOMS AND SIGNS: ICD-10-CM

## 2021-10-05 DIAGNOSIS — Z95.0 CARDIAC PACEMAKER IN SITU: ICD-10-CM

## 2021-10-05 DIAGNOSIS — I48.0 PAROXYSMAL ATRIAL FIBRILLATION (HCC): ICD-10-CM

## 2021-10-05 DIAGNOSIS — I10 ESSENTIAL HYPERTENSION, BENIGN: ICD-10-CM

## 2021-10-05 DIAGNOSIS — I87.2 VENOUS INSUFFICIENCY: ICD-10-CM

## 2021-10-05 DIAGNOSIS — Z13.39 SCREENING FOR ALCOHOLISM: ICD-10-CM

## 2021-10-05 DIAGNOSIS — R82.90 UNSPECIFIED ABNORMAL FINDINGS IN URINE: ICD-10-CM

## 2021-10-05 DIAGNOSIS — E55.9 VITAMIN D DEFICIENCY, UNSPECIFIED: ICD-10-CM

## 2021-10-05 DIAGNOSIS — Z23 NEEDS FLU SHOT: ICD-10-CM

## 2021-10-05 DIAGNOSIS — R79.9 ABNORMAL FINDING OF BLOOD CHEMISTRY, UNSPECIFIED: ICD-10-CM

## 2021-10-05 DIAGNOSIS — R00.2 PALPITATIONS: ICD-10-CM

## 2021-10-05 DIAGNOSIS — M1A.9XX0 CHRONIC GOUT WITHOUT TOPHUS, UNSPECIFIED CAUSE, UNSPECIFIED SITE: ICD-10-CM

## 2021-10-05 DIAGNOSIS — Z00.00 MEDICARE ANNUAL WELLNESS VISIT, SUBSEQUENT: Primary | ICD-10-CM

## 2021-10-05 PROCEDURE — 99213 OFFICE O/P EST LOW 20 MIN: CPT | Performed by: INTERNAL MEDICINE

## 2021-10-05 PROCEDURE — G8417 CALC BMI ABV UP PARAM F/U: HCPCS | Performed by: INTERNAL MEDICINE

## 2021-10-05 PROCEDURE — 90694 VACC AIIV4 NO PRSRV 0.5ML IM: CPT | Performed by: INTERNAL MEDICINE

## 2021-10-05 PROCEDURE — G8432 DEP SCR NOT DOC, RNG: HCPCS | Performed by: INTERNAL MEDICINE

## 2021-10-05 PROCEDURE — G8536 NO DOC ELDER MAL SCRN: HCPCS | Performed by: INTERNAL MEDICINE

## 2021-10-05 PROCEDURE — G8427 DOCREV CUR MEDS BY ELIG CLIN: HCPCS | Performed by: INTERNAL MEDICINE

## 2021-10-05 PROCEDURE — G0439 PPPS, SUBSEQ VISIT: HCPCS | Performed by: INTERNAL MEDICINE

## 2021-10-05 PROCEDURE — G0008 ADMIN INFLUENZA VIRUS VAC: HCPCS | Performed by: INTERNAL MEDICINE

## 2021-10-05 PROCEDURE — 1101F PT FALLS ASSESS-DOCD LE1/YR: CPT | Performed by: INTERNAL MEDICINE

## 2021-10-05 PROCEDURE — 1090F PRES/ABSN URINE INCON ASSESS: CPT | Performed by: INTERNAL MEDICINE

## 2021-10-05 NOTE — PATIENT INSTRUCTIONS
Medicare Wellness Visit, Female     The best way to live healthy is to have a lifestyle where you eat a well-balanced diet, exercise regularly, limit alcohol use, and quit all forms of tobacco/nicotine, if applicable. Regular preventive services are another way to keep healthy. Preventive services (vaccines, screening tests, monitoring & exams) can help personalize your care plan, which helps you manage your own care. Screening tests can find health problems at the earliest stages, when they are easiest to treat. [facility] follows the current, evidence-based guidelines published by the [facility] ([facility]) when recommending preventive services for our patients. Because we follow these guidelines, sometimes recommendations change over time as research supports it. (For example, mammograms used to be recommended annually. Even though Medicare will still pay for an annual mammogram, the newer guidelines recommend a mammogram every two years for women of average risk). Of course, you and your doctor may decide to screen more often for some diseases, based on your risk and your co-morbidities (chronic disease you are already diagnosed with). Preventive services for you include:  - Medicare offers their members a free annual wellness visit, which is time for you and your primary care provider to discuss and plan for your preventive service needs. Take advantage of this benefit every year!  -All adults over the age of 72 should receive the recommended pneumonia vaccines. Current USPSTF guidelines recommend a series of two vaccines for the best pneumonia protection.   -All adults should have a flu vaccine yearly and a tetanus vaccine every 10 years.   -All adults age 48 and older should receive the shingles vaccines (series of two vaccines).       -All adults age 38-68 who are overweight should have a diabetes screening test once every three years.   -All adults born between [birth year] and 1965 should be screened once for Hepatitis C.  -Other screening tests and preventive services for persons with diabetes include: an eye exam to screen for diabetic retinopathy, a kidney function test, a foot exam, and stricter control over your cholesterol.   -Cardiovascular screening for adults with routine risk involves an electrocardiogram (ECG) at intervals determined by your doctor.   -Colorectal cancer screenings should be done for adults age 54-65 with no increased risk factors for colorectal cancer. There are a number of acceptable methods of screening for this type of cancer. Each test has its own benefits and drawbacks. Discuss with your doctor what is most appropriate for you during your annual wellness visit. The different tests include: colonoscopy (considered the best screening method), a fecal occult blood test, a fecal DNA test, and sigmoidoscopy.    -A bone mass density test is recommended when a woman turns 65 to screen for osteoporosis. This test is only recommended one time, as a screening. Some providers will use this same test as a disease monitoring tool if you already have osteoporosis. -Breast cancer screenings are recommended every other year for women of normal risk, age 54-69.  -Cervical cancer screenings for women over age 72 are only recommended with certain risk factors.      Here is a list of your current Health Maintenance items (your personalized list of preventive services) with a due date:  Health Maintenance Due   Topic Date Due    DTaP/Tdap/Td  (1 - Tdap) Never done    Shingles Vaccine (1 of 2) Never done    Pneumococcal Vaccine (1 of 1 - PPSV23) Never done    Yearly Flu Vaccine (1) 09/01/2021    Cholesterol Test   09/24/2021         Medicare Wellness Visit, Female     The best way to live healthy is to have a lifestyle where you eat a well-balanced diet, exercise regularly, limit alcohol use, and quit all forms of tobacco/nicotine, if applicable. Regular preventive services are another way to keep healthy. Preventive services (vaccines, screening tests, monitoring & exams) can help personalize your care plan, which helps you manage your own care. Screening tests can find health problems at the earliest stages, when they are easiest to treat. [facility] follows the current, evidence-based guidelines published by the [facility] ([facility]) when recommending preventive services for our patients. Because we follow these guidelines, sometimes recommendations change over time as research supports it. (For example, mammograms used to be recommended annually. Even though Medicare will still pay for an annual mammogram, the newer guidelines recommend a mammogram every two years for women of average risk). Of course, you and your doctor may decide to screen more often for some diseases, based on your risk and your co-morbidities (chronic disease you are already diagnosed with). Preventive services for you include:  - Medicare offers their members a free annual wellness visit, which is time for you and your primary care provider to discuss and plan for your preventive service needs. Take advantage of this benefit every year!  -All adults over the age of 72 should receive the recommended pneumonia vaccines. Current USPSTF guidelines recommend a series of two vaccines for the best pneumonia protection.   -All adults should have a flu vaccine yearly and a tetanus vaccine every 10 years.   -All adults age 48 and older should receive the shingles vaccines (series of two vaccines).       -All adults age 38-68 who are overweight should have a diabetes screening test once every three years.   -All adults born between 80 and 1965 should be screened once for Hepatitis C.  -Other screening tests and preventive services for persons with diabetes include: an eye exam to screen for diabetic retinopathy, a kidney function test, a foot exam, and stricter control over your cholesterol.   -Cardiovascular screening for adults with routine risk involves an electrocardiogram (ECG) at intervals determined by your doctor.   -Colorectal cancer screenings should be done for adults age 54-65 with no increased risk factors for colorectal cancer. There are a number of acceptable methods of screening for this type of cancer. Each test has its own benefits and drawbacks. Discuss with your doctor what is most appropriate for you during your annual wellness visit. The different tests include: colonoscopy (considered the best screening method), a fecal occult blood test, a fecal DNA test, and sigmoidoscopy.    -A bone mass density test is recommended when a woman turns 65 to screen for osteoporosis. This test is only recommended one time, as a screening. Some providers will use this same test as a disease monitoring tool if you already have osteoporosis. -Breast cancer screenings are recommended every other year for women of normal risk, age 54-69.  -Cervical cancer screenings for women over age 72 are only recommended with certain risk factors. Here is a list of your current Health Maintenance items (your personalized list of preventive services) with a due date:  Health Maintenance Due   Topic Date Due    DTaP/Tdap/Td  (1 - Tdap) Never done    Shingles Vaccine (1 of 2) Never done    Pneumococcal Vaccine (1 of 1 - PPSV23) Never done    Yearly Flu Vaccine (1) 09/01/2021    Cholesterol Test   09/24/2021         Vaccine Information Statement    Influenza (Flu) Vaccine (Inactivated or Recombinant): What You Need to Know    Many vaccine information statements are available in Mongolian and other languages. See www.immunize.org/vis. Hojas de información sobre vacunas están disponibles en español y en muchos otros idiomas. Visite www.immunize.org/vis. 1. Why get vaccinated?     Influenza vaccine can prevent influenza (flu). Flu is a contagious disease that spreads around the [place] every year, usually between October and May. Anyone can get the flu, but it is more dangerous for some people. Infants and young children, people 72 years and older, pregnant people, and people with certain health conditions or a weakened immune system are at greatest risk of flu complications. Pneumonia, bronchitis, sinus infections, and ear infections are examples of flu-related complications. If you have a medical condition, such as heart disease, cancer, or diabetes, flu can make it worse. Flu can cause fever and chills, sore throat, muscle aches, fatigue, cough, headache, and runny or stuffy nose. Some people may have vomiting and diarrhea, though this is more common in children than adults. In an average year, thousands of people in the [facility] die from flu, and many more are hospitalized. Flu vaccine prevents millions of illnesses and flu-related visits to the doctor each year. 2. Influenza vaccines     CDC recommends everyone 6 months and older get vaccinated every flu season. Children 6 months through 6years of age may need 2 doses during a single flu season. Everyone else needs only 1 dose each flu season. It takes about 2 weeks for protection to develop after vaccination. There are many flu viruses, and they are always changing. Each year a new flu vaccine is made to protect against the influenza viruses believed to be likely to cause disease in the upcoming flu season. Even when the vaccine doesnt exactly match these viruses, it may still provide some protection. Influenza vaccine does not cause flu. Influenza vaccine may be given at the same time as other vaccines.     3. Talk with your health care provider    Tell your vaccination provider if the person getting the vaccine:   Has had an allergic reaction after a previous dose of influenza vaccine, or has any severe, life-threatening allergies  Has ever had Guillain-Barré Syndrome (also called GBS)    In some cases, your health care provider may decide to postpone influenza vaccination until a future visit. Influenza vaccine can be administered at any time during pregnancy. People who are or will be pregnant during influenza season should receive inactivated influenza vaccine. People with minor illnesses, such as a cold, may be vaccinated. People who are moderately or severely ill should usually wait until they recover before getting influenza vaccine. Your health care provider can give you more information. 4. Risks of a vaccine reaction     Soreness, redness, and swelling where the shot is given, fever, muscle aches, and headache can happen after influenza vaccination.  There may be a very small increased risk of Guillain-Barré Syndrome (GBS) after inactivated influenza vaccine (the flu shot). Vernonnarinder Kevin children who get the flu shot along with pneumococcal vaccine (PCV13) and/or DTaP vaccine at the same time might be slightly more likely to have a seizure caused by fever. Tell your health care provider if a child who is getting flu vaccine has ever had a seizure. People sometimes faint after medical procedures, including vaccination. Tell your provider if you feel dizzy or have vision changes or ringing in the ears. As with any medicine, there is a very remote chance of a vaccine causing a severe allergic reaction, other serious injury, or death. 5. What if there is a serious problem? An allergic reaction could occur after the vaccinated person leaves the clinic. If you see signs of a severe allergic reaction (hives, swelling of the face and throat, difficulty breathing, a fast heartbeat, dizziness, or weakness), call 9-1-1 and get the person to the nearest hospital.    For other signs that concern you, call your health care provider.     Adverse reactions should be reported to the Vaccine Adverse Event Reporting System (VAERS). Your health care provider will usually file this report, or you can do it yourself. Visit the VAERS website at www.vaers. [facility].gov or call 4-811.429.4506. VAERS is only for reporting reactions, and VAERS staff members do not give medical advice. 6. The National Vaccine Injury Compensation Program    The [facility] Vaccine Injury Compensation Program (VICP) is a federal program that was created to compensate people who may have been injured by certain vaccines. Claims regarding alleged injury or death due to vaccination have a time limit for filing, which may be as short as two years. Visit the VICP website at www.[facility].gov/vaccinecompensation or call 4-534.574.9514 to learn about the program and about filing a claim. 7. How can I learn more?  Ask your health care provider.  Call your local or state health department.  Visit the website of the Food and Drug Administration (FDA) for vaccine package inserts and additional information at www.fda.gov/vaccines-blood-biologics/vaccines.  Contact the Centers for Disease Control and Prevention (CDC):  - Call 8-177.345.1821 (1-800-CDC-INFO) or  - Visit CDCs influenza website at www.cdc.gov/flu. Vaccine Information Statement   Inactivated Influenza Vaccine   8/6/2021  42 GABRIELA Orozco [id number]   Department of Health and Human Services  Centers for Disease Control and Prevention    Office Use Only      Vaccine Information Statement    Influenza (Flu) Vaccine (Inactivated or Recombinant): What You Need to Know    Many vaccine information statements are available in Hungarian and other languages. See www.immunize.org/vis. Hojas de información sobre vacunas están disponibles en español y en muchos otros idiomas. Visite www.immunize.org/vis. 1. Why get vaccinated? Influenza vaccine can prevent influenza (flu). Flu is a contagious disease that spreads around the [place] every year, usually between October and May.  Anyone can get the flu, but it is more dangerous for some people. Infants and young children, people 72 years and older, pregnant people, and people with certain health conditions or a weakened immune system are at greatest risk of flu complications. Pneumonia, bronchitis, sinus infections, and ear infections are examples of flu-related complications. If you have a medical condition, such as heart disease, cancer, or diabetes, flu can make it worse. Flu can cause fever and chills, sore throat, muscle aches, fatigue, cough, headache, and runny or stuffy nose. Some people may have vomiting and diarrhea, though this is more common in children than adults. In an average year, thousands of people in the [facility] die from flu, and many more are hospitalized. Flu vaccine prevents millions of illnesses and flu-related visits to the doctor each year. 2. Influenza vaccines     CDC recommends everyone 6 months and older get vaccinated every flu season. Children 6 months through 6years of age may need 2 doses during a single flu season. Everyone else needs only 1 dose each flu season. It takes about 2 weeks for protection to develop after vaccination. There are many flu viruses, and they are always changing. Each year a new flu vaccine is made to protect against the influenza viruses believed to be likely to cause disease in the upcoming flu season. Even when the vaccine doesnt exactly match these viruses, it may still provide some protection. Influenza vaccine does not cause flu. Influenza vaccine may be given at the same time as other vaccines.     3. Talk with your health care provider    Tell your vaccination provider if the person getting the vaccine:   Has had an allergic reaction after a previous dose of influenza vaccine, or has any severe, life-threatening allergies    Has ever had Guillain-Barré Syndrome (also called GBS)    In some cases, your health care provider may decide to postpone influenza vaccination until a future visit. Influenza vaccine can be administered at any time during pregnancy. People who are or will be pregnant during influenza season should receive inactivated influenza vaccine. People with minor illnesses, such as a cold, may be vaccinated. People who are moderately or severely ill should usually wait until they recover before getting influenza vaccine. Your health care provider can give you more information. 4. Risks of a vaccine reaction     Soreness, redness, and swelling where the shot is given, fever, muscle aches, and headache can happen after influenza vaccination.  There may be a very small increased risk of Guillain-Barré Syndrome (GBS) after inactivated influenza vaccine (the flu shot). [facility] children who get the flu shot along with pneumococcal vaccine (PCV13) and/or DTaP vaccine at the same time might be slightly more likely to have a seizure caused by fever. Tell your health care provider if a child who is getting flu vaccine has ever had a seizure. People sometimes faint after medical procedures, including vaccination. Tell your provider if you feel dizzy or have vision changes or ringing in the ears. As with any medicine, there is a very remote chance of a vaccine causing a severe allergic reaction, other serious injury, or death. 5. What if there is a serious problem? An allergic reaction could occur after the vaccinated person leaves the clinic. If you see signs of a severe allergic reaction (hives, swelling of the face and throat, difficulty breathing, a fast heartbeat, dizziness, or weakness), call 9-1-1 and get the person to the nearest hospital.    For other signs that concern you, call your health care provider. Adverse reactions should be reported to the Vaccine Adverse Event Reporting System (VAERS). Your health care provider will usually file this report, or you can do it yourself. Visit the VAERS website at www.vaers. [facility]hs.gov or call 3-444-082-241-749-8897. [facility] is only for reporting reactions, and [facility] staff members do not give medical advice. 6. The National Vaccine Injury Compensation Program    The [facility] Vaccine Injury Compensation Program (VICP) is a federal program that was created to compensate people who may have been injured by certain vaccines. Claims regarding alleged injury or death due to vaccination have a time limit for filing, which may be as short as two years. Visit the VICP website at www.[facility].gov/vaccinecompensation or call 9-981.633.6534 to learn about the program and about filing a claim. 7. How can I learn more?  Ask your health care provider.  Call your local or state health department.  Visit the website of the Food and Drug Administration (FDA) for vaccine package inserts and additional information at www.fda.gov/vaccines-blood-biologics/vaccines.  Contact the Centers for Disease Control and Prevention (CDC):  - Call 6-147.964.6363 (1-800-CDC-INFO) or  - Visit CDCs influenza website at www.cdc.gov/flu. Vaccine Information Statement   Inactivated Influenza Vaccine   8/6/2021  42 GABRIELA Braun [id number]   Department of Health and Human Services  Centers for Disease Control and Prevention    Office Use Only

## 2021-10-05 NOTE — PROGRESS NOTES
SPORTS MEDICINE AND PRIMARY CARE  Noemi Reid MD, [address] [address]  Phone:  [phone]  Fax: [phone]      Chief Complaint   Patient presents with    Urinary Frequency     Patient is here for possiable uti.  Annual Wellness Visit         SUBECTIVE:    Juany Hendricks is a 80 y.o. female The patient returns today with her son and is doing well. Has occasional pain in her shoulder for which she takes Tylenol, but denies other specific complaints except for urinary frequency. She has a known history of venous insufficiency, paroxysmal atrial fibrillation with cardiac pacemaker implant, gout, palpitations and primary hypertension and is seen for evaluation. Current Outpatient Medications   Medication Sig Dispense Refill    atenoloL (TENORMIN) 100 mg tablet Take 1 Tablet by mouth daily. 90 Tablet 3    amLODIPine (NORVASC) 5 mg tablet TAKE 1 TABLET BY MOUTH DAILY 30 Tablet 2    warfarin (COUMADIN) 2.5 mg tablet TAKE 1 TABLET BY MOUTH EVERY DAY EXCEPT ON THURSDAY TAKE 1/2 TABLET AS DIRECTED 90 Tab 0    simvastatin (ZOCOR) 20 mg tablet Take 1 tablet every night 30 Tab 11    dorzolamide (TRUSOPT) 2 % ophthalmic solution INSTILL 1 DROP IN BOTH EYES BID  2    magnesium 250 mg tab Take 250 mg by mouth.  Mon, wed, fri only      latanoprost (XALATAN) 0.005 % ophthalmic solution INT 1 GTT IN OU QD HS  2    levobunolol (BETAGAN) 0.5 % ophthalmic solution INSTILL 1 GTT OU BID  3     Past Medical History:   Diagnosis Date    Aortic valve disorders 4/29/2012    Atrial fibrillation ([facility] 03/22/2011    on coumadin followed by cardiology - Chet Salcido MD    CAD (coronary artery disease)     Cor pulmonale, chronic ([facility] [address] 4/29/2012    Dyslipidemia (high LDL; low HDL) 4/29/2012    Gout 4/30/2012    Gout, arthritis 2/8/2012    Gout, joint     Hypertension     Hyperuricemia 4/30/2012    Obstructive sleep apnea 4/29/2012    Pacemaker 04/02/2006    Dr Mandie Fairchild placed the original pacer 2006. It is replaced in . It is [facility]    Recurrent epistaxis 2012    Sinoatrial node dysfunction ([address] 3/22/2011    Venous insufficiency      Past Surgical History:   Procedure Laterality Date    GEN INSERT/REPLACE ONLY DUAL  2014         HX PACEMAKER      AR BREAST SURGERY PROCEDURE UNLISTED      lumpectomy     No Known Allergies    REVIEW OF SYSTEMS:   No chest pain. No incontinent episodes. Social History     Socioeconomic History    Marital status:      Spouse name: Not on file    Number of children: Not on file    Years of education: Not on file    Highest education level: Not on file   Tobacco Use    Smoking status: Never Smoker    Smokeless tobacco: Never Used   Substance and Sexual Activity    Alcohol use: No    Drug use: No    Sexual activity: Not Currently   Social History Narrative    Habits:  She is a lifetime nonsmoker, non drinker, non drug abuser. Social History:  The patient is . She lives with her son. 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 She is a member of [employer]. Family History:  Father and mother , unknown ages and causes. Two sisters  of unknown caus     Social Determinants of Health     Financial Resource Strain:     Difficulty of Paying Living Expenses:    Food Insecurity:     Worried About Running Out of Food in the Last Year:     [address] in the Last Year:    Transportation Needs:     Lack of Transportation (Medical):      Lack of Transportation (Non-Medical):    Physical Activity:     Days of Exercise per Week:     Minutes of Exercise per Session:    Stress:     Feeling of Stress :    Social Connections:     Frequency of Communication with Friends and Family:     Frequency of Social Gatherings with Friends and Family:     Attends Confucianist Services:     Active Member of Clubs or Organizations:     Attends Club or Organization Meetings:  Marital Status:    r  No family history on file. OBJECTIVE:  Visit Vitals  /84   Pulse 60   Temp 98 °F (36.7 °C)   Resp 16   Ht 5' 3\" (1.6 m)   Wt 174 lb (78.9 kg)   SpO2 98%   BMI 30.82 kg/m²     ENT: perrla,  eom intact  NECK: supple. Thyroid normal  CHEST: clear to ascultation and percussion   HEART: regular rate and rhythm  ABD: soft, bowel sounds active  EXTREMITIES: no edema, pulse 1+     Anti-Coag visit on 09/27/2021   Component Date Value Ref Range Status    VALID INTERNAL CONTROL POC 09/27/2021 Yes   Final    INR POC 09/27/2021 2.1   Final   Anti-Coag visit on 08/26/2021   Component Date Value Ref Range Status    VALID INTERNAL CONTROL POC 08/26/2021 Yes   Final    INR POC 08/26/2021 2.5   Final   Anti-Coag visit on 07/29/2021   Component Date Value Ref Range Status    VALID INTERNAL CONTROL POC 07/29/2021 Yes   Final    INR POC 07/29/2021 3.0   Final          ASSESSMENT:  1. Medicare annual wellness visit, subsequent    2. Screening for alcoholism    3. Essential hypertension, benign    4. Palpitations    5. Chronic gout without tophus, unspecified cause, unspecified site    6. Cardiac pacemaker in situ    7. Paroxysmal atrial fibrillation (HCC)    8. Venous insufficiency    9. Abnormal finding of blood chemistry, unspecified     10. Unspecified abnormal findings in urine     11. Other general symptoms and signs     12. Vitamin D deficiency, unspecified       Blood pressure control is excellent and at goal.    The palpitation is related to episodes of atrial fibrillation for which Dr. Ambreen Ma could do ablation, but did not recommend it at her age. History of gout with no recent attacks. Venous insufficiency is not particularly symptomatic. Concerned about urinary frequency. Just before she urinates, she has discomfort in her bladder when she has a bladder infection. Her son is also concerned about her vitamin levels, and we will check her B12 and vitamin D level.   She will be back to see us in a year, although we encourage _____ on an as needed basis. I have discussed the diagnosis with the patient and the intended plan as seen in the  orders above. The patient understands and agees with the plan. The patient has   received an after visit summary and questions were answered concerning  future plans  Patient labs and/or xrays were reviewed  Past records were reviewed. PLAN:  .  Orders Placed This Encounter    CULTURE, URINE    URINALYSIS W/ RFLX MICROSCOPIC    CBC WITH AUTOMATED DIFF    METABOLIC PANEL, COMPREHENSIVE    LIPID PANEL    TSH 3RD GENERATION    HEMOGLOBIN A1C WITH EAG    VITAMIN B12 & FOLATE    VITAMIN D, 25 HYDROXY                     ATTENTION:   This medical record was transcribed using an electronic medical records system. Although proofread, it may and can contain electronic and spelling errors. Other human spelling and other errors may be present. Corrections may be executed at a later time. Please feel free to contact us for any clarifications as needed.

## 2021-10-05 NOTE — PROGRESS NOTES
Chief Complaint   Patient presents with    Urinary Frequency     Patient is here for possiable uti.  Annual Wellness Visit     1. Have you been to the ER, urgent care clinic since your last visit? Hospitalized since your last visit? No    2. Have you seen or consulted any other health care providers outside of the [address] [place] [address] since your last visit? Include any pap smears or colon screening. No    This is the Subsequent Medicare Annual Wellness Exam, performed 12 months or more after the Initial AWV or the last Subsequent AWV    I have reviewed the patient's medical history in detail and updated the computerized patient record. Assessment/Plan   Education and counseling provided:  Are appropriate based on today's review and evaluation    1. Medicare annual wellness visit, subsequent  2. Screening for alcoholism       Depression Risk Factor Screening     3 most recent PHQ Screens 2/11/2021   Little interest or pleasure in doing things Not at all   Feeling down, depressed, irritable, or hopeless Not at all   Total Score PHQ 2 0       Alcohol Risk Screen    Do you average more than 1 drink per night or more than 7 drinks a week:  No    On any one occasion in the past three months have you have had more than 3 drinks containing alcohol:  No        Functional Ability and Level of Safety    Hearing: The patient needs further evaluation. Activities of Daily Living: The home contains: handrails and grab bars  Patient needs help with:  transportation, shopping and preparing meals      Ambulation: with no difficulty     Fall Risk:  Fall Risk Assessment, last 12 mths 2/11/2021   Able to walk? Yes   Fall in past 12 months? 0   Do you feel unsteady?  0   Are you worried about falling 0      Abuse Screen:  Patient is not abused       Cognitive Screening    Has your family/caregiver stated any concerns about your memory: no     Cognitive Screening: Normal - Clock Drawing Test    Health Maintenance Due Health Maintenance Due   Topic Date Due    DTaP/Tdap/Td series (1 - Tdap) Never done    Shingrix Vaccine Age 50> (1 of 2) Never done    Pneumococcal 65+ years (1 of 1 - PPSV23) Never done    Flu Vaccine (1) 09/01/2021    Lipid Screen  09/24/2021       Patient Care Team   Patient Care Team:  Melly Pal MD as PCP - General (Internal Medicine)  Melly Pal MD as PCP - [address] [place] [address] Provider  Jesus Hoyt MD as Physician (Cardiology)  Emily Theodore MD as Physician (Cardiology)    History     Patient Active Problem List   Diagnosis Code    Cardiac pacemaker in situ Z95.0    Paroxysmal atrial fibrillation ([address] I48.0    Essential hypertension, benign I10    Palpitations R00.2    Sinoatrial node dysfunction ([address] I49.5    Gout M10.9    Hyperuricemia E79.0    Encounter for long-term (current) use of high-risk medication Z79.899    Pacemaker Z95.0    Venous insufficiency I87.2    Long term (current) use of anticoagulants Z79.01    Urinary tract infection without hematuria N39.0     Past Medical History:   Diagnosis Date    Aortic valve disorders 4/29/2012    Atrial fibrillation ([address] 03/22/2011    on coumadin followed by cardiology - Marline Burnett MD    CAD (coronary artery disease)     Cor pulmonale, chronic ([address] 4/29/2012    Dyslipidemia (high LDL; low HDL) 4/29/2012    Gout 4/30/2012    Gout, arthritis 2/8/2012    Gout, joint     Hypertension     Hyperuricemia 4/30/2012    Obstructive sleep apnea 4/29/2012    Pacemaker 04/02/2006    Dr Aixa Meza placed the original pacer 4/6/2006. It is replaced in 2014.   It is [facility]    Recurrent epistaxis 4/29/2012    Sinoatrial node dysfunction ([address] 3/22/2011    Venous insufficiency       Past Surgical History:   Procedure Laterality Date    GEN INSERT/REPLACE ONLY DUAL  4/28/2014         HX PACEMAKER      MO BREAST SURGERY PROCEDURE UNLISTED      lumpectomy     Current Outpatient Medications   Medication Sig Dispense Refill    atenoloL (TENORMIN) 100 mg tablet Take 1 Tablet by mouth daily. 90 Tablet 3    amLODIPine (NORVASC) 5 mg tablet TAKE 1 TABLET BY MOUTH DAILY 30 Tablet 2    warfarin (COUMADIN) 2.5 mg tablet TAKE 1 TABLET BY MOUTH EVERY DAY EXCEPT ON THURSDAY TAKE 1/2 TABLET AS DIRECTED 90 Tab 0    simvastatin (ZOCOR) 20 mg tablet Take 1 tablet every night 30 Tab 11    dorzolamide (TRUSOPT) 2 % ophthalmic solution INSTILL 1 DROP IN BOTH EYES BID  2    magnesium 250 mg tab Take 250 mg by mouth. Mon, wed, fri only      latanoprost (XALATAN) 0.005 % ophthalmic solution INT 1 GTT IN OU QD HS  2    levobunolol (BETAGAN) 0.5 % ophthalmic solution INSTILL 1 GTT OU BID  3     No Known Allergies    No family history on file.   Social History     Tobacco Use    Smoking status: Never Smoker    Smokeless tobacco: Never Used   Substance Use Topics    Alcohol use: No         Nishi Rodriguez

## 2021-10-06 LAB
25(OH)D3 SERPL-MCNC: 31.2 NG/ML (ref 30–100)
ALBUMIN SERPL-MCNC: 3.8 G/DL (ref 3.5–5)
ALBUMIN/GLOB SERPL: 1 {RATIO} (ref 1.1–2.2)
ALP SERPL-CCNC: 68 U/L (ref 45–117)
ALT SERPL-CCNC: 17 U/L (ref 12–78)
ANION GAP SERPL CALC-SCNC: 4 MMOL/L (ref 5–15)
APPEARANCE UR: CLEAR
AST SERPL-CCNC: 18 U/L (ref 15–37)
BACTERIA SPEC CULT: NORMAL
BACTERIA URNS QL MICRO: NEGATIVE /HPF
BASOPHILS # BLD: 0.1 K/UL (ref 0–0.1)
BASOPHILS NFR BLD: 1 % (ref 0–1)
BILIRUB SERPL-MCNC: 0.7 MG/DL (ref 0.2–1)
BILIRUB UR QL: NEGATIVE
BUN SERPL-MCNC: 11 MG/DL (ref 6–20)
BUN/CREAT SERPL: 10 (ref 12–20)
CALCIUM SERPL-MCNC: 9.4 MG/DL (ref 8.5–10.1)
CHLORIDE SERPL-SCNC: 103 MMOL/L (ref 97–108)
CHOLEST SERPL-MCNC: 142 MG/DL
CO2 SERPL-SCNC: 28 MMOL/L (ref 21–32)
COLOR UR: ABNORMAL
CREAT SERPL-MCNC: 1.05 MG/DL (ref 0.55–1.02)
DIFFERENTIAL METHOD BLD: ABNORMAL
EOSINOPHIL # BLD: 0.1 K/UL (ref 0–0.4)
EOSINOPHIL NFR BLD: 2 % (ref 0–7)
EPITH CASTS URNS QL MICRO: ABNORMAL /LPF
ERYTHROCYTE [DISTWIDTH] IN BLOOD BY AUTOMATED COUNT: 14.8 % (ref 11.5–14.5)
EST. AVERAGE GLUCOSE BLD GHB EST-MCNC: 120 MG/DL
FOLATE SERPL-MCNC: 15.5 NG/ML (ref 5–21)
GLOBULIN SER CALC-MCNC: 3.7 G/DL (ref 2–4)
GLUCOSE SERPL-MCNC: 88 MG/DL (ref 65–100)
GLUCOSE UR STRIP.AUTO-MCNC: NEGATIVE MG/DL
HBA1C MFR BLD: 5.8 % (ref 4–5.6)
HCT VFR BLD AUTO: 37.9 % (ref 35–47)
HDLC SERPL-MCNC: 74 MG/DL
HDLC SERPL: 1.9 {RATIO} (ref 0–5)
HGB BLD-MCNC: 12.9 G/DL (ref 11.5–16)
HGB UR QL STRIP: NEGATIVE
HYALINE CASTS URNS QL MICRO: ABNORMAL /LPF (ref 0–5)
IMM GRANULOCYTES # BLD AUTO: 0 K/UL (ref 0–0.04)
IMM GRANULOCYTES NFR BLD AUTO: 0 % (ref 0–0.5)
KETONES UR QL STRIP.AUTO: NEGATIVE MG/DL
LDLC SERPL CALC-MCNC: 51.8 MG/DL (ref 0–100)
LEUKOCYTE ESTERASE UR QL STRIP.AUTO: ABNORMAL
LYMPHOCYTES # BLD: 1.4 K/UL (ref 0.8–3.5)
LYMPHOCYTES NFR BLD: 25 % (ref 12–49)
MCH RBC QN AUTO: 28 PG (ref 26–34)
MCHC RBC AUTO-ENTMCNC: 34 G/DL (ref 30–36.5)
MCV RBC AUTO: 82.2 FL (ref 80–99)
MONOCYTES # BLD: 0.8 K/UL (ref 0–1)
MONOCYTES NFR BLD: 13 % (ref 5–13)
NEUTS SEG # BLD: 3.4 K/UL (ref 1.8–8)
NEUTS SEG NFR BLD: 59 % (ref 32–75)
NITRITE UR QL STRIP.AUTO: NEGATIVE
NRBC # BLD: 0 K/UL (ref 0–0.01)
NRBC BLD-RTO: 0 PER 100 WBC
PH UR STRIP: 7.5 [PH] (ref 5–8)
PLATELET # BLD AUTO: 203 K/UL (ref 150–400)
PMV BLD AUTO: 10.9 FL (ref 8.9–12.9)
POTASSIUM SERPL-SCNC: 3.9 MMOL/L (ref 3.5–5.1)
PROT SERPL-MCNC: 7.5 G/DL (ref 6.4–8.2)
PROT UR STRIP-MCNC: NEGATIVE MG/DL
RBC # BLD AUTO: 4.61 M/UL (ref 3.8–5.2)
RBC #/AREA URNS HPF: ABNORMAL /HPF (ref 0–5)
SERVICE CMNT-IMP: NORMAL
SODIUM SERPL-SCNC: 135 MMOL/L (ref 136–145)
SP GR UR REFRACTOMETRY: 1.01 (ref 1–1.03)
TRIGL SERPL-MCNC: 81 MG/DL (ref ?–150)
TSH SERPL DL<=0.05 MIU/L-ACNC: 1.22 UIU/ML (ref 0.36–3.74)
UROBILINOGEN UR QL STRIP.AUTO: 0.2 EU/DL (ref 0.2–1)
VIT B12 SERPL-MCNC: 208 PG/ML (ref 193–986)
VLDLC SERPL CALC-MCNC: 16.2 MG/DL
WBC # BLD AUTO: 5.8 K/UL (ref 3.6–11)
WBC URNS QL MICRO: ABNORMAL /HPF (ref 0–4)

## 2021-10-25 ENCOUNTER — ANTI-COAG VISIT (OUTPATIENT)
Dept: CARDIOLOGY CLINIC | Age: 86
End: 2021-10-25
Payer: MEDICARE

## 2021-10-25 DIAGNOSIS — I48.0 PAROXYSMAL ATRIAL FIBRILLATION (HCC): Primary | ICD-10-CM

## 2021-10-25 DIAGNOSIS — Z79.01 LONG TERM (CURRENT) USE OF ANTICOAGULANTS: ICD-10-CM

## 2021-10-25 LAB
INR BLD: 2.1
PT POC: NORMAL
VALID INTERNAL CONTROL?: YES

## 2021-10-25 PROCEDURE — 85610 PROTHROMBIN TIME: CPT | Performed by: INTERNAL MEDICINE

## 2021-10-25 NOTE — PROGRESS NOTES
A full discussion of the nature of anticoagulants has been carried out. A benefit risk analysis has been presented to the patient, so that they understand the justification for choosing anticoagulation at this time. The need for frequent and regular monitoring, precise dosage adjustment and compliance is stressed. Side effects of potential bleeding are discussed. The patient should avoid any OTC items containing aspirin or ibuprofen, and should avoid great swings in general diet. Avoid alcohol consumption. Call if any signs of abnormal bleeding. Next PT/INR test in 1 month. Pt dose was not changed      Anticoagulation Summary  As of 10/25/2021    INR goal:  2.0-3.0   TTR:  93.2 % (3.8 y)   INR used for dosin.1 (10/25/2021)   Warfarin maintenance plan:  1.25 mg (2.5 mg x 0.5) every Thu; 2.5 mg (2.5 mg x 1) all other days   Weekly warfarin total:  16.25 mg   Plan last modified:  George Buchanan RN (2021)   Next INR check:  2021   Target end date:   Indefinite    Indications    Paroxysmal atrial fibrillation ([facility] [address] [I48.0]  Long term (current) use of anticoagulants [Z79.01]             Anticoagulation Episode Summary     INR check location:      Preferred lab:      Send INR reminders to:      Comments:        Anticoagulation Care Providers     Provider Role Specialty Phone number    Manjit Lance MD [facility] Cardiology [phone]          Future Appointments   Date Time Provider Monika Garcia   2021 11:00 AM COUMADINKAREN BS AMB   2021  9:15 AM REMOTE1KAREN BS AMB   3/15/2022 11:20 AM PACEMAKER3, KAREN LI BS AMB   3/15/2022 11:40 AM MD GUILLERMO Puente BS AMB   10/11/2022 11:30 AM Kapil Iniguez MD [facility] MAIN BS AMB

## 2021-10-31 DIAGNOSIS — I48.0 PAROXYSMAL ATRIAL FIBRILLATION (HCC): ICD-10-CM

## 2021-11-02 RX ORDER — WARFARIN 2.5 MG/1
TABLET ORAL
Qty: 90 TABLET | Refills: 0 | Status: SHIPPED | OUTPATIENT
Start: 2021-11-02 | End: 2022-01-31

## 2021-11-02 NOTE — TELEPHONE ENCOUNTER
Received refill request for warfarin 2.5 mg po tabs. Refill authorized.     Future Appointments   Date Time Provider Monika Garcia   11/22/2021 11:00 AM COUMADIN, KAREN WEBBER AMB   12/13/2021  9:15 AM REMOTE1, KAREN WEBBER AMB   3/15/2022 11:20 AM PACEMAKER3, KAREN WEBBER AMB   3/15/2022 11:40 AM MD GUILLERMO Rogers AMB   10/11/2022 11:30 AM Matt Iniguez MD [facility] MORGAN BS AMB

## 2021-11-22 ENCOUNTER — ANTI-COAG VISIT (OUTPATIENT)
Dept: CARDIOLOGY CLINIC | Age: 86
End: 2021-11-22
Payer: MEDICARE

## 2021-11-22 DIAGNOSIS — I48.0 PAROXYSMAL ATRIAL FIBRILLATION (HCC): Primary | ICD-10-CM

## 2021-11-22 DIAGNOSIS — Z79.01 LONG TERM (CURRENT) USE OF ANTICOAGULANTS: ICD-10-CM

## 2021-11-22 LAB
INR BLD: 2.2
PT POC: NORMAL
VALID INTERNAL CONTROL?: YES

## 2021-11-22 PROCEDURE — 85610 PROTHROMBIN TIME: CPT | Performed by: INTERNAL MEDICINE

## 2021-11-22 NOTE — PROGRESS NOTES
A full discussion of the nature of anticoagulants has been carried out. A benefit risk analysis has been presented to the patient, so that they understand the justification for choosing anticoagulation at this time. The need for frequent and regular monitoring, precise dosage adjustment and compliance is stressed. Side effects of potential bleeding are discussed. The patient should avoid any OTC items containing aspirin or ibuprofen, and should avoid great swings in general diet. Avoid alcohol consumption. Call if any signs of abnormal bleeding. Next PT/INR test in 1 month. Pt dose was not changed     Anticoagulation Summary  As of 2021    INR goal:  2.0-3.0   TTR:  93.3 % (3.9 y)   INR used for dosin.2 (2021)   Warfarin maintenance plan:  1.25 mg (2.5 mg x 0.5) every Thu; 2.5 mg (2.5 mg x 1) all other days   Weekly warfarin total:  16.25 mg   Plan last modified:  Sobeida Hansen RN (2021)   Next INR check:  2021   Target end date: Indefinite    Indications    Paroxysmal atrial fibrillation ([facility] [address] [I48.0]  Long term (current) use of anticoagulants [Z79.01]             Anticoagulation Episode Summary     INR check location:      Preferred lab:      Send INR reminders to:      Comments:        Anticoagulation Care Providers     Provider Role Specialty Phone number    Giovanna Fierro MD [facility] Cardiology [phone]          Future Appointments   Date Time Provider Monika Garcia   2021  9:15 AM DAYAN1KAREN BS AMB   2021 11:00 AM [address] [place] [address] Dr [facility]S    3/15/2022 11:20 AM [email or web address] BS AMB   3/15/2022 11:40 AM MD GUILLERMO Bill BS AMB   10/11/2022 11:30 AM Eli Iniguez MD [facility] MAIN BS AMB

## 2021-11-30 RX ORDER — AMLODIPINE BESYLATE 5 MG/1
TABLET ORAL
Qty: 30 TABLET | Refills: 2 | Status: SHIPPED | OUTPATIENT
Start: 2021-11-30 | End: 2022-03-04

## 2021-11-30 NOTE — TELEPHONE ENCOUNTER
Received refill request for amlodipine 5 mg po tabs. Refill authorized. Future Appointments   Date Time Provider Monika Radha   12/13/2021  9:15 AM REMOTE1, KAREN LI BS AMB   12/27/2021 11:00 AM [address] [place] [address] MEDICATION [address] [facility]ERSON[facility]    3/15/2022 11:20 AM PACEMAKER3, KAREN WEBBER AMB   3/15/2022 11:40 AM MD GUILLERMO Hogan AMB   10/11/2022 11:30 AM Miranda Iniguez MD [facility] MAIN BS AMB

## 2021-12-13 ENCOUNTER — OFFICE VISIT (OUTPATIENT)
Dept: CARDIOLOGY CLINIC | Age: 86
End: 2021-12-13
Payer: MEDICARE

## 2021-12-13 DIAGNOSIS — Z95.0 CARDIAC PACEMAKER IN SITU: Primary | ICD-10-CM

## 2021-12-13 PROCEDURE — 93296 REM INTERROG EVL PM/IDS: CPT | Performed by: INTERNAL MEDICINE

## 2021-12-13 NOTE — LETTER
12/13/2021 9:40 AM    Ms. Evie CARVALHO [place]  [address] [facility]            This letter confirms that we have received your scheduled remote check of your implanted     device on 12-13-21  . Our EP team will contact you via phone if there are significant abnormal    findings. Your next in-clinic device check is scheduled for 3-15-22 at 11:20am  .                   If you have any questions, please call [address] [place] [address] Drive at [phone].                Sincerely,    Read MD [facility]

## 2021-12-16 NOTE — TELEPHONE ENCOUNTER
Patients son called and canceled the patients coumadin check on 3/19 and would like to speak to and see when she should come in to have this done. He states that he does not want to bring her out during the COVID 19.     Phone:  [phone] ,

## 2021-12-21 NOTE — PATIENT INSTRUCTIONS
Today your INR was 3.2. Your goal INR is  2.0-3.0. You have a  2.5 mg tablet of Coumadin (warfarin). Take Coumadin (warfarin) as follows: Take 1.25 mg  (0.5 tablet) every Thursday and take 2.5 mg (1 tablet) daily rest of week    Come back in 2 week(s) for your next finger stick/INR blood test.        Avoid any over the counter items containing aspirin or ibuprofen, and avoid great swings in general diet. Avoid alcohol consumption. Please notify the INR nurse if you are started on any new medication including over the counter or herbal supplements. Also, please notify your INR nurse if any of your other prescription or over the counter medications have been discontinued. Call [facility] at [phone] if you have any signs of abnormal bleeding/blood clot.  ------------------------------------------------------------------------------------------------------------------  Taking Warfarin Safely: Care Instructions    Your Care Instructions  Warfarin is a medicine that you take to prevent blood clots. It is often called a blood thinner. Doctors give warfarin (such as Coumadin) to reduce the risk of blood clots. You may be at risk for blood clots if you have atrial fibrillation or deep vein thrombosis. Some other health problems may also put you at risk. Warfarin slows the amount of time it takes for your blood to clot. It can cause bleeding problems. Even if you've been taking warfarin for a while, it's important to know how to take it safely. Foods and other medicines can affect the way warfarin works. Some can make warfarin work too well. This can cause bleeding problems. And some can make it work poorly, so that it does not prevent blood clots very well. You will need regular blood tests to check how long it takes for your blood to form a clot. This test is called a PT or prothrombin time test. The result of the test is called an INR level.  Depending on the test results, your doctor or anticoagulation clinic may adjust your dose of warfarin. Follow-up care is a key part of your treatment and safety. Be sure to make and go to all appointments, and call your doctor if you are having problems. It's also a good idea to know your test results and keep a list of the medicines you take. How can you care for yourself at home? Take warfarin safely  · Take your warfarin at the same time each day. · If you miss a dose of warfarin, don't take an extra dose to make up for it. Your doctor can tell you exactly what to do so you don't take too much or too little. · Wear medical alert jewelry that lets others know that you take warfarin. You can buy this at most drugstores. · Don't take warfarin if you are pregnant or planning to get pregnant. Talk to your doctor about how you can prevent getting pregnant while you are taking it. · Don't change your dose or stop taking warfarin unless your doctor tells you to. Effects of medicines and food on warfarin  · Don't start or stop taking any medicines, vitamins, or natural remedies unless you first talk to your doctor. Many medicines can affect how warfarin works. These include aspirin and other pain relievers, over-the-counter medicines, multivitamins, dietary supplements, and herbal products. · Tell all of your doctors and pharmacists that you take warfarin. Some prescription medicines can affect how warfarin works. · Keep the amount of vitamin K in your diet about the same from day to day. Do not suddenly eat a lot more or a lot less food that is rich in vitamin K than you usually do. Vitamin K affects how warfarin works and how your blood clots. Talk with your doctor before making big changes in your diet. Vitamin K is in many foods, such as:  ¨ Leafy greens, such as kale, cabbage, spinach, Swiss chard, and lettuce. ¨ Canola and soybean oils. ¨ Green vegetables, such as asparagus, broccoli, and [place] sprouts.   ¨ Vegetable drinks, green tea leaves, and some dietary supplement drinks. · Avoid cranberry juice and other cranberry products. They can increase the effects of warfarin. · Limit your use of alcohol. Avoid bleeding by preventing falls and injuries  · Wear slippers or shoes with nonskid soles. · Remove throw rugs and clutter. · Rearrange furniture and electrical cords to keep them out of walking paths. · Keep stairways, porches, and outside walkways well lit. Use night-lights in hallways and bathrooms. · Be extra careful when you work with sharp tools or knives. When should you call for help? Call 911 anytime you think you may need emergency care. For example, call if:  · You have a sudden, severe headache that is different from past headaches. Call your doctor now or seek immediate medical care if:  · You have any abnormal bleeding, such as:  ¨ Nosebleeds. ¨ Vaginal bleeding that is different (heavier, more frequent, at a different time of the month) than what you are used to. ¨ Bloody or black stools, or rectal bleeding. ¨ Bloody or pink urine. Watch closely for changes in your health, and be sure to contact your doctor if you have any problems. Where can you learn more? Go to http://www.gray.com/. Enter C699 in the search box to learn more about \"Taking Warfarin Safely: Care Instructions. \"  Current as of: January 27, 2016  Content Version: 11.1  © 2901-8584 [employer]. Care instructions adapted under license by [employer] (which disclaims liability or warranty for this information). If you have questions about a medical condition or this instruction, always ask your healthcare professional. [place] [address] any warranty or liability for your use of this information.

## 2021-12-21 NOTE — PROGRESS NOTES
Pharmacy Progress Note - Anticoagulation Management    S/O:  Ms. Kristin Patterson  is a 80 y.o. female seen today for anticoagulation management for the diagnosis of Atrial Fibrillation. HPI: At last visit (11/22) INR was 2.2 and therapeutic. Patient instructed to continue current regimen and recheck INR in 4 weeks. Interim History:    · Warfarin start date: Prior to October 2018  · INR Goal:  2.0-3.0    · Current warfarin regimen: 1.25 mg every Thu; 2.5 mg  all other days                · Warfarin tablet strength:   2.5 mg   · Duration of therapy: Indefinite    Today's pertinent positives includes:  No significant changes since last visit    Results for orders placed or performed in visit on 12/27/21   POC PROTHROMBIN W/INR   Result Value Ref Range    VALID INTERNAL CONTROL POC Yes     Prothrombin time (POC) 38.0 seconds    INR POC 3.2        · Adherence:   · Able to recall regimen? YES  · Miss/extra dose? NO  · Need refill? NO    Upcoming procedure(s):  NO      Past Medical History:   Diagnosis Date    Aortic valve disorders 4/29/2012    Atrial fibrillation ([facility] [address] 03/22/2011    on coumadin followed by cardiology - Nevaeh Sandoval MD    CAD (coronary artery disease)     Cor pulmonale, chronic ([facility] [address] 4/29/2012    Dyslipidemia (high LDL; low HDL) 4/29/2012    Gout 4/30/2012    Gout, arthritis 2/8/2012    Gout, joint     Hypertension     Hyperuricemia 4/30/2012    Obstructive sleep apnea 4/29/2012    Pacemaker 04/02/2006    Dr Sofy Page placed the original pacer 4/6/2006. It is replaced in 2014.   It is [facility]    Recurrent epistaxis 4/29/2012    Sinoatrial node dysfunction (HCC) 3/22/2011    Venous insufficiency      No Known Allergies  Current Outpatient Medications   Medication Sig    amLODIPine (NORVASC) 5 mg tablet TAKE 1 TABLET BY MOUTH DAILY    warfarin (COUMADIN) 2.5 mg tablet TAKE 1 TABLET BY MOUTH DAILY EXCEPT ON TAKE ONE-HALF OF A TABLET ON THURSDAYS AS DIRECTED    atenoloL (TENORMIN) 100 mg tablet Take 1 Tablet by mouth daily.  simvastatin (ZOCOR) 20 mg tablet Take 1 tablet every night    dorzolamide (TRUSOPT) 2 % ophthalmic solution INSTILL 1 DROP IN BOTH EYES BID    magnesium 250 mg tab Take 250 mg by mouth. Mon, wed, fri only    latanoprost (XALATAN) 0.005 % ophthalmic solution INT 1 GTT IN OU QD HS    levobunolol (BETAGAN) 0.5 % ophthalmic solution INSTILL 1 GTT OU BID     No current facility-administered medications for this visit. Wt Readings from Last 3 Encounters:   12/27/21 172 lb (78 kg)   10/05/21 174 lb (78.9 kg)   07/01/21 174 lb (78.9 kg)     BP Readings from Last 3 Encounters:   12/27/21 130/75   10/05/21 128/84   02/11/21 120/60     Pulse Readings from Last 3 Encounters:   12/27/21 63   10/05/21 60   02/11/21 66     Lab Results   Component Value Date/Time    WBC 5.8 10/05/2021 12:57 PM    Hemoglobin (POC) 12.6 06/17/2013 03:37 PM    HGB 12.9 10/05/2021 12:57 PM    Hematocrit (POC) 37 06/17/2013 03:37 PM    HCT 37.9 10/05/2021 12:57 PM    PLATELET 494 30/08/8048 12:57 PM    MCV 82.2 10/05/2021 12:57 PM     Lab Results   Component Value Date/Time    Sodium 135 (L) 10/05/2021 12:57 PM    Potassium 3.9 10/05/2021 12:57 PM    Chloride 103 10/05/2021 12:57 PM    CO2 28 10/05/2021 12:57 PM    Anion gap 4 (L) 10/05/2021 12:57 PM    Glucose 88 10/05/2021 12:57 PM    BUN 11 10/05/2021 12:57 PM    Creatinine 1.05 (H) 10/05/2021 12:57 PM    BUN/Creatinine ratio 10 (L) 10/05/2021 12:57 PM    GFR est AA 58 (L) 10/05/2021 12:57 PM    GFR est non-AA 48 (L) 10/05/2021 12:57 PM    Calcium 9.4 10/05/2021 12:57 PM    Bilirubin, total 0.7 10/05/2021 12:57 PM    Alk. phosphatase 68 10/05/2021 12:57 PM    Protein, total 7.5 10/05/2021 12:57 PM    Albumin 3.8 10/05/2021 12:57 PM    Globulin 3.7 10/05/2021 12:57 PM    A-G Ratio 1.0 (L) 10/05/2021 12:57 PM    ALT (SGPT) 17 10/05/2021 12:57 PM     CrCl cannot be calculated (Unknown ideal weight.).       INR History:   (normal INR range 0.8-1.2)     Date INR   PT   Dose/Comments  12/27/21 3.2  38.0 No changes  11/22/21 2.2   No changes  10/25/21 2.1   1.25 mg every Thu; 2.5 mg all other days    A/P:       Anticoagulation:  Considering Ms. Robins's past history, todays findings, and per Anticoagulation Collaborative Practice Agreement/Protocol:    1. Fingerstick POC INR (3.2) is Supratherapeutic for INR goal today. 2.  Continue warfarin 1.25 mg every Thursday and 2.5 mg daily rest of week. 3. Patient denies taking any extra doses of warfarin and denies changes to her other medications. 4. Patient reports consistent intake of vitamin K foods. We reviewed food sources of vitamin K and patient will incorporate 1-2 additional servings per week in addition to what she normally eats. 5. Since patient was previously therapeutic on this weekly dose of warfarin, patient will continue current regimen and recheck INR in 2 weeks. Patient was instructed to schedule an appointment in 2 week(s) prior to leaving the clinic. Medication reconciliation was completed during the visit. There are no discontinued medications. A full discussion of the nature of anticoagulants has been carried out. A full discussion of the need for frequent and regular monitoring, precise dosage adjustment and compliance was stressed. Side effects of potential bleeding were discussed and Ms. Robins was instructed to call [phone] if there are any signs of abnormal bleeding. Ms. Duong Abrams was instructed to avoid any OTC items containing aspirin or ibuprofen and prior to starting any new OTC products to consult with her physician or pharmacist to ensure no drug interactions are present. Ms. Duong Abrams was instructed to avoid any major changes in her general diet and to avoid alcohol consumption.     Ms. Duong Abrams was provided information in the AVS that includes topics on understanding coumadin therapy, drug interaction considerations, vitamin K and coumadin use, interactions with foods and supplements containing vitamin K, and the use of herbal products. Ms. Jade Lange verbalized her understanding of all instructions and will call the office with any questions, concerns, or signs of abnormal bleeding or blood clot. Notifications of recommendations will be sent to Dr. Ruben Deal MD for review.     Thank you,  Vianca Medina, [phone] [facility] Tracking Only     Intervention Detail: Adherence Monitorin and Lab(s) Ordered   Total # of Interventions Recommended: 2   Total # of Interventions Accepted: 2   Time Spent (min): 20

## 2021-12-27 ENCOUNTER — ANTI-COAG VISIT (OUTPATIENT)
Dept: PHARMACY | Age: 86
End: 2021-12-27
Payer: MEDICARE

## 2021-12-27 VITALS
BODY MASS INDEX: 30.48 KG/M2 | HEIGHT: 63 IN | DIASTOLIC BLOOD PRESSURE: 75 MMHG | HEART RATE: 63 BPM | WEIGHT: 172 LBS | SYSTOLIC BLOOD PRESSURE: 130 MMHG

## 2021-12-27 DIAGNOSIS — Z79.01 LONG TERM (CURRENT) USE OF ANTICOAGULANTS: ICD-10-CM

## 2021-12-27 DIAGNOSIS — I48.0 PAROXYSMAL ATRIAL FIBRILLATION (HCC): Primary | ICD-10-CM

## 2021-12-27 LAB
INR BLD: 3.2
PT POC: 38 SECONDS
VALID INTERNAL CONTROL?: YES

## 2021-12-27 PROCEDURE — 85610 PROTHROMBIN TIME: CPT

## 2021-12-27 PROCEDURE — 99211 OFF/OP EST MAY X REQ PHY/QHP: CPT

## 2022-01-07 NOTE — PROGRESS NOTES
Pharmacy Progress Note - Anticoagulation Management    S/O:  Ms. Marie Lofton  is a 80 y.o. female seen today for anticoagulation management for the diagnosis of Atrial Fibrillation. HPI: At last visit (11/22) INR was 3.2 and supratherapeutic. Patient instructed to continue current regimen and recheck INR in 2 weeks. Interim History:    · Warfarin start date: Prior to October 2018  · INR Goal:  2.0-3.0    · Current warfarin regimen: 1.25 mg every Thu; 2.5 mg  all other days                · Warfarin tablet strength:   2.5 mg   · Duration of therapy: Indefinite    Today's pertinent positives includes:  No significant changes since last visit    Results for orders placed or performed in visit on 01/10/22   POC PROTHROMBIN W/INR   Result Value Ref Range    VALID INTERNAL CONTROL POC Yes     Prothrombin time (POC) 46.3 seconds    INR POC 3.9        · Adherence:   · Able to recall regimen? YES  · Miss/extra dose? NO  · Need refill? NO    Upcoming procedure(s):  NO      Past Medical History:   Diagnosis Date    Aortic valve disorders 4/29/2012    Atrial fibrillation ([facility] [address] 03/22/2011    on coumadin followed by cardiology - Rolanda Verma MD    CAD (coronary artery disease)     Cor pulmonale, chronic ([facility] [address] 4/29/2012    Dyslipidemia (high LDL; low HDL) 4/29/2012    Gout 4/30/2012    Gout, arthritis 2/8/2012    Gout, joint     Hypertension     Hyperuricemia 4/30/2012    Obstructive sleep apnea 4/29/2012    Pacemaker 04/02/2006    Dr Yumiko Lynne placed the original pacer 4/6/2006. It is replaced in 2014.   It is [facility]    Recurrent epistaxis 4/29/2012    Sinoatrial node dysfunction (HCC) 3/22/2011    Venous insufficiency      No Known Allergies  Current Outpatient Medications   Medication Sig    amLODIPine (NORVASC) 5 mg tablet TAKE 1 TABLET BY MOUTH DAILY    warfarin (COUMADIN) 2.5 mg tablet TAKE 1 TABLET BY MOUTH DAILY EXCEPT ON TAKE ONE-HALF OF A TABLET ON THURSDAYS AS DIRECTED    atenoloL (TENORMIN) 100 mg tablet Take 1 Tablet by mouth daily.  simvastatin (ZOCOR) 20 mg tablet Take 1 tablet every night    dorzolamide (TRUSOPT) 2 % ophthalmic solution INSTILL 1 DROP IN BOTH EYES BID    magnesium 250 mg tab Take 250 mg by mouth. Mon, wed, fri only    latanoprost (XALATAN) 0.005 % ophthalmic solution INT 1 GTT IN OU QD HS    levobunolol (BETAGAN) 0.5 % ophthalmic solution INSTILL 1 GTT OU BID     No current facility-administered medications for this visit. Wt Readings from Last 3 Encounters:   12/27/21 172 lb (78 kg)   10/05/21 174 lb (78.9 kg)   07/01/21 174 lb (78.9 kg)     BP Readings from Last 3 Encounters:   12/27/21 130/75   10/05/21 128/84   02/11/21 120/60     Pulse Readings from Last 3 Encounters:   12/27/21 63   10/05/21 60   02/11/21 66     Lab Results   Component Value Date/Time    WBC 5.8 10/05/2021 12:57 PM    Hemoglobin (POC) 12.6 06/17/2013 03:37 PM    HGB 12.9 10/05/2021 12:57 PM    Hematocrit (POC) 37 06/17/2013 03:37 PM    HCT 37.9 10/05/2021 12:57 PM    PLATELET 568 73/08/7493 12:57 PM    MCV 82.2 10/05/2021 12:57 PM     Lab Results   Component Value Date/Time    Sodium 135 (L) 10/05/2021 12:57 PM    Potassium 3.9 10/05/2021 12:57 PM    Chloride 103 10/05/2021 12:57 PM    CO2 28 10/05/2021 12:57 PM    Anion gap 4 (L) 10/05/2021 12:57 PM    Glucose 88 10/05/2021 12:57 PM    BUN 11 10/05/2021 12:57 PM    Creatinine 1.05 (H) 10/05/2021 12:57 PM    BUN/Creatinine ratio 10 (L) 10/05/2021 12:57 PM    GFR est AA 58 (L) 10/05/2021 12:57 PM    GFR est non-AA 48 (L) 10/05/2021 12:57 PM    Calcium 9.4 10/05/2021 12:57 PM    Bilirubin, total 0.7 10/05/2021 12:57 PM    Alk. phosphatase 68 10/05/2021 12:57 PM    Protein, total 7.5 10/05/2021 12:57 PM    Albumin 3.8 10/05/2021 12:57 PM    Globulin 3.7 10/05/2021 12:57 PM    A-G Ratio 1.0 (L) 10/05/2021 12:57 PM    ALT (SGPT) 17 10/05/2021 12:57 PM     CrCl cannot be calculated (Unknown ideal weight.).       INR History:   (normal INR range 0.8-1.2) Date   INR   PT   Dose/Comments  01/10/22 3.9  46.3 No changes  12/27/21 3.2  38.0 No changes  11/22/21 2.2   No changes  10/25/21 2.1   1.25 mg every Thu; 2.5 mg all other days    A/P:       Anticoagulation:  Considering Ms. Robins's past history, todays findings, and per Anticoagulation Collaborative Practice Agreement/Protocol:    1. Fingerstick POC INR (3.9) is Supratherapeutic for INR goal today. 2.  Change warfarin and hold dose on 1/11 then resume taking 1.25 mg every Thursday and 2.5 mg daily rest of week  3. Patient denies taking any extra doses of warfarin and denies changes to her other medications. 4. Patient reports consistent intake of vitamin K foods. We reviewed food sources of vitamin K and patient will incorporate 1-2 additional servings per week in addition to what she normally eats. 5. Since patient has already taken her dose for today (1/10), patient will hold dose tomorrow (1/11) and then resume current regimen. This will reduce weekly warfarin dose from 16.25 mg to 13.75 mg. Will recheck INR in 1 week. Patient was instructed to schedule an appointment in 1 week(s) prior to leaving the clinic. Medication reconciliation was completed during the visit. There are no discontinued medications. A full discussion of the nature of anticoagulants has been carried out. A full discussion of the need for frequent and regular monitoring, precise dosage adjustment and compliance was stressed. Side effects of potential bleeding were discussed and Ms. Robins was instructed to call [phone] if there are any signs of abnormal bleeding. Ms. Basia Huynh was instructed to avoid any OTC items containing aspirin or ibuprofen and prior to starting any new OTC products to consult with her physician or pharmacist to ensure no drug interactions are present. Ms. Basia Huynh was instructed to avoid any major changes in her general diet and to avoid alcohol consumption.     Ms. Basia Huynh was provided information in the AVS that includes topics on understanding coumadin therapy, drug interaction considerations, vitamin K and coumadin use, interactions with foods and supplements containing vitamin K, and the use of herbal products. Ms. Iglesia Cowart verbalized her understanding of all instructions and will call the office with any questions, concerns, or signs of abnormal bleeding or blood clot. Notifications of recommendations will be sent to Dr. Tristen Maurer MD for review.     Thank you,  Brittnay Olguin, [phone] [facility] Tracking Only     Intervention Detail: Adherence Monitorin, Dose Adjustment: 1, reason: Therapy Optimization and Lab(s) Ordered   Total # of Interventions Recommended: 3   Total # of Interventions Accepted: 3   Time Spent (min): 20

## 2022-01-07 NOTE — PATIENT INSTRUCTIONS
Today your INR was 3.9. Your goal INR is  2.0-3.0. You have a  2.5 mg tablet of Coumadin (warfarin). Take Coumadin (warfarin) as follows:    Hold dose tomorrow (1/11) then take 1.25 mg  (0.5 tablet) every Thursday and take 2.5 mg (1 tablet) daily rest of week    Come back in 1 week(s) for your next finger stick/INR blood test.        Avoid any over the counter items containing aspirin or ibuprofen, and avoid great swings in general diet. Avoid alcohol consumption. Please notify the INR nurse if you are started on any new medication including over the counter or herbal supplements. Also, please notify your INR nurse if any of your other prescription or over the counter medications have been discontinued. Your Care Instructions  Warfarin is a medicine that you take to prevent blood clots. It is often called a blood thinner. Doctors give warfarin (such as Coumadin) to reduce the risk of blood clots. You may be at risk for blood clots if you have atrial fibrillation or deep vein thrombosis. Some other health problems may also put you at risk. Warfarin slows the amount of time it takes for your blood to clot. It can cause bleeding problems. Even if you've been taking warfarin for a while, it's important to know how to take it safely. Foods and other medicines can affect the way warfarin works. Some can make warfarin work too well. This can cause bleeding problems. And some can make it work poorly, so that it does not prevent blood clots very well. You will need regular blood tests to check how long it takes for your blood to form a clot. This test is called a PT or prothrombin time test. The result of the test is called an INR level. Depending on the test results, your doctor or anticoagulation clinic may adjust your dose of warfarin. Follow-up care is a key part of your treatment and safety. Be sure to make and go to all appointments, and call your doctor if you are having problems.  It's also a good idea to know your test results and keep a list of the medicines you take. How can you care for yourself at home? Take warfarin safely  · Take your warfarin at the same time each day. · If you miss a dose of warfarin, don't take an extra dose to make up for it. Your doctor can tell you exactly what to do so you don't take too much or too little. · Wear medical alert jewelry that lets others know that you take warfarin. You can buy this at most drugstores. · Don't take warfarin if you are pregnant or planning to get pregnant. Talk to your doctor about how you can prevent getting pregnant while you are taking it. · Don't change your dose or stop taking warfarin unless your doctor tells you to. Effects of medicines and food on warfarin  · Don't start or stop taking any medicines, vitamins, or natural remedies unless you first talk to your doctor. Many medicines can affect how warfarin works. These include aspirin and other pain relievers, over-the-counter medicines, multivitamins, dietary supplements, and herbal products. · Tell all of your doctors and pharmacists that you take warfarin. Some prescription medicines can affect how warfarin works. · Keep the amount of vitamin K in your diet about the same from day to day. Do not suddenly eat a lot more or a lot less food that is rich in vitamin K than you usually do. Vitamin K affects how warfarin works and how your blood clots. Talk with your doctor before making big changes in your diet. Vitamin K is in many foods, such as:  ¨ Leafy greens, such as kale, cabbage, spinach, Swiss chard, and lettuce. ¨ Canola and soybean oils. ¨ Green vegetables, such as asparagus, broccoli, and [place] sprouts. ¨ Vegetable drinks, green tea leaves, and some dietary supplement drinks. · Avoid cranberry juice and other cranberry products. They can increase the effects of warfarin. · Limit your use of alcohol.     Avoid bleeding by preventing falls and injuries  · Wear slippers or shoes with nonskid soles. · Remove throw rugs and clutter. · Rearrange furniture and electrical cords to keep them out of walking paths. · Keep stairways, porches, and outside walkways well lit. Use night-lights in hallways and bathrooms. · Be extra careful when you work with sharp tools or knives. When should you call for help? Call 911 anytime you think you may need emergency care. For example, call if:  · You have a sudden, severe headache that is different from past headaches. Call your doctor now or seek immediate medical care if:  · You have any abnormal bleeding, such as:  ¨ Nosebleeds. ¨ Vaginal bleeding that is different (heavier, more frequent, at a different time of the month) than what you are used to. ¨ Bloody or black stools, or rectal bleeding. ¨ Bloody or pink urine. Watch closely for changes in your health, and be sure to contact your doctor if you have any problems. Where can you learn more? Go to http://www.gray.com/. Enter V268 in the search box to learn more about \"Taking Warfarin Safely: Care Instructions. \"  Current as of: January 27, 2016  Content Version: 11.1  © 5417-8104 [employer]. Care instructions adapted under license by [employer] (which disclaims liability or warranty for this information). If you have questions about a medical condition or this instruction, always ask your healthcare professional. [place] [address] any warranty or liability for your use of this information.

## 2022-01-10 ENCOUNTER — ANTI-COAG VISIT (OUTPATIENT)
Dept: PHARMACY | Age: 87
End: 2022-01-10
Payer: MEDICARE

## 2022-01-10 DIAGNOSIS — I48.0 PAROXYSMAL ATRIAL FIBRILLATION (HCC): Primary | ICD-10-CM

## 2022-01-10 DIAGNOSIS — Z79.01 LONG TERM (CURRENT) USE OF ANTICOAGULANTS: ICD-10-CM

## 2022-01-10 LAB
INR BLD: 3.9
PT POC: 46.3 SECONDS
VALID INTERNAL CONTROL?: YES

## 2022-01-10 PROCEDURE — 99212 OFFICE O/P EST SF 10 MIN: CPT

## 2022-01-10 PROCEDURE — 85610 PROTHROMBIN TIME: CPT

## 2022-01-17 NOTE — PATIENT INSTRUCTIONS
Today your INR was 3.3. Your goal INR is  2.0-3.0. You have a  2.5 mg tablet of Coumadin (warfarin). Take Coumadin (warfarin) as follows: Take 1.25 mg  (0.5 tablet) every Monday, Thursday and take 2.5 mg (1 tablet) daily rest of week    Come back in 2 week(s) for your next finger stick/INR blood test.        Avoid any over the counter items containing aspirin or ibuprofen, and avoid great swings in general diet. Avoid alcohol consumption. Please notify the INR nurse if you are started on any new medication including over the counter or herbal supplements. Also, please notify your INR nurse if any of your other prescription or over the counter medications have been discontinued. Your Care Instructions  Warfarin is a medicine that you take to prevent blood clots. It is often called a blood thinner. Doctors give warfarin (such as Coumadin) to reduce the risk of blood clots. You may be at risk for blood clots if you have atrial fibrillation or deep vein thrombosis. Some other health problems may also put you at risk. Warfarin slows the amount of time it takes for your blood to clot. It can cause bleeding problems. Even if you've been taking warfarin for a while, it's important to know how to take it safely. Foods and other medicines can affect the way warfarin works. Some can make warfarin work too well. This can cause bleeding problems. And some can make it work poorly, so that it does not prevent blood clots very well. You will need regular blood tests to check how long it takes for your blood to form a clot. This test is called a PT or prothrombin time test. The result of the test is called an INR level. Depending on the test results, your doctor or anticoagulation clinic may adjust your dose of warfarin. Follow-up care is a key part of your treatment and safety. Be sure to make and go to all appointments, and call your doctor if you are having problems.  It's also a good idea to know your test results and keep a list of the medicines you take. How can you care for yourself at home? Take warfarin safely  · Take your warfarin at the same time each day. · If you miss a dose of warfarin, don't take an extra dose to make up for it. Your doctor can tell you exactly what to do so you don't take too much or too little. · Wear medical alert jewelry that lets others know that you take warfarin. You can buy this at most drugstores. · Don't take warfarin if you are pregnant or planning to get pregnant. Talk to your doctor about how you can prevent getting pregnant while you are taking it. · Don't change your dose or stop taking warfarin unless your doctor tells you to. Effects of medicines and food on warfarin  · Don't start or stop taking any medicines, vitamins, or natural remedies unless you first talk to your doctor. Many medicines can affect how warfarin works. These include aspirin and other pain relievers, over-the-counter medicines, multivitamins, dietary supplements, and herbal products. · Tell all of your doctors and pharmacists that you take warfarin. Some prescription medicines can affect how warfarin works. · Keep the amount of vitamin K in your diet about the same from day to day. Do not suddenly eat a lot more or a lot less food that is rich in vitamin K than you usually do. Vitamin K affects how warfarin works and how your blood clots. Talk with your doctor before making big changes in your diet. Vitamin K is in many foods, such as:  ¨ Leafy greens, such as kale, cabbage, spinach, Swiss chard, and lettuce. ¨ Canola and soybean oils. ¨ Green vegetables, such as asparagus, broccoli, and [place] sprouts. ¨ Vegetable drinks, green tea leaves, and some dietary supplement drinks. · Avoid cranberry juice and other cranberry products. They can increase the effects of warfarin. · Limit your use of alcohol.     Avoid bleeding by preventing falls and injuries  · Wear slippers or shoes with nonskid soles. · Remove throw rugs and clutter. · Rearrange furniture and electrical cords to keep them out of walking paths. · Keep stairways, porches, and outside walkways well lit. Use night-lights in hallways and bathrooms. · Be extra careful when you work with sharp tools or knives. When should you call for help? Call 911 anytime you think you may need emergency care. For example, call if:  · You have a sudden, severe headache that is different from past headaches. Call your doctor now or seek immediate medical care if:  · You have any abnormal bleeding, such as:  ¨ Nosebleeds. ¨ Vaginal bleeding that is different (heavier, more frequent, at a different time of the month) than what you are used to. ¨ Bloody or black stools, or rectal bleeding. ¨ Bloody or pink urine. Watch closely for changes in your health, and be sure to contact your doctor if you have any problems. Where can you learn more? Go to http://www.gray.com/. Enter H678 in the search box to learn more about \"Taking Warfarin Safely: Care Instructions. \"  Current as of: January 27, 2016  Content Version: 11.1  © 2151-7211 [employer]. Care instructions adapted under license by [employer] (which disclaims liability or warranty for this information). If you have questions about a medical condition or this instruction, always ask your healthcare professional. [place] [address] any warranty or liability for your use of this information.

## 2022-01-17 NOTE — PROGRESS NOTES
Pharmacy Progress Note - Anticoagulation Management    S/O:  Ms. Serafin Griffin  is a 80 y.o. female seen today for anticoagulation management for the diagnosis of Atrial Fibrillation. HPI: At last visit (01/10) INR was 3.9 and supratherapeutic. Patient had already taken dose 1/10 so patient was instructed to hold dose on 1/11 then resume taking 1.25 mg every Thursday and 2.5 mg daily rest of week. Patient instructed to recheck INR in 1 week. Due to inclement weather, patient rescheduled from 1/17 to 1/19. Interim History:    · Warfarin start date: Prior to October 2018  · INR Goal:  2.0-3.0    · Current warfarin regimen: held dose x 1 day then 1.25 mg every Thu; 2.5 mg  all other days                · Warfarin tablet strength:   2.5 mg   · Duration of therapy: Indefinite    Today's pertinent positives includes:  No significant changes since last visit    Results for orders placed or performed in visit on 01/19/22   POC PROTHROMBIN W/INR   Result Value Ref Range    VALID INTERNAL CONTROL POC Yes     Prothrombin time (POC) 39.7 seconds    INR POC 3.3        · Adherence:   · Able to recall regimen? YES  · Miss/extra dose? NO  · Need refill? NO    Upcoming procedure(s):  NO      Past Medical History:   Diagnosis Date    Aortic valve disorders 4/29/2012    Atrial fibrillation ([facility] [address] 03/22/2011    on coumadin followed by cardiology - Stephany Pettit MD    CAD (coronary artery disease)     Cor pulmonale, chronic ([facility] [address] 4/29/2012    Dyslipidemia (high LDL; low HDL) 4/29/2012    Gout 4/30/2012    Gout, arthritis 2/8/2012    Gout, joint     Hypertension     Hyperuricemia 4/30/2012    Obstructive sleep apnea 4/29/2012    Pacemaker 04/02/2006    Dr Liv Carter placed the original pacer 4/6/2006. It is replaced in 2014.   It is [facility]    Recurrent epistaxis 4/29/2012    Sinoatrial node dysfunction (HCC) 3/22/2011    Venous insufficiency      No Known Allergies  Current Outpatient Medications   Medication Sig    amLODIPine (NORVASC) 5 mg tablet TAKE 1 TABLET BY MOUTH DAILY    warfarin (COUMADIN) 2.5 mg tablet TAKE 1 TABLET BY MOUTH DAILY EXCEPT ON TAKE ONE-HALF OF A TABLET ON THURSDAYS AS DIRECTED    atenoloL (TENORMIN) 100 mg tablet Take 1 Tablet by mouth daily.  simvastatin (ZOCOR) 20 mg tablet Take 1 tablet every night    dorzolamide (TRUSOPT) 2 % ophthalmic solution INSTILL 1 DROP IN BOTH EYES BID    magnesium 250 mg tab Take 250 mg by mouth. Mon, wed, fri only    latanoprost (XALATAN) 0.005 % ophthalmic solution INT 1 GTT IN OU QD HS    levobunolol (BETAGAN) 0.5 % ophthalmic solution INSTILL 1 GTT OU BID     No current facility-administered medications for this visit. Wt Readings from Last 3 Encounters:   01/19/22 172 lb (78 kg)   12/27/21 172 lb (78 kg)   10/05/21 174 lb (78.9 kg)     BP Readings from Last 3 Encounters:   01/19/22 119/72   12/27/21 130/75   10/05/21 128/84     Pulse Readings from Last 3 Encounters:   01/19/22 68   12/27/21 63   10/05/21 60     Lab Results   Component Value Date/Time    WBC 5.8 10/05/2021 12:57 PM    Hemoglobin (POC) 12.6 06/17/2013 03:37 PM    HGB 12.9 10/05/2021 12:57 PM    Hematocrit (POC) 37 06/17/2013 03:37 PM    HCT 37.9 10/05/2021 12:57 PM    PLATELET 573 02/09/1525 12:57 PM    MCV 82.2 10/05/2021 12:57 PM     Lab Results   Component Value Date/Time    Sodium 135 (L) 10/05/2021 12:57 PM    Potassium 3.9 10/05/2021 12:57 PM    Chloride 103 10/05/2021 12:57 PM    CO2 28 10/05/2021 12:57 PM    Anion gap 4 (L) 10/05/2021 12:57 PM    Glucose 88 10/05/2021 12:57 PM    BUN 11 10/05/2021 12:57 PM    Creatinine 1.05 (H) 10/05/2021 12:57 PM    BUN/Creatinine ratio 10 (L) 10/05/2021 12:57 PM    GFR est AA 58 (L) 10/05/2021 12:57 PM    GFR est non-AA 48 (L) 10/05/2021 12:57 PM    Calcium 9.4 10/05/2021 12:57 PM    Bilirubin, total 0.7 10/05/2021 12:57 PM    Alk.  phosphatase 68 10/05/2021 12:57 PM    Protein, total 7.5 10/05/2021 12:57 PM    Albumin 3.8 10/05/2021 12:57 PM Globulin 3.7 10/05/2021 12:57 PM    A-G Ratio 1.0 (L) 10/05/2021 12:57 PM    ALT (SGPT) 17 10/05/2021 12:57 PM     CrCl cannot be calculated (Unknown ideal weight.). INR History:   (normal INR range 0.8-1.2)     Date   INR   PT   Dose/Comments  01/19/22 3.3  39.7 Held x 1 day, then 1.25 mg Thur, 2.5 mg daily ROW  01/10/22 3.9  46.3 No changes  12/27/21 3.2  38.0 No changes  11/22/21 2.2   No changes  10/25/21 2.1   1.25 mg every Thu; 2.5 mg all other days    A/P:       Anticoagulation:  Considering Ms. Robins's past history, todays findings, and per Anticoagulation Collaborative Practice Agreement/Protocol:    1. Fingerstick POC INR (3.3) is Supratherapeutic for INR goal today. 2.  Change warfarin and decrease to take 1.25 mg on Mon, Thurs and 2.5 mg daily ROW  3. INR is 3.3 and remains supratherapeutic. Over the previous 7 days patient has taken 16.25 mg of warfarin. Will reduce weekly dose to 15 mg and patient will take 1.25 mg on Mon, Thurs and 2.5 mg daily ROW. Will recheck INR in 2 weeks. Patient was instructed to schedule an appointment in 2 week(s) prior to leaving the clinic. Medication reconciliation was completed during the visit. There are no discontinued medications. A full discussion of the nature of anticoagulants has been carried out. A full discussion of the need for frequent and regular monitoring, precise dosage adjustment and compliance was stressed. Side effects of potential bleeding were discussed and Ms. Robins was instructed to call [phone] if there are any signs of abnormal bleeding. Ms. Jeimy Pedraza was instructed to avoid any OTC items containing aspirin or ibuprofen and prior to starting any new OTC products to consult with her physician or pharmacist to ensure no drug interactions are present. Ms. Jeimy Pedraza was instructed to avoid any major changes in her general diet and to avoid alcohol consumption.     Ms. Jeimy Pedraza was provided information in the AVS that includes topics on understanding coumadin therapy, drug interaction considerations, vitamin K and coumadin use, interactions with foods and supplements containing vitamin K, and the use of herbal products. Ms. Dilma Sanchez verbalized her understanding of all instructions and will call the office with any questions, concerns, or signs of abnormal bleeding or blood clot. Notifications of recommendations will be sent to Dr. Keyla Wong MD for review.     Thank you,  Mari Bejarano, [address] Tracking Only     Intervention Detail: Adherence Monitorin, Dose Adjustment: 1, reason: Therapy Optimization and Lab(s) Ordered   Total # of Interventions Recommended: 3   Total # of Interventions Accepted: 3   Time Spent (min): 20

## 2022-01-19 ENCOUNTER — ANTI-COAG VISIT (OUTPATIENT)
Dept: PHARMACY | Age: 87
End: 2022-01-19
Payer: MEDICARE

## 2022-01-19 VITALS
HEIGHT: 63 IN | DIASTOLIC BLOOD PRESSURE: 72 MMHG | WEIGHT: 172 LBS | BODY MASS INDEX: 30.48 KG/M2 | SYSTOLIC BLOOD PRESSURE: 119 MMHG | HEART RATE: 68 BPM

## 2022-01-19 DIAGNOSIS — Z79.01 LONG TERM (CURRENT) USE OF ANTICOAGULANTS: ICD-10-CM

## 2022-01-19 DIAGNOSIS — I48.0 PAROXYSMAL ATRIAL FIBRILLATION (HCC): Primary | ICD-10-CM

## 2022-01-19 LAB
INR BLD: 3.3
PT POC: 39.7 SECONDS
VALID INTERNAL CONTROL?: YES

## 2022-01-19 PROCEDURE — 99211 OFF/OP EST MAY X REQ PHY/QHP: CPT

## 2022-01-19 PROCEDURE — 85610 PROTHROMBIN TIME: CPT

## 2022-01-29 DIAGNOSIS — I48.0 PAROXYSMAL ATRIAL FIBRILLATION (HCC): ICD-10-CM

## 2022-01-31 RX ORDER — WARFARIN 2.5 MG/1
TABLET ORAL
Qty: 90 TABLET | Refills: 0 | Status: SHIPPED | OUTPATIENT
Start: 2022-01-31 | End: 2022-04-29

## 2022-01-31 NOTE — TELEPHONE ENCOUNTER
Received refill request for warfarin 2.5 mg po tabs. Refill authorized. Future Appointments   Date Time Provider Monika Rhoadesi   2/2/2022 11:30 AM [facility] MEDICATION MGMT [address] [place] [address]   3/15/2022 11:20 AM KAREN STAUFFER BS AMB   3/15/2022 11:40 AM MD GUILLERMO Evans AMB   10/11/2022 11:30 AM Karla Iniguez MD [facility] MORGAN BS AMB

## 2022-02-01 NOTE — PATIENT INSTRUCTIONS
Today your INR was 1.9. Your goal INR is  2.0-3.0. You have a  2.5 mg tablet of Coumadin (warfarin). Take Coumadin (warfarin) as follows: Take 1.25 mg  (0.5 tablet) every Monday, Thursday and take 2.5 mg (1 tablet) daily rest of week    Come back in 2 week(s) for your next finger stick/INR blood test.        Avoid any over the counter items containing aspirin or ibuprofen, and avoid great swings in general diet. Avoid alcohol consumption. Please notify the INR nurse if you are started on any new medication including over the counter or herbal supplements. Also, please notify your INR nurse if any of your other prescription or over the counter medications have been discontinued. Your Care Instructions  Warfarin is a medicine that you take to prevent blood clots. It is often called a blood thinner. Doctors give warfarin (such as Coumadin) to reduce the risk of blood clots. You may be at risk for blood clots if you have atrial fibrillation or deep vein thrombosis. Some other health problems may also put you at risk. Warfarin slows the amount of time it takes for your blood to clot. It can cause bleeding problems. Even if you've been taking warfarin for a while, it's important to know how to take it safely. Foods and other medicines can affect the way warfarin works. Some can make warfarin work too well. This can cause bleeding problems. And some can make it work poorly, so that it does not prevent blood clots very well. You will need regular blood tests to check how long it takes for your blood to form a clot. This test is called a PT or prothrombin time test. The result of the test is called an INR level. Depending on the test results, your doctor or anticoagulation clinic may adjust your dose of warfarin. Follow-up care is a key part of your treatment and safety. Be sure to make and go to all appointments, and call your doctor if you are having problems.  It's also a good idea to know your test results and keep a list of the medicines you take. How can you care for yourself at home? Take warfarin safely  · Take your warfarin at the same time each day. · If you miss a dose of warfarin, don't take an extra dose to make up for it. Your doctor can tell you exactly what to do so you don't take too much or too little. · Wear medical alert jewelry that lets others know that you take warfarin. You can buy this at most drugstores. · Don't take warfarin if you are pregnant or planning to get pregnant. Talk to your doctor about how you can prevent getting pregnant while you are taking it. · Don't change your dose or stop taking warfarin unless your doctor tells you to. Effects of medicines and food on warfarin  · Don't start or stop taking any medicines, vitamins, or natural remedies unless you first talk to your doctor. Many medicines can affect how warfarin works. These include aspirin and other pain relievers, over-the-counter medicines, multivitamins, dietary supplements, and herbal products. · Tell all of your doctors and pharmacists that you take warfarin. Some prescription medicines can affect how warfarin works. · Keep the amount of vitamin K in your diet about the same from day to day. Do not suddenly eat a lot more or a lot less food that is rich in vitamin K than you usually do. Vitamin K affects how warfarin works and how your blood clots. Talk with your doctor before making big changes in your diet. Vitamin K is in many foods, such as:  ¨ Leafy greens, such as kale, cabbage, spinach, Swiss chard, and lettuce. ¨ Canola and soybean oils. ¨ Green vegetables, such as asparagus, broccoli, and [place] sprouts. ¨ Vegetable drinks, green tea leaves, and some dietary supplement drinks. · Avoid cranberry juice and other cranberry products. They can increase the effects of warfarin. · Limit your use of alcohol.     Avoid bleeding by preventing falls and injuries  · Wear slippers or shoes with nonskid soles. · Remove throw rugs and clutter. · Rearrange furniture and electrical cords to keep them out of walking paths. · Keep stairways, porches, and outside walkways well lit. Use night-lights in hallways and bathrooms. · Be extra careful when you work with sharp tools or knives. When should you call for help? Call 911 anytime you think you may need emergency care. For example, call if:  · You have a sudden, severe headache that is different from past headaches. Call your doctor now or seek immediate medical care if:  · You have any abnormal bleeding, such as:  ¨ Nosebleeds. ¨ Vaginal bleeding that is different (heavier, more frequent, at a different time of the month) than what you are used to. ¨ Bloody or black stools, or rectal bleeding. ¨ Bloody or pink urine. Watch closely for changes in your health, and be sure to contact your doctor if you have any problems. Where can you learn more? Go to http://www.gray.com/. Enter L034 in the search box to learn more about \"Taking Warfarin Safely: Care Instructions. \"  Current as of: January 27, 2016  Content Version: 11.1  © 0555-3404 [employer]. Care instructions adapted under license by [employer] (which disclaims liability or warranty for this information). If you have questions about a medical condition or this instruction, always ask your healthcare professional. [place] [address] any warranty or liability for your use of this information.

## 2022-02-01 NOTE — PROGRESS NOTES
Pharmacy Progress Note - Anticoagulation Management    S/O:  Ms. Dina Contreras  is a 80 y.o. female seen today for anticoagulation management for the diagnosis of Atrial Fibrillation. HPI: At last visit (01/19) INR was 3.3 and supratherapeutic. Patient instructed to decrease warfarin and take 1.25 mg on Mon, Thurs and 2.5 mg daily ROW. This change reduced weekly dose from 16.25 mg to 15 mg. Patient instructed to recheck INR in 2 weeks. Interim History:    · Warfarin start date: Prior to October 2018  · INR Goal:  2.0-3.0    · Current warfarin regimen: 1.25 mg Mon, Thurs and 2.5 mg daily ROW    · Warfarin tablet strength:   2.5 mg   · Duration of therapy: Indefinite    Today's pertinent positives includes:  Change in diet/appetite    Patient reports having 3 servings of greens in the past week. Results for orders placed or performed in visit on 02/02/22   POC PROTHROMBIN W/INR   Result Value Ref Range    VALID INTERNAL CONTROL POC Yes     Prothrombin time (POC) 22.4 seconds    INR POC 1.9      · Adherence:   · Able to recall regimen? YES  · Miss/extra dose? NO  · Need refill? NO    Upcoming procedure(s):  NO      Past Medical History:   Diagnosis Date    Aortic valve disorders 4/29/2012    Atrial fibrillation ([facility] [address] 03/22/2011    on coumadin followed by cardiology - Yonas Jimenez MD    CAD (coronary artery disease)     Cor pulmonale, chronic ([facility] [address] 4/29/2012    Dyslipidemia (high LDL; low HDL) 4/29/2012    Gout 4/30/2012    Gout, arthritis 2/8/2012    Gout, joint     Hypertension     Hyperuricemia 4/30/2012    Obstructive sleep apnea 4/29/2012    Pacemaker 04/02/2006    Dr Prabhakar Lazar placed the original pacer 4/6/2006. It is replaced in 2014.   It is [facility]    Recurrent epistaxis 4/29/2012    Sinoatrial node dysfunction (HCC) 3/22/2011    Venous insufficiency      No Known Allergies  Current Outpatient Medications   Medication Sig    warfarin (COUMADIN) 2.5 mg tablet TAKE 1 TABLET BY MOUTH DAILY EXCEPT ON TAKE ONE-HALF OF A TABLET ON THURSDAYS AS DIRECTED    amLODIPine (NORVASC) 5 mg tablet TAKE 1 TABLET BY MOUTH DAILY    atenoloL (TENORMIN) 100 mg tablet Take 1 Tablet by mouth daily.  simvastatin (ZOCOR) 20 mg tablet Take 1 tablet every night    dorzolamide (TRUSOPT) 2 % ophthalmic solution INSTILL 1 DROP IN BOTH EYES BID    magnesium 250 mg tab Take 250 mg by mouth. Mon, wed, fri only    latanoprost (XALATAN) 0.005 % ophthalmic solution INT 1 GTT IN OU QD HS    levobunolol (BETAGAN) 0.5 % ophthalmic solution INSTILL 1 GTT OU BID     No current facility-administered medications for this visit. Wt Readings from Last 3 Encounters:   02/02/22 172 lb (78 kg)   01/19/22 172 lb (78 kg)   12/27/21 172 lb (78 kg)     BP Readings from Last 3 Encounters:   02/02/22 121/70   01/19/22 119/72   12/27/21 130/75     Pulse Readings from Last 3 Encounters:   02/02/22 72   01/19/22 68   12/27/21 63     Lab Results   Component Value Date/Time    WBC 5.8 10/05/2021 12:57 PM    Hemoglobin (POC) 12.6 06/17/2013 03:37 PM    HGB 12.9 10/05/2021 12:57 PM    Hematocrit (POC) 37 06/17/2013 03:37 PM    HCT 37.9 10/05/2021 12:57 PM    PLATELET 174 23/33/8651 12:57 PM    MCV 82.2 10/05/2021 12:57 PM     Lab Results   Component Value Date/Time    Sodium 135 (L) 10/05/2021 12:57 PM    Potassium 3.9 10/05/2021 12:57 PM    Chloride 103 10/05/2021 12:57 PM    CO2 28 10/05/2021 12:57 PM    Anion gap 4 (L) 10/05/2021 12:57 PM    Glucose 88 10/05/2021 12:57 PM    BUN 11 10/05/2021 12:57 PM    Creatinine 1.05 (H) 10/05/2021 12:57 PM    BUN/Creatinine ratio 10 (L) 10/05/2021 12:57 PM    GFR est AA 58 (L) 10/05/2021 12:57 PM    GFR est non-AA 48 (L) 10/05/2021 12:57 PM    Calcium 9.4 10/05/2021 12:57 PM    Bilirubin, total 0.7 10/05/2021 12:57 PM    Alk.  phosphatase 68 10/05/2021 12:57 PM    Protein, total 7.5 10/05/2021 12:57 PM    Albumin 3.8 10/05/2021 12:57 PM    Globulin 3.7 10/05/2021 12:57 PM    A-G Ratio 1.0 (L) 10/05/2021 12:57 PM    ALT (SGPT) 17 10/05/2021 12:57 PM     CrCl cannot be calculated (Unknown ideal weight.). INR History:   (normal INR range 0.8-1.2)     Date   INR   PT   Dose/Comments  02/02/22 1.9  22.4 No changes  01/19/22 3.3  39.7 Held x 1 day, then 1.25 mg Thur, 2.5 mg daily ROW  01/10/22 3.9  46.3 No changes  12/27/21 3.2  38.0 No changes  11/22/21 2.2   No changes  10/25/21 2.1   1.25 mg every Thu; 2.5 mg all other days    A/P:       Anticoagulation:  Considering Ms. Robins's past history, todays findings, and per Anticoagulation Collaborative Practice Agreement/Protocol:    1. Fingerstick POC INR (1.9) is Subtherapeutic for INR goal today. 2.  Continue warfarin 1.25 mg on Mon, Thurs and 2.5 mg daily ROW  3. Patient reports having 3 servings of greens in the past week. Patient typically on has 1-2 servings per week. Patient denies any missed doses of warfarin or changes to her other medications. Patient will continue current warfarin regimen, return to typical weekly intake of greens, and recheck INR in 2 weeks. Patient was instructed to schedule an appointment in 2 week(s) prior to leaving the clinic. Medication reconciliation was completed during the visit. There are no discontinued medications. A full discussion of the nature of anticoagulants has been carried out. A full discussion of the need for frequent and regular monitoring, precise dosage adjustment and compliance was stressed. Side effects of potential bleeding were discussed and Ms. Robins was instructed to call [phone] if there are any signs of abnormal bleeding. Ms. Nica Santillan was instructed to avoid any OTC items containing aspirin or ibuprofen and prior to starting any new OTC products to consult with her physician or pharmacist to ensure no drug interactions are present. Ms. Nica Santillan was instructed to avoid any major changes in her general diet and to avoid alcohol consumption.     Ms. Nica Santillan was provided information in the AVS that includes topics on understanding coumadin therapy, drug interaction considerations, vitamin K and coumadin use, interactions with foods and supplements containing vitamin K, and the use of herbal products. Ms. Kailey Waggoner verbalized her understanding of all instructions and will call the office with any questions, concerns, or signs of abnormal bleeding or blood clot. Notifications of recommendations will be sent to Dr. Alis Arambula MD for review.     Thank you,  EULOGIO Mendes/ [address] Tracking Only     Intervention Detail: Adherence Monitorin and Lab(s) Ordered   Total # of Interventions Recommended: 2   Total # of Interventions Accepted: 2   Time Spent (min): 20

## 2022-02-02 ENCOUNTER — ANTI-COAG VISIT (OUTPATIENT)
Dept: PHARMACY | Age: 87
End: 2022-02-02
Payer: MEDICARE

## 2022-02-02 VITALS
DIASTOLIC BLOOD PRESSURE: 70 MMHG | WEIGHT: 172 LBS | HEART RATE: 72 BPM | SYSTOLIC BLOOD PRESSURE: 121 MMHG | BODY MASS INDEX: 30.48 KG/M2 | HEIGHT: 63 IN

## 2022-02-02 DIAGNOSIS — I48.0 PAROXYSMAL ATRIAL FIBRILLATION (HCC): Primary | ICD-10-CM

## 2022-02-02 DIAGNOSIS — Z79.01 LONG TERM (CURRENT) USE OF ANTICOAGULANTS: ICD-10-CM

## 2022-02-02 LAB
INR BLD: 1.9
PT POC: 22.4 SECONDS
VALID INTERNAL CONTROL?: YES

## 2022-02-02 PROCEDURE — 85610 PROTHROMBIN TIME: CPT

## 2022-02-02 PROCEDURE — 99211 OFF/OP EST MAY X REQ PHY/QHP: CPT

## 2022-02-18 NOTE — PATIENT INSTRUCTIONS
Today your INR was 2.3. Your goal INR is  2.0-3.0. You have a  2.5 mg tablet of Coumadin (warfarin). Take Coumadin (warfarin) as follows: Take 1.25 mg  (0.5 tablet) every Monday, Thursday and take 2.5 mg (1 tablet) daily rest of week    Come back in 4 week(s) for your next finger stick/INR blood test.        Avoid any over the counter items containing aspirin or ibuprofen, and avoid great swings in general diet. Avoid alcohol consumption. Please notify the INR nurse if you are started on any new medication including over the counter or herbal supplements. Also, please notify your INR nurse if any of your other prescription or over the counter medications have been discontinued. Your Care Instructions  Warfarin is a medicine that you take to prevent blood clots. It is often called a blood thinner. Doctors give warfarin (such as Coumadin) to reduce the risk of blood clots. You may be at risk for blood clots if you have atrial fibrillation or deep vein thrombosis. Some other health problems may also put you at risk. Warfarin slows the amount of time it takes for your blood to clot. It can cause bleeding problems. Even if you've been taking warfarin for a while, it's important to know how to take it safely. Foods and other medicines can affect the way warfarin works. Some can make warfarin work too well. This can cause bleeding problems. And some can make it work poorly, so that it does not prevent blood clots very well. You will need regular blood tests to check how long it takes for your blood to form a clot. This test is called a PT or prothrombin time test. The result of the test is called an INR level. Depending on the test results, your doctor or anticoagulation clinic may adjust your dose of warfarin. Follow-up care is a key part of your treatment and safety. Be sure to make and go to all appointments, and call your doctor if you are having problems.  It's also a good idea to know your test results and keep a list of the medicines you take. How can you care for yourself at home? Take warfarin safely  · Take your warfarin at the same time each day. · If you miss a dose of warfarin, don't take an extra dose to make up for it. Your doctor can tell you exactly what to do so you don't take too much or too little. · Wear medical alert jewelry that lets others know that you take warfarin. You can buy this at most drugstores. · Don't take warfarin if you are pregnant or planning to get pregnant. Talk to your doctor about how you can prevent getting pregnant while you are taking it. · Don't change your dose or stop taking warfarin unless your doctor tells you to. Effects of medicines and food on warfarin  · Don't start or stop taking any medicines, vitamins, or natural remedies unless you first talk to your doctor. Many medicines can affect how warfarin works. These include aspirin and other pain relievers, over-the-counter medicines, multivitamins, dietary supplements, and herbal products. · Tell all of your doctors and pharmacists that you take warfarin. Some prescription medicines can affect how warfarin works. · Keep the amount of vitamin K in your diet about the same from day to day. Do not suddenly eat a lot more or a lot less food that is rich in vitamin K than you usually do. Vitamin K affects how warfarin works and how your blood clots. Talk with your doctor before making big changes in your diet. Vitamin K is in many foods, such as:  ¨ Leafy greens, such as kale, cabbage, spinach, Swiss chard, and lettuce. ¨ Canola and soybean oils. ¨ Green vegetables, such as asparagus, broccoli, and [place] sprouts. ¨ Vegetable drinks, green tea leaves, and some dietary supplement drinks. · Avoid cranberry juice and other cranberry products. They can increase the effects of warfarin. · Limit your use of alcohol.     Avoid bleeding by preventing falls and injuries  · Wear slippers or shoes with nonskid soles. · Remove throw rugs and clutter. · Rearrange furniture and electrical cords to keep them out of walking paths. · Keep stairways, porches, and outside walkways well lit. Use night-lights in hallways and bathrooms. · Be extra careful when you work with sharp tools or knives. When should you call for help? Call 911 anytime you think you may need emergency care. For example, call if:  · You have a sudden, severe headache that is different from past headaches. Call your doctor now or seek immediate medical care if:  · You have any abnormal bleeding, such as:  ¨ Nosebleeds. ¨ Vaginal bleeding that is different (heavier, more frequent, at a different time of the month) than what you are used to. ¨ Bloody or black stools, or rectal bleeding. ¨ Bloody or pink urine. Watch closely for changes in your health, and be sure to contact your doctor if you have any problems. Where can you learn more? Go to http://www.gray.com/. Enter N497 in the search box to learn more about \"Taking Warfarin Safely: Care Instructions. \"  Current as of: January 27, 2016  Content Version: 11.1  © 1745-5816 [employer]. Care instructions adapted under license by [employer] (which disclaims liability or warranty for this information). If you have questions about a medical condition or this instruction, always ask your healthcare professional. [place] [address] any warranty or liability for your use of this information.

## 2022-02-21 ENCOUNTER — ANTI-COAG VISIT (OUTPATIENT)
Dept: PHARMACY | Age: 87
End: 2022-02-21
Payer: MEDICARE

## 2022-02-21 VITALS
WEIGHT: 170 LBS | SYSTOLIC BLOOD PRESSURE: 126 MMHG | BODY MASS INDEX: 30.12 KG/M2 | DIASTOLIC BLOOD PRESSURE: 67 MMHG | HEIGHT: 63 IN | HEART RATE: 61 BPM

## 2022-02-21 DIAGNOSIS — I48.0 PAROXYSMAL ATRIAL FIBRILLATION (HCC): Primary | ICD-10-CM

## 2022-02-21 DIAGNOSIS — Z79.01 LONG TERM (CURRENT) USE OF ANTICOAGULANTS: ICD-10-CM

## 2022-02-21 LAB
INR BLD: 2.3
PT POC: 28.1 SECONDS
VALID INTERNAL CONTROL?: YES

## 2022-02-21 PROCEDURE — 99211 OFF/OP EST MAY X REQ PHY/QHP: CPT

## 2022-02-21 PROCEDURE — 85610 PROTHROMBIN TIME: CPT

## 2022-03-04 RX ORDER — AMLODIPINE BESYLATE 5 MG/1
TABLET ORAL
Qty: 30 TABLET | Refills: 2 | OUTPATIENT
Start: 2022-03-04

## 2022-03-04 RX ORDER — AMLODIPINE BESYLATE 5 MG/1
TABLET ORAL
Qty: 90 TABLET | Refills: 1 | Status: SHIPPED | OUTPATIENT
Start: 2022-03-04 | End: 2022-08-10

## 2022-03-04 NOTE — TELEPHONE ENCOUNTER
Received refill request for amlodipine 5 mg po tabs. Refill authorized. Future Appointments   Date Time Provider Monika Garcia   3/15/2022 11:20 AM KAREN STAUFFER BS AMB   3/15/2022 11:40 AM Marlena Goodpasture, MD [facility] BS AMB   3/21/2022 11:15 AM [facility] MEDICATION MGMT [address] [facility]   10/11/2022 11:30 AM Luis Iniguez MD [facility] MAIN BS AMB No, the patient is not being discharged from [facility]

## 2022-03-15 ENCOUNTER — OFFICE VISIT (OUTPATIENT)
Dept: CARDIOLOGY CLINIC | Age: 87
End: 2022-03-15
Payer: MEDICARE

## 2022-03-15 ENCOUNTER — CLINICAL SUPPORT (OUTPATIENT)
Dept: CARDIOLOGY CLINIC | Age: 87
End: 2022-03-15
Payer: MEDICARE

## 2022-03-15 VITALS
WEIGHT: 170 LBS | BODY MASS INDEX: 30.12 KG/M2 | DIASTOLIC BLOOD PRESSURE: 64 MMHG | SYSTOLIC BLOOD PRESSURE: 108 MMHG | HEART RATE: 60 BPM | HEIGHT: 63 IN

## 2022-03-15 DIAGNOSIS — I49.5 SINOATRIAL NODE DYSFUNCTION (HCC): ICD-10-CM

## 2022-03-15 DIAGNOSIS — Z95.0 CARDIAC PACEMAKER IN SITU: Primary | ICD-10-CM

## 2022-03-15 DIAGNOSIS — I48.0 PAROXYSMAL ATRIAL FIBRILLATION (HCC): ICD-10-CM

## 2022-03-15 DIAGNOSIS — Z79.01 ANTICOAGULATED ON COUMADIN: ICD-10-CM

## 2022-03-15 DIAGNOSIS — I87.2 VENOUS INSUFFICIENCY: ICD-10-CM

## 2022-03-15 DIAGNOSIS — I10 ESSENTIAL HYPERTENSION, BENIGN: ICD-10-CM

## 2022-03-15 PROCEDURE — 1090F PRES/ABSN URINE INCON ASSESS: CPT | Performed by: INTERNAL MEDICINE

## 2022-03-15 PROCEDURE — G8417 CALC BMI ABV UP PARAM F/U: HCPCS | Performed by: INTERNAL MEDICINE

## 2022-03-15 PROCEDURE — G8427 DOCREV CUR MEDS BY ELIG CLIN: HCPCS | Performed by: INTERNAL MEDICINE

## 2022-03-15 PROCEDURE — G8432 DEP SCR NOT DOC, RNG: HCPCS | Performed by: INTERNAL MEDICINE

## 2022-03-15 PROCEDURE — 1101F PT FALLS ASSESS-DOCD LE1/YR: CPT | Performed by: INTERNAL MEDICINE

## 2022-03-15 PROCEDURE — 93280 PM DEVICE PROGR EVAL DUAL: CPT | Performed by: INTERNAL MEDICINE

## 2022-03-15 PROCEDURE — 99214 OFFICE O/P EST MOD 30 MIN: CPT | Performed by: INTERNAL MEDICINE

## 2022-03-15 PROCEDURE — G8536 NO DOC ELDER MAL SCRN: HCPCS | Performed by: INTERNAL MEDICINE

## 2022-03-15 PROCEDURE — G0463 HOSPITAL OUTPT CLINIC VISIT: HCPCS | Performed by: INTERNAL MEDICINE

## 2022-03-15 NOTE — PROGRESS NOTES
Cardiac Electrophysiology Office Note     Subjective:      Keith Lomas is a 80 y.o. woman who is here to follow up, is s/p [employer] Isael dual chamber pacemaker (gen change 04/28/2014, leads 04/06/2006). She is here with her son     Occasional brief palpitations that awaken her at night. She is active and no falls even at age 80    Denies chest pain, PND, orthopnea, SOB, syncope, or edema.   + leg edema    Anticoagulated with warfarin, denies bleeding issues. She denies falls. [facility] at [facility]. Previous:  St. Isael dual chamber pacemaker originally implanted by Dr. Moses Velarde 04/06/2006, had gen change in 2014. Previously saw Dr. Buck Gibson. He had prescribed Mg for leg spasm. Patient Active Problem List    Diagnosis Date Noted    Hyperuricemia 04/30/2012    Gout 04/30/2012    Urinary tract infection without hematuria 09/24/2020    Long term (current) use of anticoagulants 11/05/2018    Venous insufficiency     Pacemaker     Encounter for long-term (current) use of high-risk medication 04/07/2014    Paroxysmal atrial fibrillation ([facility] [address] 03/22/2011    Essential hypertension, benign 03/22/2011    Palpitations 03/22/2011    Sinoatrial node dysfunction ([facility] [address] 03/22/2011    Cardiac pacemaker in situ 11/17/2010     Current Outpatient Medications   Medication Sig Dispense Refill    amLODIPine (NORVASC) 5 mg tablet TAKE 1 TABLET BY MOUTH DAILY 90 Tablet 1    warfarin (COUMADIN) 2.5 mg tablet TAKE 1 TABLET BY MOUTH DAILY EXCEPT ON TAKE ONE-HALF OF A TABLET ON THURSDAYS AS DIRECTED (Patient taking differently: TAKE 1 TABLET BY MOUTH DAILY EXCEPT ON TAKE ONE-HALF OF A TABLET ON Monday & THURSDAYS AS DIRECTED) 90 Tablet 0    atenoloL (TENORMIN) 100 mg tablet Take 1 Tablet by mouth daily.  90 Tablet 3    simvastatin (ZOCOR) 20 mg tablet Take 1 tablet every night 30 Tab 11    dorzolamide (TRUSOPT) 2 % ophthalmic solution INSTILL 1 DROP IN BOTH EYES BID  2    magnesium 250 mg tab Take 250 mg by mouth. Mon, wed, fri only      latanoprost (XALATAN) 0.005 % ophthalmic solution INT 1 GTT IN OU QD HS  2    levobunolol (BETAGAN) 0.5 % ophthalmic solution INSTILL 1 GTT OU BID  3     No Known Allergies  Past Medical History:   Diagnosis Date    Aortic valve disorders 4/29/2012    Atrial fibrillation ([facility] [address] 03/22/2011    on coumadin followed by cardiology - Tristen Maurer MD    CAD (coronary artery disease)     Cor pulmonale, chronic ([address] 4/29/2012    Dyslipidemia (high LDL; low HDL) 4/29/2012    Gout 4/30/2012    Gout, arthritis 2/8/2012    Gout, joint     Hypertension     Hyperuricemia 4/30/2012    Obstructive sleep apnea 4/29/2012    Pacemaker 04/02/2006    Dr Kathy Patterson placed the original pacer 4/6/2006. It is replaced in 2014. It is [employer] Isael    Recurrent epistaxis 4/29/2012    Sinoatrial node dysfunction ([facility] [address] 3/22/2011    Venous insufficiency      Past Surgical History:   Procedure Laterality Date    GEN INSERT/REPLACE ONLY DUAL  4/28/2014         HX PACEMAKER      IL BREAST SURGERY PROCEDURE UNLISTED      lumpectomy       Social History     Tobacco Use    Smoking status: Never Smoker    Smokeless tobacco: Never Used   Substance Use Topics    Alcohol use: No        Review of Systems: All other review of systems otherwise negative. Constitutional: Negative for fever, chills, weight loss, malaise/fatigue. HEENT: Negative for nosebleeds, vision changes. Respiratory: Negative for cough, hemoptysis, sputum production, and wheezing. Cardiovascular: Negative for chest pain, + mild leg swelling, no syncope, and PND. + occasional brief palpitations. Leg edema  Gastrointestinal: Negative for nausea, vomiting, diarrhea, blood in stool and melena. Genitourinary: Negative for dysuria, and hematuria. + polyuria, urgency  Musculoskeletal: Negative for myalgias, + arthralgia right knee, mild discomfort left wrist with trace swelling. Skin: Negative for rash.    Heme: Does not bleed but bruise when hitting some thing  Neurological: Negative for speech change and focal weakness. + hand paresthesia     Objective:     Visit Vitals  /64   Pulse 60   Ht 5' 3\" (1.6 m)   Wt 170 lb (77.1 kg)   BMI 30.11 kg/m²      Physical Exam:   Constitutional: Well-developed and well-nourished. No distress. Head: Normocephalic and atraumatic. Eyes: Pupils are equal, round  Neck: Supple. No JVD present. No bruits   Cardiovascular: Normal rate, regular rhythm and normal heart sounds. Exam reveals no gallop and no friction rub. No murmur heard. Pulmonary/Chest: Effort normal. No wheezes. Abdominal: Soft, no tenderness. Musculoskeletal: Moves all extremities independently. Ambulates with cane. Vasc/lymphatic: 1+ leg edema. she wears compression stockings  Neurological: Alert, oriented. walk with a cane  Skin: Skin is warm and dry. Left sided ppm incision healed. Psychiatric: Normal mood and affect. Judgment and thought content normal.        Imaging/Studies:  Echo (12/26/2018): LVEF 69%, no RWMA, grade 1 diastolic dysfunction. Mild MR. Mild TR. Assessment/Plan:       ICD-10-CM ICD-9-CM    1. Cardiac pacemaker in situ  Z95.0 V45.01    2. Paroxysmal atrial fibrillation (HCC)  I48.0 427.31    3. Essential hypertension, benign  I10 401.1    4. Anticoagulated on Coumadin  Z79.01 V58.61    5. Sinoatrial node dysfunction (HCC)  I49.5 427.81    6. Venous insufficiency  I87.2 459.81         St. Isael dual chamber pacemaker (gen change 04/28/2014, leads 04/06/2006):  Device check today shows proper lead & generator function. Generator longevity estimated 4 yrs. RA 72%, RV pacing 7%. 348 AMS but longest less than 30 minutes and AT/AF burden 1 %. She feels palpitations at times    HTN:  BP controlled. Continue current regimen including norvasc but expect leg edema    Anticoagulation:  Continue warfarin for embolic CVA prophylaxis.     Last one therapeutic    Venous insufficiency: more with norvasc but ok to use compression stockings    Remote pacer checks q 3 months. EP clinic follow up in 1 year. Future Appointments   Date Time Provider Monika Radha   3/21/2022 11:15 AM [facility] MEDICATION MGMT [address] [facility] H   6/22/2022  3:30 PM REMOTE1, KAREN LI BS AMB   9/26/2022  2:00 PM REMOTE1, KAREN WEBBER AMB   10/11/2022 11:30 AM Jorge Iniguez MD [facility] MAIN BS AMB   1/4/2023  8:30 AM REMOTE1, KAREN LI BS AMB   4/13/2023 11:00 AM PACEMAKER3, KAREN WEBBER AMB   4/13/2023 11:20 AM MD GUILLERMO Falk BS AMB       Thank you for involving me in this patient's care and please call with further concerns or questions. Sheri Turner M.D.   Electrophysiology/Cardiology  [facility]  [facility] [address], [facility] [address] [place] [address]  [phone]

## 2022-03-16 NOTE — PROGRESS NOTES
Pharmacy Progress Note - Anticoagulation Management    S/O:  Ms. Taylor Ramirez  is a 80 y.o. female seen today for anticoagulation management for the diagnosis of Atrial Fibrillation. HPI: At last visit (2/21) INR was 2.3 and therapeutic. Patient instructed to continue current regimen and recheck INR in 4 weeks. Interim History:    · Warfarin start date: Prior to October 2018  · INR Goal:  2.0-3.0    · Current warfarin regimen: 1.25 mg Mon, Thurs and 2.5 mg daily ROW    · Warfarin tablet strength:   2.5 mg   · Duration of therapy: Indefinite    Today's pertinent positives includes:  No significant changes since last visit    Results for orders placed or performed in visit on 03/21/22   POC PROTHROMBIN W/INR   Result Value Ref Range    VALID INTERNAL CONTROL POC Yes     Prothrombin time (POC) 28.1 seconds    INR POC 2.3      · Adherence:   · Able to recall regimen? YES  · Miss/extra dose? NO  · Need refill? NO    Upcoming procedure(s):  NO      Past Medical History:   Diagnosis Date    Aortic valve disorders 4/29/2012    Atrial fibrillation ([facility] [address] 03/22/2011    on coumadin followed by cardiology - Eden Morris MD    CAD (coronary artery disease)     Cor pulmonale, chronic ([facility] [address] 4/29/2012    Dyslipidemia (high LDL; low HDL) 4/29/2012    Gout 4/30/2012    Gout, arthritis 2/8/2012    Gout, joint     Hypertension     Hyperuricemia 4/30/2012    Obstructive sleep apnea 4/29/2012    Pacemaker 04/02/2006    Dr Yovani Pearson placed the original pacer 4/6/2006. It is replaced in 2014.   It is [employer]ael    Recurrent epistaxis 4/29/2012    Sinoatrial node dysfunction (HCC) 3/22/2011    Venous insufficiency      No Known Allergies  Current Outpatient Medications   Medication Sig    amLODIPine (NORVASC) 5 mg tablet TAKE 1 TABLET BY MOUTH DAILY    warfarin (COUMADIN) 2.5 mg tablet TAKE 1 TABLET BY MOUTH DAILY EXCEPT ON TAKE ONE-HALF OF A TABLET ON THURSDAYS AS DIRECTED (Patient taking differently: TAKE 1 TABLET BY MOUTH DAILY EXCEPT ON TAKE ONE-HALF OF A TABLET ON Monday & THURSDAYS AS DIRECTED)    atenoloL (TENORMIN) 100 mg tablet Take 1 Tablet by mouth daily.  simvastatin (ZOCOR) 20 mg tablet Take 1 tablet every night    dorzolamide (TRUSOPT) 2 % ophthalmic solution INSTILL 1 DROP IN BOTH EYES BID    magnesium 250 mg tab Take 250 mg by mouth. Mon, wed, fri only    latanoprost (XALATAN) 0.005 % ophthalmic solution INT 1 GTT IN OU QD HS    levobunolol (BETAGAN) 0.5 % ophthalmic solution INSTILL 1 GTT OU BID     No current facility-administered medications for this visit. Wt Readings from Last 3 Encounters:   03/15/22 170 lb (77.1 kg)   02/21/22 170 lb (77.1 kg)   02/02/22 172 lb (78 kg)     Patient has difficulty standing comfortably, weight not measured    BP Readings from Last 3 Encounters:   03/21/22 112/70   03/15/22 108/64   02/21/22 126/67     Pulse Readings from Last 3 Encounters:   03/21/22 62   03/15/22 60   02/21/22 61     Lab Results   Component Value Date/Time    WBC 5.8 10/05/2021 12:57 PM    Hemoglobin (POC) 12.6 06/17/2013 03:37 PM    HGB 12.9 10/05/2021 12:57 PM    Hematocrit (POC) 37 06/17/2013 03:37 PM    HCT 37.9 10/05/2021 12:57 PM    PLATELET 719 30/89/9973 12:57 PM    MCV 82.2 10/05/2021 12:57 PM     Lab Results   Component Value Date/Time    Sodium 135 (L) 10/05/2021 12:57 PM    Potassium 3.9 10/05/2021 12:57 PM    Chloride 103 10/05/2021 12:57 PM    CO2 28 10/05/2021 12:57 PM    Anion gap 4 (L) 10/05/2021 12:57 PM    Glucose 88 10/05/2021 12:57 PM    BUN 11 10/05/2021 12:57 PM    Creatinine 1.05 (H) 10/05/2021 12:57 PM    BUN/Creatinine ratio 10 (L) 10/05/2021 12:57 PM    GFR est AA 58 (L) 10/05/2021 12:57 PM    GFR est non-AA 48 (L) 10/05/2021 12:57 PM    Calcium 9.4 10/05/2021 12:57 PM    Bilirubin, total 0.7 10/05/2021 12:57 PM    Alk.  phosphatase 68 10/05/2021 12:57 PM    Protein, total 7.5 10/05/2021 12:57 PM    Albumin 3.8 10/05/2021 12:57 PM    Globulin 3.7 10/05/2021 12:57 PM    A-G Ratio 1.0 (L) 10/05/2021 12:57 PM    ALT (SGPT) 17 10/05/2021 12:57 PM     Estimated Creatinine Clearance: 27.3 mL/min (A) (by C-G formula based on SCr of 1.05 mg/dL (H)). INR History:   (normal INR range 0.8-1.2)     Date   INR   PT   Dose/Comments  03/21/22 2.3  28.1 No changes  02/21/22 2.3  28.1 No changes  02/02/22 1.9  22.4 No changes, increased greens  01/19/22 3.3  39.7 Held x 1 day, then 1.25 mg Thur, 2.5 mg daily ROW  01/10/22 3.9  46.3 No changes  12/27/21 3.2  38.0 No changes  11/22/21 2.2   No changes  10/25/21 2.1   1.25 mg every Thu; 2.5 mg all other days    A/P:       Anticoagulation:  Considering Ms. Robins's past history, todays findings, and per Anticoagulation Collaborative Practice Agreement/Protocol:    1. Fingerstick POC INR (2.3) is Therapeutic for INR goal today. 2.  Continue warfarin 1.25 mg on Mon, Thurs and 2.5 mg daily ROW  3. Patient denies changes to her other medications and reports consistent intake of vitamin K foods. Patient will continue current warfarin regimen and recheck INR in 4 weeks. Patient was instructed to schedule an appointment in 4 week(s) prior to leaving the clinic. Medication reconciliation was completed during the visit. There are no discontinued medications. A full discussion of the nature of anticoagulants has been carried out. A full discussion of the need for frequent and regular monitoring, precise dosage adjustment and compliance was stressed. Side effects of potential bleeding were discussed and Ms. Robins was instructed to call [phone] if there are any signs of abnormal bleeding. Ms. Azalia Jorgensen was instructed to avoid any OTC items containing aspirin or ibuprofen and prior to starting any new OTC products to consult with her physician or pharmacist to ensure no drug interactions are present. Ms. Azalia Jorgensen was instructed to avoid any major changes in her general diet and to avoid alcohol consumption.     Ms. Azalia Jorgensen was provided information in the AVS that includes topics on understanding coumadin therapy, drug interaction considerations, vitamin K and coumadin use, interactions with foods and supplements containing vitamin K, and the use of herbal products. Ms. Nica Santillan verbalized her understanding of all instructions and will call the office with any questions, concerns, or signs of abnormal bleeding or blood clot. Notifications of recommendations will be sent to Dr. Yonas Jimenez MD for review.     Thank you,  EULOGIO Mujica/ Nate Perez 77 Tracking Only     Intervention Detail: Adherence Monitorin and Lab(s) Ordered   Total # of Interventions Recommended: 2   Total # of Interventions Accepted: 2   Time Spent (min): 20

## 2022-03-16 NOTE — PATIENT INSTRUCTIONS
Today your INR was 2.3. Your goal INR is  2.0-3.0. You have a  2.5 mg tablet of Coumadin (warfarin). Take Coumadin (warfarin) as follows: Take 1.25 mg  (0.5 tablet) every Monday, Thursday and take 2.5 mg (1 tablet) daily rest of week    Come back in 4 week(s) for your next finger stick/INR blood test.        Avoid any over the counter items containing aspirin or ibuprofen, and avoid great swings in general diet. Avoid alcohol consumption. Please notify the INR nurse if you are started on any new medication including over the counter or herbal supplements. Also, please notify your INR nurse if any of your other prescription or over the counter medications have been discontinued. Your Care Instructions  Warfarin is a medicine that you take to prevent blood clots. It is often called a blood thinner. Doctors give warfarin (such as Coumadin) to reduce the risk of blood clots. You may be at risk for blood clots if you have atrial fibrillation or deep vein thrombosis. Some other health problems may also put you at risk. Warfarin slows the amount of time it takes for your blood to clot. It can cause bleeding problems. Even if you've been taking warfarin for a while, it's important to know how to take it safely. Foods and other medicines can affect the way warfarin works. Some can make warfarin work too well. This can cause bleeding problems. And some can make it work poorly, so that it does not prevent blood clots very well. You will need regular blood tests to check how long it takes for your blood to form a clot. This test is called a PT or prothrombin time test. The result of the test is called an INR level. Depending on the test results, your doctor or anticoagulation clinic may adjust your dose of warfarin. Follow-up care is a key part of your treatment and safety. Be sure to make and go to all appointments, and call your doctor if you are having problems.  It's also a good idea to know your test results and keep a list of the medicines you take. How can you care for yourself at home? Take warfarin safely  · Take your warfarin at the same time each day. · If you miss a dose of warfarin, don't take an extra dose to make up for it. Your doctor can tell you exactly what to do so you don't take too much or too little. · Wear medical alert jewelry that lets others know that you take warfarin. You can buy this at most drugstores. · Don't take warfarin if you are pregnant or planning to get pregnant. Talk to your doctor about how you can prevent getting pregnant while you are taking it. · Don't change your dose or stop taking warfarin unless your doctor tells you to. Effects of medicines and food on warfarin  · Don't start or stop taking any medicines, vitamins, or natural remedies unless you first talk to your doctor. Many medicines can affect how warfarin works. These include aspirin and other pain relievers, over-the-counter medicines, multivitamins, dietary supplements, and herbal products. · Tell all of your doctors and pharmacists that you take warfarin. Some prescription medicines can affect how warfarin works. · Keep the amount of vitamin K in your diet about the same from day to day. Do not suddenly eat a lot more or a lot less food that is rich in vitamin K than you usually do. Vitamin K affects how warfarin works and how your blood clots. Talk with your doctor before making big changes in your diet. Vitamin K is in many foods, such as:  ¨ Leafy greens, such as kale, cabbage, spinach, Swiss chard, and lettuce. ¨ Canola and soybean oils. ¨ Green vegetables, such as asparagus, broccoli, and [place] sprouts. ¨ Vegetable drinks, green tea leaves, and some dietary supplement drinks. · Avoid cranberry juice and other cranberry products. They can increase the effects of warfarin. · Limit your use of alcohol.     Avoid bleeding by preventing falls and injuries  · Wear slippers or shoes with nonskid soles. · Remove throw rugs and clutter. · Rearrange furniture and electrical cords to keep them out of walking paths. · Keep stairways, porches, and outside walkways well lit. Use night-lights in hallways and bathrooms. · Be extra careful when you work with sharp tools or knives. When should you call for help? Call 911 anytime you think you may need emergency care. For example, call if:  · You have a sudden, severe headache that is different from past headaches. Call your doctor now or seek immediate medical care if:  · You have any abnormal bleeding, such as:  ¨ Nosebleeds. ¨ Vaginal bleeding that is different (heavier, more frequent, at a different time of the month) than what you are used to. ¨ Bloody or black stools, or rectal bleeding. ¨ Bloody or pink urine. Watch closely for changes in your health, and be sure to contact your doctor if you have any problems. Where can you learn more? Go to http://www.gray.com/. Enter T735 in the search box to learn more about \"Taking Warfarin Safely: Care Instructions. \"  Current as of: January 27, 2016  Content Version: 11.1  © 7402-9262 [employer], [employer]. Care instructions adapted under license by [employer] (which disclaims liability or warranty for this information). If you have questions about a medical condition or this instruction, always ask your healthcare professional. [place] [address] any warranty or liability for your use of this information.

## 2022-03-19 PROBLEM — Z79.01 LONG TERM (CURRENT) USE OF ANTICOAGULANTS: Status: ACTIVE | Noted: 2018-11-05

## 2022-03-19 PROBLEM — N39.0 URINARY TRACT INFECTION WITHOUT HEMATURIA: Status: ACTIVE | Noted: 2020-09-24

## 2022-03-21 ENCOUNTER — ANTI-COAG VISIT (OUTPATIENT)
Dept: PHARMACY | Age: 87
End: 2022-03-21
Payer: MEDICARE

## 2022-03-21 VITALS — DIASTOLIC BLOOD PRESSURE: 70 MMHG | SYSTOLIC BLOOD PRESSURE: 112 MMHG | HEART RATE: 62 BPM

## 2022-03-21 DIAGNOSIS — I48.0 PAROXYSMAL ATRIAL FIBRILLATION (HCC): Primary | ICD-10-CM

## 2022-03-21 DIAGNOSIS — Z79.01 LONG TERM (CURRENT) USE OF ANTICOAGULANTS: ICD-10-CM

## 2022-03-21 LAB
INR BLD: 2.3
PT POC: 28.1 SECONDS
VALID INTERNAL CONTROL?: YES

## 2022-03-21 PROCEDURE — 85610 PROTHROMBIN TIME: CPT

## 2022-03-21 PROCEDURE — 99211 OFF/OP EST MAY X REQ PHY/QHP: CPT

## 2022-04-12 NOTE — PATIENT INSTRUCTIONS
Today your INR was 2.3. Your goal INR is  2.0-3.0. You have a  2.5 mg tablet of Coumadin (warfarin). Take Coumadin (warfarin) as follows: Take 1.25 mg  (0.5 tablet) every Monday, Thursday and take 2.5 mg (1 tablet) daily rest of week    Come back in 4 week(s) for your next finger stick/INR blood test.        Avoid any over the counter items containing aspirin or ibuprofen, and avoid great swings in general diet. Avoid alcohol consumption. Please notify the INR nurse if you are started on any new medication including over the counter or herbal supplements. Also, please notify your INR nurse if any of your other prescription or over the counter medications have been discontinued. Your Care Instructions  Warfarin is a medicine that you take to prevent blood clots. It is often called a blood thinner. Doctors give warfarin (such as Coumadin) to reduce the risk of blood clots. You may be at risk for blood clots if you have atrial fibrillation or deep vein thrombosis. Some other health problems may also put you at risk. Warfarin slows the amount of time it takes for your blood to clot. It can cause bleeding problems. Even if you've been taking warfarin for a while, it's important to know how to take it safely. Foods and other medicines can affect the way warfarin works. Some can make warfarin work too well. This can cause bleeding problems. And some can make it work poorly, so that it does not prevent blood clots very well. You will need regular blood tests to check how long it takes for your blood to form a clot. This test is called a PT or prothrombin time test. The result of the test is called an INR level. Depending on the test results, your doctor or anticoagulation clinic may adjust your dose of warfarin. Follow-up care is a key part of your treatment and safety. Be sure to make and go to all appointments, and call your doctor if you are having problems.  It's also a good idea to know your test results and keep a list of the medicines you take. How can you care for yourself at home? Take warfarin safely  · Take your warfarin at the same time each day. · If you miss a dose of warfarin, don't take an extra dose to make up for it. Your doctor can tell you exactly what to do so you don't take too much or too little. · Wear medical alert jewelry that lets others know that you take warfarin. You can buy this at most drugstores. · Don't take warfarin if you are pregnant or planning to get pregnant. Talk to your doctor about how you can prevent getting pregnant while you are taking it. · Don't change your dose or stop taking warfarin unless your doctor tells you to. Effects of medicines and food on warfarin  · Don't start or stop taking any medicines, vitamins, or natural remedies unless you first talk to your doctor. Many medicines can affect how warfarin works. These include aspirin and other pain relievers, over-the-counter medicines, multivitamins, dietary supplements, and herbal products. · Tell all of your doctors and pharmacists that you take warfarin. Some prescription medicines can affect how warfarin works. · Keep the amount of vitamin K in your diet about the same from day to day. Do not suddenly eat a lot more or a lot less food that is rich in vitamin K than you usually do. Vitamin K affects how warfarin works and how your blood clots. Talk with your doctor before making big changes in your diet. Vitamin K is in many foods, such as:  ¨ Leafy greens, such as kale, cabbage, spinach, Swiss chard, and lettuce. ¨ Canola and soybean oils. ¨ Green vegetables, such as asparagus, broccoli, and [place] sprouts. ¨ Vegetable drinks, green tea leaves, and some dietary supplement drinks. · Avoid cranberry juice and other cranberry products. They can increase the effects of warfarin. · Limit your use of alcohol.     Avoid bleeding by preventing falls and injuries  · Wear slippers or shoes with nonskid soles. · Remove throw rugs and clutter. · Rearrange furniture and electrical cords to keep them out of walking paths. · Keep stairways, porches, and outside walkways well lit. Use night-lights in hallways and bathrooms. · Be extra careful when you work with sharp tools or knives. When should you call for help? Call 911 anytime you think you may need emergency care. For example, call if:  · You have a sudden, severe headache that is different from past headaches. Call your doctor now or seek immediate medical care if:  · You have any abnormal bleeding, such as:  ¨ Nosebleeds. ¨ Vaginal bleeding that is different (heavier, more frequent, at a different time of the month) than what you are used to. ¨ Bloody or black stools, or rectal bleeding. ¨ Bloody or pink urine. Watch closely for changes in your health, and be sure to contact your doctor if you have any problems. Where can you learn more? Go to http://www.gray.com/. Enter S917 in the search box to learn more about \"Taking Warfarin Safely: Care Instructions. \"  Current as of: January 27, 2016  Content Version: 11.1  © 8983-7521 [employer]. Care instructions adapted under license by [employer] (which disclaims liability or warranty for this information). If you have questions about a medical condition or this instruction, always ask your healthcare professional. [place] [address] any warranty or liability for your use of this information.

## 2022-04-12 NOTE — PROGRESS NOTES
Pharmacy Progress Note - Anticoagulation Management    S/O:  Ms. Kiran Mendoza  is a 80 y.o. female seen today for anticoagulation management for the diagnosis of Atrial Fibrillation. HPI: At last visit (3/21) INR was 2.3 and therapeutic. Patient instructed to continue current regimen and recheck INR in 4 weeks. Interim History:    · Warfarin start date: Prior to October 2018  · INR Goal:  2.0-3.0    · Current warfarin regimen: 1.25 mg Mon, Thurs and 2.5 mg daily ROW    · Warfarin tablet strength:   2.5 mg   · Duration of therapy: Indefinite    Today's pertinent positives includes:  No significant changes since last visit    Results for orders placed or performed in visit on 04/18/22   POC PROTHROMBIN W/INR   Result Value Ref Range    VALID INTERNAL CONTROL POC Yes     Prothrombin time (POC) 27.6 seconds    INR POC 2.3      · Adherence:   · Able to recall regimen? YES  · Miss/extra dose? NO  · Need refill? NO    Upcoming procedure(s):  NO      Past Medical History:   Diagnosis Date    Aortic valve disorders 4/29/2012    Atrial fibrillation ([facility] [address] 03/22/2011    on coumadin followed by cardiology - Maggy Hawkins MD    CAD (coronary artery disease)     Cor pulmonale, chronic ([facility] [address] 4/29/2012    Dyslipidemia (high LDL; low HDL) 4/29/2012    Gout 4/30/2012    Gout, arthritis 2/8/2012    Gout, joint     Hypertension     Hyperuricemia 4/30/2012    Obstructive sleep apnea 4/29/2012    Pacemaker 04/02/2006    Dr Job Taveras placed the original pacer 4/6/2006. It is replaced in 2014.   It is [facility]    Recurrent epistaxis 4/29/2012    Sinoatrial node dysfunction (HCC) 3/22/2011    Venous insufficiency      No Known Allergies  Current Outpatient Medications   Medication Sig    amLODIPine (NORVASC) 5 mg tablet TAKE 1 TABLET BY MOUTH DAILY    warfarin (COUMADIN) 2.5 mg tablet TAKE 1 TABLET BY MOUTH DAILY EXCEPT ON TAKE ONE-HALF OF A TABLET ON THURSDAYS AS DIRECTED (Patient taking differently: TAKE 1 TABLET BY MOUTH DAILY EXCEPT ON TAKE ONE-HALF OF A TABLET ON Monday & THURSDAYS AS DIRECTED)    atenoloL (TENORMIN) 100 mg tablet Take 1 Tablet by mouth daily.  simvastatin (ZOCOR) 20 mg tablet Take 1 tablet every night    dorzolamide (TRUSOPT) 2 % ophthalmic solution INSTILL 1 DROP IN BOTH EYES BID    magnesium 250 mg tab Take 250 mg by mouth. Mon, wed, fri only    latanoprost (XALATAN) 0.005 % ophthalmic solution INT 1 GTT IN OU QD HS    levobunolol (BETAGAN) 0.5 % ophthalmic solution INSTILL 1 GTT OU BID     No current facility-administered medications for this visit. Wt Readings from Last 3 Encounters:   03/15/22 170 lb (77.1 kg)   02/21/22 170 lb (77.1 kg)   02/02/22 172 lb (78 kg)     Patient has difficulty standing comfortably, weight not measured    BP Readings from Last 3 Encounters:   04/18/22 110/70   03/21/22 112/70   03/15/22 108/64     Pulse Readings from Last 3 Encounters:   04/18/22 62   03/21/22 62   03/15/22 60     Lab Results   Component Value Date/Time    WBC 5.8 10/05/2021 12:57 PM    Hemoglobin (POC) 12.6 06/17/2013 03:37 PM    HGB 12.9 10/05/2021 12:57 PM    Hematocrit (POC) 37 06/17/2013 03:37 PM    HCT 37.9 10/05/2021 12:57 PM    PLATELET 214 43/52/4320 12:57 PM    MCV 82.2 10/05/2021 12:57 PM     Lab Results   Component Value Date/Time    Sodium 135 (L) 10/05/2021 12:57 PM    Potassium 3.9 10/05/2021 12:57 PM    Chloride 103 10/05/2021 12:57 PM    CO2 28 10/05/2021 12:57 PM    Anion gap 4 (L) 10/05/2021 12:57 PM    Glucose 88 10/05/2021 12:57 PM    BUN 11 10/05/2021 12:57 PM    Creatinine 1.05 (H) 10/05/2021 12:57 PM    BUN/Creatinine ratio 10 (L) 10/05/2021 12:57 PM    GFR est AA 58 (L) 10/05/2021 12:57 PM    GFR est non-AA 48 (L) 10/05/2021 12:57 PM    Calcium 9.4 10/05/2021 12:57 PM    Bilirubin, total 0.7 10/05/2021 12:57 PM    Alk.  phosphatase 68 10/05/2021 12:57 PM    Protein, total 7.5 10/05/2021 12:57 PM    Albumin 3.8 10/05/2021 12:57 PM    Globulin 3.7 10/05/2021 12:57 PM    A-G Ratio 1.0 (L) 10/05/2021 12:57 PM    ALT (SGPT) 17 10/05/2021 12:57 PM     CrCl cannot be calculated (Patient's most recent lab result is older than the maximum 180 days allowed.). INR History:   (normal INR range 0.8-1.2)     Date   INR   PT   Dose/Comments  04/18/22 2.3  27.6 No changes  03/21/22 2.3  28.1 No changes  02/21/22 2.3  28.1 No changes  02/02/22 1.9  22.4 No changes, increased greens  01/19/22 3.3  39.7 Held x 1 day, then 1.25 mg Thur, 2.5 mg daily ROW  01/10/22 3.9  46.3 No changes  12/27/21 3.2  38.0 No changes  11/22/21 2.2   No changes  10/25/21 2.1   1.25 mg every Thu; 2.5 mg all other days    A/P:       Anticoagulation:  Considering Ms. Robins's past history, todays findings, and per Anticoagulation Collaborative Practice Agreement/Protocol:    1. Fingerstick POC INR (2.3) is Therapeutic for INR goal today. 2.  Continue warfarin 1.25 mg on Mon, Thurs and 2.5 mg daily ROW  3. Patient denies changes to her other medications and reports consistent intake of vitamin K foods. Patient will continue current warfarin regimen and recheck INR in 4 weeks. Patient was instructed to schedule an appointment in 4 week(s) prior to leaving the clinic. Medication reconciliation was completed during the visit. There are no discontinued medications. A full discussion of the nature of anticoagulants has been carried out. A full discussion of the need for frequent and regular monitoring, precise dosage adjustment and compliance was stressed. Side effects of potential bleeding were discussed and Ms. Robins was instructed to call [phone] if there are any signs of abnormal bleeding. Ms. Velia Morgan was instructed to avoid any OTC items containing aspirin or ibuprofen and prior to starting any new OTC products to consult with her physician or pharmacist to ensure no drug interactions are present.   Ms. Velia Morgan was instructed to avoid any major changes in her general diet and to avoid alcohol consumption. Ms. Gregor Garcia was provided information in the AVS that includes topics on understanding coumadin therapy, drug interaction considerations, vitamin K and coumadin use, interactions with foods and supplements containing vitamin K, and the use of herbal products. Ms. Gregor Garcia verbalized her understanding of all instructions and will call the office with any questions, concerns, or signs of abnormal bleeding or blood clot. Notifications of recommendations will be sent to Dr. Maribel Kilgore MD for review.     Thank you,  EULOGIO Estrada/ Nate Perez 77 Tracking Only     Intervention Detail: Adherence Monitorin and Lab(s) Ordered   Total # of Interventions Recommended: 2   Total # of Interventions Accepted: 2   Time Spent (min): 20

## 2022-04-18 ENCOUNTER — ANTI-COAG VISIT (OUTPATIENT)
Dept: PHARMACY | Age: 87
End: 2022-04-18
Payer: MEDICARE

## 2022-04-18 VITALS — SYSTOLIC BLOOD PRESSURE: 110 MMHG | HEART RATE: 62 BPM | DIASTOLIC BLOOD PRESSURE: 70 MMHG

## 2022-04-18 DIAGNOSIS — Z79.01 LONG TERM (CURRENT) USE OF ANTICOAGULANTS: ICD-10-CM

## 2022-04-18 DIAGNOSIS — I48.0 PAROXYSMAL ATRIAL FIBRILLATION (HCC): Primary | ICD-10-CM

## 2022-04-18 LAB
INR BLD: 2.3
PT POC: 27.6 SECONDS
VALID INTERNAL CONTROL?: YES

## 2022-04-18 PROCEDURE — 99211 OFF/OP EST MAY X REQ PHY/QHP: CPT

## 2022-04-18 PROCEDURE — 85610 PROTHROMBIN TIME: CPT

## 2022-04-29 DIAGNOSIS — I48.0 PAROXYSMAL ATRIAL FIBRILLATION (HCC): ICD-10-CM

## 2022-04-29 RX ORDER — WARFARIN 2.5 MG/1
TABLET ORAL
Qty: 90 TABLET | Refills: 0 | Status: SHIPPED | OUTPATIENT
Start: 2022-04-29 | End: 2022-07-28

## 2022-04-29 NOTE — TELEPHONE ENCOUNTER
Received refill request for warfarin 2.5 mg po tabs. Refill authorized. Future Appointments   Date Time Provider Monika Garcia   5/16/2022 11:00 AM [facility] MEDICATION MGMT [address] MEJIAS H   6/22/2022  3:30 PM REMOTE1, KAREN LI BS AMB   9/26/2022  2:00 PM REMOTE1, KAREN WEBBER AMB   10/11/2022 11:30 AM Je Iniguez MD [facility] MORGAN BS AMB   1/4/2023  8:30 AM REMOTE1, KAREN WEBBER AMB   4/13/2023 11:00 AM PACEMAKER3, KAREN WEBBER AMB   4/13/2023 11:20 AM MD GUILLERMO Ennis BS AMB

## 2022-05-11 NOTE — PROGRESS NOTES
Pharmacy Progress Note - Anticoagulation Management    S/O:  Ms. Ezra Montgomery  is a 80 y.o. female seen today for anticoagulation management for the diagnosis of Atrial Fibrillation. HPI: At last visit (4/18) INR was 2.3 and therapeutic. Patient instructed to continue current regimen and recheck INR in 4 weeks. Interim History:    · Warfarin start date: Prior to October 2018  · INR Goal:  2.0-3.0    · Current warfarin regimen: 1.25 mg Mon, Thurs and 2.5 mg daily ROW    · Warfarin tablet strength:   2.5 mg   · Duration of therapy: Indefinite    Today's pertinent positives includes:  No significant changes since last visit    Results for orders placed or performed in visit on 05/16/22   POC PROTHROMBIN W/INR   Result Value Ref Range    VALID INTERNAL CONTROL POC Yes     Prothrombin time (POC) 22.4 seconds    INR POC 1.9      · Adherence:   · Able to recall regimen? YES  · Miss/extra dose? NO  · Need refill? NO    Upcoming procedure(s):  NO      Past Medical History:   Diagnosis Date    Aortic valve disorders 4/29/2012    Atrial fibrillation ([facility] 03/22/2011    on coumadin followed by cardiology - Maria Antonia Yanez MD    CAD (coronary artery disease)     Cor pulmonale, chronic ([facility] [address] 4/29/2012    Dyslipidemia (high LDL; low HDL) 4/29/2012    Gout 4/30/2012    Gout, arthritis 2/8/2012    Gout, joint     Hypertension     Hyperuricemia 4/30/2012    Obstructive sleep apnea 4/29/2012    Pacemaker 04/02/2006    Dr George Wood placed the original pacer 4/6/2006. It is replaced in 2014. It is [facility]    Recurrent epistaxis 4/29/2012    Sinoatrial node dysfunction (HCC) 3/22/2011    Venous insufficiency      No Known Allergies  Current Outpatient Medications   Medication Sig    warfarin (COUMADIN) 2.5 mg tablet TAKE 1 TABLET BY MOUTH DAILY.  EXCEPT ON TAKE 1/2 TABLET ON THURSDAYS AS DIRECTED    amLODIPine (NORVASC) 5 mg tablet TAKE 1 TABLET BY MOUTH DAILY    atenoloL (TENORMIN) 100 mg tablet Take 1 Tablet by mouth daily.  simvastatin (ZOCOR) 20 mg tablet Take 1 tablet every night    dorzolamide (TRUSOPT) 2 % ophthalmic solution INSTILL 1 DROP IN BOTH EYES BID    magnesium 250 mg tab Take 250 mg by mouth. Mon, wed, fri only    latanoprost (XALATAN) 0.005 % ophthalmic solution INT 1 GTT IN OU QD HS    levobunolol (BETAGAN) 0.5 % ophthalmic solution INSTILL 1 GTT OU BID     No current facility-administered medications for this visit. Wt Readings from Last 3 Encounters:   03/15/22 170 lb (77.1 kg)   02/21/22 170 lb (77.1 kg)   02/02/22 172 lb (78 kg)     Patient has difficulty standing comfortably, weight not measured    BP Readings from Last 3 Encounters:   05/16/22 108/70   04/18/22 110/70   03/21/22 112/70     Pulse Readings from Last 3 Encounters:   05/16/22 60   04/18/22 62   03/21/22 62     Lab Results   Component Value Date/Time    WBC 5.8 10/05/2021 12:57 PM    Hemoglobin (POC) 12.6 06/17/2013 03:37 PM    HGB 12.9 10/05/2021 12:57 PM    Hematocrit (POC) 37 06/17/2013 03:37 PM    HCT 37.9 10/05/2021 12:57 PM    PLATELET 644 38/85/8033 12:57 PM    MCV 82.2 10/05/2021 12:57 PM     Lab Results   Component Value Date/Time    Sodium 135 (L) 10/05/2021 12:57 PM    Potassium 3.9 10/05/2021 12:57 PM    Chloride 103 10/05/2021 12:57 PM    CO2 28 10/05/2021 12:57 PM    Anion gap 4 (L) 10/05/2021 12:57 PM    Glucose 88 10/05/2021 12:57 PM    BUN 11 10/05/2021 12:57 PM    Creatinine 1.05 (H) 10/05/2021 12:57 PM    BUN/Creatinine ratio 10 (L) 10/05/2021 12:57 PM    GFR est AA 58 (L) 10/05/2021 12:57 PM    GFR est non-AA 48 (L) 10/05/2021 12:57 PM    Calcium 9.4 10/05/2021 12:57 PM    Bilirubin, total 0.7 10/05/2021 12:57 PM    Alk.  phosphatase 68 10/05/2021 12:57 PM    Protein, total 7.5 10/05/2021 12:57 PM    Albumin 3.8 10/05/2021 12:57 PM    Globulin 3.7 10/05/2021 12:57 PM    A-G Ratio 1.0 (L) 10/05/2021 12:57 PM    ALT (SGPT) 17 10/05/2021 12:57 PM     CrCl cannot be calculated (Patient's most recent lab result is older than the maximum 180 days allowed.). INR History:   (normal INR range 0.8-1.2)     Date   INR   PT   Dose/Comments  05/16/22 1.9  22.4 No changes  04/18/22 2.3  27.6 No changes  03/21/22 2.3  28.1 No changes  02/21/22 2.3  28.1 No changes  02/02/22 1.9  22.4 No changes, increased greens  01/19/22 3.3  39.7 Held x 1 day, then 1.25 mg Thur, 2.5 mg daily ROW  01/10/22 3.9  46.3 No changes  12/27/21 3.2  38.0 No changes  11/22/21 2.2   No changes  10/25/21 2.1   1.25 mg every Thu; 2.5 mg all other days    A/P:       Anticoagulation:  Considering Ms. Robins's past history, todays findings, and per Anticoagulation Collaborative Practice Agreement/Protocol:    1. Fingerstick POC INR (1.9) is Subtherapeutic for INR goal today. 2.  Continue warfarin 1.25 mg on Mon, Thurs and 2.5 mg daily ROW  3. INR is 1.9 and slightly subtherapeutic. Patient denies any missed doses of warfarin or changes to her other medications. Patient reports consistent intake of vitamin K foods. Patient will continue current regimen and recheck INR in 2 weeks. Patient was instructed to schedule an appointment in 2 week(s) prior to leaving the clinic. Medication reconciliation was completed during the visit. There are no discontinued medications. A full discussion of the nature of anticoagulants has been carried out. A full discussion of the need for frequent and regular monitoring, precise dosage adjustment and compliance was stressed. Side effects of potential bleeding were discussed and Ms. Robins was instructed to call [phone] if there are any signs of abnormal bleeding. Ms. Gregor Garcia was instructed to avoid any OTC items containing aspirin or ibuprofen and prior to starting any new OTC products to consult with her physician or pharmacist to ensure no drug interactions are present. Ms. Gregor Garcia was instructed to avoid any major changes in her general diet and to avoid alcohol consumption.     Ms. Gregor Garcia was provided information in the AVS that includes topics on understanding coumadin therapy, drug interaction considerations, vitamin K and coumadin use, interactions with foods and supplements containing vitamin K, and the use of herbal products. Ms. Nicci Iqbal verbalized her understanding of all instructions and will call the office with any questions, concerns, or signs of abnormal bleeding or blood clot. Notifications of recommendations will be sent to Dr. Gutierrez Wills MD for review.     Thank you,  EULOGIO Keita/ Nate Perez 77 Tracking Only     Intervention Detail: Adherence Monitorin and Lab(s) Ordered   Total # of Interventions Recommended: 2   Total # of Interventions Accepted: 2   Time Spent (min): 20

## 2022-05-11 NOTE — PATIENT INSTRUCTIONS
Today your INR was 1.9. Your goal INR is  2.0-3.0. You have a  2.5 mg tablet of Coumadin (warfarin). Take Coumadin (warfarin) as follows: Take 1.25 mg  (0.5 tablet) every Monday, Thursday and take 2.5 mg (1 tablet) daily rest of week    Come back in 2 week(s) for your next finger stick/INR blood test.        Avoid any over the counter items containing aspirin or ibuprofen, and avoid great swings in general diet. Avoid alcohol consumption. Please notify the INR nurse if you are started on any new medication including over the counter or herbal supplements. Also, please notify your INR nurse if any of your other prescription or over the counter medications have been discontinued. Your Care Instructions  Warfarin is a medicine that you take to prevent blood clots. It is often called a blood thinner. Doctors give warfarin (such as Coumadin) to reduce the risk of blood clots. You may be at risk for blood clots if you have atrial fibrillation or deep vein thrombosis. Some other health problems may also put you at risk. Warfarin slows the amount of time it takes for your blood to clot. It can cause bleeding problems. Even if you've been taking warfarin for a while, it's important to know how to take it safely. Foods and other medicines can affect the way warfarin works. Some can make warfarin work too well. This can cause bleeding problems. And some can make it work poorly, so that it does not prevent blood clots very well. You will need regular blood tests to check how long it takes for your blood to form a clot. This test is called a PT or prothrombin time test. The result of the test is called an INR level. Depending on the test results, your doctor or anticoagulation clinic may adjust your dose of warfarin. Follow-up care is a key part of your treatment and safety. Be sure to make and go to all appointments, and call your doctor if you are having problems.  It's also a good idea to know your test results and keep a list of the medicines you take. How can you care for yourself at home? Take warfarin safely  · Take your warfarin at the same time each day. · If you miss a dose of warfarin, don't take an extra dose to make up for it. Your doctor can tell you exactly what to do so you don't take too much or too little. · Wear medical alert jewelry that lets others know that you take warfarin. You can buy this at most drugstores. · Don't take warfarin if you are pregnant or planning to get pregnant. Talk to your doctor about how you can prevent getting pregnant while you are taking it. · Don't change your dose or stop taking warfarin unless your doctor tells you to. Effects of medicines and food on warfarin  · Don't start or stop taking any medicines, vitamins, or natural remedies unless you first talk to your doctor. Many medicines can affect how warfarin works. These include aspirin and other pain relievers, over-the-counter medicines, multivitamins, dietary supplements, and herbal products. · Tell all of your doctors and pharmacists that you take warfarin. Some prescription medicines can affect how warfarin works. · Keep the amount of vitamin K in your diet about the same from day to day. Do not suddenly eat a lot more or a lot less food that is rich in vitamin K than you usually do. Vitamin K affects how warfarin works and how your blood clots. Talk with your doctor before making big changes in your diet. Vitamin K is in many foods, such as:  ¨ Leafy greens, such as kale, cabbage, spinach, Swiss chard, and lettuce. ¨ Canola and soybean oils. ¨ Green vegetables, such as asparagus, broccoli, and [place] sprouts. ¨ Vegetable drinks, green tea leaves, and some dietary supplement drinks. · Avoid cranberry juice and other cranberry products. They can increase the effects of warfarin. · Limit your use of alcohol.     Avoid bleeding by preventing falls and injuries  · Wear slippers or shoes with nonskid soles. · Remove throw rugs and clutter. · Rearrange furniture and electrical cords to keep them out of walking paths. · Keep stairways, porches, and outside walkways well lit. Use night-lights in hallways and bathrooms. · Be extra careful when you work with sharp tools or knives. When should you call for help? Call 911 anytime you think you may need emergency care. For example, call if:  · You have a sudden, severe headache that is different from past headaches. Call your doctor now or seek immediate medical care if:  · You have any abnormal bleeding, such as:  ¨ Nosebleeds. ¨ Vaginal bleeding that is different (heavier, more frequent, at a different time of the month) than what you are used to. ¨ Bloody or black stools, or rectal bleeding. ¨ Bloody or pink urine. Watch closely for changes in your health, and be sure to contact your doctor if you have any problems. Where can you learn more? Go to http://saurav-roni.info/. Enter L910 in the search box to learn more about \"Taking Warfarin Safely: Care Instructions. \"  Current as of: January 27, 2016  Content Version: 11.1  © 3386-5262 [employer]. Care instructions adapted under license by [employer] (which disclaims liability or warranty for this information). If you have questions about a medical condition or this instruction, always ask your healthcare professional. [place] [address] any warranty or liability for your use of this information.

## 2022-05-16 ENCOUNTER — ANTI-COAG VISIT (OUTPATIENT)
Dept: PHARMACY | Age: 87
End: 2022-05-16
Payer: MEDICARE

## 2022-05-16 VITALS — DIASTOLIC BLOOD PRESSURE: 70 MMHG | SYSTOLIC BLOOD PRESSURE: 108 MMHG | HEART RATE: 60 BPM

## 2022-05-16 DIAGNOSIS — I48.0 PAROXYSMAL ATRIAL FIBRILLATION (HCC): Primary | ICD-10-CM

## 2022-05-16 DIAGNOSIS — Z79.01 LONG TERM (CURRENT) USE OF ANTICOAGULANTS: ICD-10-CM

## 2022-05-16 LAB
INR BLD: 1.9
PT POC: 22.4 SECONDS
VALID INTERNAL CONTROL?: YES

## 2022-05-16 PROCEDURE — 85610 PROTHROMBIN TIME: CPT

## 2022-05-16 PROCEDURE — 99211 OFF/OP EST MAY X REQ PHY/QHP: CPT

## 2022-05-24 RX ORDER — SIMVASTATIN 20 MG/1
TABLET, FILM COATED ORAL
Qty: 30 TABLET | Refills: 11 | Status: SHIPPED | OUTPATIENT
Start: 2022-05-24

## 2022-05-24 NOTE — PATIENT INSTRUCTIONS
Today your INR was 1.8. Your goal INR is  2.0-3.0. You have a  2.5 mg tablet of Coumadin (warfarin). Take Coumadin (warfarin) as follows: Take 1.25 mg  (0.5 tablet) every Monday and take 2.5 mg (1 tablet) daily rest of week    Come back in 2 week(s) for your next finger stick/INR blood test.        Avoid any over the counter items containing aspirin or ibuprofen, and avoid great swings in general diet. Avoid alcohol consumption. Please notify the INR nurse if you are started on any new medication including over the counter or herbal supplements. Also, please notify your INR nurse if any of your other prescription or over the counter medications have been discontinued. Your Care Instructions  Warfarin is a medicine that you take to prevent blood clots. It is often called a blood thinner. Doctors give warfarin (such as Coumadin) to reduce the risk of blood clots. You may be at risk for blood clots if you have atrial fibrillation or deep vein thrombosis. Some other health problems may also put you at risk. Warfarin slows the amount of time it takes for your blood to clot. It can cause bleeding problems. Even if you've been taking warfarin for a while, it's important to know how to take it safely. Foods and other medicines can affect the way warfarin works. Some can make warfarin work too well. This can cause bleeding problems. And some can make it work poorly, so that it does not prevent blood clots very well. You will need regular blood tests to check how long it takes for your blood to form a clot. This test is called a PT or prothrombin time test. The result of the test is called an INR level. Depending on the test results, your doctor or anticoagulation clinic may adjust your dose of warfarin. Follow-up care is a key part of your treatment and safety. Be sure to make and go to all appointments, and call your doctor if you are having problems.  It's also a good idea to know your test results and keep a list of the medicines you take. How can you care for yourself at home? Take warfarin safely  · Take your warfarin at the same time each day. · If you miss a dose of warfarin, don't take an extra dose to make up for it. Your doctor can tell you exactly what to do so you don't take too much or too little. · Wear medical alert jewelry that lets others know that you take warfarin. You can buy this at most drugstores. · Don't take warfarin if you are pregnant or planning to get pregnant. Talk to your doctor about how you can prevent getting pregnant while you are taking it. · Don't change your dose or stop taking warfarin unless your doctor tells you to. Effects of medicines and food on warfarin  · Don't start or stop taking any medicines, vitamins, or natural remedies unless you first talk to your doctor. Many medicines can affect how warfarin works. These include aspirin and other pain relievers, over-the-counter medicines, multivitamins, dietary supplements, and herbal products. · Tell all of your doctors and pharmacists that you take warfarin. Some prescription medicines can affect how warfarin works. · Keep the amount of vitamin K in your diet about the same from day to day. Do not suddenly eat a lot more or a lot less food that is rich in vitamin K than you usually do. Vitamin K affects how warfarin works and how your blood clots. Talk with your doctor before making big changes in your diet. Vitamin K is in many foods, such as:  ¨ Leafy greens, such as kale, cabbage, spinach, Swiss chard, and lettuce. ¨ Canola and soybean oils. ¨ Green vegetables, such as asparagus, broccoli, and [place] sprouts. ¨ Vegetable drinks, green tea leaves, and some dietary supplement drinks. · Avoid cranberry juice and other cranberry products. They can increase the effects of warfarin. · Limit your use of alcohol.     Avoid bleeding by preventing falls and injuries  · Wear slippers or shoes with nonskid soles. · Remove throw rugs and clutter. · Rearrange furniture and electrical cords to keep them out of walking paths. · Keep stairways, porches, and outside walkways well lit. Use night-lights in hallways and bathrooms. · Be extra careful when you work with sharp tools or knives. When should you call for help? Call 911 anytime you think you may need emergency care. For example, call if:  · You have a sudden, severe headache that is different from past headaches. Call your doctor now or seek immediate medical care if:  · You have any abnormal bleeding, such as:  ¨ Nosebleeds. ¨ Vaginal bleeding that is different (heavier, more frequent, at a different time of the month) than what you are used to. ¨ Bloody or black stools, or rectal bleeding. ¨ Bloody or pink urine. Watch closely for changes in your health, and be sure to contact your doctor if you have any problems. Where can you learn more? Go to http://www.gray.com/. Enter J652 in the search box to learn more about \"Taking Warfarin Safely: Care Instructions. \"  Current as of: January 27, 2016  Content Version: 11.1  © 9272-0939 [employer]. Care instructions adapted under license by [employer] (which disclaims liability or warranty for this information). If you have questions about a medical condition or this instruction, always ask your healthcare professional. [place] [address] any warranty or liability for your use of this information.

## 2022-05-31 ENCOUNTER — ANTI-COAG VISIT (OUTPATIENT)
Dept: PHARMACY | Age: 87
End: 2022-05-31
Payer: MEDICARE

## 2022-05-31 VITALS — HEART RATE: 60 BPM | SYSTOLIC BLOOD PRESSURE: 110 MMHG | DIASTOLIC BLOOD PRESSURE: 70 MMHG

## 2022-05-31 DIAGNOSIS — Z79.01 LONG TERM (CURRENT) USE OF ANTICOAGULANTS: ICD-10-CM

## 2022-05-31 DIAGNOSIS — I48.0 PAROXYSMAL ATRIAL FIBRILLATION (HCC): Primary | ICD-10-CM

## 2022-05-31 LAB
INR BLD: 1.8
PT POC: 21.7 SECONDS
VALID INTERNAL CONTROL?: YES

## 2022-05-31 PROCEDURE — 85610 PROTHROMBIN TIME: CPT

## 2022-05-31 PROCEDURE — 99212 OFFICE O/P EST SF 10 MIN: CPT

## 2022-06-14 NOTE — PATIENT INSTRUCTIONS
Today your INR was 2.2. Your goal INR is  2.0-3.0. You have a  2.5 mg tablet of Coumadin (warfarin). Take Coumadin (warfarin) as follows: Take 1.25 mg  (0.5 tablet) every Monday and take 2.5 mg (1 tablet) daily rest of week    Come back in 4 week(s) for your next finger stick/INR blood test.        Avoid any over the counter items containing aspirin or ibuprofen, and avoid great swings in general diet. Avoid alcohol consumption. Please notify the INR nurse if you are started on any new medication including over the counter or herbal supplements. Also, please notify your INR nurse if any of your other prescription or over the counter medications have been discontinued. Your Care Instructions  Warfarin is a medicine that you take to prevent blood clots. It is often called a blood thinner. Doctors give warfarin (such as Coumadin) to reduce the risk of blood clots. You may be at risk for blood clots if you have atrial fibrillation or deep vein thrombosis. Some other health problems may also put you at risk. Warfarin slows the amount of time it takes for your blood to clot. It can cause bleeding problems. Even if you've been taking warfarin for a while, it's important to know how to take it safely. Foods and other medicines can affect the way warfarin works. Some can make warfarin work too well. This can cause bleeding problems. And some can make it work poorly, so that it does not prevent blood clots very well. You will need regular blood tests to check how long it takes for your blood to form a clot. This test is called a PT or prothrombin time test. The result of the test is called an INR level. Depending on the test results, your doctor or anticoagulation clinic may adjust your dose of warfarin. Follow-up care is a key part of your treatment and safety. Be sure to make and go to all appointments, and call your doctor if you are having problems.  It's also a good idea to know your test results and keep a list of the medicines you take. How can you care for yourself at home? Take warfarin safely  · Take your warfarin at the same time each day. · If you miss a dose of warfarin, don't take an extra dose to make up for it. Your doctor can tell you exactly what to do so you don't take too much or too little. · Wear medical alert jewelry that lets others know that you take warfarin. You can buy this at most drugstores. · Don't take warfarin if you are pregnant or planning to get pregnant. Talk to your doctor about how you can prevent getting pregnant while you are taking it. · Don't change your dose or stop taking warfarin unless your doctor tells you to. Effects of medicines and food on warfarin  · Don't start or stop taking any medicines, vitamins, or natural remedies unless you first talk to your doctor. Many medicines can affect how warfarin works. These include aspirin and other pain relievers, over-the-counter medicines, multivitamins, dietary supplements, and herbal products. · Tell all of your doctors and pharmacists that you take warfarin. Some prescription medicines can affect how warfarin works. · Keep the amount of vitamin K in your diet about the same from day to day. Do not suddenly eat a lot more or a lot less food that is rich in vitamin K than you usually do. Vitamin K affects how warfarin works and how your blood clots. Talk with your doctor before making big changes in your diet. Vitamin K is in many foods, such as:  ¨ Leafy greens, such as kale, cabbage, spinach, Swiss chard, and lettuce. ¨ Canola and soybean oils. ¨ Green vegetables, such as asparagus, broccoli, and [place] sprouts. ¨ Vegetable drinks, green tea leaves, and some dietary supplement drinks. · Avoid cranberry juice and other cranberry products. They can increase the effects of warfarin. · Limit your use of alcohol.     Avoid bleeding by preventing falls and injuries  · Wear slippers or shoes with nonskid soles. · Remove throw rugs and clutter. · Rearrange furniture and electrical cords to keep them out of walking paths. · Keep stairways, porches, and outside walkways well lit. Use night-lights in hallways and bathrooms. · Be extra careful when you work with sharp tools or knives. When should you call for help? Call 911 anytime you think you may need emergency care. For example, call if:  · You have a sudden, severe headache that is different from past headaches. Call your doctor now or seek immediate medical care if:  · You have any abnormal bleeding, such as:  ¨ Nosebleeds. ¨ Vaginal bleeding that is different (heavier, more frequent, at a different time of the month) than what you are used to. ¨ Bloody or black stools, or rectal bleeding. ¨ Bloody or pink urine. Watch closely for changes in your health, and be sure to contact your doctor if you have any problems. Where can you learn more? Go to http://www.gray.com/. Enter C986 in the search box to learn more about \"Taking Warfarin Safely: Care Instructions. \"  Current as of: January 27, 2016  Content Version: 11.1  © 0182-3955 [employer] [place]. Care instructions adapted under license by [employer] (which disclaims liability or warranty for this information). If you have questions about a medical condition or this instruction, always ask your healthcare professional. [place] [address] any warranty or liability for your use of this information.

## 2022-06-14 NOTE — PROGRESS NOTES
Pharmacy Progress Note - Anticoagulation Management    S/O:  Ms. Brii Mann  is a 80 y.o. female seen today for anticoagulation management for the diagnosis of Atrial Fibrillation. HPI: At last visit (5/31) INR was 1.8 and subttherapeutic. Weekly warfarin dose increased from 15 mg to 16.25 mg (~8%). Patient instructed to take 1.25 mg Mon; 2.5 mg daily ROW. Will recheck INR in 2 weeks. Interim History:    · Warfarin start date: Prior to October 2018  · INR Goal:  2.0-3.0    · Current warfarin regimen: 1.25 mg Mon; 2.5 mg daily ROW  · Warfarin tablet strength:   2.5 mg   · Duration of therapy: Indefinite    Today's pertinent positives includes:  No significant changes since last visit    Results for orders placed or performed in visit on 06/21/22   POC PROTHROMBIN W/INR   Result Value Ref Range    VALID INTERNAL CONTROL POC Yes     Prothrombin time (POC) 26.6 seconds    INR POC 2.2      · Adherence:   · Able to recall regimen? YES  · Miss/extra dose? NO  · Need refill? NO    Upcoming procedure(s):  NO      Past Medical History:   Diagnosis Date    Aortic valve disorders 4/29/2012    Atrial fibrillation ([facility] [address] 03/22/2011    on coumadin followed by cardiology - Gutierrez Wills MD    CAD (coronary artery disease)     Cor pulmonale, chronic ([facility] [address] 4/29/2012    Dyslipidemia (high LDL; low HDL) 4/29/2012    Gout 4/30/2012    Gout, arthritis 2/8/2012    Gout, joint     Hypertension     Hyperuricemia 4/30/2012    Obstructive sleep apnea 4/29/2012    Pacemaker 04/02/2006    Dr Silvino Redding placed the original pacer 4/6/2006. It is replaced in 2014. It is [facility]    Recurrent epistaxis 4/29/2012    Sinoatrial node dysfunction (HCC) 3/22/2011    Venous insufficiency      No Known Allergies  Current Outpatient Medications   Medication Sig    simvastatin (ZOCOR) 20 mg tablet TAKE 1 TABLET BY MOUTH EVERY NIGHT    warfarin (COUMADIN) 2.5 mg tablet TAKE 1 TABLET BY MOUTH DAILY.  EXCEPT ON TAKE 1/2 TABLET ON THURSDAYS AS DIRECTED    amLODIPine (NORVASC) 5 mg tablet TAKE 1 TABLET BY MOUTH DAILY    atenoloL (TENORMIN) 100 mg tablet Take 1 Tablet by mouth daily.  dorzolamide (TRUSOPT) 2 % ophthalmic solution INSTILL 1 DROP IN BOTH EYES BID    magnesium 250 mg tab Take 250 mg by mouth. Mon, wed, fri only    latanoprost (XALATAN) 0.005 % ophthalmic solution INT 1 GTT IN OU QD HS    levobunolol (BETAGAN) 0.5 % ophthalmic solution INSTILL 1 GTT OU BID     No current facility-administered medications for this visit. Wt Readings from Last 3 Encounters:   03/15/22 170 lb (77.1 kg)   02/21/22 170 lb (77.1 kg)   02/02/22 172 lb (78 kg)       BP Readings from Last 3 Encounters:   05/31/22 110/70   05/16/22 108/70   04/18/22 110/70     Pulse Readings from Last 3 Encounters:   05/31/22 60   05/16/22 60   04/18/22 62     Lab Results   Component Value Date/Time    WBC 5.8 10/05/2021 12:57 PM    Hemoglobin (POC) 12.6 06/17/2013 03:37 PM    HGB 12.9 10/05/2021 12:57 PM    Hematocrit (POC) 37 06/17/2013 03:37 PM    HCT 37.9 10/05/2021 12:57 PM    PLATELET 955 52/52/8074 12:57 PM    MCV 82.2 10/05/2021 12:57 PM     Lab Results   Component Value Date/Time    Sodium 135 (L) 10/05/2021 12:57 PM    Potassium 3.9 10/05/2021 12:57 PM    Chloride 103 10/05/2021 12:57 PM    CO2 28 10/05/2021 12:57 PM    Anion gap 4 (L) 10/05/2021 12:57 PM    Glucose 88 10/05/2021 12:57 PM    BUN 11 10/05/2021 12:57 PM    Creatinine 1.05 (H) 10/05/2021 12:57 PM    BUN/Creatinine ratio 10 (L) 10/05/2021 12:57 PM    GFR est AA 58 (L) 10/05/2021 12:57 PM    GFR est non-AA 48 (L) 10/05/2021 12:57 PM    Calcium 9.4 10/05/2021 12:57 PM    Bilirubin, total 0.7 10/05/2021 12:57 PM    Alk.  phosphatase 68 10/05/2021 12:57 PM    Protein, total 7.5 10/05/2021 12:57 PM    Albumin 3.8 10/05/2021 12:57 PM    Globulin 3.7 10/05/2021 12:57 PM    A-G Ratio 1.0 (L) 10/05/2021 12:57 PM    ALT (SGPT) 17 10/05/2021 12:57 PM     CrCl cannot be calculated (Patient's most recent lab result is older than the maximum 180 days allowed.). INR History:   (normal INR range 0.8-1.2)     Date   INR   PT   Dose/Comments  06/21/22 2.2  26.6 Increased to 1.25 mg Mon; 2.5 mg daily ROW  05/31/22 1.8  21.7 No changes  05/16/22 1.9  22.4 No changes  04/18/22 2.3  27.6 No changes  03/21/22 2.3  28.1 No changes  02/21/22 2.3  28.1 No changes  02/02/22 1.9  22.4 No changes, increased greens  01/19/22 3.3  39.7 Held x 1 day, then 1.25 mg Thur, 2.5 mg daily ROW  01/10/22 3.9  46.3 No changes  12/27/21 3.2  38.0 No changes  11/22/21 2.2   No changes  10/25/21 2.1   1.25 mg every Thu; 2.5 mg all other days    A/P:       Anticoagulation:  Considering Ms. Robins's past history, todays findings, and per Anticoagulation Collaborative Practice Agreement/Protocol:    1. Fingerstick POC INR (2.2) is Therapeutic for INR goal today. 2.  Continue warfarin 1.25 mg on Monday and 2.5 mg daily ROW  3. At last visit (5/31) INR was 1.8 and subtherapeutic for INR goal 2.0-3.0. Weekly warfarin dose increased  from 15 mg to 16.25 mg (~8%). Patient instructed to take 1.25 mg Mon; 2.5 mg daily ROW. Today INR is 2.2 and therapeutic. Patient denies changes to her medications and reports consistent intake of vitamin K foods. Patient will continue current regimen and recheck INR in 4 weeks. Patient was instructed to schedule an appointment in 4 week(s) prior to leaving the clinic. Medication reconciliation was completed during the visit. There are no discontinued medications. A full discussion of the nature of anticoagulants has been carried out. A full discussion of the need for frequent and regular monitoring, precise dosage adjustment and compliance was stressed. Side effects of potential bleeding were discussed and Ms. Robins was instructed to call [phone] if there are any signs of abnormal bleeding.   Ms. Scarlett Blanco was instructed to avoid any OTC items containing aspirin or ibuprofen and prior to starting any new OTC products to consult with her physician or pharmacist to ensure no drug interactions are present. Ms. Yun Kothari was instructed to avoid any major changes in her general diet and to avoid alcohol consumption. Ms. Yun Kothari was provided information in the AVS that includes topics on understanding coumadin therapy, drug interaction considerations, vitamin K and coumadin use, interactions with foods and supplements containing vitamin K, and the use of herbal products. Ms. Yun Kothari verbalized her understanding of all instructions and will call the office with any questions, concerns, or signs of abnormal bleeding or blood clot. Notifications of recommendations will be sent to Dr. Shantell Corbett MD for review.     Thank you,  Pedro Luis Burns, [address] [facility] Tracking Only     Intervention Detail: Adherence Monitorin and Lab(s) Ordered   Total # of Interventions Recommended: 2   Total # of Interventions Accepted: 2   Time Spent (min): 20

## 2022-06-21 ENCOUNTER — ANTI-COAG VISIT (OUTPATIENT)
Dept: PHARMACY | Age: 87
End: 2022-06-21
Payer: MEDICARE

## 2022-06-21 DIAGNOSIS — Z79.01 LONG TERM (CURRENT) USE OF ANTICOAGULANTS: ICD-10-CM

## 2022-06-21 DIAGNOSIS — I48.0 PAROXYSMAL ATRIAL FIBRILLATION (HCC): Primary | ICD-10-CM

## 2022-06-21 LAB
INR BLD: 2.2
PT POC: 26.6 SECONDS
VALID INTERNAL CONTROL?: YES

## 2022-06-21 PROCEDURE — 85610 PROTHROMBIN TIME: CPT

## 2022-06-21 PROCEDURE — 99211 OFF/OP EST MAY X REQ PHY/QHP: CPT

## 2022-06-22 ENCOUNTER — OFFICE VISIT (OUTPATIENT)
Dept: CARDIOLOGY CLINIC | Age: 87
End: 2022-06-22
Payer: MEDICARE

## 2022-06-22 DIAGNOSIS — Z95.0 CARDIAC PACEMAKER IN SITU: Primary | ICD-10-CM

## 2022-06-22 PROCEDURE — 93296 REM INTERROG EVL PM/IDS: CPT | Performed by: INTERNAL MEDICINE

## 2022-06-22 NOTE — LETTER
6/23/2022 7:42 AM    Ms. [id number] [facility] [id number]            This letter confirms that we have received your scheduled remote check of your implanted     device on 6-22-22  . Our EP team will contact you via phone if there are significant abnormal    findings. Your next remote check from home is scheduled for 9-26-22  . If you have any questions, please call [address] at [phone].                Sincerely,    Vijaya Smallwood MD [facility] [place]

## 2022-07-14 NOTE — PATIENT INSTRUCTIONS
Today your INR was 2.1. Your goal INR is  2.0-3.0. You have a  2.5 mg tablet of Coumadin (warfarin). Take Coumadin (warfarin) as follows: Take 1.25 mg  (0.5 tablet) every Monday and take 2.5 mg (1 tablet) daily rest of week    Come back in 4 week(s) for your next finger stick/INR blood test.        Avoid any over the counter items containing aspirin or ibuprofen, and avoid great swings in general diet. Avoid alcohol consumption. Please notify the INR nurse if you are started on any new medication including over the counter or herbal supplements. Also, please notify your INR nurse if any of your other prescription or over the counter medications have been discontinued. Your Care Instructions  Warfarin is a medicine that you take to prevent blood clots. It is often called a blood thinner. Doctors give warfarin (such as Coumadin) to reduce the risk of blood clots. You may be at risk for blood clots if you have atrial fibrillation or deep vein thrombosis. Some other health problems may also put you at risk. Warfarin slows the amount of time it takes for your blood to clot. It can cause bleeding problems. Even if you've been taking warfarin for a while, it's important to know how to take it safely. Foods and other medicines can affect the way warfarin works. Some can make warfarin work too well. This can cause bleeding problems. And some can make it work poorly, so that it does not prevent blood clots very well. You will need regular blood tests to check how long it takes for your blood to form a clot. This test is called a PT or prothrombin time test. The result of the test is called an INR level. Depending on the test results, your doctor or anticoagulation clinic may adjust your dose of warfarin. Follow-up care is a key part of your treatment and safety. Be sure to make and go to all appointments, and call your doctor if you are having problems.  It's also a good idea to know your test results and keep a list of the medicines you take. How can you care for yourself at home? Take warfarin safely  · Take your warfarin at the same time each day. · If you miss a dose of warfarin, don't take an extra dose to make up for it. Your doctor can tell you exactly what to do so you don't take too much or too little. · Wear medical alert jewelry that lets others know that you take warfarin. You can buy this at most drugstores. · Don't take warfarin if you are pregnant or planning to get pregnant. Talk to your doctor about how you can prevent getting pregnant while you are taking it. · Don't change your dose or stop taking warfarin unless your doctor tells you to. Effects of medicines and food on warfarin  · Don't start or stop taking any medicines, vitamins, or natural remedies unless you first talk to your doctor. Many medicines can affect how warfarin works. These include aspirin and other pain relievers, over-the-counter medicines, multivitamins, dietary supplements, and herbal products. · Tell all of your doctors and pharmacists that you take warfarin. Some prescription medicines can affect how warfarin works. · Keep the amount of vitamin K in your diet about the same from day to day. Do not suddenly eat a lot more or a lot less food that is rich in vitamin K than you usually do. Vitamin K affects how warfarin works and how your blood clots. Talk with your doctor before making big changes in your diet. Vitamin K is in many foods, such as:  ¨ Leafy greens, such as kale, cabbage, spinach, Swiss chard, and lettuce. ¨ Canola and soybean oils. ¨ Green vegetables, such as asparagus, broccoli, and [place] sprouts. ¨ Vegetable drinks, green tea leaves, and some dietary supplement drinks. · Avoid cranberry juice and other cranberry products. They can increase the effects of warfarin. · Limit your use of alcohol.     Avoid bleeding by preventing falls and injuries  · Wear slippers or shoes with nonskid soles. · Remove throw rugs and clutter. · Rearrange furniture and electrical cords to keep them out of walking paths. · Keep stairways, porches, and outside walkways well lit. Use night-lights in hallways and bathrooms. · Be extra careful when you work with sharp tools or knives. When should you call for help? Call 911 anytime you think you may need emergency care. For example, call if:  · You have a sudden, severe headache that is different from past headaches. Call your doctor now or seek immediate medical care if:  · You have any abnormal bleeding, such as:  ¨ Nosebleeds. ¨ Vaginal bleeding that is different (heavier, more frequent, at a different time of the month) than what you are used to. ¨ Bloody or black stools, or rectal bleeding. ¨ Bloody or pink urine. Watch closely for changes in your health, and be sure to contact your doctor if you have any problems. Where can you learn more? Go to http://www.gray.com/. Enter T944 in the search box to learn more about \"Taking Warfarin Safely: Care Instructions. \"  Current as of: January 27, 2016  Content Version: 11.1  © 4243-3475 [employer]. Care instructions adapted under license by [employer] (which disclaims liability or warranty for this information). If you have questions about a medical condition or this instruction, always ask your healthcare professional. [place] [address] any warranty or liability for your use of this information.

## 2022-07-14 NOTE — PROGRESS NOTES
Pharmacy Progress Note - Anticoagulation Management    S/O:  Ms. Loli Milenr  is a 80 y.o. female seen today for anticoagulation management for the diagnosis of Atrial Fibrillation. HPI: At last visit (6/21) INR was 2.2 and therapeutic. Patient instructed to continue 1.25 mg Mon; 2.5 mg daily ROW and recheck INR in 4 weeks. Interim History:    · Warfarin start date: Prior to October 2018  · INR Goal:  2.0-3.0    · Current warfarin regimen: 1.25 mg Mon; 2.5 mg daily ROW  · Warfarin tablet strength:   2.5 mg   · Duration of therapy: Indefinite    Today's pertinent positives includes:  No significant changes since last visit    Results for orders placed or performed in visit on 07/19/22   POC PROTHROMBIN W/INR   Result Value Ref Range    VALID INTERNAL CONTROL POC Yes     Prothrombin time (POC) 24.9 seconds    INR POC 2.1      · Adherence:   · Able to recall regimen? YES  · Miss/extra dose? NO  · Need refill? NO    Upcoming procedure(s):  NO      Past Medical History:   Diagnosis Date    Aortic valve disorders 4/29/2012    Atrial fibrillation ([facility] [address] 03/22/2011    on coumadin followed by cardiology - Joanna Ramirez MD    CAD (coronary artery disease)     Cor pulmonale, chronic ([facility] [address] 4/29/2012    Dyslipidemia (high LDL; low HDL) 4/29/2012    Gout 4/30/2012    Gout, arthritis 2/8/2012    Gout, joint     Hypertension     Hyperuricemia 4/30/2012    Obstructive sleep apnea 4/29/2012    Pacemaker 04/02/2006    Dr Nomi Shafer placed the original pacer 4/6/2006. It is replaced in 2014. It is [facility] [employer]    Recurrent epistaxis 4/29/2012    Sinoatrial node dysfunction (HCC) 3/22/2011    Venous insufficiency      No Known Allergies  Current Outpatient Medications   Medication Sig    simvastatin (ZOCOR) 20 mg tablet TAKE 1 TABLET BY MOUTH EVERY NIGHT    warfarin (COUMADIN) 2.5 mg tablet TAKE 1 TABLET BY MOUTH DAILY.  EXCEPT ON TAKE 1/2 TABLET ON THURSDAYS AS DIRECTED    amLODIPine (NORVASC) 5 mg tablet TAKE 1 TABLET BY MOUTH DAILY    atenoloL (TENORMIN) 100 mg tablet Take 1 Tablet by mouth daily.  dorzolamide (TRUSOPT) 2 % ophthalmic solution INSTILL 1 DROP IN BOTH EYES BID    magnesium 250 mg tab Take 250 mg by mouth. Mon, wed, fri only    latanoprost (XALATAN) 0.005 % ophthalmic solution INT 1 GTT IN OU QD HS    levobunolol (BETAGAN) 0.5 % ophthalmic solution INSTILL 1 GTT OU BID     No current facility-administered medications for this visit. Wt Readings from Last 3 Encounters:   03/15/22 170 lb (77.1 kg)   02/21/22 170 lb (77.1 kg)   02/02/22 172 lb (78 kg)       BP Readings from Last 3 Encounters:   05/31/22 110/70   05/16/22 108/70   04/18/22 110/70     Pulse Readings from Last 3 Encounters:   05/31/22 60   05/16/22 60   04/18/22 62     Lab Results   Component Value Date/Time    WBC 5.8 10/05/2021 12:57 PM    Hemoglobin (POC) 12.6 06/17/2013 03:37 PM    HGB 12.9 10/05/2021 12:57 PM    Hematocrit (POC) 37 06/17/2013 03:37 PM    HCT 37.9 10/05/2021 12:57 PM    PLATELET 617 05/73/1478 12:57 PM    MCV 82.2 10/05/2021 12:57 PM     Lab Results   Component Value Date/Time    Sodium 135 (L) 10/05/2021 12:57 PM    Potassium 3.9 10/05/2021 12:57 PM    Chloride 103 10/05/2021 12:57 PM    CO2 28 10/05/2021 12:57 PM    Anion gap 4 (L) 10/05/2021 12:57 PM    Glucose 88 10/05/2021 12:57 PM    BUN 11 10/05/2021 12:57 PM    Creatinine 1.05 (H) 10/05/2021 12:57 PM    BUN/Creatinine ratio 10 (L) 10/05/2021 12:57 PM    GFR est AA 58 (L) 10/05/2021 12:57 PM    GFR est non-AA 48 (L) 10/05/2021 12:57 PM    Calcium 9.4 10/05/2021 12:57 PM    Bilirubin, total 0.7 10/05/2021 12:57 PM    Alk. phosphatase 68 10/05/2021 12:57 PM    Protein, total 7.5 10/05/2021 12:57 PM    Albumin 3.8 10/05/2021 12:57 PM    Globulin 3.7 10/05/2021 12:57 PM    A-G Ratio 1.0 (L) 10/05/2021 12:57 PM    ALT (SGPT) 17 10/05/2021 12:57 PM     CrCl cannot be calculated (Patient's most recent lab result is older than the maximum 180 days allowed.).       INR History:   (normal INR range 0.8-1.2)     Date   INR   PT   Dose/Comments  07/19/22 2.1  24.9 No changes  06/21/22 2.2  26.6 Increased to 1.25 mg Mon; 2.5 mg daily ROW  05/31/22 1.8  21.7 No changes  05/16/22 1.9  22.4 No changes  04/18/22 2.3  27.6 No changes  03/21/22 2.3  28.1 No changes  02/21/22 2.3  28.1 No changes  02/02/22 1.9  22.4 No changes, increased greens  01/19/22 3.3  39.7 Held x 1 day, then 1.25 mg Thur, 2.5 mg daily ROW  01/10/22 3.9  46.3 No changes  12/27/21 3.2  38.0 No changes  11/22/21 2.2   No changes  10/25/21 2.1   1.25 mg every Thu; 2.5 mg all other days    A/P:       Anticoagulation:  Considering Ms. Robins's past history, todays findings, and per Anticoagulation Collaborative Practice Agreement/Protocol:    1. Fingerstick POC INR (2.1) is Therapeutic for INR goal today. 2.  Continue warfarin 1.25 mg on Monday and 2.5 mg daily ROW  3. INR is 2.1 and therapeutic for INR goal 2.0-3.0. Patient denies changes to her medications and reports consistent intake of vitamin K foods. Patient will continue 1.25 mg Mon; 2.5 mg daily ROW. Will recheck INR in 4 weeks. Patient was instructed to schedule an appointment in 4 week(s) prior to leaving the clinic. Medication reconciliation was completed during the visit. There are no discontinued medications. A full discussion of the nature of anticoagulants has been carried out. A full discussion of the need for frequent and regular monitoring, precise dosage adjustment and compliance was stressed. Side effects of potential bleeding were discussed and Ms. Robins was instructed to call [phone] if there are any signs of abnormal bleeding. Ms. Selena Renae was instructed to avoid any OTC items containing aspirin or ibuprofen and prior to starting any new OTC products to consult with her physician or pharmacist to ensure no drug interactions are present.   Ms. Selena Renae was instructed to avoid any major changes in her general diet and to avoid alcohol consumption. Ms. Jordan Suazo was provided information in the AVS that includes topics on understanding coumadin therapy, drug interaction considerations, vitamin K and coumadin use, interactions with foods and supplements containing vitamin K, and the use of herbal products. Ms. Jordan Suazo verbalized her understanding of all instructions and will call the office with any questions, concerns, or signs of abnormal bleeding or blood clot. Notifications of recommendations will be sent to Dr. Ama Perry MD for review.     Thank you,  EULOGIO Qureshi/ [address] Tracking Only     Intervention Detail: Adherence Monitorin and Lab(s) Ordered   Total # of Interventions Recommended: 2   Total # of Interventions Accepted: 2   Time Spent (min): 20

## 2022-07-19 ENCOUNTER — ANTI-COAG VISIT (OUTPATIENT)
Dept: PHARMACY | Age: 87
End: 2022-07-19
Payer: MEDICARE

## 2022-07-19 DIAGNOSIS — Z79.01 LONG TERM (CURRENT) USE OF ANTICOAGULANTS: ICD-10-CM

## 2022-07-19 DIAGNOSIS — I48.0 PAROXYSMAL ATRIAL FIBRILLATION (HCC): Primary | ICD-10-CM

## 2022-07-19 LAB
INR BLD: 2.1
PT POC: 24.9 SECONDS
VALID INTERNAL CONTROL?: YES

## 2022-07-19 PROCEDURE — 99211 OFF/OP EST MAY X REQ PHY/QHP: CPT

## 2022-07-19 PROCEDURE — 85610 PROTHROMBIN TIME: CPT

## 2022-07-28 DIAGNOSIS — I48.0 PAROXYSMAL ATRIAL FIBRILLATION (HCC): ICD-10-CM

## 2022-07-28 RX ORDER — WARFARIN 2.5 MG/1
TABLET ORAL
Qty: 90 TABLET | Refills: 0 | Status: SHIPPED | OUTPATIENT
Start: 2022-07-28 | End: 2022-10-31

## 2022-07-28 NOTE — TELEPHONE ENCOUNTER
Received refill request for warfarin 2.5 mg po tabs. Refill authorized. Future Appointments   Date Time Provider Monika Radha   8/16/2022 10:30 AM [facility] MEDICATION MGMT [address] [facility]   9/26/2022  2:00 PM REMOTE1, KAREN LI BS AMB   10/11/2022 11:30 AM Diana Iniguez MD [facility] MAIN BS AMB   1/4/2023  8:30 AM REMOTE1, KAREN WEBBER AMB   4/13/2023 11:00 AM PACEMAKER3, KAREN LI BS AMB   4/13/2023 11:20 AM MD GUILLERMO Medrano BS AMB

## 2022-08-08 NOTE — PATIENT INSTRUCTIONS
Today your INR was 1.9. Your goal INR is  2.0-3.0. You have a  2.5 mg tablet of Coumadin (warfarin). Take Coumadin (warfarin) as follows: Take 2.5 mg (1 tablet) daily    Come back in 2 week(s) for your next finger stick/INR blood test.        Avoid any over the counter items containing aspirin or ibuprofen, and avoid great swings in general diet. Avoid alcohol consumption. Please notify the INR nurse if you are started on any new medication including over the counter or herbal supplements. Also, please notify your INR nurse if any of your other prescription or over the counter medications have been discontinued. Your Care Instructions  Warfarin is a medicine that you take to prevent blood clots. It is often called a blood thinner. Doctors give warfarin (such as Coumadin) to reduce the risk of blood clots. You may be at risk for blood clots if you have atrial fibrillation or deep vein thrombosis. Some other health problems may also put you at risk. Warfarin slows the amount of time it takes for your blood to clot. It can cause bleeding problems. Even if you've been taking warfarin for a while, it's important to know how to take it safely. Foods and other medicines can affect the way warfarin works. Some can make warfarin work too well. This can cause bleeding problems. And some can make it work poorly, so that it does not prevent blood clots very well. You will need regular blood tests to check how long it takes for your blood to form a clot. This test is called a PT or prothrombin time test. The result of the test is called an INR level. Depending on the test results, your doctor or anticoagulation clinic may adjust your dose of warfarin. Follow-up care is a key part of your treatment and safety. Be sure to make and go to all appointments, and call your doctor if you are having problems. It's also a good idea to know your test results and keep a list of the medicines you take.     How can you care for yourself at home? Take warfarin safely  Take your warfarin at the same time each day. If you miss a dose of warfarin, don't take an extra dose to make up for it. Your doctor can tell you exactly what to do so you don't take too much or too little. Wear medical alert jewelry that lets others know that you take warfarin. You can buy this at most drugstores. Don't take warfarin if you are pregnant or planning to get pregnant. Talk to your doctor about how you can prevent getting pregnant while you are taking it. Don't change your dose or stop taking warfarin unless your doctor tells you to. Effects of medicines and food on warfarin  Don't start or stop taking any medicines, vitamins, or natural remedies unless you first talk to your doctor. Many medicines can affect how warfarin works. These include aspirin and other pain relievers, over-the-counter medicines, multivitamins, dietary supplements, and herbal products. Tell all of your doctors and pharmacists that you take warfarin. Some prescription medicines can affect how warfarin works. Keep the amount of vitamin K in your diet about the same from day to day. Do not suddenly eat a lot more or a lot less food that is rich in vitamin K than you usually do. Vitamin K affects how warfarin works and how your blood clots. Talk with your doctor before making big changes in your diet. Vitamin K is in many foods, such as:  Leafy greens, such as kale, cabbage, spinach, Swiss chard, and lettuce. Canola and soybean oils. Green vegetables, such as asparagus, broccoli, and [place] sprouts. Vegetable drinks, green tea leaves, and some dietary supplement drinks. Avoid cranberry juice and other cranberry products. They can increase the effects of warfarin. Limit your use of alcohol. Avoid bleeding by preventing falls and injuries  Wear slippers or shoes with nonskid soles. Remove throw rugs and clutter.   Rearrange furniture and electrical cords to keep them out of walking paths. Keep stairways, porches, and outside walkways well lit. Use night-lights in hallways and bathrooms. Be extra careful when you work with sharp tools or knives. When should you call for help? Call 911 anytime you think you may need emergency care. For example, call if:  You have a sudden, severe headache that is different from past headaches. Call your doctor now or seek immediate medical care if:  You have any abnormal bleeding, such as:  Nosebleeds. Vaginal bleeding that is different (heavier, more frequent, at a different time of the month) than what you are used to. Bloody or black stools, or rectal bleeding. Bloody or pink urine. Watch closely for changes in your health, and be sure to contact your doctor if you have any problems. Where can you learn more? Go to http://www.gray.com/. Enter O228 in the search box to learn more about \"Taking Warfarin Safely: Care Instructions. \"  Current as of: January 27, 2016  Content Version: 11.1  © 1204-9673 [employer]. Care instructions adapted under license by [employer] (which disclaims liability or warranty for this information). If you have questions about a medical condition or this instruction, always ask your healthcare professional. [address] any warranty or liability for your use of this information.

## 2022-08-08 NOTE — PROGRESS NOTES
Pharmacy Progress Note - Anticoagulation Management    S/O:  Ms. Antonia Hull  is a 80 y.o. female seen today for anticoagulation management for the diagnosis of Atrial Fibrillation. HPI: At last visit (7/19) INR was 2.1 and therapeutic. Patient instructed to continue 1.25 mg Mon; 2.5 mg daily ROW and recheck INR in 4 weeks. Interim History:    Warfarin start date: Prior to October 2018  INR Goal:  2.0-3.0    Current warfarin regimen: 1.25 mg Mon; 2.5 mg daily ROW  Warfarin tablet strength:   2.5 mg   Duration of therapy: Indefinite    Today's pertinent positives includes:  No significant changes since last visit    Results for orders placed or performed in visit on 08/16/22   AMB POC PT/INR   Result Value Ref Range    VALID INTERNAL CONTROL POC Yes     Prothrombin time (POC) 22.8 seconds    INR POC 1.9      Adherence:   Able to recall regimen? YES  Miss/extra dose? NO  Need refill? NO    Upcoming procedure(s):  NO      Past Medical History:   Diagnosis Date    Aortic valve disorders 4/29/2012    Atrial fibrillation ([facility] [address] 03/22/2011    on coumadin followed by cardiology - Monica Olsen MD    CAD (coronary artery disease)     Cor pulmonale, chronic ([facility] [address] 4/29/2012    Dyslipidemia (high LDL; low HDL) 4/29/2012    Gout 4/30/2012    Gout, arthritis 2/8/2012    Gout, joint     Hypertension     Hyperuricemia 4/30/2012    Obstructive sleep apnea 4/29/2012    Pacemaker 04/02/2006    Dr Brianna Lee placed the original pacer 4/6/2006. It is replaced in 2014. It is [facility]    Recurrent epistaxis 4/29/2012    Sinoatrial node dysfunction (HCC) 3/22/2011    Venous insufficiency      No Known Allergies  Current Outpatient Medications   Medication Sig    warfarin (COUMADIN) 2.5 mg tablet TAKE 1 TABLET BY MOUTH DAILY.  EXCEPT ON TAKE 1/2 TABLET ON THURSDAYS AS DIRECTED    simvastatin (ZOCOR) 20 mg tablet TAKE 1 TABLET BY MOUTH EVERY NIGHT    amLODIPine (NORVASC) 5 mg tablet TAKE 1 TABLET BY MOUTH DAILY    atenoloL (TENORMIN) 100 mg tablet Take 1 Tablet by mouth daily. dorzolamide (TRUSOPT) 2 % ophthalmic solution INSTILL 1 DROP IN BOTH EYES BID    magnesium 250 mg tab Take 250 mg by mouth. Mon, wed, fri only    latanoprost (XALATAN) 0.005 % ophthalmic solution INT 1 GTT IN OU QD HS    levobunolol (BETAGAN) 0.5 % ophthalmic solution INSTILL 1 GTT OU BID     No current facility-administered medications for this visit. Wt Readings from Last 3 Encounters:   03/15/22 170 lb (77.1 kg)   02/21/22 170 lb (77.1 kg)   02/02/22 172 lb (78 kg)       BP Readings from Last 3 Encounters:   05/31/22 110/70   05/16/22 108/70   04/18/22 110/70     Pulse Readings from Last 3 Encounters:   05/31/22 60   05/16/22 60   04/18/22 62     Lab Results   Component Value Date/Time    WBC 5.8 10/05/2021 12:57 PM    Hemoglobin (POC) 12.6 06/17/2013 03:37 PM    HGB 12.9 10/05/2021 12:57 PM    Hematocrit (POC) 37 06/17/2013 03:37 PM    HCT 37.9 10/05/2021 12:57 PM    PLATELET 129 43/90/4943 12:57 PM    MCV 82.2 10/05/2021 12:57 PM     Lab Results   Component Value Date/Time    Sodium 135 (L) 10/05/2021 12:57 PM    Potassium 3.9 10/05/2021 12:57 PM    Chloride 103 10/05/2021 12:57 PM    CO2 28 10/05/2021 12:57 PM    Anion gap 4 (L) 10/05/2021 12:57 PM    Glucose 88 10/05/2021 12:57 PM    BUN 11 10/05/2021 12:57 PM    Creatinine 1.05 (H) 10/05/2021 12:57 PM    BUN/Creatinine ratio 10 (L) 10/05/2021 12:57 PM    GFR est AA 58 (L) 10/05/2021 12:57 PM    GFR est non-AA 48 (L) 10/05/2021 12:57 PM    Calcium 9.4 10/05/2021 12:57 PM    Bilirubin, total 0.7 10/05/2021 12:57 PM    Alk. phosphatase 68 10/05/2021 12:57 PM    Protein, total 7.5 10/05/2021 12:57 PM    Albumin 3.8 10/05/2021 12:57 PM    Globulin 3.7 10/05/2021 12:57 PM    A-G Ratio 1.0 (L) 10/05/2021 12:57 PM    ALT (SGPT) 17 10/05/2021 12:57 PM     CrCl cannot be calculated (Patient's most recent lab result is older than the maximum 180 days allowed.).       INR History:   (normal INR range 0.8-1.2)     Date   INR PT   Dose/Comments  08/16/22 1.9  22.8 No changes  07/19/22 2.1  24.9 No changes  06/21/22 2.2  26.6 Increased to 1.25 mg Mon; 2.5 mg daily ROW  05/31/22 1.8  21.7 No changes  05/16/22 1.9  22.4 No changes  04/18/22 2.3  27.6 No changes  03/21/22 2.3  28.1 No changes  02/21/22 2.3  28.1 No changes  02/02/22 1.9  22.4 No changes, increased greens  01/19/22 3.3  39.7 Held x 1 day, then 1.25 mg Thur, 2.5 mg daily ROW  01/10/22 3.9  46.3 No changes  12/27/21 3.2  38.0 No changes  11/22/21 2.2   No changes  10/25/21 2.1   1.25 mg every Thu; 2.5 mg all other days    A/P:       Anticoagulation:  Considering Ms. Robins's past history, todays findings, and per Anticoagulation Collaborative Practice Agreement/Protocol:    Fingerstick POC INR (1.9) is Subtherapeutic for INR goal today. Change warfarin and increase to take 2.5 mg daily  INR is 1.9 and slightly subtherapeutic for INR goal 2.0-3.0. Patient denies missed doses of warfarin or changes to her other medications. Patient reports consistent intake of vitamin K foods. INR previously therapeutic on this weekly dose, but near lower limit of therapeutic range (2.1). Since INR is subtherapeutic, will increase weekly warfarin dose from 16.25 mg to 17.5 mg (~8%). Patient instructed to take 2.5 mg daily and recheck INR in 2 weeks. Patient was instructed to schedule an appointment in 2 week(s) prior to leaving the clinic. Medication reconciliation was completed during the visit. There are no discontinued medications. A full discussion of the nature of anticoagulants has been carried out. A full discussion of the need for frequent and regular monitoring, precise dosage adjustment and compliance was stressed. Side effects of potential bleeding were discussed and Ms. Robins was instructed to call [phone] if there are any signs of abnormal bleeding.   Ms. Ivette English was instructed to avoid any OTC items containing aspirin or ibuprofen and prior to starting any new OTC products to consult with her physician or pharmacist to ensure no drug interactions are present. Ms. Spencer Lin was instructed to avoid any major changes in her general diet and to avoid alcohol consumption. Ms. Spencer Lin was provided information in the AVS that includes topics on understanding coumadin therapy, drug interaction considerations, vitamin K and coumadin use, interactions with foods and supplements containing vitamin K, and the use of herbal products. Ms. Spencer Lin verbalized her understanding of all instructions and will call the office with any questions, concerns, or signs of abnormal bleeding or blood clot. Notifications of recommendations will be sent to Dr. Heather Rangel MD for review.     Thank you,  EULOGIO Shafer/ Nate Perez 77 Tracking Only    Intervention Detail: Adherence Monitorin, Dose Adjustment: 1, reason: Therapy Optimization, and Lab(s) Ordered  Total # of Interventions Recommended: 3  Total # of Interventions Accepted: 3  Time Spent (min): 20

## 2022-08-10 RX ORDER — AMLODIPINE BESYLATE 5 MG/1
TABLET ORAL
Qty: 90 TABLET | Refills: 1 | Status: SHIPPED | OUTPATIENT
Start: 2022-08-10

## 2022-08-10 NOTE — TELEPHONE ENCOUNTER
Received refill request for amlodipine 5 mg po tabs. Refill authorized. Future Appointments   Date Time Provider Monika Rhoadesi   8/16/2022 10:30 AM [facility] MEDICATION MGMT [address] [facility]   9/26/2022  2:00 PM REMOTE1, KAREN LI BS AMB   10/11/2022 11:30 AM Matt Iniguez MD [facility] MAIN BS AMB   1/4/2023  8:30 AM REMOTE1, KAREN LI BS AMB   4/13/2023 11:00 AM PACEMAKER3, KAREN LI BS AMB   4/13/2023 11:20 AM MD GUILLERMO Rogers BS AMB

## 2022-08-16 ENCOUNTER — ANTI-COAG VISIT (OUTPATIENT)
Dept: PHARMACY | Age: 87
End: 2022-08-16
Payer: MEDICARE

## 2022-08-16 DIAGNOSIS — Z79.01 LONG TERM (CURRENT) USE OF ANTICOAGULANTS: ICD-10-CM

## 2022-08-16 DIAGNOSIS — I48.0 PAROXYSMAL ATRIAL FIBRILLATION (HCC): Primary | ICD-10-CM

## 2022-08-16 LAB
INR BLD: 1.9
PT POC: 22.8 SECONDS
VALID INTERNAL CONTROL?: YES

## 2022-08-16 PROCEDURE — 99212 OFFICE O/P EST SF 10 MIN: CPT

## 2022-08-25 NOTE — PATIENT INSTRUCTIONS
Today your INR was 2.7. Your goal INR is  2.0-3.0. You have a  2.5 mg tablet of Coumadin (warfarin). Take Coumadin (warfarin) as follows: Take 2.5 mg (1 tablet) daily    Come back in 4 week(s) for your next finger stick/INR blood test.        Avoid any over the counter items containing aspirin or ibuprofen, and avoid great swings in general diet. Avoid alcohol consumption. Please notify the INR nurse if you are started on any new medication including over the counter or herbal supplements. Also, please notify your INR nurse if any of your other prescription or over the counter medications have been discontinued. Your Care Instructions  Warfarin is a medicine that you take to prevent blood clots. It is often called a blood thinner. Doctors give warfarin (such as Coumadin) to reduce the risk of blood clots. You may be at risk for blood clots if you have atrial fibrillation or deep vein thrombosis. Some other health problems may also put you at risk. Warfarin slows the amount of time it takes for your blood to clot. It can cause bleeding problems. Even if you've been taking warfarin for a while, it's important to know how to take it safely. Foods and other medicines can affect the way warfarin works. Some can make warfarin work too well. This can cause bleeding problems. And some can make it work poorly, so that it does not prevent blood clots very well. You will need regular blood tests to check how long it takes for your blood to form a clot. This test is called a PT or prothrombin time test. The result of the test is called an INR level. Depending on the test results, your doctor or anticoagulation clinic may adjust your dose of warfarin. Follow-up care is a key part of your treatment and safety. Be sure to make and go to all appointments, and call your doctor if you are having problems. It's also a good idea to know your test results and keep a list of the medicines you take.     How can you care for yourself at home? Take warfarin safely  Take your warfarin at the same time each day. If you miss a dose of warfarin, don't take an extra dose to make up for it. Your doctor can tell you exactly what to do so you don't take too much or too little. Wear medical alert jewelry that lets others know that you take warfarin. You can buy this at most drugstores. Don't take warfarin if you are pregnant or planning to get pregnant. Talk to your doctor about how you can prevent getting pregnant while you are taking it. Don't change your dose or stop taking warfarin unless your doctor tells you to. Effects of medicines and food on warfarin  Don't start or stop taking any medicines, vitamins, or natural remedies unless you first talk to your doctor. Many medicines can affect how warfarin works. These include aspirin and other pain relievers, over-the-counter medicines, multivitamins, dietary supplements, and herbal products. Tell all of your doctors and pharmacists that you take warfarin. Some prescription medicines can affect how warfarin works. Keep the amount of vitamin K in your diet about the same from day to day. Do not suddenly eat a lot more or a lot less food that is rich in vitamin K than you usually do. Vitamin K affects how warfarin works and how your blood clots. Talk with your doctor before making big changes in your diet. Vitamin K is in many foods, such as:  Leafy greens, such as kale, cabbage, spinach, Swiss chard, and lettuce. Canola and soybean oils. Green vegetables, such as asparagus, broccoli, and [place] sprouts. Vegetable drinks, green tea leaves, and some dietary supplement drinks. Avoid cranberry juice and other cranberry products. They can increase the effects of warfarin. Limit your use of alcohol. Avoid bleeding by preventing falls and injuries  Wear slippers or shoes with nonskid soles. Remove throw rugs and clutter.   Rearrange furniture and electrical cords to keep them out of walking paths. Keep stairways, porches, and outside walkways well lit. Use night-lights in hallways and bathrooms. Be extra careful when you work with sharp tools or knives. When should you call for help? Call 911 anytime you think you may need emergency care. For example, call if:  You have a sudden, severe headache that is different from past headaches. Call your doctor now or seek immediate medical care if:  You have any abnormal bleeding, such as:  Nosebleeds. Vaginal bleeding that is different (heavier, more frequent, at a different time of the month) than what you are used to. Bloody or black stools, or rectal bleeding. Bloody or pink urine. Watch closely for changes in your health, and be sure to contact your doctor if you have any problems. Where can you learn more? Go to http://www.gray.com/. Enter N623 in the search box to learn more about \"Taking Warfarin Safely: Care Instructions. \"  Current as of: January 27, 2016  Content Version: 11.1  © 3970-9157 [employer]. Care instructions adapted under license by [employer] (which disclaims liability or warranty for this information). If you have questions about a medical condition or this instruction, always ask your healthcare professional. [address] any warranty or liability for your use of this information.

## 2022-08-25 NOTE — PROGRESS NOTES
Pharmacy Progress Note - Anticoagulation Management    S/O:  Ms. Morgan Leigh  is a 80 y.o. female seen today for anticoagulation management for the diagnosis of Atrial Fibrillation. HPI: At last visit (8/16) INR was 1.9 and slightly subtherapeutic for INR goal 2.0-3.0. Weekly warfarin dose was increased from 16.25 mg to 17.5 mg (~8%) and patient was instructed to take 2.5 mg daily. Will recheck INR in 2 weeks. Interim History:    Warfarin start date: Prior to October 2018  INR Goal:  2.0-3.0    Current warfarin regimen: 2.5 mg daily  Warfarin tablet strength:   2.5 mg   Duration of therapy: Indefinite    Today's pertinent positives includes:  No significant changes since last visit    Results for orders placed or performed in visit on 09/01/22   AMB POC PT/INR   Result Value Ref Range    VALID INTERNAL CONTROL POC Yes     Prothrombin time (POC) 32.9 seconds    INR POC 2.7      Adherence:   Able to recall regimen? YES  Miss/extra dose? NO  Need refill? NO    Upcoming procedure(s):  NO      Past Medical History:   Diagnosis Date    Aortic valve disorders 4/29/2012    Atrial fibrillation ([facility] [address] 03/22/2011    on coumadin followed by cardiology - Cynthia Rios MD    CAD (coronary artery disease)     Cor pulmonale, chronic ([facility] [address] 4/29/2012    Dyslipidemia (high LDL; low HDL) 4/29/2012    Gout 4/30/2012    Gout, arthritis 2/8/2012    Gout, joint     Hypertension     Hyperuricemia 4/30/2012    Obstructive sleep apnea 4/29/2012    Pacemaker 04/02/2006    Dr Richelle Che placed the original pacer 4/6/2006. It is replaced in 2014. It is [employer]    Recurrent epistaxis 4/29/2012    Sinoatrial node dysfunction (HCC) 3/22/2011    Venous insufficiency      No Known Allergies  Current Outpatient Medications   Medication Sig    amLODIPine (NORVASC) 5 mg tablet TAKE 1 TABLET BY MOUTH DAILY    warfarin (COUMADIN) 2.5 mg tablet TAKE 1 TABLET BY MOUTH DAILY.  EXCEPT ON TAKE 1/2 TABLET ON THURSDAYS AS DIRECTED    simvastatin (ZOCOR) 20 mg tablet TAKE 1 TABLET BY MOUTH EVERY NIGHT    atenoloL (TENORMIN) 100 mg tablet Take 1 Tablet by mouth daily. dorzolamide (TRUSOPT) 2 % ophthalmic solution INSTILL 1 DROP IN BOTH EYES BID    magnesium 250 mg tab Take 250 mg by mouth. Mon, wed, fri only    latanoprost (XALATAN) 0.005 % ophthalmic solution INT 1 GTT IN OU QD HS    levobunolol (BETAGAN) 0.5 % ophthalmic solution INSTILL 1 GTT OU BID     No current facility-administered medications for this visit. Wt Readings from Last 3 Encounters:   03/15/22 170 lb (77.1 kg)   02/21/22 170 lb (77.1 kg)   02/02/22 172 lb (78 kg)       BP Readings from Last 3 Encounters:   05/31/22 110/70   05/16/22 108/70   04/18/22 110/70     Pulse Readings from Last 3 Encounters:   05/31/22 60   05/16/22 60   04/18/22 62     Lab Results   Component Value Date/Time    WBC 5.8 10/05/2021 12:57 PM    Hemoglobin (POC) 12.6 06/17/2013 03:37 PM    HGB 12.9 10/05/2021 12:57 PM    Hematocrit (POC) 37 06/17/2013 03:37 PM    HCT 37.9 10/05/2021 12:57 PM    PLATELET 822 93/86/2309 12:57 PM    MCV 82.2 10/05/2021 12:57 PM     Lab Results   Component Value Date/Time    Sodium 135 (L) 10/05/2021 12:57 PM    Potassium 3.9 10/05/2021 12:57 PM    Chloride 103 10/05/2021 12:57 PM    CO2 28 10/05/2021 12:57 PM    Anion gap 4 (L) 10/05/2021 12:57 PM    Glucose 88 10/05/2021 12:57 PM    BUN 11 10/05/2021 12:57 PM    Creatinine 1.05 (H) 10/05/2021 12:57 PM    BUN/Creatinine ratio 10 (L) 10/05/2021 12:57 PM    GFR est AA 58 (L) 10/05/2021 12:57 PM    GFR est non-AA 48 (L) 10/05/2021 12:57 PM    Calcium 9.4 10/05/2021 12:57 PM    Bilirubin, total 0.7 10/05/2021 12:57 PM    Alk.  phosphatase 68 10/05/2021 12:57 PM    Protein, total 7.5 10/05/2021 12:57 PM    Albumin 3.8 10/05/2021 12:57 PM    Globulin 3.7 10/05/2021 12:57 PM    A-G Ratio 1.0 (L) 10/05/2021 12:57 PM    ALT (SGPT) 17 10/05/2021 12:57 PM     CrCl cannot be calculated (Patient's most recent lab result is older than the maximum 180 days allowed.). INR History:   (normal INR range 0.8-1.2)     Date   INR   PT   Dose/Comments  09/01/22 2.7  32.9 Increased to 2.5 mg daily  08/16/22 1.9  22.8 No changes  07/19/22 2.1  24.9 No changes  06/21/22 2.2  26.6 Increased to 1.25 mg Mon; 2.5 mg daily ROW  05/31/22 1.8  21.7 No changes  05/16/22 1.9  22.4 No changes  04/18/22 2.3  27.6 No changes  03/21/22 2.3  28.1 No changes  02/21/22 2.3  28.1 No changes  02/02/22 1.9  22.4 No changes, increased greens  01/19/22 3.3  39.7 Held x 1 day, then 1.25 mg Thur, 2.5 mg daily ROW  01/10/22 3.9  46.3 No changes  12/27/21 3.2  38.0 No changes  11/22/21 2.2   No changes  10/25/21 2.1   1.25 mg every Thu; 2.5 mg all other days    A/P:       Anticoagulation:  Considering Ms. Robins's past history, todays findings, and per Anticoagulation Collaborative Practice Agreement/Protocol:    Fingerstick POC INR (2.7) is Therapeutic for INR goal today. Continue warfarin 2.5 mg daily  At last visit (8/16) INR was 1.9 and slightly subtherapeutic for INR goal 2.0-3.0. Weekly warfarin dose was increased from 16.25 mg to 17.5 mg (~8%). Patient was instructed to take 2.5 mg daily and recheck INR in 2 weeks. Today INR is 2.7 and therapeutic for INR goal 2.0-3.0. Patient denies missed doses of warfarin or changes to her other medications. Patient will continue 2.5 mg daily and recheck INR in 4 weeks. Patient was instructed to schedule an appointment in 4 week(s) prior to leaving the clinic. Medication reconciliation was completed during the visit. There are no discontinued medications. A full discussion of the nature of anticoagulants has been carried out. A full discussion of the need for frequent and regular monitoring, precise dosage adjustment and compliance was stressed. Side effects of potential bleeding were discussed and Ms. Robins was instructed to call [phone] if there are any signs of abnormal bleeding.   Ms. Carson Callow was instructed to avoid any OTC items containing aspirin or ibuprofen and prior to starting any new OTC products to consult with her physician or pharmacist to ensure no drug interactions are present. Ms. Kiet Tatum was instructed to avoid any major changes in her general diet and to avoid alcohol consumption. Ms. Kiet Tatum was provided information in the AVS that includes topics on understanding coumadin therapy, drug interaction considerations, vitamin K and coumadin use, interactions with foods and supplements containing vitamin K, and the use of herbal products. Ms. Kiet Tatum verbalized her understanding of all instructions and will call the office with any questions, concerns, or signs of abnormal bleeding or blood clot. Notifications of recommendations will be sent to Dr. Marline Burnett MD for review.     Thank you,  EULOGIO Nassar/ Nate Perez 77 Tracking Only    Intervention Detail: Adherence Monitorin and Lab(s) Ordered  Total # of Interventions Recommended: 2  Total # of Interventions Accepted: 2  Time Spent (min): 20

## 2022-08-27 DIAGNOSIS — I48.20 CHRONIC ATRIAL FIBRILLATION (HCC): ICD-10-CM

## 2022-08-27 RX ORDER — ATENOLOL 100 MG/1
100 TABLET ORAL DAILY
Qty: 90 TABLET | Refills: 3 | Status: SHIPPED | OUTPATIENT
Start: 2022-08-27

## 2022-09-01 ENCOUNTER — ANTI-COAG VISIT (OUTPATIENT)
Dept: PHARMACY | Age: 87
End: 2022-09-01
Payer: MEDICARE

## 2022-09-01 DIAGNOSIS — I48.0 PAROXYSMAL ATRIAL FIBRILLATION (HCC): Primary | ICD-10-CM

## 2022-09-01 DIAGNOSIS — Z79.01 LONG TERM (CURRENT) USE OF ANTICOAGULANTS: ICD-10-CM

## 2022-09-01 LAB
INR BLD: 2.7
PT POC: 32.9 SECONDS
VALID INTERNAL CONTROL?: YES

## 2022-09-01 PROCEDURE — 99211 OFF/OP EST MAY X REQ PHY/QHP: CPT

## 2022-09-26 ENCOUNTER — OFFICE VISIT (OUTPATIENT)
Dept: CARDIOLOGY CLINIC | Age: 87
End: 2022-09-26
Payer: MEDICARE

## 2022-09-26 DIAGNOSIS — Z95.0 CARDIAC PACEMAKER IN SITU: Primary | ICD-10-CM

## 2022-09-26 PROCEDURE — 93294 REM INTERROG EVL PM/LDLS PM: CPT | Performed by: INTERNAL MEDICINE

## 2022-09-26 NOTE — LETTER
10/10/2022 3:11 PM    Ms. [address]      Dear Ms. Evie CARVALHO Shimon,    We have received your recent remote monitor check of your implanted device on 9/26/22. Your remaining estimated battery life is 7.4 months and your device is working normally & appropriately. Your next remote monitor check is scheduled for 1/4/2023. This is NOT an in-clinic appointment. This transmission is sent from your home monitor. Please make sure your home monitor is plugged into power and within 10 feet of where you sleep. If you are using the phone applications, please make sure it is open on your smart phone. If you have difficulty sending a transmission, please do NOT call our office. Instead, call tech support for your device as they are better able to assist.    [facility])   [phone]    If you have any questions, please call the Pacemaker/ICD clinic that follows you. We appreciate you staying remotely connected!     Sincerely,    Kriss Sandy RN, BSN  Device Coordinator RN  [facility]  [phone]   [place]  [phone]

## 2022-09-27 ENCOUNTER — OFFICE VISIT (OUTPATIENT)
Dept: INTERNAL MEDICINE CLINIC | Age: 87
End: 2022-09-27
Payer: MEDICARE

## 2022-09-27 VITALS
RESPIRATION RATE: 20 BRPM | HEART RATE: 60 BPM | TEMPERATURE: 98.2 F | BODY MASS INDEX: 29.71 KG/M2 | HEIGHT: 63 IN | DIASTOLIC BLOOD PRESSURE: 73 MMHG | OXYGEN SATURATION: 100 % | WEIGHT: 167.7 LBS | SYSTOLIC BLOOD PRESSURE: 117 MMHG

## 2022-09-27 DIAGNOSIS — E55.9 VITAMIN D DEFICIENCY, UNSPECIFIED: ICD-10-CM

## 2022-09-27 DIAGNOSIS — I87.2 VENOUS INSUFFICIENCY: ICD-10-CM

## 2022-09-27 DIAGNOSIS — E78.5 DYSLIPIDEMIA: ICD-10-CM

## 2022-09-27 DIAGNOSIS — Z23 ENCOUNTER FOR IMMUNIZATION: Primary | ICD-10-CM

## 2022-09-27 DIAGNOSIS — I48.0 PAROXYSMAL ATRIAL FIBRILLATION (HCC): ICD-10-CM

## 2022-09-27 DIAGNOSIS — I49.5 SINOATRIAL NODE DYSFUNCTION (HCC): ICD-10-CM

## 2022-09-27 DIAGNOSIS — I10 ESSENTIAL HYPERTENSION, BENIGN: ICD-10-CM

## 2022-09-27 DIAGNOSIS — Z95.0 CARDIAC PACEMAKER IN SITU: ICD-10-CM

## 2022-09-27 DIAGNOSIS — M1A.9XX0 CHRONIC GOUT WITHOUT TOPHUS, UNSPECIFIED CAUSE, UNSPECIFIED SITE: ICD-10-CM

## 2022-09-27 PROCEDURE — 1101F PT FALLS ASSESS-DOCD LE1/YR: CPT | Performed by: INTERNAL MEDICINE

## 2022-09-27 PROCEDURE — 1090F PRES/ABSN URINE INCON ASSESS: CPT | Performed by: INTERNAL MEDICINE

## 2022-09-27 PROCEDURE — G8417 CALC BMI ABV UP PARAM F/U: HCPCS | Performed by: INTERNAL MEDICINE

## 2022-09-27 PROCEDURE — 99214 OFFICE O/P EST MOD 30 MIN: CPT | Performed by: INTERNAL MEDICINE

## 2022-09-27 PROCEDURE — 90694 VACC AIIV4 NO PRSRV 0.5ML IM: CPT | Performed by: INTERNAL MEDICINE

## 2022-09-27 PROCEDURE — G8427 DOCREV CUR MEDS BY ELIG CLIN: HCPCS | Performed by: INTERNAL MEDICINE

## 2022-09-27 PROCEDURE — G0008 ADMIN INFLUENZA VIRUS VAC: HCPCS | Performed by: INTERNAL MEDICINE

## 2022-09-27 PROCEDURE — G8432 DEP SCR NOT DOC, RNG: HCPCS | Performed by: INTERNAL MEDICINE

## 2022-09-27 PROCEDURE — 1123F ACP DISCUSS/DSCN MKR DOCD: CPT | Performed by: INTERNAL MEDICINE

## 2022-09-27 PROCEDURE — G8536 NO DOC ELDER MAL SCRN: HCPCS | Performed by: INTERNAL MEDICINE

## 2022-09-27 NOTE — PROGRESS NOTES
SPORTS MEDICINE AND PRIMARY CARE  Jaya Gonzales MD, [address] [address]  Phone:  [phone]  Fax: [phone]       Chief Complaint   Patient presents with    Hypertension   . SUBJECTIVE:    Demond Patel is a 80 y.o. female The patient returns today with known history of hypertension, paroxysmal atrial fibrillation, cardiac pacemaker, chronic gout. The patient is seen for evaluation. She saw Dr. Dwayne Bell for cataracts and glaucoma. She goes to [facility] for PT checks. Since we last saw her, she was also seen by her cardiologist Rafy Lares MD in February of last year. She is status post [employer] dual chamber pacemaker with a generator change of 4/20/14 [pls verify]. She is having occasional palpitations. The generator longevity was estimated to be 5.25 years. A 3.5% PAF/PAT burden. No significant RVR. She continues on warfarin for a embolic CVA prophylaxis. INR is monitored at [facility]. She has had no bleeding and no falls. She does remote pacer checks every three years and is supposed to have EP followup in one year. She states that she has to urinate frequently but states \"I'll live with it as opposed to taking more medication. \"  Other new complaints denied. The patient is seen for evaluation. She still has trouble with a pinched nerve in the right arm. Current Outpatient Medications   Medication Sig Dispense Refill    atenoloL (TENORMIN) 100 mg tablet TAKE 1 TABLET BY MOUTH DAILY 90 Tablet 3    amLODIPine (NORVASC) 5 mg tablet TAKE 1 TABLET BY MOUTH DAILY 90 Tablet 1    warfarin (COUMADIN) 2.5 mg tablet TAKE 1 TABLET BY MOUTH DAILY. EXCEPT ON TAKE 1/2 TABLET ON THURSDAYS AS DIRECTED 90 Tablet 0    simvastatin (ZOCOR) 20 mg tablet TAKE 1 TABLET BY MOUTH EVERY NIGHT 30 Tablet 11    dorzolamide (TRUSOPT) 2 % ophthalmic solution INSTILL 1 DROP IN BOTH EYES BID  2    magnesium 250 mg tab Take 250 mg by mouth.  Mon, wed, fri only      latanoprost (XALATAN) 0.005 % ophthalmic solution INT 1 GTT IN OU QD HS  2    levobunolol (BETAGAN) 0.5 % ophthalmic solution INSTILL 1 GTT OU BID  3     Past Medical History:   Diagnosis Date    Aortic valve disorders 4/29/2012    Atrial fibrillation ([facility] [address] 03/22/2011    on coumadin followed by cardiology - Keith Werner MD    CAD (coronary artery disease)     Cor pulmonale, chronic ([address] 4/29/2012    Dyslipidemia (high LDL; low HDL) 4/29/2012    Gout 4/30/2012    Gout, arthritis 2/8/2012    Gout, joint     Hypertension     Hyperuricemia 4/30/2012    Obstructive sleep apnea 4/29/2012    Pacemaker 04/02/2006    Dr Mira Sharma placed the original pacer 4/6/2006. It is replaced in 2014.   It is [employer]    Recurrent epistaxis 4/29/2012    Sinoatrial node dysfunction ([facility] [address] 3/22/2011    Venous insufficiency      Past Surgical History:   Procedure Laterality Date    GEN INSERT/REPLACE ONLY DUAL  4/28/2014         HX PACEMAKER      DC BREAST SURGERY PROCEDURE UNLISTED      lumpectomy     No Known Allergies      REVIEW OF SYSTEMS:  General: negative for - chills or fever  ENT: negative for - headaches, nasal congestion or tinnitus  Respiratory: negative for - cough, hemoptysis, shortness of breath or wheezing  Cardiovascular : negative for - chest pain, edema, palpitations or shortness of breath  Gastrointestinal: negative for - abdominal pain, blood in stools, heartburn or nausea/vomiting  Genito-Urinary: no dysuria, trouble voiding, or hematuria  Musculoskeletal: negative for - gait disturbance, joint pain, joint stiffness or joint swelling  Neurological: no TIA or stroke symptoms  Hematologic: no bruises, no bleeding, no swollen glands  Integument: no lumps, mole changes, nail changes or rash  Endocrine: no malaise/lethargy or unexpected weight changes      Social History     Socioeconomic History    Marital status:    Tobacco Use    Smoking status: Never    Smokeless tobacco: Never   Substance and Sexual Activity    Alcohol use: No    Drug use: No    Sexual activity: Not Currently   Social History Narrative    Habits:  She is a lifetime nonsmoker, non drinker, non drug abuser. Social History:  The patient is . She lives with her son. 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 She is a member of [employer]. Family History:  Father and mother , unknown ages and causes. Two sisters  of unknown caus     No family history on file. OBJECTIVE:    Visit Vitals  /73 (BP 1 Location: Right arm, BP Patient Position: Sitting)   Pulse 60   Temp 98.2 °F (36.8 °C) (Oral)   Resp 20   Ht 5' 3\" (1.6 m)   Wt 167 lb 11.2 oz (76.1 kg)   SpO2 100%   BMI 29.71 kg/m²     CONSTITUTIONAL: well , well nourished, appears age appropriate  EYES: perrla, eom intact  ENMT:moist mucous membranes, pharynx clear  NECK: supple. Thyroid normal  RESPIRATORY: Chest: clear bilaterally   CARDIOVASCULAR: Heart: regular rate and rhythm  GASTROINTESTINAL: Abdomen: soft, bowel sounds active  HEMATOLOGIC: no pathological lymph nodes palpated  MUSCULOSKELETAL: Extremities: no edema, pulse 1+   INTEGUMENT: No unusual rashes or suspicious skin lesions noted. Nails appear normal.  NEUROLOGIC: non-focal exam   MENTAL STATUS: alert and oriented, appropriate affect           ASSESSMENT:  1. Essential hypertension, benign    2. Paroxysmal atrial fibrillation (HCC)    3. Cardiac pacemaker in situ    4. Sinoatrial node dysfunction (HCC)    5. Chronic gout without tophus, unspecified cause, unspecified site    6. Venous insufficiency    7. Dyslipidemia    8. Vitamin D deficiency, unspecified       At 89 she is exceptional.  She is doing very well. Blood pressure control is at goal.    She is in normal sinus rhythm on auscultation today. Pacemaker is intact. No recent gouty attacks. Chronic venous insufficiency remains, but she is dealing with it.     She would rather have urinary frequency than taking another medication. The only one available would be Myrbetriq, as the others would interfere with her glaucoma. She will be back to see me in a year, sooner if any problems. We congratulate her. She will be 89 on December [birth year]. I have discussed the diagnosis with the patient and the intended plan as seen in the  Orders. The patient understands and agees with the plan. The patient has   received an after visit summary and questions were answered concerning  future plans  Patient labs and/or xrays were reviewed  Past records were reviewed. PLAN:  .  Orders Placed This Encounter    CBC WITH AUTOMATED DIFF    METABOLIC PANEL, COMPREHENSIVE    LIPID PANEL    VITAMIN D, 25 HYDROXY    URINALYSIS W/ REFLEX CULTURE         Follow-up and Dispositions    Return in about 1 year (around 9/27/2023). ATTENTION:   This medical record was transcribed using an electronic medical records system. Although proofread, it may and can contain electronic and spelling errors. Other human spelling and other errors may be present. Corrections may be executed at a later time. Please feel free to contact us for any clarifications as needed.

## 2022-09-28 LAB
25(OH)D3 SERPL-MCNC: 60.4 NG/ML (ref 30–100)
ALBUMIN SERPL-MCNC: 4 G/DL (ref 3.5–5)
ALBUMIN/GLOB SERPL: 1.4 {RATIO} (ref 1.1–2.2)
ALP SERPL-CCNC: 69 U/L (ref 45–117)
ALT SERPL-CCNC: 19 U/L (ref 12–78)
ANION GAP SERPL CALC-SCNC: 6 MMOL/L (ref 5–15)
APPEARANCE UR: CLEAR
AST SERPL-CCNC: 13 U/L (ref 15–37)
BACTERIA URNS QL MICRO: ABNORMAL /HPF
BASOPHILS # BLD: 0.1 K/UL (ref 0–0.1)
BASOPHILS NFR BLD: 1 % (ref 0–1)
BILIRUB SERPL-MCNC: 0.5 MG/DL (ref 0.2–1)
BILIRUB UR QL: NEGATIVE
BUN SERPL-MCNC: 12 MG/DL (ref 6–20)
BUN/CREAT SERPL: 12 (ref 12–20)
CALCIUM SERPL-MCNC: 9.2 MG/DL (ref 8.5–10.1)
CHLORIDE SERPL-SCNC: 102 MMOL/L (ref 97–108)
CHOLEST SERPL-MCNC: 127 MG/DL
CO2 SERPL-SCNC: 29 MMOL/L (ref 21–32)
COLOR UR: ABNORMAL
CREAT SERPL-MCNC: 1 MG/DL (ref 0.55–1.02)
DIFFERENTIAL METHOD BLD: ABNORMAL
EOSINOPHIL # BLD: 0.1 K/UL (ref 0–0.4)
EOSINOPHIL NFR BLD: 2 % (ref 0–7)
EPITH CASTS URNS QL MICRO: ABNORMAL /LPF
ERYTHROCYTE [DISTWIDTH] IN BLOOD BY AUTOMATED COUNT: 15.3 % (ref 11.5–14.5)
GLOBULIN SER CALC-MCNC: 2.9 G/DL (ref 2–4)
GLUCOSE SERPL-MCNC: 92 MG/DL (ref 65–100)
GLUCOSE UR STRIP.AUTO-MCNC: NEGATIVE MG/DL
HCT VFR BLD AUTO: 36.1 % (ref 35–47)
HDLC SERPL-MCNC: 68 MG/DL
HDLC SERPL: 1.9 {RATIO} (ref 0–5)
HGB BLD-MCNC: 12.5 G/DL (ref 11.5–16)
HGB UR QL STRIP: NEGATIVE
HYALINE CASTS URNS QL MICRO: ABNORMAL /LPF (ref 0–5)
IMM GRANULOCYTES # BLD AUTO: 0 K/UL (ref 0–0.04)
IMM GRANULOCYTES NFR BLD AUTO: 0 % (ref 0–0.5)
KETONES UR QL STRIP.AUTO: NEGATIVE MG/DL
LDLC SERPL CALC-MCNC: 45 MG/DL (ref 0–100)
LEUKOCYTE ESTERASE UR QL STRIP.AUTO: ABNORMAL
LYMPHOCYTES # BLD: 1.4 K/UL (ref 0.8–3.5)
LYMPHOCYTES NFR BLD: 23 % (ref 12–49)
MCH RBC QN AUTO: 28.7 PG (ref 26–34)
MCHC RBC AUTO-ENTMCNC: 34.6 G/DL (ref 30–36.5)
MCV RBC AUTO: 82.8 FL (ref 80–99)
MONOCYTES # BLD: 0.6 K/UL (ref 0–1)
MONOCYTES NFR BLD: 11 % (ref 5–13)
NEUTS SEG # BLD: 3.8 K/UL (ref 1.8–8)
NEUTS SEG NFR BLD: 63 % (ref 32–75)
NITRITE UR QL STRIP.AUTO: NEGATIVE
NRBC # BLD: 0 K/UL (ref 0–0.01)
NRBC BLD-RTO: 0 PER 100 WBC
PH UR STRIP: 7.5 [PH] (ref 5–8)
PLATELET # BLD AUTO: 198 K/UL (ref 150–400)
PMV BLD AUTO: 11.1 FL (ref 8.9–12.9)
POTASSIUM SERPL-SCNC: 4.2 MMOL/L (ref 3.5–5.1)
PROT SERPL-MCNC: 6.9 G/DL (ref 6.4–8.2)
PROT UR STRIP-MCNC: NEGATIVE MG/DL
RBC # BLD AUTO: 4.36 M/UL (ref 3.8–5.2)
RBC #/AREA URNS HPF: ABNORMAL /HPF (ref 0–5)
SODIUM SERPL-SCNC: 137 MMOL/L (ref 136–145)
SP GR UR REFRACTOMETRY: 1.01 (ref 1–1.03)
TRIGL SERPL-MCNC: 70 MG/DL (ref ?–150)
UA: UC IF INDICATED,UAUC: ABNORMAL
UROBILINOGEN UR QL STRIP.AUTO: 0.2 EU/DL (ref 0.2–1)
VLDLC SERPL CALC-MCNC: 14 MG/DL
WBC # BLD AUTO: 6 K/UL (ref 3.6–11)
WBC URNS QL MICRO: ABNORMAL /HPF (ref 0–4)

## 2022-10-03 NOTE — PROGRESS NOTES
Pharmacy Progress Note - Anticoagulation Management    S/O:  Ms. Jesus Garg  is a 80 y.o. female seen today for anticoagulation management for the diagnosis of Atrial Fibrillation. HPI: At last visit (9/1) INR was 2.7 and therapeutic for INR goal 2.0-3.0. Patient was instructed to continue 2.5 mg daily and recheck INR in 4 weeks. Interim History:    Warfarin start date: Prior to October 2018  INR Goal:  2.0-3.0    Current warfarin regimen: 2.5 mg daily  Warfarin tablet strength:   2.5 mg   Duration of therapy: Indefinite    Today's pertinent positives includes:  No significant changes since last visit    Results for orders placed or performed in visit on 10/04/22   AMB POC PT/INR   Result Value Ref Range    VALID INTERNAL CONTROL POC Yes     Prothrombin time (POC) 30.1 seconds    INR POC 2.5      Adherence:   Able to recall regimen? YES  Miss/extra dose? NO  Need refill? NO    Upcoming procedure(s):  NO      Past Medical History:   Diagnosis Date    Aortic valve disorders 4/29/2012    Atrial fibrillation ([facility] [address] 03/22/2011    on coumadin followed by cardiology - Angela Lanes, MD    CAD (coronary artery disease)     Cor pulmonale, chronic ([facility] [address] 4/29/2012    Dyslipidemia (high LDL; low HDL) 4/29/2012    Gout 4/30/2012    Gout, arthritis 2/8/2012    Gout, joint     Hypertension     Hyperuricemia 4/30/2012    Obstructive sleep apnea 4/29/2012    Pacemaker 04/02/2006    Dr Ondina Gifford placed the original pacer 4/6/2006. It is replaced in 2014. It is [employer]    Recurrent epistaxis 4/29/2012    Sinoatrial node dysfunction (HCC) 3/22/2011    Venous insufficiency      No Known Allergies  Current Outpatient Medications   Medication Sig    atenoloL (TENORMIN) 100 mg tablet TAKE 1 TABLET BY MOUTH DAILY    amLODIPine (NORVASC) 5 mg tablet TAKE 1 TABLET BY MOUTH DAILY    warfarin (COUMADIN) 2.5 mg tablet TAKE 1 TABLET BY MOUTH DAILY.  EXCEPT ON TAKE 1/2 TABLET ON THURSDAYS AS DIRECTED    simvastatin (ZOCOR) 20 mg tablet TAKE 1 TABLET BY MOUTH EVERY NIGHT    dorzolamide (TRUSOPT) 2 % ophthalmic solution INSTILL 1 DROP IN BOTH EYES BID    magnesium 250 mg tab Take 250 mg by mouth. Mon, wed, fri only    latanoprost (XALATAN) 0.005 % ophthalmic solution INT 1 GTT IN OU QD HS    levobunolol (BETAGAN) 0.5 % ophthalmic solution INSTILL 1 GTT OU BID     No current facility-administered medications for this visit. Wt Readings from Last 3 Encounters:   09/27/22 167 lb 11.2 oz (76.1 kg)   03/15/22 170 lb (77.1 kg)   02/21/22 170 lb (77.1 kg)       BP Readings from Last 3 Encounters:   09/27/22 117/73   05/31/22 110/70   05/16/22 108/70     Pulse Readings from Last 3 Encounters:   09/27/22 60   05/31/22 60   05/16/22 60     Lab Results   Component Value Date/Time    WBC 6.0 09/27/2022 11:55 AM    Hemoglobin (POC) 12.6 06/17/2013 03:37 PM    HGB 12.5 09/27/2022 11:55 AM    Hematocrit (POC) 37 06/17/2013 03:37 PM    HCT 36.1 09/27/2022 11:55 AM    PLATELET 995 44/79/2783 11:55 AM    MCV 82.8 09/27/2022 11:55 AM     Lab Results   Component Value Date/Time    Sodium 137 09/27/2022 11:55 AM    Potassium 4.2 09/27/2022 11:55 AM    Chloride 102 09/27/2022 11:55 AM    CO2 29 09/27/2022 11:55 AM    Anion gap 6 09/27/2022 11:55 AM    Glucose 92 09/27/2022 11:55 AM    BUN 12 09/27/2022 11:55 AM    Creatinine 1.00 09/27/2022 11:55 AM    BUN/Creatinine ratio 12 09/27/2022 11:55 AM    GFR est AA >60 09/27/2022 11:55 AM    GFR est non-AA 51 (L) 09/27/2022 11:55 AM    Calcium 9.2 09/27/2022 11:55 AM    Bilirubin, total 0.5 09/27/2022 11:55 AM    Alk. phosphatase 69 09/27/2022 11:55 AM    Protein, total 6.9 09/27/2022 11:55 AM    Albumin 4.0 09/27/2022 11:55 AM    Globulin 2.9 09/27/2022 11:55 AM    A-G Ratio 1.4 09/27/2022 11:55 AM    ALT (SGPT) 19 09/27/2022 11:55 AM     Estimated Creatinine Clearance: 28.5 mL/min (by C-G formula based on SCr of 1 mg/dL).       INR History:   (normal INR range 0.8-1.2)     Date   INR   PT   Dose/Comments  10/04/22 2.5  30.1 No changes  09/01/22 2.7  32.9 Increased to 2.5 mg daily  08/16/22 1.9  22.8 No changes  07/19/22 2.1  24.9 No changes  06/21/22 2.2  26.6 Increased to 1.25 mg Mon; 2.5 mg daily ROW  05/31/22 1.8  21.7 No changes  05/16/22 1.9  22.4 No changes  04/18/22 2.3  27.6 No changes  03/21/22 2.3  28.1 No changes  02/21/22 2.3  28.1 No changes  02/02/22 1.9  22.4 No changes, increased greens  01/19/22 3.3  39.7 Held x 1 day, then 1.25 mg Thur, 2.5 mg daily ROW  01/10/22 3.9  46.3 No changes  12/27/21 3.2  38.0 No changes  11/22/21 2.2   No changes  10/25/21 2.1   1.25 mg every Thu; 2.5 mg all other days    A/P:       Anticoagulation:  Considering Ms. Robins's past history, todays findings, and per Anticoagulation Collaborative Practice Agreement/Protocol:    Fingerstick POC INR (2.5) is Therapeutic for INR goal today. Continue warfarin 2.5 mg daily  INR is 2.5 and therapeutic for INR goal 2.0-3.0. Patient denies changes to her other medications and reports consistent intake of vitamin K foods. Patient will continue 2.5 mg daily and recheck INR in 4 weeks. Patient was instructed to schedule an appointment in 4 week(s) prior to leaving the clinic. Medication reconciliation was completed during the visit. There are no discontinued medications. A full discussion of the nature of anticoagulants has been carried out. A full discussion of the need for frequent and regular monitoring, precise dosage adjustment and compliance was stressed. Side effects of potential bleeding were discussed and Ms. Robins was instructed to call [phone] if there are any signs of abnormal bleeding. Ms. Pardeep Hooper was instructed to avoid any OTC items containing aspirin or ibuprofen and prior to starting any new OTC products to consult with her physician or pharmacist to ensure no drug interactions are present. Ms. Pardeep Hooper was instructed to avoid any major changes in her general diet and to avoid alcohol consumption.     Ms. Pardeep Hooper was provided information in the AVS that includes topics on understanding coumadin therapy, drug interaction considerations, vitamin K and coumadin use, interactions with foods and supplements containing vitamin K, and the use of herbal products. Ms. Luisa Leal verbalized her understanding of all instructions and will call the office with any questions, concerns, or signs of abnormal bleeding or blood clot. Notifications of recommendations will be sent to Dr. Karel Ackerman MD for review.     Thank you,  EULOGIO Espinoza/ Nate Perez 77 Tracking Only    Intervention Detail: Adherence Monitorin and Lab(s) Ordered  Total # of Interventions Recommended: 2  Total # of Interventions Accepted: 2  Time Spent (min): 20

## 2022-10-03 NOTE — PATIENT INSTRUCTIONS
Today your INR was 2.5. Your goal INR is  2.0-3.0. You have a  2.5 mg tablet of Coumadin (warfarin). Take Coumadin (warfarin) as follows: Take 2.5 mg (1 tablet) daily    Come back in 4 week(s) for your next finger stick/INR blood test.        Avoid any over the counter items containing aspirin or ibuprofen, and avoid great swings in general diet. Avoid alcohol consumption. Please notify the INR nurse if you are started on any new medication including over the counter or herbal supplements. Also, please notify your INR nurse if any of your other prescription or over the counter medications have been discontinued. Your Care Instructions  Warfarin is a medicine that you take to prevent blood clots. It is often called a blood thinner. Doctors give warfarin (such as Coumadin) to reduce the risk of blood clots. You may be at risk for blood clots if you have atrial fibrillation or deep vein thrombosis. Some other health problems may also put you at risk. Warfarin slows the amount of time it takes for your blood to clot. It can cause bleeding problems. Even if you've been taking warfarin for a while, it's important to know how to take it safely. Foods and other medicines can affect the way warfarin works. Some can make warfarin work too well. This can cause bleeding problems. And some can make it work poorly, so that it does not prevent blood clots very well. You will need regular blood tests to check how long it takes for your blood to form a clot. This test is called a PT or prothrombin time test. The result of the test is called an INR level. Depending on the test results, your doctor or anticoagulation clinic may adjust your dose of warfarin. Follow-up care is a key part of your treatment and safety. Be sure to make and go to all appointments, and call your doctor if you are having problems. It's also a good idea to know your test results and keep a list of the medicines you take.     How can you care for yourself at home? Take warfarin safely  Take your warfarin at the same time each day. If you miss a dose of warfarin, don't take an extra dose to make up for it. Your doctor can tell you exactly what to do so you don't take too much or too little. Wear medical alert jewelry that lets others know that you take warfarin. You can buy this at most drugstores. Don't take warfarin if you are pregnant or planning to get pregnant. Talk to your doctor about how you can prevent getting pregnant while you are taking it. Don't change your dose or stop taking warfarin unless your doctor tells you to. Effects of medicines and food on warfarin  Don't start or stop taking any medicines, vitamins, or natural remedies unless you first talk to your doctor. Many medicines can affect how warfarin works. These include aspirin and other pain relievers, over-the-counter medicines, multivitamins, dietary supplements, and herbal products. Tell all of your doctors and pharmacists that you take warfarin. Some prescription medicines can affect how warfarin works. Keep the amount of vitamin K in your diet about the same from day to day. Do not suddenly eat a lot more or a lot less food that is rich in vitamin K than you usually do. Vitamin K affects how warfarin works and how your blood clots. Talk with your doctor before making big changes in your diet. Vitamin K is in many foods, such as:  Leafy greens, such as kale, cabbage, spinach, Swiss chard, and lettuce. Canola and soybean oils. Green vegetables, such as asparagus, broccoli, and [place] sprouts. Vegetable drinks, green tea leaves, and some dietary supplement drinks. Avoid cranberry juice and other cranberry products. They can increase the effects of warfarin. Limit your use of alcohol. Avoid bleeding by preventing falls and injuries  Wear slippers or shoes with nonskid soles. Remove throw rugs and clutter.   Rearrange furniture and electrical cords to keep them out of walking paths. Keep stairways, porches, and outside walkways well lit. Use night-lights in hallways and bathrooms. Be extra careful when you work with sharp tools or knives. When should you call for help? Call 911 anytime you think you may need emergency care. For example, call if:  You have a sudden, severe headache that is different from past headaches. Call your doctor now or seek immediate medical care if:  You have any abnormal bleeding, such as:  Nosebleeds. Vaginal bleeding that is different (heavier, more frequent, at a different time of the month) than what you are used to. Bloody or black stools, or rectal bleeding. Bloody or pink urine. Watch closely for changes in your health, and be sure to contact your doctor if you have any problems. Where can you learn more? Go to http://www.gray.com/. Enter V050 in the search box to learn more about \"Taking Warfarin Safely: Care Instructions. \"  Current as of: January 27, 2016  Content Version: 11.1  © 5456-3203 [employer]. Care instructions adapted under license by [employer] (which disclaims liability or warranty for this information). If you have questions about a medical condition or this instruction, always ask your healthcare professional. [address] any warranty or liability for your use of this information.

## 2022-10-04 ENCOUNTER — ANTI-COAG VISIT (OUTPATIENT)
Dept: PHARMACY | Age: 87
End: 2022-10-04
Payer: MEDICARE

## 2022-10-04 DIAGNOSIS — Z79.01 LONG TERM (CURRENT) USE OF ANTICOAGULANTS: ICD-10-CM

## 2022-10-04 DIAGNOSIS — I48.0 PAROXYSMAL ATRIAL FIBRILLATION (HCC): Primary | ICD-10-CM

## 2022-10-04 LAB
INR BLD: 2.5
PT POC: 30.1 SECONDS
VALID INTERNAL CONTROL?: YES

## 2022-10-04 PROCEDURE — 99211 OFF/OP EST MAY X REQ PHY/QHP: CPT

## 2022-10-10 NOTE — PROGRESS NOTES
Chargeable pacer remote    Normal pacer function with 15% RV pacing. Known afib/takes warfarin. AF burden 2.7%. See scanned report in  for details.

## 2022-10-18 NOTE — PROGRESS NOTES
At 89 she is exceptional.  She is doing very well. Blood pressure control is at goal.    She is in normal sinus rhythm on auscultation today. Pacemaker is intact. No recent gouty attacks. Chronic venous insufficiency remains, but she is dealing with it. She would rather have urinary frequency than taking another medication. The only one available would be Myrbetriq, as the others would interfere with her glaucoma. She will be back to see me in a year, sooner if any problems. We congratulate her. She will be 89 on December [birth year].

## 2022-10-18 NOTE — PROGRESS NOTES
The patient returns today with known history of hypertension, paroxysmal atrial fibrillation, cardiac pacemaker, chronic gout. The patient is seen for evaluation. She saw Dr. Peggy Mancini for cataracts and glaucoma. She goes to [facility] for PT checks. Since we last saw her, she was also seen by her cardiologist Donny Sanchez MD in February of last year. She is status post [employer] dual chamber pacemaker with a generator change of 4/20/14 [pls verify]. She is having occasional palpitations. The generator longevity was estimated to be 5.25 years. A 3.5% PAF/PAT burden. No significant RVR. She continues on warfarin for a embolic CVA prophylaxis. INR is monitored at [facility]. She has had no bleeding and no falls. She does remote pacer checks every three years and is supposed to have EP followup in one year. She states that she has to urinate frequently but states \"I'll live with it as opposed to taking more medication. \"  Other new complaints denied. The patient is seen for evaluation. She still has trouble with a pinched nerve in the right arm.

## 2022-10-26 DIAGNOSIS — I48.0 PAROXYSMAL ATRIAL FIBRILLATION (HCC): ICD-10-CM

## 2022-10-27 NOTE — PROGRESS NOTES
Pharmacy Progress Note - Anticoagulation Management    S/O:  Ms. Ama Maguire  is a 80 y.o. female seen today for anticoagulation management for the diagnosis of Atrial Fibrillation. HPI: At last visit (10/4) INR was 2.5 and therapeutic for INR goal 2.0-3.0. Patient was instructed to continue 2.5 mg daily and recheck INR in 4 weeks. Interim History:    Warfarin start date: Prior to October 2018  INR Goal:  2.0-3.0    Current warfarin regimen: 2.5 mg daily  Warfarin tablet strength:   2.5 mg   Duration of therapy: Indefinite    Today's pertinent positives includes:  No significant changes since last visit    Results for orders placed or performed in visit on 11/01/22   AMB POC PT/INR   Result Value Ref Range    VALID INTERNAL CONTROL POC Yes     Prothrombin time (POC) 28.0 seconds    INR POC 2.3      Adherence:   Able to recall regimen? YES  Miss/extra dose? NO  Need refill? NO    Upcoming procedure(s):  NO    INR History:   (normal INR range 0.8-1.2)     Date   INR   PT   Dose/Comments  11/01/22 2.3  28.0 No changes  10/04/22 2.5  30.1 No changes  09/01/22 2.7  32.9 Increased to 2.5 mg daily  08/16/22 1.9  22.8 No changes  07/19/22 2.1  24.9 No changes  06/21/22 2.2  26.6 Increased to 1.25 mg Mon; 2.5 mg daily ROW  05/31/22 1.8  21.7 No changes  05/16/22 1.9  22.4 No changes  04/18/22 2.3  27.6 No changes  03/21/22 2.3  28.1 No changes  02/21/22 2.3  28.1 No changes  02/02/22 1.9  22.4 No changes, increased greens  01/19/22 3.3  39.7 Held x 1 day, then 1.25 mg Thur, 2.5 mg daily ROW  01/10/22 3.9  46.3 No changes  12/27/21 3.2  38.0 No changes  11/22/21 2.2   No changes  10/25/21 2.1   1.25 mg every Thu; 2.5 mg all other days    A/P:       Anticoagulation:  Considering Ms. Robins's past history, todays findings, and per Anticoagulation Collaborative Practice Agreement/Protocol:    Fingerstick POC INR (2.3) is Therapeutic for INR goal today.    Continue warfarin 2.5 mg daily  INR is 2.3 and therapeutic for INR goal 2.0-3.0. Patient denies changes to her other medications and reports consistent intake of vitamin K foods. Since INR is therapeutic, patient will continue 2.5 mg daily and recheck INR in 4 weeks. Patient requested follow up INR on  at 11 AM.    Patient was instructed to schedule an appointment in 4 week(s) prior to leaving the clinic. Medication reconciliation was completed during the visit. There are no discontinued medications. A full discussion of the nature of anticoagulants has been carried out. A full discussion of the need for frequent and regular monitoring, precise dosage adjustment and compliance was stressed. Side effects of potential bleeding were discussed and Ms. Robins was instructed to call [phone] if there are any signs of abnormal bleeding. Ms. Jair Perera was instructed to avoid any OTC items containing aspirin or ibuprofen and prior to starting any new OTC products to consult with her physician or pharmacist to ensure no drug interactions are present. Ms. Jair Perera was instructed to avoid any major changes in her general diet and to avoid alcohol consumption. Ms. Jair Perera was provided information in the AVS that includes topics on understanding coumadin therapy, drug interaction considerations, vitamin K and coumadin use, interactions with foods and supplements containing vitamin K, and the use of herbal products. Ms. Jair Perera verbalized her understanding of all instructions and will call the office with any questions, concerns, or signs of abnormal bleeding or blood clot. Notifications of recommendations will be sent to Dr. Mayela Agustin MD for review.     Thank you,  EULOGIO Taylor/ Nate Perez 77 Tracking Only    Intervention Detail: Adherence Monitorin and Lab(s) Ordered  Total # of Interventions Recommended: 2  Total # of Interventions Accepted: 2  Time Spent (min): 20

## 2022-10-27 NOTE — PATIENT INSTRUCTIONS
Today your INR was 2.3. Your goal INR is  2.0-3.0. You have a  2.5 mg tablet of Coumadin (warfarin). Take Coumadin (warfarin) as follows: Take 2.5 mg (1 tablet) daily    Come back in 4 week(s) for your next finger stick/INR blood test.        Avoid any over the counter items containing aspirin or ibuprofen, and avoid great swings in general diet. Avoid alcohol consumption. Please notify the INR nurse if you are started on any new medication including over the counter or herbal supplements. Also, please notify your INR nurse if any of your other prescription or over the counter medications have been discontinued. Your Care Instructions  Warfarin is a medicine that you take to prevent blood clots. It is often called a blood thinner. Doctors give warfarin (such as Coumadin) to reduce the risk of blood clots. You may be at risk for blood clots if you have atrial fibrillation or deep vein thrombosis. Some other health problems may also put you at risk. Warfarin slows the amount of time it takes for your blood to clot. It can cause bleeding problems. Even if you've been taking warfarin for a while, it's important to know how to take it safely. Foods and other medicines can affect the way warfarin works. Some can make warfarin work too well. This can cause bleeding problems. And some can make it work poorly, so that it does not prevent blood clots very well. You will need regular blood tests to check how long it takes for your blood to form a clot. This test is called a PT or prothrombin time test. The result of the test is called an INR level. Depending on the test results, your doctor or anticoagulation clinic may adjust your dose of warfarin. Follow-up care is a key part of your treatment and safety. Be sure to make and go to all appointments, and call your doctor if you are having problems. It's also a good idea to know your test results and keep a list of the medicines you take.     How can you care for yourself at home? Take warfarin safely  Take your warfarin at the same time each day. If you miss a dose of warfarin, don't take an extra dose to make up for it. Your doctor can tell you exactly what to do so you don't take too much or too little. Wear medical alert jewelry that lets others know that you take warfarin. You can buy this at most drugstores. Don't take warfarin if you are pregnant or planning to get pregnant. Talk to your doctor about how you can prevent getting pregnant while you are taking it. Don't change your dose or stop taking warfarin unless your doctor tells you to. Effects of medicines and food on warfarin  Don't start or stop taking any medicines, vitamins, or natural remedies unless you first talk to your doctor. Many medicines can affect how warfarin works. These include aspirin and other pain relievers, over-the-counter medicines, multivitamins, dietary supplements, and herbal products. Tell all of your doctors and pharmacists that you take warfarin. Some prescription medicines can affect how warfarin works. Keep the amount of vitamin K in your diet about the same from day to day. Do not suddenly eat a lot more or a lot less food that is rich in vitamin K than you usually do. Vitamin K affects how warfarin works and how your blood clots. Talk with your doctor before making big changes in your diet. Vitamin K is in many foods, such as:  Leafy greens, such as kale, cabbage, spinach, Swiss chard, and lettuce. Canola and soybean oils. Green vegetables, such as asparagus, broccoli, and [place] sprouts. Vegetable drinks, green tea leaves, and some dietary supplement drinks. Avoid cranberry juice and other cranberry products. They can increase the effects of warfarin. Limit your use of alcohol. Avoid bleeding by preventing falls and injuries  Wear slippers or shoes with nonskid soles. Remove throw rugs and clutter.   Rearrange furniture and electrical cords to keep them out of walking paths. Keep stairways, porches, and outside walkways well lit. Use night-lights in hallways and bathrooms. Be extra careful when you work with sharp tools or knives. When should you call for help? Call 911 anytime you think you may need emergency care. For example, call if:  You have a sudden, severe headache that is different from past headaches. Call your doctor now or seek immediate medical care if:  You have any abnormal bleeding, such as:  Nosebleeds. Vaginal bleeding that is different (heavier, more frequent, at a different time of the month) than what you are used to. Bloody or black stools, or rectal bleeding. Bloody or pink urine. Watch closely for changes in your health, and be sure to contact your doctor if you have any problems. Where can you learn more? Go to http://www.gray.com/. Enter Q746 in the search box to learn more about \"Taking Warfarin Safely: Care Instructions. \"  Current as of: January 27, 2016  Content Version: 11.1  © 2412-6568 [employer]. Care instructions adapted under license by [employer] (which disclaims liability or warranty for this information). If you have questions about a medical condition or this instruction, always ask your healthcare professional. [address] any warranty or liability for your use of this information.

## 2022-10-31 RX ORDER — WARFARIN 2.5 MG/1
TABLET ORAL
Qty: 90 TABLET | Refills: 0 | Status: SHIPPED | OUTPATIENT
Start: 2022-10-31

## 2022-10-31 NOTE — TELEPHONE ENCOUNTER
Received refill request for warfarin 2.5 mg po tabs. Refill authorized. Future Appointments   Date Time Provider Monika Garcia   11/1/2022 11:00 AM [facility] MEDICATION MGMT [address] [facility] [facility]   1/4/2023  8:30 AM REMOTE1, KAREN LI BS AMB   4/13/2023 11:00 AM PACEMAKER3, KAREN LI BS AMB   4/13/2023 11:20 AM MD GUILLERMO Neal BS AMB   9/28/2023 10:30 AM Kev Iniguez MD [facility]IN BS AMB

## 2022-11-01 ENCOUNTER — ANTI-COAG VISIT (OUTPATIENT)
Dept: PHARMACY | Age: 87
End: 2022-11-01
Payer: MEDICARE

## 2022-11-01 DIAGNOSIS — Z79.01 LONG TERM (CURRENT) USE OF ANTICOAGULANTS: ICD-10-CM

## 2022-11-01 DIAGNOSIS — I48.0 PAROXYSMAL ATRIAL FIBRILLATION (HCC): Primary | ICD-10-CM

## 2022-11-01 LAB
INR BLD: 2.3
PT POC: 28 SECONDS
VALID INTERNAL CONTROL?: YES

## 2022-11-01 PROCEDURE — 99211 OFF/OP EST MAY X REQ PHY/QHP: CPT

## 2022-12-05 NOTE — PROGRESS NOTES
Pharmacy Progress Note - Anticoagulation Management    S/O:  Ms. Jesus Garg  is a 80 y.o. female seen today for anticoagulation management for the diagnosis of Atrial Fibrillation. HPI: At last visit (11/1) INR was 2.3 and therapeutic for INR goal 2.0-3.0. Patient was instructed to continue 2.5 mg daily and recheck INR in 4 weeks. Interim History:    Warfarin start date: Prior to October 2018  INR Goal:  2.0-3.0    Current warfarin regimen: 2.5 mg daily  Warfarin tablet strength:   2.5 mg   Duration of therapy: Indefinite    Today's pertinent positives includes:  No significant changes since last visit    Results for orders placed or performed in visit on 12/06/22   AMB POC PT/INR   Result Value Ref Range    VALID INTERNAL CONTROL POC Yes     Prothrombin time (POC) 31.3 seconds    INR POC 2.6      Adherence:   Able to recall regimen? YES  Miss/extra dose? NO  Need refill? NO    Upcoming procedure(s):  NO    INR History:   (normal INR range 0.8-1.2)     Date   INR   PT   Dose/Comments  12/06/22 2.6  31.3 No changes  11/01/22 2.3  28.0 No changes  10/04/22 2.5  30.1 No changes  09/01/22 2.7  32.9 Increased to 2.5 mg daily  08/16/22 1.9  22.8 No changes  07/19/22 2.1  24.9 No changes  06/21/22 2.2  26.6 Increased to 1.25 mg Mon; 2.5 mg daily ROW  05/31/22 1.8  21.7 No changes  05/16/22 1.9  22.4 No changes  04/18/22 2.3  27.6 No changes  03/21/22 2.3  28.1 No changes  02/21/22 2.3  28.1 No changes  02/02/22 1.9  22.4 No changes, increased greens  01/19/22 3.3  39.7 Held x 1 day, then 1.25 mg Thur, 2.5 mg daily ROW  01/10/22 3.9  46.3 No changes  12/27/21 3.2  38.0 No changes  11/22/21 2.2   No changes  10/25/21 2.1   1.25 mg every Thu; 2.5 mg all other days    A/P:       Anticoagulation:  Considering Ms. Robins's past history, todays findings, and per Anticoagulation Collaborative Practice Agreement/Protocol:    Fingerstick POC INR (2.6) is Therapeutic for INR goal today.    Continue warfarin 2.5 mg daily  INR is 2.6 and therapeutic for INR goal 2.0-3.0. Patient denies changes to her other medications and reports consistent intake of vitamin K foods. Since INR is therapeutic, patient will continue 2.5 mg daily and recheck INR in 4 weeks. Patient scheduled follow up INR on 1/10/23. Patient was instructed to schedule an appointment in 4 week(s) prior to leaving the clinic. Medication reconciliation was completed during the visit. There are no discontinued medications. A full discussion of the nature of anticoagulants has been carried out. A full discussion of the need for frequent and regular monitoring, precise dosage adjustment and compliance was stressed. Side effects of potential bleeding were discussed and Ms. Robins was instructed to call [phone] if there are any signs of abnormal bleeding. Ms. Odilia Segundo was instructed to avoid any OTC items containing aspirin or ibuprofen and prior to starting any new OTC products to consult with her physician or pharmacist to ensure no drug interactions are present. Ms. Odilia Segundo was instructed to avoid any major changes in her general diet and to avoid alcohol consumption. Ms. Odilia Segundo was provided information in the AVS that includes topics on understanding coumadin therapy, drug interaction considerations, vitamin K and coumadin use, interactions with foods and supplements containing vitamin K, and the use of herbal products. Ms. Odilia Segundo verbalized her understanding of all instructions and will call the office with any questions, concerns, or signs of abnormal bleeding or blood clot. Notifications of recommendations will be sent to Dr. Jenny Llanos MD for review.     Thank you,  EULOGIO Hong/ Nate Perez 77 Tracking Only    Intervention Detail: Adherence Monitorin and Lab(s) Ordered  Total # of Interventions Recommended: 2  Total # of Interventions Accepted: 2  Time Spent (min): 20

## 2022-12-05 NOTE — PATIENT INSTRUCTIONS
Today your INR was 2.6. Your goal INR is  2.0-3.0. You have a  2.5 mg tablet of Coumadin (warfarin). Take Coumadin (warfarin) as follows: Take 2.5 mg (1 tablet) daily    Come back in 4 week(s) for your next finger stick/INR blood test.        Avoid any over the counter items containing aspirin or ibuprofen, and avoid great swings in general diet. Avoid alcohol consumption. Please notify the INR nurse if you are started on any new medication including over the counter or herbal supplements. Also, please notify your INR nurse if any of your other prescription or over the counter medications have been discontinued. Your Care Instructions  Warfarin is a medicine that you take to prevent blood clots. It is often called a blood thinner. Doctors give warfarin (such as Coumadin) to reduce the risk of blood clots. You may be at risk for blood clots if you have atrial fibrillation or deep vein thrombosis. Some other health problems may also put you at risk. Warfarin slows the amount of time it takes for your blood to clot. It can cause bleeding problems. Even if you've been taking warfarin for a while, it's important to know how to take it safely. Foods and other medicines can affect the way warfarin works. Some can make warfarin work too well. This can cause bleeding problems. And some can make it work poorly, so that it does not prevent blood clots very well. You will need regular blood tests to check how long it takes for your blood to form a clot. This test is called a PT or prothrombin time test. The result of the test is called an INR level. Depending on the test results, your doctor or anticoagulation clinic may adjust your dose of warfarin. Follow-up care is a key part of your treatment and safety. Be sure to make and go to all appointments, and call your doctor if you are having problems. It's also a good idea to know your test results and keep a list of the medicines you take.     How can you care for yourself at home? Take warfarin safely  Take your warfarin at the same time each day. If you miss a dose of warfarin, don't take an extra dose to make up for it. Your doctor can tell you exactly what to do so you don't take too much or too little. Wear medical alert jewelry that lets others know that you take warfarin. You can buy this at most drugstores. Don't take warfarin if you are pregnant or planning to get pregnant. Talk to your doctor about how you can prevent getting pregnant while you are taking it. Don't change your dose or stop taking warfarin unless your doctor tells you to. Effects of medicines and food on warfarin  Don't start or stop taking any medicines, vitamins, or natural remedies unless you first talk to your doctor. Many medicines can affect how warfarin works. These include aspirin and other pain relievers, over-the-counter medicines, multivitamins, dietary supplements, and herbal products. Tell all of your doctors and pharmacists that you take warfarin. Some prescription medicines can affect how warfarin works. Keep the amount of vitamin K in your diet about the same from day to day. Do not suddenly eat a lot more or a lot less food that is rich in vitamin K than you usually do. Vitamin K affects how warfarin works and how your blood clots. Talk with your doctor before making big changes in your diet. Vitamin K is in many foods, such as:  Leafy greens, such as kale, cabbage, spinach, Swiss chard, and lettuce. Canola and soybean oils. Green vegetables, such as asparagus, broccoli, and [place] sprouts. Vegetable drinks, green tea leaves, and some dietary supplement drinks. Avoid cranberry juice and other cranberry products. They can increase the effects of warfarin. Limit your use of alcohol. Avoid bleeding by preventing falls and injuries  Wear slippers or shoes with nonskid soles. Remove throw rugs and clutter.   Rearrange furniture and electrical cords to keep them out of walking paths. Keep stairways, porches, and outside walkways well lit. Use night-lights in hallways and bathrooms. Be extra careful when you work with sharp tools or knives. When should you call for help? Call 911 anytime you think you may need emergency care. For example, call if:  You have a sudden, severe headache that is different from past headaches. Call your doctor now or seek immediate medical care if:  You have any abnormal bleeding, such as:  Nosebleeds. Vaginal bleeding that is different (heavier, more frequent, at a different time of the month) than what you are used to. Bloody or black stools, or rectal bleeding. Bloody or pink urine. Watch closely for changes in your health, and be sure to contact your doctor if you have any problems. Where can you learn more? Go to http://www.gray.com/. Enter E933 in the search box to learn more about \"Taking Warfarin Safely: Care Instructions. \"  Current as of: January 27, 2016  Content Version: 11.1  © 4544-8638 [employer]. Care instructions adapted under license by [employer] (which disclaims liability or warranty for this information). If you have questions about a medical condition or this instruction, always ask your healthcare professional. [address] any warranty or liability for your use of this information.

## 2022-12-06 ENCOUNTER — ANTI-COAG VISIT (OUTPATIENT)
Dept: PHARMACY | Age: 87
End: 2022-12-06
Payer: MEDICARE

## 2022-12-06 DIAGNOSIS — Z79.01 LONG TERM (CURRENT) USE OF ANTICOAGULANTS: ICD-10-CM

## 2022-12-06 DIAGNOSIS — I48.0 PAROXYSMAL ATRIAL FIBRILLATION (HCC): Primary | ICD-10-CM

## 2022-12-06 LAB
INR BLD: 2.6
PT POC: 31.3 SECONDS
VALID INTERNAL CONTROL?: YES

## 2022-12-06 PROCEDURE — 99211 OFF/OP EST MAY X REQ PHY/QHP: CPT

## 2023-01-03 NOTE — PATIENT INSTRUCTIONS
Today your INR was 3.5. Your goal INR is  2.0-3.0. You have a  2.5 mg tablet of Coumadin (warfarin). Take Coumadin (warfarin) as follows: Take 1.25 mg (0.5 tablet) tonight 1/9. Starting 1/10 take 2.5 mg (1 tablet) daily    Come back in 2 week(s) for your next finger stick/INR blood test.        Avoid any over the counter items containing aspirin or ibuprofen, and avoid great swings in general diet. Avoid alcohol consumption. Please notify the INR nurse if you are started on any new medication including over the counter or herbal supplements. Also, please notify your INR nurse if any of your other prescription or over the counter medications have been discontinued. Your Care Instructions  Warfarin is a medicine that you take to prevent blood clots. It is often called a blood thinner. Doctors give warfarin (such as Coumadin) to reduce the risk of blood clots. You may be at risk for blood clots if you have atrial fibrillation or deep vein thrombosis. Some other health problems may also put you at risk. Warfarin slows the amount of time it takes for your blood to clot. It can cause bleeding problems. Even if you've been taking warfarin for a while, it's important to know how to take it safely. Foods and other medicines can affect the way warfarin works. Some can make warfarin work too well. This can cause bleeding problems. And some can make it work poorly, so that it does not prevent blood clots very well. You will need regular blood tests to check how long it takes for your blood to form a clot. This test is called a PT or prothrombin time test. The result of the test is called an INR level. Depending on the test results, your doctor or anticoagulation clinic may adjust your dose of warfarin. Follow-up care is a key part of your treatment and safety. Be sure to make and go to all appointments, and call your doctor if you are having problems.  It's also a good idea to know your test results and keep a list of the medicines you take. How can you care for yourself at home? Take warfarin safely  Take your warfarin at the same time each day. If you miss a dose of warfarin, don't take an extra dose to make up for it. Your doctor can tell you exactly what to do so you don't take too much or too little. Wear medical alert jewelry that lets others know that you take warfarin. You can buy this at most drugstores. Don't take warfarin if you are pregnant or planning to get pregnant. Talk to your doctor about how you can prevent getting pregnant while you are taking it. Don't change your dose or stop taking warfarin unless your doctor tells you to. Effects of medicines and food on warfarin  Don't start or stop taking any medicines, vitamins, or natural remedies unless you first talk to your doctor. Many medicines can affect how warfarin works. These include aspirin and other pain relievers, over-the-counter medicines, multivitamins, dietary supplements, and herbal products. Tell all of your doctors and pharmacists that you take warfarin. Some prescription medicines can affect how warfarin works. Keep the amount of vitamin K in your diet about the same from day to day. Do not suddenly eat a lot more or a lot less food that is rich in vitamin K than you usually do. Vitamin K affects how warfarin works and how your blood clots. Talk with your doctor before making big changes in your diet. Vitamin K is in many foods, such as:  Leafy greens, such as kale, cabbage, spinach, Swiss chard, and lettuce. Canola and soybean oils. Green vegetables, such as asparagus, broccoli, and [place] sprouts. Vegetable drinks, green tea leaves, and some dietary supplement drinks. Avoid cranberry juice and other cranberry products. They can increase the effects of warfarin. Limit your use of alcohol. Avoid bleeding by preventing falls and injuries  Wear slippers or shoes with nonskid soles.   Remove throw rugs and clutter. Rearrange furniture and electrical cords to keep them out of walking paths. Keep stairways, porches, and outside walkways well lit. Use night-lights in hallways and bathrooms. Be extra careful when you work with sharp tools or knives. When should you call for help? Call 911 anytime you think you may need emergency care. For example, call if:  You have a sudden, severe headache that is different from past headaches. Call your doctor now or seek immediate medical care if:  You have any abnormal bleeding, such as:  Nosebleeds. Vaginal bleeding that is different (heavier, more frequent, at a different time of the month) than what you are used to. Bloody or black stools, or rectal bleeding. Bloody or pink urine. Watch closely for changes in your health, and be sure to contact your doctor if you have any problems. Where can you learn more? Go to http://www.gray.com/. Enter E773 in the search box to learn more about \"Taking Warfarin Safely: Care Instructions. \"  Current as of: January 27, 2016  Content Version: 11.1  © 2860-8550 [employer]. Care instructions adapted under license by [employer] (which disclaims liability or warranty for this information). If you have questions about a medical condition or this instruction, always ask your healthcare professional. [place] [address] any warranty or liability for your use of this information.

## 2023-01-03 NOTE — PROGRESS NOTES
Pharmacy Progress Note - Anticoagulation Management    S/O:  Ms. Jaden Chao  is a [address] y.o. female seen today for anticoagulation management for the diagnosis of Atrial Fibrillation. HPI: At last visit (12/6) INR was 2.6 and therapeutic for INR goal 2.0-3.0. Patient was instructed to continue 2.5 mg daily and recheck INR in 4 weeks. Interim History:    Warfarin start date: Prior to October 2018  INR Goal:  2.0-3.0    Current warfarin regimen: 2.5 mg daily  Warfarin tablet strength:   2.5 mg   Duration of therapy: Indefinite    Today's pertinent positives includes:  No significant changes since last visit    Results for orders placed or performed in visit on 01/10/23   AMB POC PT/INR   Result Value Ref Range    VALID INTERNAL CONTROL POC Yes     Prothrombin time (POC) 41.8 seconds    INR POC 3.5      Adherence:   Able to recall regimen? YES  Miss/extra dose? NO  Need refill? NO    Upcoming procedure(s):  NO    INR History:   (normal INR range 0.8-1.2)     Date   INR   PT   Dose/Comments  01/10/23 3.5  41.8 No changes  12/06/22 2.6  31.3 No changes  11/01/22 2.3  28.0 No changes  10/04/22 2.5  30.1 No changes  09/01/22 2.7  32.9 Increased to 2.5 mg daily  08/16/22 1.9  22.8 No changes  07/19/22 2.1  24.9 No changes  06/21/22 2.2  26.6 Increased to 1.25 mg Mon; 2.5 mg daily ROW  05/31/22 1.8  21.7 No changes  05/16/22 1.9  22.4 No changes  04/18/22 2.3  27.6 No changes  03/21/22 2.3  28.1 No changes  02/21/22 2.3  28.1 No changes  02/02/22 1.9  22.4 No changes, increased greens  01/19/22 3.3  39.7 Held x 1 day, then 1.25 mg Thur, 2.5 mg daily ROW  01/10/22 3.9  46.3 No changes  12/27/21 3.2  38.0 No changes  11/22/21 2.2   No changes  10/25/21 2.1   1.25 mg every Thu; 2.5 mg all other days    A/P:       Anticoagulation:  Considering Ms. Robins's past history, todays findings, and per Anticoagulation Collaborative Practice Agreement/Protocol:    Fingerstick POC INR (3.5) is Supratherapeutic for INR goal today. Change warfarin and take 1.25 mg tomorrow 1/11 not 2.5 mg as planned. Starting 1/12 resume 2.5 mg daily  INR is 3.5 and supratherapeutic for INR goal 2.0-3.0. Patient denies extra doses of warfarin or changes to her other medications. Patient reports consistent intake of vitamin K foods. Patient reports she has already taken warfarin dose today (1/10). Due to INR elevation, patient will take 1.25 mg tomorrow (1/11) not 2.5 mg as planned. Since INR previously therapeutic on this weekly dose, on 1/12 patient will resume 2.5 mg daily and recheck INR in 2 weeks. Patient was instructed to schedule an appointment in 2 week(s) prior to leaving the clinic. Medication reconciliation was completed during the visit. There are no discontinued medications. A full discussion of the nature of anticoagulants has been carried out. A full discussion of the need for frequent and regular monitoring, precise dosage adjustment and compliance was stressed. Side effects of potential bleeding were discussed and Ms. Robins was instructed to call [phone] if there are any signs of abnormal bleeding. Ms. Dilma Sanchez was instructed to avoid any OTC items containing aspirin or ibuprofen and prior to starting any new OTC products to consult with her physician or pharmacist to ensure no drug interactions are present. Ms. Dilma Sanchez was instructed to avoid any major changes in her general diet and to avoid alcohol consumption. Ms. Dilma Sanchez was provided information in the AVS that includes topics on understanding coumadin therapy, drug interaction considerations, vitamin K and coumadin use, interactions with foods and supplements containing vitamin K, and the use of herbal products. Ms. Dilma Sanchez verbalized her understanding of all instructions and will call the office with any questions, concerns, or signs of abnormal bleeding or blood clot. Notifications of recommendations will be sent to Dr. Keyla Wong MD for review.     Thank Alex conroy, [address] Only    Intervention Detail: Dose Adjustment: 1, reason: Therapy Optimization and Lab(s) Ordered  Total # of Interventions Recommended: 2  Total # of Interventions Accepted: 2  Time Spent (min): 20

## 2023-01-04 ENCOUNTER — OFFICE VISIT (OUTPATIENT)
Dept: CARDIOLOGY CLINIC | Age: 88
End: 2023-01-04
Payer: MEDICARE

## 2023-01-04 DIAGNOSIS — Z95.0 CARDIAC PACEMAKER IN SITU: Primary | ICD-10-CM

## 2023-01-04 NOTE — PROGRESS NOTES
C/ GOFF REMOTE   Scheduled remote transmission. Device functioning appropriately as programmed. See scanned documents.  Chargeable visit

## 2023-01-10 ENCOUNTER — ANTI-COAG VISIT (OUTPATIENT)
Dept: PHARMACY | Age: 88
End: 2023-01-10
Payer: MEDICARE

## 2023-01-10 DIAGNOSIS — I48.0 PAROXYSMAL ATRIAL FIBRILLATION (HCC): Primary | ICD-10-CM

## 2023-01-10 DIAGNOSIS — Z79.01 LONG TERM (CURRENT) USE OF ANTICOAGULANTS: ICD-10-CM

## 2023-01-10 LAB
INR BLD: 3.5
PT POC: 41.8 SECONDS
VALID INTERNAL CONTROL?: YES

## 2023-01-10 PROCEDURE — 99212 OFFICE O/P EST SF 10 MIN: CPT

## 2023-01-24 DIAGNOSIS — I48.0 PAROXYSMAL ATRIAL FIBRILLATION (HCC): ICD-10-CM

## 2023-01-24 RX ORDER — WARFARIN 2.5 MG/1
TABLET ORAL
Qty: 90 TABLET | Refills: 0 | Status: SHIPPED | OUTPATIENT
Start: 2023-01-24

## 2023-01-24 NOTE — TELEPHONE ENCOUNTER
Received refill request for warfarin 2.5 mg po tabs. Refill request authorized. Future Appointments   Date Time Provider Monika Rhoadesi   1/27/2023 11:15 AM [facility] MEDICATION [facility] [address] [facility]   4/13/2023 11:00 AM KAREN STAUFFER BS AMB   4/13/2023 11:20 AM MD GUILLERMO Mosley BS AMB   9/28/2023 10:30 AM Jigar Iniguez MD [facility] MORGAN BS AMB

## 2023-01-26 NOTE — PROGRESS NOTES
Pharmacy Progress Note - Anticoagulation Management    S/O:  Ms. Antonia Hull  is a 80 y.o. female seen today for anticoagulation management for the diagnosis of Atrial Fibrillation. HPI: At last visit (1/10) INR was 3.5 and supratherapeutic for INR goal 2.0-3.0. Due to INR elevation, patient instructed to take 1.25 mg on 1/11 and on 1/12 resume 2.5 mg daily. Patient scheduled to recheck INR in 2 weeks. Interim History:    Warfarin start date: Prior to October 2018  INR Goal:  2.0-3.0    Current warfarin regimen: 1.25 mg x 1 dose then 2.5 mg daily  Warfarin tablet strength:   2.5 mg   Duration of therapy: Indefinite    Today's pertinent positives includes:  No significant changes since last visit    Results for orders placed or performed in visit on 01/27/23   AMB POC PT/INR   Result Value Ref Range    VALID INTERNAL CONTROL POC Yes     Prothrombin time (POC) 34.8 seconds    INR POC 2.9      Adherence:   Able to recall regimen? YES  Miss/extra dose? NO  Need refill? NO    Upcoming procedure(s):  NO    INR History:   (normal INR range 0.8-1.2)     Date   INR   PT   Dose/Comments  01/27/23 2.9  34.8 1.25 mg x 1 dose then 2.5 mg daily  01/10/23 3.5  41.8 No changes  12/06/22 2.6  31.3 No changes  11/01/22 2.3  28.0 No changes  10/04/22 2.5  30.1 No changes  09/01/22 2.7  32.9 Increased to 2.5 mg daily  08/16/22 1.9  22.8 No changes  07/19/22 2.1  24.9 No changes  06/21/22 2.2  26.6 Increased to 1.25 mg Mon; 2.5 mg daily ROW  05/31/22 1.8  21.7 No changes  05/16/22 1.9  22.4 No changes  04/18/22 2.3  27.6 No changes  03/21/22 2.3  28.1 No changes  02/21/22 2.3  28.1 No changes  02/02/22 1.9  22.4 No changes, increased greens  01/19/22 3.3  39.7 Held x 1 day, then 1.25 mg Thur, 2.5 mg daily ROW  01/10/22 3.9  46.3 No changes  12/27/21 3.2  38.0 No changes  11/22/21 2.2   No changes  10/25/21 2.1   1.25 mg every Thu; 2.5 mg all other days    A/P:       Anticoagulation:  Considering Ms. Robins's past history, todays findings, and per Anticoagulation Collaborative Practice Agreement/Protocol:    Fingerstick POC INR (2.9) is Subtherapeutic for INR goal today. Continue warfarin 2.5 mg daily  INR is 2.9 and therapeutic for INR goal 2.0-3.0. Patient denies changes to her other medications and reports consistent intake of vitamin K foods. Since INR is therapeutic today patient will continue 2.5 mg daily and recheck INR in 4 weeks. Patient was instructed to schedule an appointment in 4 week(s) prior to leaving the clinic. Medication reconciliation was completed during the visit. There are no discontinued medications. A full discussion of the nature of anticoagulants has been carried out. A full discussion of the need for frequent and regular monitoring, precise dosage adjustment and compliance was stressed. Side effects of potential bleeding were discussed and Ms. Robins was instructed to call [phone] if there are any signs of abnormal bleeding. Ms. Spencer Lin was instructed to avoid any OTC items containing aspirin or ibuprofen and prior to starting any new OTC products to consult with her physician or pharmacist to ensure no drug interactions are present. Ms. Spencer Lin was instructed to avoid any major changes in her general diet and to avoid alcohol consumption. Ms. Spencer Lin was provided information in the AVS that includes topics on understanding coumadin therapy, drug interaction considerations, vitamin K and coumadin use, interactions with foods and supplements containing vitamin K, and the use of herbal products. Ms. Spencer Lin verbalized her understanding of all instructions and will call the office with any questions, concerns, or signs of abnormal bleeding or blood clot. Notifications of recommendations will be sent to Dr. Heather Rangel MD for review.     Thank you,  Omer Shafer Only    Intervention Detail: Lab(s) Ordered  Total # of Interventions Recommended: 1  Total # of Interventions Accepted: 1  Time Spent (min): 20

## 2023-01-26 NOTE — PATIENT INSTRUCTIONS
Today your INR was 2.9. Your goal INR is  2.0-3.0. You have a  2.5 mg tablet of Coumadin (warfarin). Take Coumadin (warfarin) as follows: Take 2.5 mg (1 tablet) daily    Come back in 4 week(s) for your next finger stick/INR blood test.        Avoid any over the counter items containing aspirin or ibuprofen, and avoid great swings in general diet. Avoid alcohol consumption. Please notify the INR nurse if you are started on any new medication including over the counter or herbal supplements. Also, please notify your INR nurse if any of your other prescription or over the counter medications have been discontinued. Your Care Instructions  Warfarin is a medicine that you take to prevent blood clots. It is often called a blood thinner. Doctors give warfarin (such as Coumadin) to reduce the risk of blood clots. You may be at risk for blood clots if you have atrial fibrillation or deep vein thrombosis. Some other health problems may also put you at risk. Warfarin slows the amount of time it takes for your blood to clot. It can cause bleeding problems. Even if you've been taking warfarin for a while, it's important to know how to take it safely. Foods and other medicines can affect the way warfarin works. Some can make warfarin work too well. This can cause bleeding problems. And some can make it work poorly, so that it does not prevent blood clots very well. You will need regular blood tests to check how long it takes for your blood to form a clot. This test is called a PT or prothrombin time test. The result of the test is called an INR level. Depending on the test results, your doctor or anticoagulation clinic may adjust your dose of warfarin. Follow-up care is a key part of your treatment and safety. Be sure to make and go to all appointments, and call your doctor if you are having problems. It's also a good idea to know your test results and keep a list of the medicines you take.     How can you care for yourself at home? Take warfarin safely  Take your warfarin at the same time each day. If you miss a dose of warfarin, don't take an extra dose to make up for it. Your doctor can tell you exactly what to do so you don't take too much or too little. Wear medical alert jewelry that lets others know that you take warfarin. You can buy this at most drugstores. Don't take warfarin if you are pregnant or planning to get pregnant. Talk to your doctor about how you can prevent getting pregnant while you are taking it. Don't change your dose or stop taking warfarin unless your doctor tells you to. Effects of medicines and food on warfarin  Don't start or stop taking any medicines, vitamins, or natural remedies unless you first talk to your doctor. Many medicines can affect how warfarin works. These include aspirin and other pain relievers, over-the-counter medicines, multivitamins, dietary supplements, and herbal products. Tell all of your doctors and pharmacists that you take warfarin. Some prescription medicines can affect how warfarin works. Keep the amount of vitamin K in your diet about the same from day to day. Do not suddenly eat a lot more or a lot less food that is rich in vitamin K than you usually do. Vitamin K affects how warfarin works and how your blood clots. Talk with your doctor before making big changes in your diet. Vitamin K is in many foods, such as:  Leafy greens, such as kale, cabbage, spinach, Swiss chard, and lettuce. Canola and soybean oils. Green vegetables, such as asparagus, broccoli, and [place]idian sprouts. Vegetable drinks, green tea leaves, and some dietary supplement drinks. Avoid cranberry juice and other cranberry products. They can increase the effects of warfarin. Limit your use of alcohol. Avoid bleeding by preventing falls and injuries  Wear slippers or shoes with nonskid soles. Remove throw rugs and clutter.   Rearrange furniture and electrical cords to keep them out of walking paths. Keep stairways, porches, and outside walkways well lit. Use night-lights in hallways and bathrooms. Be extra careful when you work with sharp tools or knives. When should you call for help? Call 911 anytime you think you may need emergency care. For example, call if:  You have a sudden, severe headache that is different from past headaches. Call your doctor now or seek immediate medical care if:  You have any abnormal bleeding, such as:  Nosebleeds. Vaginal bleeding that is different (heavier, more frequent, at a different time of the month) than what you are used to. Bloody or black stools, or rectal bleeding. Bloody or pink urine. Watch closely for changes in your health, and be sure to contact your doctor if you have any problems. Where can you learn more? Go to http://www.gray.com/. Enter D097 in the search box to learn more about \"Taking Warfarin Safely: Care Instructions. \"  Current as of: January 27, 2016  Content Version: 11.1  © 6664-7714 [employer]. Care instructions adapted under license by [employer] (which disclaims liability or warranty for this information). If you have questions about a medical condition or this instruction, always ask your healthcare professional. [address] any warranty or liability for your use of this information.

## 2023-01-27 ENCOUNTER — ANTI-COAG VISIT (OUTPATIENT)
Dept: PHARMACY | Age: 88
End: 2023-01-27
Payer: MEDICARE

## 2023-01-27 DIAGNOSIS — Z79.01 LONG TERM (CURRENT) USE OF ANTICOAGULANTS: ICD-10-CM

## 2023-01-27 DIAGNOSIS — I48.0 PAROXYSMAL ATRIAL FIBRILLATION (HCC): Primary | ICD-10-CM

## 2023-01-27 LAB
INR BLD: 2.9
PT POC: 34.8 SECONDS
VALID INTERNAL CONTROL?: YES

## 2023-01-27 PROCEDURE — 99211 OFF/OP EST MAY X REQ PHY/QHP: CPT

## 2023-02-03 ENCOUNTER — TELEPHONE (OUTPATIENT)
Dept: CARDIOLOGY CLINIC | Age: 88
End: 2023-02-03

## 2023-02-03 NOTE — LETTER
2/7/2023 11:24 AM    Ms. [id number] [facility] [id number]    Your dual chamber pacemaker generator change procedure has been scheduled for 5/5/23 at 1100 am at [facility].    Please report to Admitting Department by 900 am, or 2 hours prior to your scheduled procedure. Please bring a list of your current medications and medication bottles, if able, to the hospital on this day. You will be unable to drive after your procedure so please make sure to bring someone with you to your procedure. You will need to have nothing to eat or drink after midnight, the night prior to your procedure. You may have small sips of water, if needed, to take with your medication. You will also need to see Dr. Mariia Blevins in office prior to your procedure. An appointment has been scheduled for 4/13/23 at 1100. You will need labs drawn prior to your procedure. Please go to have this done after your appointment with Dr. Mariia Blevins. We will discuss specific instructions about when to hold your Coumadin at your upcoming appointment with Dr. Mariia Blevins. After your procedure, you will need to follow up with Dr. Catalina Batista nurse for a wound and device check. Your follow-up appointment has been scheduled for 5/19/23 at 1000 am.     Hibiclens 4% topical solution has been ordered and sent into your pharmacy  Patient it start Hibiclens application 5 days prior to procedure date    Directions Hibiclens 4%: Start cleanse 5 days prior to procedure   1. Rinse area (upper chest and upper arms) with water. 2. Apply minimum amount necessary to scrub the upper chest area from shoulder/neck to mid line of chest and to below the nipple each of  5 nights before the day of the procedure  3. Let solution dry.          Sincerely,      Maureen Reyes MD

## 2023-02-03 NOTE — TELEPHONE ENCOUNTER
----- Message from Celina Villarreal sent at 1/31/2023  1:29 PM EST -----  Hey! Just to get this name on your radar. Pt had a remote come through and is about 3.5months out till the device will hit ARTHUR. (Not dependant). She also has an upcoming annual in April, so not sure if you want to address this then?  Thanks! :)

## 2023-02-03 NOTE — TELEPHONE ENCOUNTER
Verified patient with two types of identifiers. Spoke with Medhat Quintero, verified on [address]. Notified of need to change out battery at the beginning of May. Will add on an echo to appointment in April. Medhat Quintero is going to call back next week when his around his calendar. Offered 5/5/23 or 5/11/23. He reports patient is doing very well. Patient verbalized understanding and will call with any other questions.

## 2023-02-06 NOTE — TELEPHONE ENCOUNTER
Pt son called to confirm appt for the pt battery change in device, pt son would like to schedule this on 5/5/23, the son would like a letter in the mail to confirm this appt as well, confirmed pt address please advise      Medhat Quintero (son)  [phone]

## 2023-02-07 NOTE — TELEPHONE ENCOUNTER
Verified patient with two types of identifiers. Spoke with patient's son Teresa Castellano, verified with PHI. Patient will be scheduled for gen change on 5/5/23 at 1100 am and [facility]. Will mail preprocedure instructions per patient request.  Will review instructions at upcoming appointment with Dr. Tamiko Matthews. Patient verbalized understanding and will call with any other questions. Future Appointments   Date Time Provider Monika Garcia   2/27/2023 11:00 AM [facility] MEDICATION MGMT [address] [place] [address] Dr MCPHERSON'S H   4/13/2023 10:00 AM ECHOTWKAREN ALEMAN BS AMB   4/13/2023 11:00 AM PACEMAKER3, KAREN WEBBER AMB   4/13/2023 11:20 AM MD GUILLERMO Day BS AMB   5/19/2023 10:00 AM WOUND CHECKS, KAREN WEBBER AMB   5/19/2023 10:00 AM PACEMAKER3, KAREN WEBBER AMB   9/28/2023 10:30 AM Chepe Iniguez MD [facility] MORGAN BS AMB

## 2023-02-14 ENCOUNTER — OFFICE VISIT (OUTPATIENT)
Dept: INTERNAL MEDICINE CLINIC | Age: 88
End: 2023-02-14
Payer: MEDICARE

## 2023-02-14 VITALS
SYSTOLIC BLOOD PRESSURE: 120 MMHG | DIASTOLIC BLOOD PRESSURE: 72 MMHG | HEIGHT: 63 IN | HEART RATE: 61 BPM | WEIGHT: 160 LBS | OXYGEN SATURATION: 100 % | RESPIRATION RATE: 18 BRPM | TEMPERATURE: 98 F | BODY MASS INDEX: 28.35 KG/M2

## 2023-02-14 DIAGNOSIS — Z13.39 SCREENING FOR ALCOHOLISM: ICD-10-CM

## 2023-02-14 DIAGNOSIS — Z00.00 MEDICARE ANNUAL WELLNESS VISIT, SUBSEQUENT: Primary | ICD-10-CM

## 2023-02-14 DIAGNOSIS — I10 ESSENTIAL HYPERTENSION, BENIGN: ICD-10-CM

## 2023-02-14 DIAGNOSIS — I48.0 PAROXYSMAL ATRIAL FIBRILLATION (HCC): ICD-10-CM

## 2023-02-14 DIAGNOSIS — Z95.0 CARDIAC PACEMAKER IN SITU: ICD-10-CM

## 2023-02-14 DIAGNOSIS — I49.5 SINOATRIAL NODE DYSFUNCTION (HCC): ICD-10-CM

## 2023-02-14 PROCEDURE — 99213 OFFICE O/P EST LOW 20 MIN: CPT | Performed by: INTERNAL MEDICINE

## 2023-02-14 PROCEDURE — G8536 NO DOC ELDER MAL SCRN: HCPCS | Performed by: INTERNAL MEDICINE

## 2023-02-14 PROCEDURE — 1090F PRES/ABSN URINE INCON ASSESS: CPT | Performed by: INTERNAL MEDICINE

## 2023-02-14 PROCEDURE — G8427 DOCREV CUR MEDS BY ELIG CLIN: HCPCS | Performed by: INTERNAL MEDICINE

## 2023-02-14 PROCEDURE — 1101F PT FALLS ASSESS-DOCD LE1/YR: CPT | Performed by: INTERNAL MEDICINE

## 2023-02-14 PROCEDURE — 1123F ACP DISCUSS/DSCN MKR DOCD: CPT | Performed by: INTERNAL MEDICINE

## 2023-02-14 PROCEDURE — G0439 PPPS, SUBSEQ VISIT: HCPCS | Performed by: INTERNAL MEDICINE

## 2023-02-14 PROCEDURE — G8417 CALC BMI ABV UP PARAM F/U: HCPCS | Performed by: INTERNAL MEDICINE

## 2023-02-14 PROCEDURE — G8432 DEP SCR NOT DOC, RNG: HCPCS | Performed by: INTERNAL MEDICINE

## 2023-02-14 NOTE — PROGRESS NOTES
SPORTS MEDICINE AND PRIMARY CARE  Lars Atkinson MD, [address] [address]  Phone:  [phone]  Fax: [phone]      Chief Complaint   Patient presents with    Other     Itchy throat         SUBECTIVE:    Danelle Shah is a 80 y.o. female The patient returns today with her son, with history of sinoatrial node dysfunction, paroxysmal atrial fibrillation, cardiac pacemaker in situ, primary hypertension, and complains of some nasal congestion and rhinorrhea. She states her grandmother would call it a fresh cold. Other new complaints are denied. She is followed by at the clinic at [address] [place] [address] for her Coumadin. Current Outpatient Medications   Medication Sig Dispense Refill    warfarin (COUMADIN) 2.5 mg tablet TAKE 1 TABLET BY MOUTH DAILY. EXCEPT ON TAKE 1/2 TABLET ON THURSDAYS AS DIRECTED 90 Tablet 0    atenoloL (TENORMIN) 100 mg tablet TAKE 1 TABLET BY MOUTH DAILY 90 Tablet 3    amLODIPine (NORVASC) 5 mg tablet TAKE 1 TABLET BY MOUTH DAILY 90 Tablet 1    simvastatin (ZOCOR) 20 mg tablet TAKE 1 TABLET BY MOUTH EVERY NIGHT 30 Tablet 11    dorzolamide (TRUSOPT) 2 % ophthalmic solution INSTILL 1 DROP IN BOTH EYES BID  2    magnesium 250 mg tab Take 250 mg by mouth. Mon, wed, fri only      latanoprost (XALATAN) 0.005 % ophthalmic solution INT 1 GTT IN OU QD HS  2    levobunolol (BETAGAN) 0.5 % ophthalmic solution INSTILL 1 GTT OU BID  3     Past Medical History:   Diagnosis Date    Aortic valve disorders 4/29/2012    Atrial fibrillation ([address] 03/22/2011    on coumadin followed by cardiology - Walter Delarosa MD    CAD (coronary artery disease)     Cor pulmonale, chronic ([address] 4/29/2012    Dyslipidemia (high LDL; low HDL) 4/29/2012    Gout 4/30/2012    Gout, arthritis 2/8/2012    Gout, joint     Hypertension     Hyperuricemia 4/30/2012    Obstructive sleep apnea 4/29/2012    Pacemaker 04/02/2006    Dr Severiano Pinta placed the original pacer 4/6/2006. It is replaced in 2014.   It is [facility]    Recurrent epistaxis 2012    Sinoatrial node dysfunction ([address] 3/22/2011    Venous insufficiency      Past Surgical History:   Procedure Laterality Date    GEN INSERT/REPLACE ONLY DUAL  2014         HX PACEMAKER      AK UNLISTED PROCEDURE BREAST      lumpectomy     No Known Allergies    REVIEW OF SYSTEMS:   No chest pain. No shortness of breath. Social History     Socioeconomic History    Marital status:    Tobacco Use    Smoking status: Never    Smokeless tobacco: Never   Vaping Use    Vaping Use: Never used   Substance and Sexual Activity    Alcohol use: No    Drug use: No    Sexual activity: Not Currently   Social History Narrative    Habits:  She is a lifetime nonsmoker, non drinker, non drug abuser. Social History:  The patient is . She lives with her son. 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 She is a member of [employer]. Family History:  Father and mother , unknown ages and causes. Two sisters  of unknown caus   r  History reviewed. No pertinent family history. OBJECTIVE:  Visit Vitals  /72 (BP 1 Location: Left upper arm, BP Patient Position: Sitting)   Pulse 61   Temp 98 °F (36.7 °C)   Resp 18   Ht 5' 3\" (1.6 m)   Wt 160 lb (72.6 kg)   SpO2 100%   BMI 28.34 kg/m²     ENT: perrla,  eom intact  NECK: supple.  Thyroid normal  CHEST: clear to ascultation and percussion   HEART: regular rate and rhythm  ABD: soft, bowel sounds active  EXTREMITIES: no edema, pulse 1+     Anti-Coag visit on 2023   Component Date Value Ref Range Status    VALID INTERNAL CONTROL POC 2023 Yes   Final    Prothrombin time (POC) 2023 34.8  seconds Final    INR POC 2023 2.9   Final   Anti-Coag visit on 01/10/2023   Component Date Value Ref Range Status    VALID INTERNAL CONTROL POC 01/10/2023 Yes   Final    Prothrombin time (POC) 01/10/2023 41.8  seconds Final    INR POC 01/10/2023 3.5   Final   Anti-Coag visit on 12/06/2022   Component Date Value Ref Range Status    VALID INTERNAL CONTROL POC 12/06/2022 Yes   Final    Prothrombin time (POC) 12/06/2022 31.3  seconds Final    INR POC 12/06/2022 2.6   Final          ASSESSMENT:  1. Medicare annual wellness visit, subsequent    2. Screening for alcoholism    3. Sinoatrial node dysfunction (HCC)    4. Paroxysmal atrial fibrillation (HCC)    5. Cardiac pacemaker in situ    6. Essential hypertension, benign      At 8 years old, she is remarkable. She is going to have her battery changed, I think, in the near future of her pacemaker. On auscultation, she is in sinus rhythm. Blood pressure control is at goal and INR is therapeutic. We will continue to encourage activity. She will be back to see me in six months, actually in September or sooner if she has any problems. I have discussed the diagnosis with the patient and the intended plan as seen in the  orders above. The patient understands and agees with the plan. The patient has   received an after visit summary and questions were answered concerning  future plans  Patient labs and/or xrays were reviewed  Past records were reviewed. PLAN:  . No orders of the defined types were placed in this encounter. Follow-up and Dispositions    Return in about 7 months (around 9/28/2023). ATTENTION:   This medical record was transcribed using an electronic medical records system. Although proofread, it may and can contain electronic and spelling errors. Other human spelling and other errors may be present. Corrections may be executed at a later time. Please feel free to contact us for any clarifications as needed.

## 2023-02-14 NOTE — PATIENT INSTRUCTIONS
Medicare Wellness Visit, Female     The best way to live healthy is to have a lifestyle where you eat a well-balanced diet, exercise regularly, limit alcohol use, and quit all forms of tobacco/nicotine, if applicable. Regular preventive services are another way to keep healthy. Preventive services (vaccines, screening tests, monitoring & exams) can help personalize your care plan, which helps you manage your own care. Screening tests can find health problems at the earliest stages, when they are easiest to treat. [facility] follows the current, evidence-based guidelines published by the [facility] Tyrell Hendrix ([facility]) when recommending preventive services for our patients. Because we follow these guidelines, sometimes recommendations change over time as research supports it. (For example, mammograms used to be recommended annually. Even though Medicare will still pay for an annual mammogram, the newer guidelines recommend a mammogram every two years for women of average risk). Of course, you and your doctor may decide to screen more often for some diseases, based on your risk and your co-morbidities (chronic disease you are already diagnosed with). Preventive services for you include:  - Medicare offers their members a free annual wellness visit, which is time for you and your primary care provider to discuss and plan for your preventive service needs.  Take advantage of this benefit every year!    -Over the age of 72 should receive the recommended pneumonia vaccines.    -All adults should have a flu vaccine yearly.  -All adults should have a tetanus vaccine every 10 years.   -Over the age 48 should receive the shingles vaccines.        -All adults should be screened once for Hepatitis C.  -All adults age 38-68 who are overweight should have a diabetes screening test once every three years.   -Other screening tests and preventive services for persons with diabetes include: an eye exam to screen for diabetic retinopathy, a kidney function test, a foot exam, and stricter control over your cholesterol.   -Cardiovascular screening for adults with routine risk involves an electrocardiogram (ECG) at intervals determined by your doctor.     -Colorectal cancer screenings should be done for adults age 39-70 with no increased risk factors for colorectal cancer. There are a number of acceptable methods of screening for this type of cancer. Each test has its own benefits and drawbacks. Discuss with your doctor what is most appropriate for you during your annual wellness visit. The different tests include: colonoscopy (considered the best screening method), a fecal occult blood test, a fecal DNA test, and sigmoidoscopy.    -Lung cancer screening is recommended annually with a low dose CT scan for adults between age 54 and 68, who have smoked at least 30 pack years (equivalent of 1 pack per day for 30 days), and who is a current smoker or quit less than 15 years ago.    -A bone mass density test is recommended when a woman turns 65 to screen for osteoporosis. This test is only recommended one time, as a screening. Some providers will use this same test as a disease monitoring tool if you already have osteoporosis. -Breast cancer screenings are recommended every other year for women of normal risk, age 54-69.    -Cervical cancer screenings for women over age 72 are only recommended with certain risk factors.      Here is a list of your current Health Maintenance items (your personalized list of preventive services) with a due date:  Health Maintenance Due   Topic Date Due    Shingles Vaccine (1 of 2) Never done    COVID-19 Vaccine (3 - Booster for [employer] series) 05/18/2021

## 2023-02-14 NOTE — PROGRESS NOTES
Chief Complaint   Patient presents with    Other     Itchy throat     1. Have you been to the ER, urgent care clinic since your last visit? Hospitalized since your last visit? No    2. Have you seen or consulted any other health care providers outside of the [address] [place] [address] since your last visit? Include any pap smears or colon screening. No  This is the Subsequent Medicare Annual Wellness Exam, performed 12 months or more after the Initial AWV or the last Subsequent AWV    I have reviewed the patient's medical history in detail and updated the computerized patient record.        Assessment/Plan   Education and counseling provided:  Are appropriate based on today's review and evaluation    Depression Risk Factor Screening     3 most recent PHQ Screens 9/27/2022   Little interest or pleasure in doing things Not at all   Feeling down, depressed, irritable, or hopeless Not at all   Total Score PHQ 2 0   Trouble falling or staying asleep, or sleeping too much Not at all   Feeling tired or having little energy Not at all   Poor appetite, weight loss, or overeating Not at all   Feeling bad about yourself - or that you are a failure or have let yourself or your family down Not at all   Trouble concentrating on things such as school, work, reading, or watching TV Not at all   Moving or speaking so slowly that other people could have noticed; or the opposite being so fidgety that others notice Not at all   Thoughts of being better off dead, or hurting yourself in some way Not at all   PHQ 9 Score 0   How difficult have these problems made it for you to do your work, take care of your home and get along with others Not difficult at all       Alcohol & Drug Abuse Risk Screen    Do you average more than 1 drink per night or more than 7 drinks a week:  No    On any one occasion in the past three months have you have had more than 3 drinks containing alcohol:  No          Functional Ability and Level of Safety Hearing: Hearing is good. Activities of Daily Living: The home contains: grab bars  Patient does total self care      Ambulation: with difficulty, uses a cane     Fall Risk:  Fall Risk Assessment, last 12 mths 9/27/2022   Able to walk? Yes   Fall in past 12 months? 0   Do you feel unsteady?  0   Are you worried about falling 0      Abuse Screen:  Patient is not abused       Cognitive Screening    Has your family/caregiver stated any concerns about your memory: no     Cognitive Screening: Normal - Verbal Fluency Test    Health Maintenance Due     Health Maintenance Due   Topic Date Due    Shingles Vaccine (1 of 2) Never done    COVID-19 Vaccine (3 - Booster for [employer] series) 05/18/2021    Medicare Yearly Exam  10/06/2022       Patient Care Team   Patient Care Team:  Rivka Holliday MD as PCP - General (Internal Medicine Physician)  Rivka Holliday MD as PCP - [facility]  Azalia Hearn MD as Physician (Cardiovascular Disease Physician)  Freida Arizmendi MD as Physician (Cardiovascular Disease Physician)    History     Patient Active Problem List   Diagnosis Code    Cardiac pacemaker in situ Z95.0    Paroxysmal atrial fibrillation ([address] I48.0    Essential hypertension, benign I10    Palpitations R00.2    Sinoatrial node dysfunction ([address] I49.5    Gout M10.9    Hyperuricemia E79.0    Encounter for long-term (current) use of high-risk medication Z79.899    Pacemaker Z95.0    Venous insufficiency I87.2    Long term (current) use of anticoagulants Z79.01    Urinary tract infection without hematuria N39.0     Past Medical History:   Diagnosis Date    Aortic valve disorders 4/29/2012    Atrial fibrillation ([address] 03/22/2011    on coumadin followed by cardiology - Alex Elaine MD    CAD (coronary artery disease)     Cor pulmonale, chronic ([address] 4/29/2012    Dyslipidemia (high LDL; low HDL) 4/29/2012    Gout 4/30/2012    Gout, arthritis 2/8/2012    Gout, joint     Hypertension     Hyperuricemia 4/30/2012    Obstructive sleep apnea 4/29/2012    Pacemaker 04/02/2006    Dr Armin Watts placed the original pacer 4/6/2006. It is replaced in 2014. It is [facility]    Recurrent epistaxis 4/29/2012    Sinoatrial node dysfunction [address] 3/22/2011    Venous insufficiency       Past Surgical History:   Procedure Laterality Date    GEN INSERT/REPLACE ONLY DUAL  4/28/2014         HX PACEMAKER      AK UNLISTED PROCEDURE BREAST      lumpectomy     Current Outpatient Medications   Medication Sig Dispense Refill    warfarin (COUMADIN) 2.5 mg tablet TAKE 1 TABLET BY MOUTH DAILY. EXCEPT ON TAKE 1/2 TABLET ON THURSDAYS AS DIRECTED 90 Tablet 0    atenoloL (TENORMIN) 100 mg tablet TAKE 1 TABLET BY MOUTH DAILY 90 Tablet 3    amLODIPine (NORVASC) 5 mg tablet TAKE 1 TABLET BY MOUTH DAILY 90 Tablet 1    simvastatin (ZOCOR) 20 mg tablet TAKE 1 TABLET BY MOUTH EVERY NIGHT 30 Tablet 11    dorzolamide (TRUSOPT) 2 % ophthalmic solution INSTILL 1 DROP IN BOTH EYES BID  2    magnesium 250 mg tab Take 250 mg by mouth. Mon, wed, fri only      latanoprost (XALATAN) 0.005 % ophthalmic solution INT 1 GTT IN OU QD HS  2    levobunolol (BETAGAN) 0.5 % ophthalmic solution INSTILL 1 GTT OU BID  3     No Known Allergies    History reviewed. No pertinent family history.   Social History     Tobacco Use    Smoking status: Never    Smokeless tobacco: Never   Substance Use Topics    Alcohol use: No         Laly Kapoor LPN

## 2023-02-23 NOTE — PATIENT INSTRUCTIONS
Today your INR was 3.2. Your goal INR is  2.0-3.0. You have a  2.5 mg tablet of Coumadin (warfarin). Take Coumadin (warfarin) as follows: Take 1.25 mg (0.5 tablet) Tuesdays and 2.5 mg (1 tablet) daily rest of week    Come back in 2 week(s) for your next finger stick/INR blood test.        Avoid any over the counter items containing aspirin or ibuprofen, and avoid great swings in general diet. Avoid alcohol consumption. Please notify the INR nurse if you are started on any new medication including over the counter or herbal supplements. Also, please notify your INR nurse if any of your other prescription or over the counter medications have been discontinued. Your Care Instructions  Warfarin is a medicine that you take to prevent blood clots. It is often called a blood thinner. Doctors give warfarin (such as Coumadin) to reduce the risk of blood clots. You may be at risk for blood clots if you have atrial fibrillation or deep vein thrombosis. Some other health problems may also put you at risk. Warfarin slows the amount of time it takes for your blood to clot. It can cause bleeding problems. Even if you've been taking warfarin for a while, it's important to know how to take it safely. Foods and other medicines can affect the way warfarin works. Some can make warfarin work too well. This can cause bleeding problems. And some can make it work poorly, so that it does not prevent blood clots very well. You will need regular blood tests to check how long it takes for your blood to form a clot. This test is called a PT or prothrombin time test. The result of the test is called an INR level. Depending on the test results, your doctor or anticoagulation clinic may adjust your dose of warfarin. Follow-up care is a key part of your treatment and safety. Be sure to make and go to all appointments, and call your doctor if you are having problems.  It's also a good idea to know your test results and keep a list of the medicines you take. How can you care for yourself at home? Take warfarin safely  Take your warfarin at the same time each day. If you miss a dose of warfarin, don't take an extra dose to make up for it. Your doctor can tell you exactly what to do so you don't take too much or too little. Wear medical alert jewelry that lets others know that you take warfarin. You can buy this at most drugstores. Don't take warfarin if you are pregnant or planning to get pregnant. Talk to your doctor about how you can prevent getting pregnant while you are taking it. Don't change your dose or stop taking warfarin unless your doctor tells you to. Effects of medicines and food on warfarin  Don't start or stop taking any medicines, vitamins, or natural remedies unless you first talk to your doctor. Many medicines can affect how warfarin works. These include aspirin and other pain relievers, over-the-counter medicines, multivitamins, dietary supplements, and herbal products. Tell all of your doctors and pharmacists that you take warfarin. Some prescription medicines can affect how warfarin works. Keep the amount of vitamin K in your diet about the same from day to day. Do not suddenly eat a lot more or a lot less food that is rich in vitamin K than you usually do. Vitamin K affects how warfarin works and how your blood clots. Talk with your doctor before making big changes in your diet. Vitamin K is in many foods, such as:  Leafy greens, such as kale, cabbage, spinach, Swiss chard, and lettuce. Canola and soybean oils. Green vegetables, such as asparagus, broccoli, and [place] sprouts. Vegetable drinks, green tea leaves, and some dietary supplement drinks. Avoid cranberry juice and other cranberry products. They can increase the effects of warfarin. Limit your use of alcohol. Avoid bleeding by preventing falls and injuries  Wear slippers or shoes with nonskid soles.   Remove throw rugs and clutter. Rearrange furniture and electrical cords to keep them out of walking paths. Keep stairways, porches, and outside walkways well lit. Use night-lights in hallways and bathrooms. Be extra careful when you work with sharp tools or knives. When should you call for help? Call 911 anytime you think you may need emergency care. For example, call if:  You have a sudden, severe headache that is different from past headaches. Call your doctor now or seek immediate medical care if:  You have any abnormal bleeding, such as:  Nosebleeds. Vaginal bleeding that is different (heavier, more frequent, at a different time of the month) than what you are used to. Bloody or black stools, or rectal bleeding. Bloody or pink urine. Watch closely for changes in your health, and be sure to contact your doctor if you have any problems. Where can you learn more? Go to http://saurav-roni.info/. Enter D154 in the search box to learn more about \"Taking Warfarin Safely: Care Instructions. \"  Current as of: January 27, 2016  Content Version: 11.1  © 8382-7480 [employer]. Care instructions adapted under license by [employer] (which disclaims liability or warranty for this information). If you have questions about a medical condition or this instruction, always ask your healthcare professional. [place] [address] any warranty or liability for your use of this information.

## 2023-02-23 NOTE — PROGRESS NOTES
Pharmacy Progress Note - Anticoagulation Management    S/O:  Ms. Satish Linder  is a 80 y.o. female seen today for anticoagulation management for the diagnosis of Atrial Fibrillation. HPI: At last visit (1/27) INR was 2.9 and therapeutic for INR goal 2.0-3.0. Patient was instructed to continue 2.5 mg daily and recheck INR in 4 weeks. Interim History:    Warfarin start date: Prior to October 2018  INR Goal:  2.0-3.0    Current warfarin regimen: 2.5 mg daily  Warfarin tablet strength:   2.5 mg   Duration of therapy: Indefinite    Today's pertinent positives includes:  No significant changes since last visit    Results for orders placed or performed in visit on 02/27/23   AMB POC PT/INR   Result Value Ref Range    VALID INTERNAL CONTROL POC Yes     Prothrombin time (POC) 38.6 seconds    INR POC 3.2      Adherence:   Able to recall regimen? YES  Miss/extra dose? NO  Need refill? NO    Upcoming procedure(s):  NO    INR History:   (normal INR range 0.8-1.2)     Date   INR   PT   Dose/Comments  02/27/23 3.2  38.6 2.5 mg daily  01/27/23 2.9  34.8 1.25 mg x 1 dose then 2.5 mg daily  01/10/23 3.5  41.8 No changes  12/06/22 2.6  31.3 No changes  11/01/22 2.3  28.0 No changes  10/04/22 2.5  30.1 No changes  09/01/22 2.7  32.9 Increased to 2.5 mg daily  08/16/22 1.9  22.8 No changes  07/19/22 2.1  24.9 No changes  06/21/22 2.2  26.6 Increased to 1.25 mg Mon; 2.5 mg daily ROW  05/31/22 1.8  21.7 No changes  05/16/22 1.9  22.4 No changes  04/18/22 2.3  27.6 No changes  03/21/22 2.3  28.1 No changes  02/21/22 2.3  28.1 No changes  02/02/22 1.9  22.4 No changes, increased greens  01/19/22 3.3  39.7 Held x 1 day, then 1.25 mg Thur, 2.5 mg daily ROW  01/10/22 3.9  46.3 No changes  12/27/21 3.2  38.0 No changes  11/22/21 2.2   No changes  10/25/21 2.1   1.25 mg every Thu; 2.5 mg all other days    A/P:       Anticoagulation:  Considering Ms. Robins's past history, todays findings, and per Anticoagulation Collaborative Practice Agreement/Protocol:    Fingerstick POC INR (3.2) is Supratherapeutic for INR goal today. Change warfarin and reduce to take 1.25 mg Tues; 2.5 mg daily ROW  INR is 3.2 and slightly supratherapeutic for INR goal 2.0-3.0. Vitaliy Turner (son) denies extra doses of warfarin of changes to patient's other medications. Vitlaiy Turner reports consistent intake of vitamin K foods. INR previously therapeutic on this weekly dose but near upper limit of therapeutic range (2.9). Will reduce weekly warfarin dose from 17.5 mg to 16.25 mg (7%). Patient will take 1.25 mg Tues; 2.5 mg daily ROW and recheck INR in 2 weeks. Patient was instructed to schedule an appointment in 2 week(s) prior to leaving the clinic. Medication reconciliation was completed during the visit. There are no discontinued medications. A full discussion of the nature of anticoagulants has been carried out. A full discussion of the need for frequent and regular monitoring, precise dosage adjustment and compliance was stressed. Side effects of potential bleeding were discussed and Ms. Robins was instructed to call [phone] if there are any signs of abnormal bleeding. Ms. Lucian Castaneda was instructed to avoid any OTC items containing aspirin or ibuprofen and prior to starting any new OTC products to consult with her physician or pharmacist to ensure no drug interactions are present. Ms. Lucian Castaneda was instructed to avoid any major changes in her general diet and to avoid alcohol consumption. Ms. Lucian Castaneda was provided information in the AVS that includes topics on understanding coumadin therapy, drug interaction considerations, vitamin K and coumadin use, interactions with foods and supplements containing vitamin K, and the use of herbal products. Ms. Lucian Castaneda verbalized her understanding of all instructions and will call the office with any questions, concerns, or signs of abnormal bleeding or blood clot.   Notifications of recommendations will be sent to Dr. Juanita Kuo MD for review.     Thank you,  Omer Patel Only    Intervention Detail: Dose Adjustment: 1, reason: Therapy Optimization and Lab(s) Ordered  Total # of Interventions Recommended: 2  Total # of Interventions Accepted: 2  Time Spent (min): 20

## 2023-02-27 ENCOUNTER — ANTI-COAG VISIT (OUTPATIENT)
Dept: PHARMACY | Age: 88
End: 2023-02-27
Payer: MEDICARE

## 2023-02-27 DIAGNOSIS — I48.0 PAROXYSMAL ATRIAL FIBRILLATION (HCC): Primary | ICD-10-CM

## 2023-02-27 DIAGNOSIS — Z79.01 LONG TERM (CURRENT) USE OF ANTICOAGULANTS: ICD-10-CM

## 2023-02-27 LAB
INR BLD: 3.2
PT POC: 38.6 SECONDS
VALID INTERNAL CONTROL?: YES

## 2023-02-27 PROCEDURE — 99212 OFFICE O/P EST SF 10 MIN: CPT

## 2023-02-27 RX ORDER — AMLODIPINE BESYLATE 5 MG/1
TABLET ORAL
Qty: 90 TABLET | Refills: 1 | Status: SHIPPED | OUTPATIENT
Start: 2023-02-27

## 2023-02-27 NOTE — TELEPHONE ENCOUNTER
Received refill request for amlodipine 5 mg po tabs. Refill request authorized. Future Appointments   Date Time Provider Monika Garcia   3/14/2023 11:15 AM [facility] MEDICATION MGMT [address] [facility]S H   4/13/2023 10:00 AM ECHOTWKAREN ALEMAN BS AMB   4/13/2023 11:00 AM PACEMAKER3, KAREN WEBBER AMB   4/13/2023 11:20 AM MD GUILLERMO Rosario BS AMB   5/19/2023 10:00 AM WOUND CHECKS, KAREN WEBBER AMB   5/19/2023 10:00 AM PACEMAKER3, KAREN WEBBER AMB   9/28/2023 10:30 AM Theodore Iniguez MD [facility] M[facility]GAN BS AMB

## 2023-03-09 NOTE — PROGRESS NOTES
Pharmacy Progress Note - Anticoagulation Management    S/O:  Ms. Perfecto Cameron  is a 80 y.o. female seen today for anticoagulation management for the diagnosis of Atrial Fibrillation. HPI: At last visit (2/27) INR was 3.2 and slightly supratherapeutic for INR goal 2.0-3.0. Weekly warfarin dose was reduced from 17.5 mg to 16.25 mg (7%). Patient was instructed to take 1.25 mg Tues; 2.5 mg daily ROW and recheck INR in 2 weeks. Interim History:    Warfarin start date: Prior to October 2018  INR Goal:  2.0-3.0    Current warfarin regimen: Reduced to 1.25 mg Tues; 2.5 mg daily ROW  Warfarin tablet strength:   2.5 mg   Duration of therapy: Indefinite    Today's pertinent positives includes:  No significant changes since last visit    Results for orders placed or performed in visit on 03/14/23   AMB POC PT/INR   Result Value Ref Range    VALID INTERNAL CONTROL POC Yes     Prothrombin time (POC) 35.9 seconds    INR POC 3.0      Adherence:   Able to recall regimen? YES  Miss/extra dose? NO  Need refill?  NO    Upcoming procedure(s):  NO    INR History:   (normal INR range 0.8-1.2)     Date   INR   PT   Dose/Comments  03/14/23 3.0  35.9 Reduced to 1.25 mg Tues; 2.5 mg daily ROW  02/27/23 3.2  38.6 2.5 mg daily  01/27/23 2.9  34.8 1.25 mg x 1 dose then 2.5 mg daily  01/10/23 3.5  41.8 No changes  12/06/22 2.6  31.3 No changes  11/01/22 2.3  28.0 No changes  10/04/22 2.5  30.1 No changes  09/01/22 2.7  32.9 Increased to 2.5 mg daily  08/16/22 1.9  22.8 No changes  07/19/22 2.1  24.9 No changes  06/21/22 2.2  26.6 Increased to 1.25 mg Mon; 2.5 mg daily ROW  05/31/22 1.8  21.7 No changes  05/16/22 1.9  22.4 No changes  04/18/22 2.3  27.6 No changes  03/21/22 2.3  28.1 No changes  02/21/22 2.3  28.1 No changes  02/02/22 1.9  22.4 No changes, increased greens  01/19/22 3.3  39.7 Held x 1 day, then 1.25 mg Thur, 2.5 mg daily ROW  01/10/22 3.9  46.3 No changes  12/27/21 3.2  38.0 No changes  11/22/21 2.2   No changes  10/25/21 2.1   1.25 mg every Thu; 2.5 mg all other days    A/P:       Anticoagulation:  Considering Ms. Robins's past history, todays findings, and per Anticoagulation Collaborative Practice Agreement/Protocol:    Fingerstick POC INR (3.0) is Therapeutic for INR goal today. Continue warfarin 1.25 mg Tues; 2.5 mg daily ROW  INR is 3.0 and therapeutic for INR goal 2.0-3.0. Judit Abdi (son) recalled warfarin plan from last visit (2/27) and denies changes to patient's other medications. Judit Abdi reports consistent intake of vitamin K foods. Since INR is therapeutic, patient will continue 1.25 mg Tues; 2.5 mg daily ROW and recheck INR in 4 weeks. Patient was instructed to schedule an appointment in 4 week(s) prior to leaving the clinic. Medication reconciliation was completed during the visit. There are no discontinued medications. A full discussion of the nature of anticoagulants has been carried out. A full discussion of the need for frequent and regular monitoring, precise dosage adjustment and compliance was stressed. Side effects of potential bleeding were discussed and Ms. Robins was instructed to call [phone] if there are any signs of abnormal bleeding. Ms. Carrie Smith was instructed to avoid any OTC items containing aspirin or ibuprofen and prior to starting any new OTC products to consult with her physician or pharmacist to ensure no drug interactions are present. Ms. Carrie Smith was instructed to avoid any major changes in her general diet and to avoid alcohol consumption. Ms. Carrie Smith was provided information in the AVS that includes topics on understanding coumadin therapy, drug interaction considerations, vitamin K and coumadin use, interactions with foods and supplements containing vitamin K, and the use of herbal products. Ms. Carrie Smith verbalized her understanding of all instructions and will call the office with any questions, concerns, or signs of abnormal bleeding or blood clot.   Notifications of recommendations will be sent to Dr. Karlee Elias MD for review.     Thank you,  EULOGIO Hoff/ Nate Perez 77 Tracking Only    Intervention Detail: Lab(s) Ordered  Total # of Interventions Recommended: 1  Total # of Interventions Accepted: 1  Time Spent (min): 30

## 2023-03-09 NOTE — PATIENT INSTRUCTIONS
Today your INR was 3.0. Your goal INR is  2.0-3.0. You have a  2.5 mg tablet of Coumadin (warfarin). Take Coumadin (warfarin) as follows: Take 1.25 mg (0.5 tablet) Tuesdays and 2.5 mg (1 tablet) daily rest of week    Come back in 4 week(s) for your next finger stick/INR blood test.        Avoid any over the counter items containing aspirin or ibuprofen, and avoid great swings in general diet. Avoid alcohol consumption. Please notify the INR nurse if you are started on any new medication including over the counter or herbal supplements. Also, please notify your INR nurse if any of your other prescription or over the counter medications have been discontinued. Your Care Instructions  Warfarin is a medicine that you take to prevent blood clots. It is often called a blood thinner. Doctors give warfarin (such as Coumadin) to reduce the risk of blood clots. You may be at risk for blood clots if you have atrial fibrillation or deep vein thrombosis. Some other health problems may also put you at risk. Warfarin slows the amount of time it takes for your blood to clot. It can cause bleeding problems. Even if you've been taking warfarin for a while, it's important to know how to take it safely. Foods and other medicines can affect the way warfarin works. Some can make warfarin work too well. This can cause bleeding problems. And some can make it work poorly, so that it does not prevent blood clots very well. You will need regular blood tests to check how long it takes for your blood to form a clot. This test is called a PT or prothrombin time test. The result of the test is called an INR level. Depending on the test results, your doctor or anticoagulation clinic may adjust your dose of warfarin. Follow-up care is a key part of your treatment and safety. Be sure to make and go to all appointments, and call your doctor if you are having problems.  It's also a good idea to know your test results and keep a list of the medicines you take. How can you care for yourself at home? Take warfarin safely  Take your warfarin at the same time each day. If you miss a dose of warfarin, don't take an extra dose to make up for it. Your doctor can tell you exactly what to do so you don't take too much or too little. Wear medical alert jewelry that lets others know that you take warfarin. You can buy this at most drugstores. Don't take warfarin if you are pregnant or planning to get pregnant. Talk to your doctor about how you can prevent getting pregnant while you are taking it. Don't change your dose or stop taking warfarin unless your doctor tells you to. Effects of medicines and food on warfarin  Don't start or stop taking any medicines, vitamins, or natural remedies unless you first talk to your doctor. Many medicines can affect how warfarin works. These include aspirin and other pain relievers, over-the-counter medicines, multivitamins, dietary supplements, and herbal products. Tell all of your doctors and pharmacists that you take warfarin. Some prescription medicines can affect how warfarin works. Keep the amount of vitamin K in your diet about the same from day to day. Do not suddenly eat a lot more or a lot less food that is rich in vitamin K than you usually do. Vitamin K affects how warfarin works and how your blood clots. Talk with your doctor before making big changes in your diet. Vitamin K is in many foods, such as:  Leafy greens, such as kale, cabbage, spinach, Swiss chard, and lettuce. Canola and soybean oils. Green vegetables, such as asparagus, broccoli, and [place] sprouts. Vegetable drinks, green tea leaves, and some dietary supplement drinks. Avoid cranberry juice and other cranberry products. They can increase the effects of warfarin. Limit your use of alcohol. Avoid bleeding by preventing falls and injuries  Wear slippers or shoes with nonskid soles.   Remove throw rugs and clutter. Rearrange furniture and electrical cords to keep them out of walking paths. Keep stairways, porches, and outside walkways well lit. Use night-lights in hallways and bathrooms. Be extra careful when you work with sharp tools or knives. When should you call for help? Call 911 anytime you think you may need emergency care. For example, call if:  You have a sudden, severe headache that is different from past headaches. Call your doctor now or seek immediate medical care if:  You have any abnormal bleeding, such as:  Nosebleeds. Vaginal bleeding that is different (heavier, more frequent, at a different time of the month) than what you are used to. Bloody or black stools, or rectal bleeding. Bloody or pink urine. Watch closely for changes in your health, and be sure to contact your doctor if you have any problems. Where can you learn more? Go to http://www.gray.com/. Enter E435 in the search box to learn more about \"Taking Warfarin Safely: Care Instructions. \"  Current as of: January 27, 2016  Content Version: 11.1  © 0360-0411 [employer]. Care instructions adapted under license by [employer] (which disclaims liability or warranty for this information). If you have questions about a medical condition or this instruction, always ask your healthcare professional. [place] [address] any warranty or liability for your use of this information.

## 2023-03-14 ENCOUNTER — ANTI-COAG VISIT (OUTPATIENT)
Dept: PHARMACY | Age: 88
End: 2023-03-14
Payer: MEDICARE

## 2023-03-14 DIAGNOSIS — Z79.01 LONG TERM (CURRENT) USE OF ANTICOAGULANTS: ICD-10-CM

## 2023-03-14 DIAGNOSIS — I48.0 PAROXYSMAL ATRIAL FIBRILLATION (HCC): Primary | ICD-10-CM

## 2023-03-14 LAB
INR BLD: 3
PT POC: 35.9 SECONDS
VALID INTERNAL CONTROL?: YES

## 2023-03-14 PROCEDURE — 99211 OFF/OP EST MAY X REQ PHY/QHP: CPT

## 2023-04-13 ENCOUNTER — ANCILLARY PROCEDURE (OUTPATIENT)
Dept: CARDIOLOGY CLINIC | Age: 88
End: 2023-04-13
Payer: MEDICARE

## 2023-04-13 VITALS — HEIGHT: 63 IN | BODY MASS INDEX: 28.35 KG/M2 | WEIGHT: 160 LBS

## 2023-04-13 DIAGNOSIS — Z95.0 PACEMAKER: ICD-10-CM

## 2023-04-13 DIAGNOSIS — I48.91 ATRIAL FIBRILLATION, UNSPECIFIED TYPE (HCC): ICD-10-CM

## 2023-04-13 PROCEDURE — 93306 TTE W/DOPPLER COMPLETE: CPT | Performed by: INTERNAL MEDICINE

## 2023-04-16 LAB
ECHO AO ROOT DIAM: 2.9 CM
ECHO AO ROOT INDEX: 1.65 CM/M2
ECHO AV AREA PEAK VELOCITY: 2 CM2
ECHO AV AREA VTI: 2 CM2
ECHO AV AREA/BSA PEAK VELOCITY: 1.1 CM2/M2
ECHO AV AREA/BSA VTI: 1.1 CM2/M2
ECHO AV MEAN GRADIENT: 4 MMHG
ECHO AV MEAN VELOCITY: 0.9 M/S
ECHO AV PEAK GRADIENT: 7 MMHG
ECHO AV PEAK VELOCITY: 1.3 M/S
ECHO AV VELOCITY RATIO: 0.54
ECHO AV VTI: 27.1 CM
ECHO EST RA PRESSURE: 3 MMHG
ECHO LA VOL 2C: 54 ML (ref 22–52)
ECHO LA VOL 4C: 43 ML (ref 22–52)
ECHO LA VOL BP: 48 ML (ref 22–52)
ECHO LA VOL/BSA BIPLANE: 27 ML/M2 (ref 16–34)
ECHO LA VOLUME AREA LENGTH: 52 ML
ECHO LA VOLUME INDEX A2C: 31 ML/M2 (ref 16–34)
ECHO LA VOLUME INDEX A4C: 24 ML/M2 (ref 16–34)
ECHO LA VOLUME INDEX AREA LENGTH: 30 ML/M2 (ref 16–34)
ECHO LV E' LATERAL VELOCITY: 7 CM/S
ECHO LV E' SEPTAL VELOCITY: 4 CM/S
ECHO LV EDV A2C: 43 ML
ECHO LV EDV A4C: 50 ML
ECHO LV EDV BP: 47 ML (ref 56–104)
ECHO LV EDV INDEX A4C: 28 ML/M2
ECHO LV EDV INDEX BP: 27 ML/M2
ECHO LV EDV NDEX A2C: 24 ML/M2
ECHO LV EJECTION FRACTION A2C: 61 %
ECHO LV EJECTION FRACTION A4C: 74 %
ECHO LV EJECTION FRACTION BIPLANE: 68 % (ref 55–100)
ECHO LV ESV A2C: 17 ML
ECHO LV ESV A4C: 13 ML
ECHO LV ESV BP: 15 ML (ref 19–49)
ECHO LV ESV INDEX A2C: 10 ML/M2
ECHO LV ESV INDEX A4C: 7 ML/M2
ECHO LV ESV INDEX BP: 9 ML/M2
ECHO LV FRACTIONAL SHORTENING: 31 % (ref 28–44)
ECHO LV INTERNAL DIMENSION DIASTOLE INDEX: 1.99 CM/M2
ECHO LV INTERNAL DIMENSION DIASTOLIC: 3.5 CM (ref 3.9–5.3)
ECHO LV INTERNAL DIMENSION SYSTOLIC INDEX: 1.36 CM/M2
ECHO LV INTERNAL DIMENSION SYSTOLIC: 2.4 CM
ECHO LV IVSD: 1.2 CM (ref 0.6–0.9)
ECHO LV MASS 2D: 135.8 G (ref 67–162)
ECHO LV MASS INDEX 2D: 77.2 G/M2 (ref 43–95)
ECHO LV POSTERIOR WALL DIASTOLIC: 1.2 CM (ref 0.6–0.9)
ECHO LV RELATIVE WALL THICKNESS RATIO: 0.69
ECHO LVOT AREA: 3.5 CM2
ECHO LVOT AV VTI INDEX: 0.58
ECHO LVOT DIAM: 2.1 CM
ECHO LVOT MEAN GRADIENT: 1 MMHG
ECHO LVOT PEAK GRADIENT: 2 MMHG
ECHO LVOT PEAK VELOCITY: 0.7 M/S
ECHO LVOT STROKE VOLUME INDEX: 30.7 ML/M2
ECHO LVOT SV: 54 ML
ECHO LVOT VTI: 15.6 CM
ECHO MV A VELOCITY: 0.83 M/S
ECHO MV E DECELERATION TIME (DT): 191.5 MS
ECHO MV E VELOCITY: 0.63 M/S
ECHO MV E/A RATIO: 0.76
ECHO MV E/E' LATERAL: 9
ECHO MV E/E' RATIO (AVERAGED): 12.38
ECHO MV E/E' SEPTAL: 15.75
ECHO RIGHT VENTRICULAR SYSTOLIC PRESSURE (RVSP): 36 MMHG
ECHO TV REGURGITANT MAX VELOCITY: 2.89 M/S
ECHO TV REGURGITANT PEAK GRADIENT: 33 MMHG

## 2023-04-16 PROCEDURE — 93306 TTE W/DOPPLER COMPLETE: CPT | Performed by: INTERNAL MEDICINE

## 2023-04-17 ENCOUNTER — TELEPHONE (OUTPATIENT)
Dept: CARDIOLOGY CLINIC | Age: 88
End: 2023-04-17

## 2023-04-17 RX ORDER — CHLORHEXIDINE GLUCONATE 4 G/100ML
SOLUTION TOPICAL
Qty: 1 EACH | Refills: 0 | Status: SHIPPED | OUTPATIENT
Start: 2023-04-17

## 2023-04-17 NOTE — TELEPHONE ENCOUNTER
----- Message from Pee Chen MD sent at 4/16/2023  5:36 PM EDT -----  Excellent LVEF  No significant valve disease  Would not need echo again in the future    Future Appointments  5/1/2023   1:20 PM    PACEMAKER3, KAREN LI              BS AMB  5/1/2023   1:40 PM    KEYA Willett AMB  5/16/2023  11:00 AM   [facility] MEDICATION MGMT        [address] MEJIAS H  5/19/2023  10:00 AM   WOUND CHECKS, KAREN WEBBER AMB  5/19/2023  10:00 AM   PACEMAKER3, KAREN WEBBER AMB  9/28/2023  10:30 AM   Negrita Iniguez MD  [facility]           BS AMB

## 2023-04-17 NOTE — TELEPHONE ENCOUNTER
Verified patient with two types of identifiers. Spoke with patient's son verified on PHI. Notified of NP recommendations. Patient verbalized understanding and will call with any other questions. Future Appointments   Date Time Provider Monika Garcia   5/1/2023  1:20 PM PACEMAKER3, KAREN WEBBER AMB   5/1/2023  1:40 PM KEYA Mccarthy AMB   5/16/2023 11:00 AM [facility] MEDICATION MGMT [address] [place] [address] Dr [facility]ERSON'S H   5/19/2023 10:00 AM WOUND CHECKS, KAREN WEBBER AMB   5/19/2023 10:00 AM PACEMAKER3, KAREN WEBBER AMB   9/28/2023 10:30 AM Zina Iniguez MD [facility] WEBBER AMB

## 2023-04-17 NOTE — TELEPHONE ENCOUNTER
Verified patient with two types of identifiers. Spoke with patient's son verified on PHI. Notified of results and MD recommendations. Reviewed pre procedure instructions before generator change. She is scheduled to see Tod Carter on 5/1/23. Her son wanted to know ahead of time how long she should plan to hold her Coumadin prior to procedure.  Will ask MD/NP and call back with recommendations

## 2023-04-21 ENCOUNTER — TELEPHONE (OUTPATIENT)
Dept: CARDIOLOGY CLINIC | Age: 88
End: 2023-04-21

## 2023-04-21 NOTE — TELEPHONE ENCOUNTER
Verified patient with two types of identifiers. Spoke with patient's son verified on PHI. Notified that she will start using the Hibiclens on 5/1/23. Patient's son verbalized understanding and will call with any other questions. Future Appointments   Date Time Provider Monika Garcia   5/1/2023  1:20 PM PACEMAKER3, KAREN WEBBER AMB   5/1/2023  1:40 PM KEYA Mauro AMB   5/16/2023 11:00 AM [address] [place] [address] Dr MCPHERSON'S    5/19/2023 10:00 AM WOUND CHECKS, KAREN WEBBER AMB   5/19/2023 10:00 AM PACEMAKER3, KAREN WEBBER AMB   9/28/2023 10:30 AM Dunia Iniguez MD [facility] WEBBER AMB

## 2023-04-21 NOTE — TELEPHONE ENCOUNTER
Spoke with pts son Brian Gregory to confirm appt change to 5/1/2023. Son would like to know if he needs to have her using the special wash on her chest on Monday 5/1 or Tuesday 5/2 for the procedure on 5/5. Son is okay with a voicemail in case he cannot answer.  Thank you      Call back [phone]

## 2023-04-28 RX ORDER — SIMVASTATIN 20 MG/1
TABLET, FILM COATED ORAL
Qty: 30 TABLET | Refills: 11 | Status: SHIPPED | OUTPATIENT
Start: 2023-04-28

## 2023-04-28 RX ORDER — SODIUM CHLORIDE 0.9 % (FLUSH) 0.9 %
5-40 SYRINGE (ML) INJECTION AS NEEDED
OUTPATIENT
Start: 2023-04-28

## 2023-04-28 RX ORDER — SODIUM CHLORIDE 0.9 % (FLUSH) 0.9 %
5-40 SYRINGE (ML) INJECTION EVERY 8 HOURS
OUTPATIENT
Start: 2023-04-28

## 2023-05-01 ENCOUNTER — CLINICAL SUPPORT (OUTPATIENT)
Dept: CARDIOLOGY CLINIC | Age: 88
End: 2023-05-01

## 2023-05-01 ENCOUNTER — OFFICE VISIT (OUTPATIENT)
Dept: CARDIOLOGY CLINIC | Age: 88
End: 2023-05-01
Payer: MEDICARE

## 2023-05-01 VITALS
OXYGEN SATURATION: 97 % | HEART RATE: 62 BPM | HEIGHT: 63 IN | RESPIRATION RATE: 16 BRPM | BODY MASS INDEX: 28.35 KG/M2 | SYSTOLIC BLOOD PRESSURE: 118 MMHG | DIASTOLIC BLOOD PRESSURE: 60 MMHG | WEIGHT: 160 LBS

## 2023-05-01 DIAGNOSIS — Z95.0 CARDIAC PACEMAKER IN SITU: Primary | ICD-10-CM

## 2023-05-01 DIAGNOSIS — Z79.01 ANTICOAGULATED ON COUMADIN: ICD-10-CM

## 2023-05-01 DIAGNOSIS — Z95.0 CARDIAC PACEMAKER IN SITU: ICD-10-CM

## 2023-05-01 DIAGNOSIS — I49.5 SINOATRIAL NODE DYSFUNCTION (HCC): ICD-10-CM

## 2023-05-01 DIAGNOSIS — I10 ESSENTIAL HYPERTENSION, BENIGN: ICD-10-CM

## 2023-05-01 DIAGNOSIS — I48.0 PAROXYSMAL ATRIAL FIBRILLATION (HCC): ICD-10-CM

## 2023-05-01 DIAGNOSIS — I49.5 SICK SINUS SYNDROME (HCC): ICD-10-CM

## 2023-05-01 DIAGNOSIS — I87.2 VENOUS INSUFFICIENCY: ICD-10-CM

## 2023-05-01 LAB
INR PPP: 1.8 (ref 0.9–1.1)
PROTHROMBIN TIME: 18.2 SEC (ref 9–11.1)

## 2023-05-01 PROCEDURE — G8536 NO DOC ELDER MAL SCRN: HCPCS | Performed by: NURSE PRACTITIONER

## 2023-05-01 PROCEDURE — G8417 CALC BMI ABV UP PARAM F/U: HCPCS | Performed by: NURSE PRACTITIONER

## 2023-05-01 PROCEDURE — G8432 DEP SCR NOT DOC, RNG: HCPCS | Performed by: NURSE PRACTITIONER

## 2023-05-01 PROCEDURE — G0463 HOSPITAL OUTPT CLINIC VISIT: HCPCS | Performed by: NURSE PRACTITIONER

## 2023-05-01 PROCEDURE — 1090F PRES/ABSN URINE INCON ASSESS: CPT | Performed by: NURSE PRACTITIONER

## 2023-05-01 PROCEDURE — 1123F ACP DISCUSS/DSCN MKR DOCD: CPT | Performed by: NURSE PRACTITIONER

## 2023-05-01 PROCEDURE — 99214 OFFICE O/P EST MOD 30 MIN: CPT | Performed by: NURSE PRACTITIONER

## 2023-05-01 PROCEDURE — G8427 DOCREV CUR MEDS BY ELIG CLIN: HCPCS | Performed by: NURSE PRACTITIONER

## 2023-05-01 PROCEDURE — 1101F PT FALLS ASSESS-DOCD LE1/YR: CPT | Performed by: NURSE PRACTITIONER

## 2023-05-01 RX ORDER — GLUCOSAMINE SULFATE 1500 MG
POWDER IN PACKET (EA) ORAL DAILY
COMMUNITY

## 2023-05-01 NOTE — PATIENT INSTRUCTIONS
Your dual chamber pacemaker generator change procedure has been scheduled for 5/5/23 at 1100 am, at [facility].    Please report to Admitting Department by 900 am, or 2 hours prior to your scheduled procedure. Please bring a list of your current medications and medication bottles, if able, to the hospital on this day. You will be unable to drive after your procedure so please make sure to bring someone with you to your procedure. You will need to have nothing to eat or drink after midnight, the night prior to your procedure. You may have small sips of water, if needed, to take with your medication. You will need labs drawn prior to your procedure. Please go to have this done today. After we receive your INR results, we will tell you when to hold your Coumadin medication. After your procedure, you will need to follow up with Dr. Pinzon Hazard nurse for a wound and device check. Your follow-up appointment has been scheduled for 5/19/23 at 1000 am.     Hibiclens 4% topical solution has been ordered and sent into your pharmacy  Patient it start Hibiclens application 5 days prior to procedure date    Directions Hibiclens 4%: Start cleanse 5 days prior to procedure   1. Rinse area (upper chest and upper arms) with water. 2. Apply minimum amount necessary to scrub the upper chest area from shoulder/neck to mid line of chest and to below the nipple each of  5 nights before the day of the procedure  3. Let solution dry.

## 2023-05-02 ENCOUNTER — TELEPHONE (OUTPATIENT)
Dept: CARDIOLOGY CLINIC | Age: 88
End: 2023-05-02

## 2023-05-02 LAB
ANION GAP SERPL CALC-SCNC: 5 MMOL/L (ref 5–15)
BASOPHILS # BLD: 0 K/UL (ref 0–0.1)
BASOPHILS NFR BLD: 1 % (ref 0–1)
BUN SERPL-MCNC: 14 MG/DL (ref 6–20)
BUN/CREAT SERPL: 14 (ref 12–20)
CALCIUM SERPL-MCNC: 9.5 MG/DL (ref 8.5–10.1)
CHLORIDE SERPL-SCNC: 103 MMOL/L (ref 97–108)
CO2 SERPL-SCNC: 28 MMOL/L (ref 21–32)
CREAT SERPL-MCNC: 0.97 MG/DL (ref 0.55–1.02)
DIFFERENTIAL METHOD BLD: ABNORMAL
EOSINOPHIL # BLD: 0.1 K/UL (ref 0–0.4)
EOSINOPHIL NFR BLD: 2 % (ref 0–7)
ERYTHROCYTE [DISTWIDTH] IN BLOOD BY AUTOMATED COUNT: 15 % (ref 11.5–14.5)
GLUCOSE SERPL-MCNC: 81 MG/DL (ref 65–100)
HCT VFR BLD AUTO: 35.5 % (ref 35–47)
HGB BLD-MCNC: 12 G/DL (ref 11.5–16)
IMM GRANULOCYTES # BLD AUTO: 0 K/UL (ref 0–0.04)
IMM GRANULOCYTES NFR BLD AUTO: 0 % (ref 0–0.5)
LYMPHOCYTES # BLD: 1.5 K/UL (ref 0.8–3.5)
LYMPHOCYTES NFR BLD: 25 % (ref 12–49)
MCH RBC QN AUTO: 28.4 PG (ref 26–34)
MCHC RBC AUTO-ENTMCNC: 33.8 G/DL (ref 30–36.5)
MCV RBC AUTO: 83.9 FL (ref 80–99)
MONOCYTES # BLD: 0.7 K/UL (ref 0–1)
MONOCYTES NFR BLD: 12 % (ref 5–13)
NEUTS SEG # BLD: 3.7 K/UL (ref 1.8–8)
NEUTS SEG NFR BLD: 60 % (ref 32–75)
NRBC # BLD: 0 K/UL (ref 0–0.01)
NRBC BLD-RTO: 0 PER 100 WBC
PLATELET # BLD AUTO: 209 K/UL (ref 150–400)
PMV BLD AUTO: 11.1 FL (ref 8.9–12.9)
POTASSIUM SERPL-SCNC: 4 MMOL/L (ref 3.5–5.1)
RBC # BLD AUTO: 4.23 M/UL (ref 3.8–5.2)
SODIUM SERPL-SCNC: 136 MMOL/L (ref 136–145)
WBC # BLD AUTO: 6.1 K/UL (ref 3.6–11)

## 2023-05-03 ENCOUNTER — TELEPHONE (OUTPATIENT)
Dept: PHARMACY | Age: 88
End: 2023-05-03

## 2023-05-05 ENCOUNTER — HOSPITAL ENCOUNTER (OUTPATIENT)
Age: 88
Setting detail: OUTPATIENT SURGERY
Discharge: HOME | End: 2023-05-05
Attending: INTERNAL MEDICINE | Admitting: INTERNAL MEDICINE
Payer: MEDICARE

## 2023-05-05 VITALS
DIASTOLIC BLOOD PRESSURE: 81 MMHG | RESPIRATION RATE: 18 BRPM | HEIGHT: 64 IN | WEIGHT: 160 LBS | OXYGEN SATURATION: 98 % | BODY MASS INDEX: 27.31 KG/M2 | SYSTOLIC BLOOD PRESSURE: 124 MMHG | HEART RATE: 76 BPM | TEMPERATURE: 97.6 F

## 2023-05-05 DIAGNOSIS — I48.91 ATRIAL FIBRILLATION, UNSPECIFIED TYPE (HCC): ICD-10-CM

## 2023-05-05 LAB — INR BLD: 1.5 (ref 0.9–1.2)

## 2023-05-05 PROCEDURE — 33228 REMV&REPLC PM GEN DUAL LEAD: CPT | Performed by: INTERNAL MEDICINE

## 2023-05-05 PROCEDURE — 77030028700 HC BLD TISS PLSM MEDT -E: Performed by: INTERNAL MEDICINE

## 2023-05-05 PROCEDURE — 74011000250 HC RX REV CODE- 250: Performed by: INTERNAL MEDICINE

## 2023-05-05 PROCEDURE — 77030033138 HC SUT PGA STRATFX J&J -B: Performed by: INTERNAL MEDICINE

## 2023-05-05 PROCEDURE — 77030032060 HC PWDR HEMSTAT ARISTA ASRB 3GM BARD -C: Performed by: INTERNAL MEDICINE

## 2023-05-05 PROCEDURE — 2709999900 HC NON-CHARGEABLE SUPPLY: Performed by: INTERNAL MEDICINE

## 2023-05-05 PROCEDURE — C1785 PMKR, DUAL, RATE-RESP: HCPCS | Performed by: INTERNAL MEDICINE

## 2023-05-05 PROCEDURE — 77030041075 HC DRSG AG OPTIFRM MDII -B: Performed by: INTERNAL MEDICINE

## 2023-05-05 PROCEDURE — 77030022704 HC SUT VLOC COVD -B: Performed by: INTERNAL MEDICINE

## 2023-05-05 PROCEDURE — 77030018547 HC SUT ETHBND1 J&J -B: Performed by: INTERNAL MEDICINE

## 2023-05-05 PROCEDURE — 74011250636 HC RX REV CODE- 250/636: Performed by: INTERNAL MEDICINE

## 2023-05-05 PROCEDURE — 85610 PROTHROMBIN TIME: CPT

## 2023-05-05 PROCEDURE — 77030018729 HC ELECTRD DEFIB PAD CARD -B: Performed by: INTERNAL MEDICINE

## 2023-05-05 DEVICE — IMPLANTABLE DEVICE: Type: IMPLANTABLE DEVICE | Status: FUNCTIONAL

## 2023-05-05 RX ORDER — SODIUM CHLORIDE 0.9 % (FLUSH) 0.9 %
5-40 SYRINGE (ML) INJECTION EVERY 8 HOURS
Status: DISCONTINUED | OUTPATIENT
Start: 2023-05-05 | End: 2023-05-05 | Stop reason: HOSPADM

## 2023-05-05 RX ORDER — SODIUM CHLORIDE 0.9 % (FLUSH) 0.9 %
5-40 SYRINGE (ML) INJECTION AS NEEDED
Status: DISCONTINUED | OUTPATIENT
Start: 2023-05-05 | End: 2023-05-05 | Stop reason: HOSPADM

## 2023-05-05 RX ORDER — ACETAMINOPHEN 325 MG/1
650 TABLET ORAL
Status: DISCONTINUED | OUTPATIENT
Start: 2023-05-05 | End: 2023-05-05 | Stop reason: HOSPADM

## 2023-05-05 RX ORDER — ONDANSETRON 2 MG/ML
4 INJECTION INTRAMUSCULAR; INTRAVENOUS
Status: DISCONTINUED | OUTPATIENT
Start: 2023-05-05 | End: 2023-05-05 | Stop reason: HOSPADM

## 2023-05-05 RX ORDER — LIDOCAINE HYDROCHLORIDE 10 MG/ML
INJECTION INFILTRATION; PERINEURAL AS NEEDED
Status: DISCONTINUED | OUTPATIENT
Start: 2023-05-05 | End: 2023-05-05 | Stop reason: HOSPADM

## 2023-05-05 RX ORDER — MIDAZOLAM HYDROCHLORIDE 1 MG/ML
INJECTION, SOLUTION INTRAMUSCULAR; INTRAVENOUS AS NEEDED
Status: DISCONTINUED | OUTPATIENT
Start: 2023-05-05 | End: 2023-05-05 | Stop reason: HOSPADM

## 2023-05-05 RX ORDER — FENTANYL CITRATE 50 UG/ML
INJECTION, SOLUTION INTRAMUSCULAR; INTRAVENOUS AS NEEDED
Status: DISCONTINUED | OUTPATIENT
Start: 2023-05-05 | End: 2023-05-05 | Stop reason: HOSPADM

## 2023-05-08 NOTE — PROGRESS NOTES
mg daily ROW  05/31/22          1.8                   21.7     No changes  05/16/22          1.9                   22.4     No changes  04/18/22          2.3                   27.6     No changes  03/21/22          2.3                   28.1     No changes  02/21/22          2.3                   28.1     No changes  02/02/22          1.9                   22.4     No changes, increased greens  01/19/22          3.3                   39.7     Held x 1 day, then 1.25 mg Thur, 2.5 mg daily ROW  01/10/22          3.9                   46.3     No changes  12/27/21          3.2                   38.0     No changes  11/22/21          2.2                               No changes  10/25/21          2.1                               1.25 mg every Thu; 2.5 mg all other days     A/P:        Anticoagulation:  Considering Ms. Cano's past history, todays findings, and per Anticoagulation Collaborative Practice Agreement/Protocol:     Fingerstick POC INR (1.4) is Subtherapeutic for INR goal today. Change warfarin and take extra dose (5 mg total) today 5/12. On 5/13 resume 1.25 mg Tues; 2.5 mg daily ROW  INR is 1.4 and subtherapeutic for INR goal 2.0-3.0. Per Kristina Perry (son) patient had procedure on 5/5 and held warfarin for 2 days prior. Patient resumed 1.25 mg Tues; 2.5 mg daily ROW on 5/5 after procedure. Due to INR depression, patient will take extra dose (5 mg total) today 5/12. Since INR previously therapeutic on this weekly dose, on 5/13 patient will resume 1.25 mg Tues; 2.5 mg daily ROW. Patient will recheck INR in 1 week. Patient was instructed to schedule an appointment in 4 week(s) prior to leaving the clinic. There are no discontinued medications. A full discussion of the nature of anticoagulants has been carried out. A full discussion of the need for frequent and regular monitoring, precise dosage adjustment and compliance was stressed. Side effects of potential bleeding were discussed and Ms. Cano was

## 2023-05-11 NOTE — PATIENT INSTRUCTIONS
Today your INR was 1.4. Your goal INR is  2.0-3.0. You have a   2.5 mg tablet of Coumadin (warfarin). Take Coumadin (warfarin) as follows: Take 5 mg (2 tablets) today 5/12. On 5/13 resume 1.25 mg (0.5 tablets) on Tuesdays and 2.5 mg (1 tablet) daily rest of week    Come back in 1 week(s) for your next finger stick/INR blood test.        Avoid any over the counter items containing aspirin or ibuprofen, and avoid great swings in general diet. Avoid alcohol consumption. Please notify the INR nurse if you are started on any new medication including over the counter or herbal supplements. Also, please notify your INR nurse if any of your other prescription or over the counter medications have been discontinued. Your Care Instructions  Warfarin is a medicine that you take to prevent blood clots. It is often called a blood thinner. Doctors give warfarin (such as Coumadin) to reduce the risk of blood clots. You may be at risk for blood clots if you have atrial fibrillation or deep vein thrombosis. Some other health problems may also put you at risk. Warfarin slows the amount of time it takes for your blood to clot. It can cause bleeding problems. Even if you've been taking warfarin for a while, it's important to know how to take it safely. Foods and other medicines can affect the way warfarin works. Some can make warfarin work too well. This can cause bleeding problems. And some can make it work poorly, so that it does not prevent blood clots very well. You will need regular blood tests to check how long it takes for your blood to form a clot. This test is called a PT or prothrombin time test. The result of the test is called an INR level. Depending on the test results, your doctor or anticoagulation clinic may adjust your dose of warfarin. Follow-up care is a key part of your treatment and safety. Be sure to make and go to all appointments, and call your doctor if you are having problems.  It's

## 2023-05-12 ENCOUNTER — HOSPITAL ENCOUNTER (OUTPATIENT)
Facility: HOSPITAL | Age: 88
Setting detail: RECURRING SERIES
End: 2023-05-12

## 2023-05-12 DIAGNOSIS — Z79.01 LONG TERM (CURRENT) USE OF ANTICOAGULANTS: ICD-10-CM

## 2023-05-12 DIAGNOSIS — I48.0 PAROXYSMAL ATRIAL FIBRILLATION (HCC): Primary | ICD-10-CM

## 2023-05-12 LAB — INTERNATIONAL NORMALIZATION RATIO, POC: 1.4

## 2023-05-19 ENCOUNTER — NURSE ONLY (OUTPATIENT)
Age: 88
End: 2023-05-19

## 2023-05-19 ENCOUNTER — NURSE ONLY (OUTPATIENT)
Age: 88
End: 2023-05-19
Payer: MEDICARE

## 2023-05-19 DIAGNOSIS — Z95.0 CARDIAC PACEMAKER IN SITU: Primary | ICD-10-CM

## 2023-05-19 PROCEDURE — 93280 PM DEVICE PROGR EVAL DUAL: CPT | Performed by: INTERNAL MEDICINE

## 2023-05-19 NOTE — PROGRESS NOTES
Cardiac Electrophysiology Wound Check Note      Wound Check   Patient is here for wound check s/p dual chamber PPM Gen change . No fever, drainage   Physical Exam   Constitutional: well-developed and well-nourished. Skin: Left side pocket is healing without redness, drainage, hematoma. The wound is intact. ASSESSMENT and PLAN   The incision is healing without redness, drainage, hematoma.    Current treatment plan is effective, no change in therapy   Device check shows proper lead and generator functions    Follow-up Disposition:  Return 3 months I will check via device clinic or remote monitoring in the future

## 2023-05-19 NOTE — PROGRESS NOTES
C/ DC PM clinic/wound check. Device functioning appropriately as programmed. See scanned documents      Patient presents for wound check post-device implantation. The dressing was removed prior to visit and the site was inspected by Lynnette Stephens RN. The site appeared to be well-healing without ecchymosis/tenderness/erythema. Denies pain, fevers, discharge. Continue follow up in device clinic as planned.

## 2023-05-22 ENCOUNTER — ANTI-COAG VISIT (OUTPATIENT)
Facility: HOSPITAL | Age: 88
End: 2023-05-22

## 2023-05-22 DIAGNOSIS — Z79.01 LONG TERM (CURRENT) USE OF ANTICOAGULANTS: ICD-10-CM

## 2023-05-22 DIAGNOSIS — I48.0 PAROXYSMAL ATRIAL FIBRILLATION (HCC): Primary | ICD-10-CM

## 2023-05-22 LAB — INTERNATIONAL NORMALIZATION RATIO, POC: 2

## 2023-06-05 RX ORDER — WARFARIN SODIUM 2.5 MG/1
TABLET ORAL
Qty: 90 TABLET | Refills: 0 | Status: SHIPPED | OUTPATIENT
Start: 2023-06-05

## 2023-06-19 ENCOUNTER — ANTI-COAG VISIT (OUTPATIENT)
Facility: HOSPITAL | Age: 88
End: 2023-06-19
Payer: MEDICARE

## 2023-06-19 DIAGNOSIS — I48.0 PAROXYSMAL ATRIAL FIBRILLATION (HCC): Primary | ICD-10-CM

## 2023-06-19 DIAGNOSIS — Z79.01 LONG TERM (CURRENT) USE OF ANTICOAGULANTS: ICD-10-CM

## 2023-06-19 LAB — INTERNATIONAL NORMALIZATION RATIO, POC: 2.2

## 2023-06-19 PROCEDURE — 85610 PROTHROMBIN TIME: CPT

## 2023-06-19 PROCEDURE — 99211 OFF/OP EST MAY X REQ PHY/QHP: CPT

## 2023-07-17 ENCOUNTER — ANTI-COAG VISIT (OUTPATIENT)
Facility: HOSPITAL | Age: 88
End: 2023-07-17
Payer: MEDICARE

## 2023-07-17 DIAGNOSIS — I48.0 PAROXYSMAL ATRIAL FIBRILLATION (HCC): Primary | ICD-10-CM

## 2023-07-17 DIAGNOSIS — Z79.01 LONG TERM (CURRENT) USE OF ANTICOAGULANTS: ICD-10-CM

## 2023-07-17 LAB — INTERNATIONAL NORMALIZATION RATIO, POC: 2.3

## 2023-07-17 PROCEDURE — 85610 PROTHROMBIN TIME: CPT

## 2023-07-17 PROCEDURE — 99211 OFF/OP EST MAY X REQ PHY/QHP: CPT

## 2023-08-09 NOTE — PROGRESS NOTES
Pharmacy Progress Note - Anticoagulation Management     S/O:  Ms. Jordin Burger  is a 80 y.o. female seen today for anticoagulation management for the diagnosis of Atrial Fibrillation. HPI: At last visit (7/17) INR was 2.3 and therapeutic for INR goal 2.0-3.0. Patient recommended to continue 1.25 mg Tues; 2.5 mg daily ROW and recheck INR in 4 weeks. Interim History:     Warfarin start date: Prior to October 2018  INR Goal:  2.0-3.0    Current warfarin regimen: 1.25 mg Tues; 2.5 mg daily ROW  Warfarin tablet strength:   2.5 mg   Duration of therapy: Indefinite    Today's pertinent positives includes:  No significant changes since last visit    INR 2.4  PT 28.2    Adherence:   Able to recall regimen? YES  Miss/extra dose? NO  Need refill?  NO    Upcoming procedure(s):  NO    INR History:   (normal INR range 0.8-1.2)      Date                INR                  PT        Dose/Comments  08/14/23 2.4  28.2  No changes  07/17/23 2.3  27.6  No changes  06/19/23 2.2   26.5  No changes  05/22/23 2.0  23.9  5 mg x 1 dose then 1.25 mg Tues; 2.5 mg daily ROW  05/12/23 1.4   16.2  Resumed warfarin on 5/5 after procedure  04/11/23          2.7                   32.3     No changes  03/14/23          3.0                   35.9     Reduced to 1.25 mg Tues; 2.5 mg daily ROW  02/27/23          3.2                   38.6     2.5 mg daily  01/27/23          2.9                   34.8     1.25 mg x 1 dose then 2.5 mg daily  01/10/23          3.5                   41.8     No changes  12/06/22          2.6                   31.3     No changes  11/01/22          2.3                   28.0     No changes  10/04/22          2.5                   30.1     No changes  09/01/22          2.7                   32.9     Increased to 2.5 mg daily  08/16/22          1.9                   22.8     No changes  07/19/22          2.1                   24.9     No changes  06/21/22          2.2                   26.6     Increased to 1.25 mg

## 2023-08-09 NOTE — PATIENT INSTRUCTIONS
Today your INR was 2.4. Your goal INR is  2.0-3.0. You have a   2.5 mg tablet of Coumadin (warfarin). Take Coumadin (warfarin) as follows: Take 1.25 mg (0.5 tablets) on Tuesdays and 2.5 mg (1 tablet) daily rest of week    Come back in 4 week(s) for your next finger stick/INR blood test.        Avoid any over the counter items containing aspirin or ibuprofen, and avoid great swings in general diet. Avoid alcohol consumption. Please notify the INR nurse if you are started on any new medication including over the counter or herbal supplements. Also, please notify your INR nurse if any of your other prescription or over the counter medications have been discontinued. Your Care Instructions  Warfarin is a medicine that you take to prevent blood clots. It is often called a blood thinner. Doctors give warfarin (such as Coumadin) to reduce the risk of blood clots. You may be at risk for blood clots if you have atrial fibrillation or deep vein thrombosis. Some other health problems may also put you at risk. Warfarin slows the amount of time it takes for your blood to clot. It can cause bleeding problems. Even if you've been taking warfarin for a while, it's important to know how to take it safely. Foods and other medicines can affect the way warfarin works. Some can make warfarin work too well. This can cause bleeding problems. And some can make it work poorly, so that it does not prevent blood clots very well. You will need regular blood tests to check how long it takes for your blood to form a clot. This test is called a PT or prothrombin time test. The result of the test is called an INR level. Depending on the test results, your doctor or anticoagulation clinic may adjust your dose of warfarin. Follow-up care is a key part of your treatment and safety. Be sure to make and go to all appointments, and call your doctor if you are having problems.  It's also a good idea to know your test results and

## 2023-08-14 ENCOUNTER — ANTI-COAG VISIT (OUTPATIENT)
Facility: HOSPITAL | Age: 88
End: 2023-08-14
Payer: MEDICARE

## 2023-08-14 DIAGNOSIS — Z79.01 LONG TERM (CURRENT) USE OF ANTICOAGULANTS: ICD-10-CM

## 2023-08-14 DIAGNOSIS — I48.0 PAROXYSMAL ATRIAL FIBRILLATION (HCC): Primary | ICD-10-CM

## 2023-08-14 LAB — INTERNATIONAL NORMALIZATION RATIO, POC: 2.4

## 2023-08-14 PROCEDURE — 99211 OFF/OP EST MAY X REQ PHY/QHP: CPT

## 2023-08-14 PROCEDURE — 85610 PROTHROMBIN TIME: CPT

## 2023-08-19 NOTE — PROGRESS NOTES
Hyperuricemia       Past Medical History:   Diagnosis Date    Aortic valve disorders 4/29/2012    Atrial fibrillation ([address]) 03/22/2011    on coumadin followed by cardiology - Maddi Carter MD    CAD (coronary artery disease)     Cor pulmonale, chronic ([address]) 4/29/2012    Dyslipidemia (high LDL; low HDL) 4/29/2012    Gout 4/30/2012    Gout, arthritis 2/8/2012    Gout, joint     Hypertension     Hyperuricemia 4/30/2012    Obstructive sleep apnea 4/29/2012    Pacemaker 04/02/2006    Dr Winston Wright placed the original pacer 4/6/2006. It is replaced in 2014. It is [facility]    Recurrent epistaxis 4/29/2012    Sinoatrial node dysfunction ([address]) 3/22/2011    Venous insufficiency        Past Surgical History:   Procedure Laterality Date    BREAST SURGERY      lumpectomy    GEN INSERT/REPLACE ONLY DUAL  4/28/2014         PACEMAKER         No Known Allergies    Social History     Tobacco Use    Smoking status: Never     Passive exposure: Never    Smokeless tobacco: Never   Substance Use Topics    Alcohol use: No    Drug use: No       No family history on file. OBJECTIVE:    Physical Exam    Vitals:   Vitals:    08/25/23 1126   BP: 130/82   Site: Left Upper Arm   Position: Sitting   Cuff Size: Medium Adult   Pulse: 59   SpO2: 99%   Weight: 163 lb (73.9 kg)   Height: 5' 4\" (1.626 m)         General:    Alert, cooperative, no distress, appears stated age. Neck:   Supple, no carotid bruit and no JVD. Back:     Symmetric. Lungs:     Clear to auscultation bilaterally. Heart[de-identified]    Regular rate and rhythm. No murmur, click, rub or gallop. Abdomen:     Soft, non-tender. Bowel sounds normal.    MSK:   Extremities normal, atraumatic. Moves extremities independently. Vasc/lymph:   No lower extremity edema. Skin:   Skin color normal. No rashes or lesions on visible areas. Left chest pacemaker site well healed with small keloid scar at lateral end. Neurologic:   Alert, moves all extremities.         Data Review:

## 2023-08-25 ENCOUNTER — PROCEDURE VISIT (OUTPATIENT)
Age: 88
End: 2023-08-25

## 2023-08-25 ENCOUNTER — OFFICE VISIT (OUTPATIENT)
Age: 88
End: 2023-08-25
Payer: MEDICARE

## 2023-08-25 VITALS
DIASTOLIC BLOOD PRESSURE: 82 MMHG | SYSTOLIC BLOOD PRESSURE: 130 MMHG | HEIGHT: 64 IN | HEART RATE: 59 BPM | WEIGHT: 163 LBS | OXYGEN SATURATION: 99 % | BODY MASS INDEX: 27.83 KG/M2

## 2023-08-25 DIAGNOSIS — Z95.0 PACEMAKER: Primary | ICD-10-CM

## 2023-08-25 DIAGNOSIS — I87.2 VENOUS INSUFFICIENCY: ICD-10-CM

## 2023-08-25 DIAGNOSIS — I48.0 PAF (PAROXYSMAL ATRIAL FIBRILLATION) (HCC): ICD-10-CM

## 2023-08-25 DIAGNOSIS — Z95.0 CARDIAC PACEMAKER IN SITU: Primary | ICD-10-CM

## 2023-08-25 DIAGNOSIS — Z79.01 ANTICOAGULATED: ICD-10-CM

## 2023-08-25 DIAGNOSIS — I10 PRIMARY HYPERTENSION: ICD-10-CM

## 2023-08-25 PROCEDURE — 99214 OFFICE O/P EST MOD 30 MIN: CPT | Performed by: NURSE PRACTITIONER

## 2023-08-25 RX ORDER — PHENOL 1.4 %
AEROSOL, SPRAY (ML) MUCOUS MEMBRANE
COMMUNITY

## 2023-08-25 RX ORDER — LANOLIN ALCOHOL/MO/W.PET/CERES
CREAM (GRAM) TOPICAL
COMMUNITY
Start: 2014-07-15

## 2023-08-25 RX ORDER — CALCIUM CARBONATE 500 MG/1
1 TABLET, CHEWABLE ORAL DAILY
COMMUNITY

## 2023-08-28 RX ORDER — AMLODIPINE BESYLATE 5 MG/1
TABLET ORAL
Qty: 90 TABLET | Refills: 1 | Status: SHIPPED | OUTPATIENT
Start: 2023-08-28

## 2023-08-28 NOTE — TELEPHONE ENCOUNTER
Request for amlodipine 5 mg. Last office visit 8/25/23, next office visit 5/28/24.  Refills per verbal order from Clyde Chau NP.

## 2023-08-29 RX ORDER — ATENOLOL 100 MG/1
100 TABLET ORAL DAILY
Qty: 90 TABLET | Refills: 3 | Status: SHIPPED | OUTPATIENT
Start: 2023-08-29

## 2023-09-11 ENCOUNTER — ANTI-COAG VISIT (OUTPATIENT)
Facility: HOSPITAL | Age: 88
End: 2023-09-11
Payer: MEDICARE

## 2023-09-11 DIAGNOSIS — I48.0 PAROXYSMAL ATRIAL FIBRILLATION (HCC): Primary | ICD-10-CM

## 2023-09-11 DIAGNOSIS — Z79.01 LONG TERM (CURRENT) USE OF ANTICOAGULANTS: ICD-10-CM

## 2023-09-11 LAB — INTERNATIONAL NORMALIZATION RATIO, POC: 2.4

## 2023-09-11 PROCEDURE — 99211 OFF/OP EST MAY X REQ PHY/QHP: CPT

## 2023-09-11 NOTE — PROGRESS NOTES
Pharmacy Progress Note - Anticoagulation Management     S/O:  Ms. Orestes Diaz  is a 80 y.o. female seen today for anticoagulation management for the diagnosis of Atrial Fibrillation. HPI: At last visit (8/14) INR was 2.4 and therapeutic for INR goal 2.0-3.0. Patient recommended to continue 1.25 mg Tues; 2.5 mg daily ROW and recheck INR in 4 weeks. Interim History:     Warfarin start date: Prior to October 2018  INR Goal:  2.0-3.0    Current warfarin regimen: 1.25 mg Tues; 2.5 mg daily ROW  Warfarin tablet strength:   2.5 mg   Duration of therapy: Indefinite    Today's pertinent positives includes:  No significant changes since last visit    INR 2.4  PT 28.7    Adherence:   Able to recall regimen? YES  Miss/extra dose? NO  Need refill?  NO    Upcoming procedure(s):  NO    INR History:   (normal INR range 0.8-1.2)      Date                INR                  PT        Dose/Comments  09/11/23 2.4  28.7  No changes  08/14/23 2.4  28.2  No changes  07/17/23 2.3  27.6  No changes  06/19/23 2.2   26.5  No changes  05/22/23 2.0  23.9  5 mg x 1 dose then 1.25 mg Tues; 2.5 mg daily ROW  05/12/23 1.4   16.2  Resumed warfarin on 5/5 after procedure  04/11/23          2.7                   32.3     No changes  03/14/23          3.0                   35.9     Reduced to 1.25 mg Tues; 2.5 mg daily ROW  02/27/23          3.2                   38.6     2.5 mg daily  01/27/23          2.9                   34.8     1.25 mg x 1 dose then 2.5 mg daily  01/10/23          3.5                   41.8     No changes  12/06/22          2.6                   31.3     No changes  11/01/22          2.3                   28.0     No changes  10/04/22          2.5                   30.1     No changes  09/01/22          2.7                   32.9     Increased to 2.5 mg daily  08/16/22          1.9                   22.8     No changes  07/19/22          2.1                   24.9     No changes  06/21/22          2.2

## 2023-09-11 NOTE — PATIENT INSTRUCTIONS
Today your INR was 2.4. Your goal INR is  2.0-3.0 . You have a  2.5 mg tablet of Coumadin (warfarin). Take Coumadin (warfarin) as follows:   warfarin 1.25 mg Tues; 2.5 mg daily ROW    Come back in 4 week(s) for your next finger stick/INR blood test.        Avoid any over the counter items containing aspirin or ibuprofen, and avoid great swings in general diet. Avoid alcohol consumption. Please notify the INR nurse if you are started on any new medication including over the counter or herbal supplements. Also, please notify your INR nurse if any of your other prescription or over the counter medications have been discontinued. ------------------------------------------------------------------------------------------------------------------  Taking Warfarin Safely: Care Instructions    Your Care Instructions  Warfarin is a medicine that you take to prevent blood clots. It is often called a blood thinner. Doctors give warfarin (such as Coumadin) to reduce the risk of blood clots. You may be at risk for blood clots if you have atrial fibrillation or deep vein thrombosis. Some other health problems may also put you at risk. Warfarin slows the amount of time it takes for your blood to clot. It can cause bleeding problems. Even if you've been taking warfarin for a while, it's important to know how to take it safely. Foods and other medicines can affect the way warfarin works. Some can make warfarin work too well. This can cause bleeding problems. And some can make it work poorly, so that it does not prevent blood clots very well. You will need regular blood tests to check how long it takes for your blood to form a clot. This test is called a PT or prothrombin time test. The result of the test is called an INR level. Depending on the test results, your doctor or anticoagulation clinic may adjust your dose of warfarin. Follow-up care is a key part of your treatment and safety.  Be sure to make and go

## 2023-09-19 RX ORDER — WARFARIN SODIUM 2.5 MG/1
TABLET ORAL
Qty: 90 TABLET | Refills: 0 | Status: SHIPPED | OUTPATIENT
Start: 2023-09-19

## 2023-09-28 ENCOUNTER — OFFICE VISIT (OUTPATIENT)
Facility: CLINIC | Age: 88
End: 2023-09-28
Payer: MEDICARE

## 2023-09-28 VITALS
OXYGEN SATURATION: 99 % | SYSTOLIC BLOOD PRESSURE: 122 MMHG | HEART RATE: 60 BPM | RESPIRATION RATE: 18 BRPM | DIASTOLIC BLOOD PRESSURE: 64 MMHG | HEIGHT: 64 IN | WEIGHT: 162.1 LBS | TEMPERATURE: 98.2 F | BODY MASS INDEX: 27.68 KG/M2

## 2023-09-28 DIAGNOSIS — Z95.0 CARDIAC PACEMAKER IN SITU: ICD-10-CM

## 2023-09-28 DIAGNOSIS — E78.5 DYSLIPIDEMIA: ICD-10-CM

## 2023-09-28 DIAGNOSIS — Z79.01 LONG TERM (CURRENT) USE OF ANTICOAGULANTS: ICD-10-CM

## 2023-09-28 DIAGNOSIS — I10 ESSENTIAL HYPERTENSION, BENIGN: Primary | ICD-10-CM

## 2023-09-28 DIAGNOSIS — M10.00 IDIOPATHIC GOUT, UNSPECIFIED CHRONICITY, UNSPECIFIED SITE: ICD-10-CM

## 2023-09-28 DIAGNOSIS — I48.0 PAROXYSMAL ATRIAL FIBRILLATION (HCC): ICD-10-CM

## 2023-09-28 DIAGNOSIS — R73.9 HYPERGLYCEMIA: ICD-10-CM

## 2023-09-28 LAB
ALBUMIN SERPL-MCNC: 3.9 G/DL (ref 3.5–5)
ALBUMIN/GLOB SERPL: 1.2 (ref 1.1–2.2)
ALP SERPL-CCNC: 66 U/L (ref 45–117)
ALT SERPL-CCNC: 18 U/L (ref 12–78)
ANION GAP SERPL CALC-SCNC: 7 MMOL/L (ref 5–15)
APPEARANCE UR: CLEAR
AST SERPL-CCNC: 14 U/L (ref 15–37)
BACTERIA URNS QL MICRO: ABNORMAL /HPF
BASOPHILS # BLD: 0.1 K/UL (ref 0–0.1)
BASOPHILS NFR BLD: 1 % (ref 0–1)
BILIRUB SERPL-MCNC: 0.5 MG/DL (ref 0.2–1)
BILIRUB UR QL: NEGATIVE
BUN SERPL-MCNC: 14 MG/DL (ref 6–20)
BUN/CREAT SERPL: 14 (ref 12–20)
CALCIUM SERPL-MCNC: 9.4 MG/DL (ref 8.5–10.1)
CAOX CRY URNS QL MICRO: ABNORMAL
CHLORIDE SERPL-SCNC: 100 MMOL/L (ref 97–108)
CHOLEST SERPL-MCNC: 120 MG/DL
CO2 SERPL-SCNC: 30 MMOL/L (ref 21–32)
COLOR UR: ABNORMAL
CREAT SERPL-MCNC: 1.03 MG/DL (ref 0.55–1.02)
DIFFERENTIAL METHOD BLD: ABNORMAL
EOSINOPHIL # BLD: 0.1 K/UL (ref 0–0.4)
EOSINOPHIL NFR BLD: 1 % (ref 0–7)
EPITH CASTS URNS QL MICRO: ABNORMAL /LPF
ERYTHROCYTE [DISTWIDTH] IN BLOOD BY AUTOMATED COUNT: 14.8 % (ref 11.5–14.5)
EST. AVERAGE GLUCOSE BLD GHB EST-MCNC: 111 MG/DL
GLOBULIN SER CALC-MCNC: 3.2 G/DL (ref 2–4)
GLUCOSE SERPL-MCNC: 98 MG/DL (ref 65–100)
GLUCOSE UR STRIP.AUTO-MCNC: NEGATIVE MG/DL
HBA1C MFR BLD: 5.5 % (ref 4–5.6)
HCT VFR BLD AUTO: 34.8 % (ref 35–47)
HDLC SERPL-MCNC: 74 MG/DL
HDLC SERPL: 1.6 (ref 0–5)
HGB BLD-MCNC: 12 G/DL (ref 11.5–16)
HGB UR QL STRIP: NEGATIVE
IMM GRANULOCYTES # BLD AUTO: 0 K/UL (ref 0–0.04)
IMM GRANULOCYTES NFR BLD AUTO: 0 % (ref 0–0.5)
KETONES UR QL STRIP.AUTO: ABNORMAL MG/DL
LDLC SERPL CALC-MCNC: 35 MG/DL (ref 0–100)
LEUKOCYTE ESTERASE UR QL STRIP.AUTO: ABNORMAL
LYMPHOCYTES # BLD: 1.2 K/UL (ref 0.8–3.5)
LYMPHOCYTES NFR BLD: 21 % (ref 12–49)
MCH RBC QN AUTO: 28.2 PG (ref 26–34)
MCHC RBC AUTO-ENTMCNC: 34.5 G/DL (ref 30–36.5)
MCV RBC AUTO: 81.7 FL (ref 80–99)
MONOCYTES # BLD: 0.6 K/UL (ref 0–1)
MONOCYTES NFR BLD: 11 % (ref 5–13)
NEUTS SEG # BLD: 3.7 K/UL (ref 1.8–8)
NEUTS SEG NFR BLD: 66 % (ref 32–75)
NITRITE UR QL STRIP.AUTO: NEGATIVE
NRBC # BLD: 0 K/UL (ref 0–0.01)
NRBC BLD-RTO: 0 PER 100 WBC
PH UR STRIP: 7 (ref 5–8)
PLATELET # BLD AUTO: 213 K/UL (ref 150–400)
PMV BLD AUTO: 10.4 FL (ref 8.9–12.9)
POTASSIUM SERPL-SCNC: 4.5 MMOL/L (ref 3.5–5.1)
PROT SERPL-MCNC: 7.1 G/DL (ref 6.4–8.2)
PROT UR STRIP-MCNC: NEGATIVE MG/DL
RBC # BLD AUTO: 4.26 M/UL (ref 3.8–5.2)
RBC #/AREA URNS HPF: ABNORMAL /HPF (ref 0–5)
SODIUM SERPL-SCNC: 137 MMOL/L (ref 136–145)
SP GR UR REFRACTOMETRY: 1.02 (ref 1–1.03)
TRIGL SERPL-MCNC: 55 MG/DL
TSH SERPL DL<=0.05 MIU/L-ACNC: 0.76 UIU/ML (ref 0.36–3.74)
URINE CULTURE IF INDICATED: ABNORMAL
UROBILINOGEN UR QL STRIP.AUTO: 0.2 EU/DL (ref 0.2–1)
VLDLC SERPL CALC-MCNC: 11 MG/DL
WBC # BLD AUTO: 5.6 K/UL (ref 3.6–11)
WBC URNS QL MICRO: ABNORMAL /HPF (ref 0–4)

## 2023-09-28 PROCEDURE — 1036F TOBACCO NON-USER: CPT | Performed by: INTERNAL MEDICINE

## 2023-09-28 PROCEDURE — G8427 DOCREV CUR MEDS BY ELIG CLIN: HCPCS | Performed by: INTERNAL MEDICINE

## 2023-09-28 PROCEDURE — 1123F ACP DISCUSS/DSCN MKR DOCD: CPT | Performed by: INTERNAL MEDICINE

## 2023-09-28 PROCEDURE — 36415 COLL VENOUS BLD VENIPUNCTURE: CPT | Performed by: INTERNAL MEDICINE

## 2023-09-28 PROCEDURE — G0008 ADMIN INFLUENZA VIRUS VAC: HCPCS | Performed by: INTERNAL MEDICINE

## 2023-09-28 PROCEDURE — 99214 OFFICE O/P EST MOD 30 MIN: CPT | Performed by: INTERNAL MEDICINE

## 2023-09-28 PROCEDURE — G8419 CALC BMI OUT NRM PARAM NOF/U: HCPCS | Performed by: INTERNAL MEDICINE

## 2023-09-28 PROCEDURE — 90694 VACC AIIV4 NO PRSRV 0.5ML IM: CPT | Performed by: INTERNAL MEDICINE

## 2023-09-28 PROCEDURE — 1090F PRES/ABSN URINE INCON ASSESS: CPT | Performed by: INTERNAL MEDICINE

## 2023-09-28 SDOH — ECONOMIC STABILITY: FOOD INSECURITY: WITHIN THE PAST 12 MONTHS, THE FOOD YOU BOUGHT JUST DIDN'T LAST AND YOU DIDN'T HAVE MONEY TO GET MORE.: NEVER TRUE

## 2023-09-28 SDOH — ECONOMIC STABILITY: FOOD INSECURITY: WITHIN THE PAST 12 MONTHS, YOU WORRIED THAT YOUR FOOD WOULD RUN OUT BEFORE YOU GOT MONEY TO BUY MORE.: NEVER TRUE

## 2023-09-28 SDOH — ECONOMIC STABILITY: INCOME INSECURITY: HOW HARD IS IT FOR YOU TO PAY FOR THE VERY BASICS LIKE FOOD, HOUSING, MEDICAL CARE, AND HEATING?: NOT HARD AT ALL

## 2023-09-28 SDOH — ECONOMIC STABILITY: HOUSING INSECURITY
IN THE LAST 12 MONTHS, WAS THERE A TIME WHEN YOU DID NOT HAVE A STEADY PLACE TO SLEEP OR SLEPT IN A SHELTER (INCLUDING NOW)?: NO

## 2023-09-28 ASSESSMENT — ANXIETY QUESTIONNAIRES
5. BEING SO RESTLESS THAT IT IS HARD TO SIT STILL: 0
6. BECOMING EASILY ANNOYED OR IRRITABLE: 0
1. FEELING NERVOUS, ANXIOUS, OR ON EDGE: 0
IF YOU CHECKED OFF ANY PROBLEMS ON THIS QUESTIONNAIRE, HOW DIFFICULT HAVE THESE PROBLEMS MADE IT FOR YOU TO DO YOUR WORK, TAKE CARE OF THINGS AT HOME, OR GET ALONG WITH OTHER PEOPLE: NOT DIFFICULT AT ALL
7. FEELING AFRAID AS IF SOMETHING AWFUL MIGHT HAPPEN: 0
2. NOT BEING ABLE TO STOP OR CONTROL WORRYING: 0
4. TROUBLE RELAXING: 0
3. WORRYING TOO MUCH ABOUT DIFFERENT THINGS: 0
GAD7 TOTAL SCORE: 0

## 2023-09-28 ASSESSMENT — PATIENT HEALTH QUESTIONNAIRE - PHQ9
2. FEELING DOWN, DEPRESSED OR HOPELESS: 0
SUM OF ALL RESPONSES TO PHQ QUESTIONS 1-9: 0
SUM OF ALL RESPONSES TO PHQ9 QUESTIONS 1 & 2: 0
SUM OF ALL RESPONSES TO PHQ QUESTIONS 1-9: 0
1. LITTLE INTEREST OR PLEASURE IN DOING THINGS: 0
SUM OF ALL RESPONSES TO PHQ QUESTIONS 1-9: 0
SUM OF ALL RESPONSES TO PHQ QUESTIONS 1-9: 0

## 2023-09-28 NOTE — PROGRESS NOTES
Jony Payton is a 80 y.o. female    Chief Complaint   Patient presents with    Follow-up     1. Have you been to the ER, urgent care clinic since your last visit? Hospitalized since your last visit? No    2. Have you seen or consulted any other health care providers outside of the [address] [place] [address] Avenue since your last visit? Include any pap smears or colon screening.  No
and other errors may be present. Corrections may be executed at a later time. Please feel free to contact us for any clarifications as needed.

## 2023-10-06 NOTE — PROGRESS NOTES
Pharmacy Progress Note - Anticoagulation Management     S/O:  Ms. Augie Mccall  is a 80 y.o. female seen today for anticoagulation management for the diagnosis of Atrial Fibrillation. HPI: At last visit (9/11) INR was 2.4 and therapeutic for INR goal 2.0-3.0. Patient recommended to continue 1.25 mg Tues; 2.5 mg daily ROW and recheck INR in 4 weeks. Interim History:     Warfarin start date: Prior to October 2018  INR Goal:  2.0-3.0    Current warfarin regimen: 1.25 mg Tues; 2.5 mg daily ROW  Warfarin tablet strength:   2.5 mg   Duration of therapy: Indefinite    Today's pertinent positives includes:  No significant changes since last visit    INR 2.9  PT 35.4    Adherence:   Able to recall regimen? YES  Miss/extra dose? NO  Need refill?  NO    Upcoming procedure(s):  NO    INR History:   (normal INR range 0.8-1.2)      Date                INR                  PT        Dose/Comments  10/09/23 2.9  35.4  No changes  09/11/23 2.4  28.7  No changes  08/14/23 2.4  28.2  No changes  07/17/23 2.3  27.6  No changes  06/19/23 2.2   26.5  No changes  05/22/23 2.0  23.9  5 mg x 1 dose then 1.25 mg Tues; 2.5 mg daily ROW  05/12/23 1.4   16.2  Resumed warfarin on 5/5 after procedure  04/11/23          2.7                   32.3     No changes  03/14/23          3.0                   35.9     Reduced to 1.25 mg Tues; 2.5 mg daily ROW  02/27/23          3.2                   38.6     2.5 mg daily  01/27/23          2.9                   34.8     1.25 mg x 1 dose then 2.5 mg daily  01/10/23          3.5                   41.8     No changes  12/06/22          2.6                   31.3     No changes  11/01/22          2.3                   28.0     No changes  10/04/22          2.5                   30.1     No changes  09/01/22          2.7                   32.9     Increased to 2.5 mg daily  08/16/22          1.9                   22.8     No changes  07/19/22          2.1                   24.9     No

## 2023-10-09 ENCOUNTER — ANTI-COAG VISIT (OUTPATIENT)
Facility: HOSPITAL | Age: 88
End: 2023-10-09
Payer: MEDICARE

## 2023-10-09 DIAGNOSIS — Z79.01 LONG TERM (CURRENT) USE OF ANTICOAGULANTS: ICD-10-CM

## 2023-10-09 DIAGNOSIS — I48.0 PAROXYSMAL ATRIAL FIBRILLATION (HCC): Primary | ICD-10-CM

## 2023-10-09 LAB — INTERNATIONAL NORMALIZATION RATIO, POC: 2.9

## 2023-10-09 PROCEDURE — 99211 OFF/OP EST MAY X REQ PHY/QHP: CPT

## 2023-10-09 PROCEDURE — 85610 PROTHROMBIN TIME: CPT

## 2023-10-09 NOTE — PATIENT INSTRUCTIONS
Today your INR was 2.9. Your goal INR is  2.0-3.0. You have a   2.5 mg tablet of Coumadin (warfarin). Take Coumadin (warfarin) as follows: Take 1.25 mg (0.5 tablets) on Tuesdays and 2.5 mg (1 tablet) daily rest of week    Come back in 4 week(s) for your next finger stick/INR blood test.        Avoid any over the counter items containing aspirin or ibuprofen, and avoid great swings in general diet. Avoid alcohol consumption. Please notify the INR nurse if you are started on any new medication including over the counter or herbal supplements. Also, please notify your INR nurse if any of your other prescription or over the counter medications have been discontinued. Your Care Instructions  Warfarin is a medicine that you take to prevent blood clots. It is often called a blood thinner. Doctors give warfarin (such as Coumadin) to reduce the risk of blood clots. You may be at risk for blood clots if you have atrial fibrillation or deep vein thrombosis. Some other health problems may also put you at risk. Warfarin slows the amount of time it takes for your blood to clot. It can cause bleeding problems. Even if you've been taking warfarin for a while, it's important to know how to take it safely. Foods and other medicines can affect the way warfarin works. Some can make warfarin work too well. This can cause bleeding problems. And some can make it work poorly, so that it does not prevent blood clots very well. You will need regular blood tests to check how long it takes for your blood to form a clot. This test is called a PT or prothrombin time test. The result of the test is called an INR level. Depending on the test results, your doctor or anticoagulation clinic may adjust your dose of warfarin. Follow-up care is a key part of your treatment and safety. Be sure to make and go to all appointments, and call your doctor if you are having problems.  It's also a good idea to know your test results and

## 2023-11-14 NOTE — PROGRESS NOTES
Pharmacy Progress Note - Anticoagulation Management     S/O:  Ms. Olivier Fountain  is a 80 y.o. female seen today for anticoagulation management for the diagnosis of Atrial Fibrillation. HPI: At last visit (10/9) INR was 2.9 and therapeutic for INR goal 2.0-3.0. Patient recommended to continue 1.25 mg Tues; 2.5 mg daily ROW and recheck INR in 4 weeks. Interim History:     Warfarin start date: Prior to October 2018  INR Goal:  2.0-3.0    Current warfarin regimen: 1.25 mg Tues; 2.5 mg daily ROW  Warfarin tablet strength:   2.5 mg   Duration of therapy: Indefinite    Today's pertinent positives includes:  No significant changes since last visit    INR 2.3  PT 28.0    Adherence:   Able to recall regimen? YES  Miss/extra dose? NO  Need refill?  NO    Upcoming procedure(s):  NO    INR History:   (normal INR range 0.8-1.2)      Date                INR                  PT        Dose/Comments  11/17/23 2.3  28.0  No changes  10/09/23 2.9  35.4  No changes  09/11/23 2.4  28.7  No changes  08/14/23 2.4  28.2  No changes  07/17/23 2.3  27.6  No changes  06/19/23 2.2   26.5  No changes  05/22/23 2.0  23.9  5 mg x 1 dose then 1.25 mg Tues; 2.5 mg daily ROW  05/12/23 1.4   16.2  Resumed warfarin on 5/5 after procedure  04/11/23          2.7                   32.3     No changes  03/14/23          3.0                   35.9     Reduced to 1.25 mg Tues; 2.5 mg daily ROW  02/27/23          3.2                   38.6     2.5 mg daily  01/27/23          2.9                   34.8     1.25 mg x 1 dose then 2.5 mg daily  01/10/23          3.5                   41.8     No changes  12/06/22          2.6                   31.3     No changes  11/01/22          2.3                   28.0     No changes  10/04/22          2.5                   30.1     No changes  09/01/22          2.7                   32.9     Increased to 2.5 mg daily  08/16/22          1.9                   22.8     No changes  07/19/22          2.1

## 2023-11-17 ENCOUNTER — ANTI-COAG VISIT (OUTPATIENT)
Facility: HOSPITAL | Age: 88
End: 2023-11-17
Payer: MEDICARE

## 2023-11-17 DIAGNOSIS — I48.0 PAROXYSMAL ATRIAL FIBRILLATION (HCC): Primary | ICD-10-CM

## 2023-11-17 DIAGNOSIS — Z79.01 LONG TERM (CURRENT) USE OF ANTICOAGULANTS: ICD-10-CM

## 2023-11-17 LAB — INTERNATIONAL NORMALIZATION RATIO, POC: 2.3

## 2023-11-17 PROCEDURE — 85610 PROTHROMBIN TIME: CPT

## 2023-11-17 PROCEDURE — 99211 OFF/OP EST MAY X REQ PHY/QHP: CPT

## 2024-01-29 NOTE — PATIENT INSTRUCTIONS
Today your INR was 2.3.      Your goal INR is  2.0-3.0.    You have a   2.5 mg tablet of Coumadin (warfarin).   Take Coumadin (warfarin) as follows:    Take 1.25 mg (0.5 tablets) on Tuesdays and 2.5 mg (1 tablet) daily rest of week    Come back in 6 week(s) for your next finger stick/INR blood test.        Avoid any over the counter items containing aspirin or ibuprofen, and avoid great swings in general diet.  Avoid alcohol consumption.  Please notify the INR nurse if you are started on any new medication including over the counter or herbal supplements. Also, please notify your INR nurse if any of your other prescription or over the counter medications have been discontinued.     Your Care Instructions  Warfarin is a medicine that you take to prevent blood clots. It is often called a blood thinner. Doctors give warfarin (such as Coumadin) to reduce the risk of blood clots. You may be at risk for blood clots if you have atrial fibrillation or deep vein thrombosis. Some other health problems may also put you at risk.  Warfarin slows the amount of time it takes for your blood to clot. It can cause bleeding problems. Even if you've been taking warfarin for a while, it's important to know how to take it safely.  Foods and other medicines can affect the way warfarin works. Some can make warfarin work too well. This can cause bleeding problems. And some can make it work poorly, so that it does not prevent blood clots very well.  You will need regular blood tests to check how long it takes for your blood to form a clot. This test is called a PT or prothrombin time test. The result of the test is called an INR level. Depending on the test results, your doctor or anticoagulation clinic may adjust your dose of warfarin.    Follow-up care is a key part of your treatment and safety. Be sure to make and go to all appointments, and call your doctor if you are having problems. It's also a good idea to know your test results and

## 2024-01-29 NOTE — PROGRESS NOTES
2.5 mg daily  08/16/22          1.9                   22.8     No changes  07/19/22          2.1                   24.9     No changes  06/21/22          2.2                   26.6     Increased to 1.25 mg Mon; 2.5 mg daily ROW  05/31/22          1.8                   21.7     No changes  05/16/22          1.9                   22.4     No changes  04/18/22          2.3                   27.6     No changes  03/21/22          2.3                   28.1     No changes  02/21/22          2.3                   28.1     No changes  02/02/22          1.9                   22.4     No changes, increased greens  01/19/22          3.3                   39.7     Held x 1 day, then 1.25 mg Thur, 2.5 mg daily ROW  01/10/22          3.9                   46.3     No changes  12/27/21          3.2                   38.0     No changes  11/22/21          2.2                               No changes  10/25/21          2.1                               1.25 mg every Thu; 2.5 mg all other days     A/P:        Anticoagulation:  Considering Ms. Cano's past history, todays findings, and per Anticoagulation Collaborative Practice Agreement/Protocol:     Fingerstick POC INR (2.3) is Therapeutic for INR goal today.   Continue warfarin 1.25 mg Tues; 2.5 mg daily ROW  INR is 2.3 and therapeutic for INR goal 2.0-3.0.  Alvaro (son) recalled warfarin plan from last visit (12/19) and denies missed or extra doses of warfarin.  Alvaro denies changes to patient's other medications and reports consistent intake of vitamin K foods.  INR has been therapeutic for 7 months consecutively without dose adjustment.  Per protocol since INR is therapeutic, patient recommended to continue 1.25 mg Tues; 2.5 mg daily ROW and recheck INR in 6 weeks.  Alvaro will contact clinic if there are changes to patient's medications prior to next appointment.      Patient was instructed to schedule an appointment in 6 week(s) prior to leaving the clinic.    There are no

## 2024-01-30 ENCOUNTER — ANTI-COAG VISIT (OUTPATIENT)
Facility: HOSPITAL | Age: 89
End: 2024-01-30
Payer: MEDICARE

## 2024-01-30 DIAGNOSIS — I48.0 PAROXYSMAL ATRIAL FIBRILLATION (HCC): Primary | ICD-10-CM

## 2024-01-30 DIAGNOSIS — Z79.01 LONG TERM (CURRENT) USE OF ANTICOAGULANTS: ICD-10-CM

## 2024-01-30 LAB — INTERNATIONAL NORMALIZATION RATIO, POC: 2.3

## 2024-01-30 PROCEDURE — 99211 OFF/OP EST MAY X REQ PHY/QHP: CPT

## 2024-01-30 PROCEDURE — 85610 PROTHROMBIN TIME: CPT

## 2024-02-22 RX ORDER — AMLODIPINE BESYLATE 5 MG/1
TABLET ORAL
Qty: 90 TABLET | Refills: 1 | Status: SHIPPED | OUTPATIENT
Start: 2024-02-22

## 2024-02-22 NOTE — TELEPHONE ENCOUNTER
Requested Prescriptions     Signed Prescriptions Disp Refills    amLODIPine (NORVASC) 5 MG tablet 90 tablet 1     Sig: TAKE 1 TABLET BY MOUTH DAILY     Authorizing Provider: CALVIN BASURTO     Ordering User: TIFF BRAND refilled per VO by MD.    Future Appointments   Date Time Provider Department Center   3/12/2024 11:00 AM [facility] MEDICATION MGMT [facility]   3/28/2024 11:00 AM Gordy Lyles MD [facility] AMB   5/28/2024  1:20 PM [email or web address] AM[facility]   5/28/2024  1:40 PM Cooper Osborn MD [facility] BS AMB

## 2024-03-08 NOTE — PROGRESS NOTES
Pharmacy Progress Note - Anticoagulation Management     S/O:  Ms. Nuzhat Cano  is a 89 y.o. female seen today for anticoagulation management for the diagnosis of Atrial Fibrillation.      HPI: At last visit (1/30) INR was 2.3 and therapeutic for INR goal 2.0-3.0.  Patient recommended to continue 1.25 mg Tues; 2.5 mg daily ROW and recheck INR in 6 weeks.     Interim History:     Warfarin start date: Prior to October 2018  INR Goal:  2.0-3.0    Current warfarin regimen: 1.25 mg Tues; 2.5 mg daily ROW  Warfarin tablet strength:   2.5 mg   Duration of therapy: Indefinite    Today's pertinent positives includes:  No significant changes since last visit    INR 2.4  PT 28.4    Adherence:   Able to recall regimen? YES  Miss/extra dose? NO  Need refill? NO    Upcoming procedure(s):  NO    INR History:   (normal INR range 0.8-1.2)      Date                INR                  PT        Dose/Comments  03/12/24 2.4  28.4  No changes  01/30/24 2.3  27.6  No changes  12/19/23 2.8  33.0  No changes  11/17/23 2.3  28.0  No changes  10/09/23 2.9  35.4  No changes  09/11/23 2.4  28.7  No changes  08/14/23 2.4  28.2  No changes  07/17/23 2.3  27.6  No changes  06/19/23 2.2   26.5  No changes  05/22/23 2.0  23.9  5 mg x 1 dose then 1.25 mg Tues; 2.5 mg daily ROW  05/12/23 1.4   16.2  Resumed warfarin on 5/5 after procedure  04/11/23          2.7                   32.3     No changes  03/14/23          3.0                   35.9     Reduced to 1.25 mg Tues; 2.5 mg daily ROW  02/27/23          3.2                   38.6     2.5 mg daily  01/27/23          2.9                   34.8     1.25 mg x 1 dose then 2.5 mg daily  01/10/23          3.5                   41.8     No changes  12/06/22          2.6                   31.3     No changes  11/01/22          2.3                   28.0     No changes  10/04/22          2.5                   30.1     No changes  09/01/22          2.7                   32.9     Increased to 2.5 mg

## 2024-03-08 NOTE — PATIENT INSTRUCTIONS
Today your INR was 2.4.      Your goal INR is  2.0-3.0.    You have a   2.5 mg tablet of Coumadin (warfarin).   Take Coumadin (warfarin) as follows:    Take 1.25 mg (0.5 tablets) on Tuesdays and 2.5 mg (1 tablet) daily rest of week    Come back in 6 week(s) for your next finger stick/INR blood test.        Avoid any over the counter items containing aspirin or ibuprofen, and avoid great swings in general diet.  Avoid alcohol consumption.  Please notify the INR nurse if you are started on any new medication including over the counter or herbal supplements. Also, please notify your INR nurse if any of your other prescription or over the counter medications have been discontinued.     Your Care Instructions  Warfarin is a medicine that you take to prevent blood clots. It is often called a blood thinner. Doctors give warfarin (such as Coumadin) to reduce the risk of blood clots. You may be at risk for blood clots if you have atrial fibrillation or deep vein thrombosis. Some other health problems may also put you at risk.  Warfarin slows the amount of time it takes for your blood to clot. It can cause bleeding problems. Even if you've been taking warfarin for a while, it's important to know how to take it safely.  Foods and other medicines can affect the way warfarin works. Some can make warfarin work too well. This can cause bleeding problems. And some can make it work poorly, so that it does not prevent blood clots very well.  You will need regular blood tests to check how long it takes for your blood to form a clot. This test is called a PT or prothrombin time test. The result of the test is called an INR level. Depending on the test results, your doctor or anticoagulation clinic may adjust your dose of warfarin.    Follow-up care is a key part of your treatment and safety. Be sure to make and go to all appointments, and call your doctor if you are having problems. It's also a good idea to know your test results and

## 2024-03-12 ENCOUNTER — ANTI-COAG VISIT (OUTPATIENT)
Facility: HOSPITAL | Age: 89
End: 2024-03-12
Payer: MEDICARE

## 2024-03-12 DIAGNOSIS — Z79.01 LONG TERM (CURRENT) USE OF ANTICOAGULANTS: ICD-10-CM

## 2024-03-12 DIAGNOSIS — I48.0 PAROXYSMAL ATRIAL FIBRILLATION (HCC): Primary | ICD-10-CM

## 2024-03-12 LAB — INTERNATIONAL NORMALIZATION RATIO, POC: 2.4

## 2024-03-12 PROCEDURE — 99211 OFF/OP EST MAY X REQ PHY/QHP: CPT

## 2024-03-12 PROCEDURE — 85610 PROTHROMBIN TIME: CPT

## 2024-03-18 RX ORDER — WARFARIN SODIUM 2.5 MG/1
TABLET ORAL
Qty: 90 TABLET | Refills: 0 | Status: SHIPPED | OUTPATIENT
Start: 2024-03-18

## 2024-03-28 ENCOUNTER — OFFICE VISIT (OUTPATIENT)
Facility: CLINIC | Age: 89
End: 2024-03-28

## 2024-03-28 DIAGNOSIS — Z95.0 CARDIAC PACEMAKER IN SITU: ICD-10-CM

## 2024-03-28 DIAGNOSIS — I48.0 PAROXYSMAL ATRIAL FIBRILLATION (HCC): ICD-10-CM

## 2024-03-28 DIAGNOSIS — M10.00 IDIOPATHIC GOUT, UNSPECIFIED CHRONICITY, UNSPECIFIED SITE: ICD-10-CM

## 2024-03-28 DIAGNOSIS — I10 ESSENTIAL HYPERTENSION, BENIGN: ICD-10-CM

## 2024-03-28 DIAGNOSIS — Z79.01 LONG TERM (CURRENT) USE OF ANTICOAGULANTS: Primary | ICD-10-CM

## 2024-03-28 DIAGNOSIS — Z00.00 MEDICARE ANNUAL WELLNESS VISIT, SUBSEQUENT: ICD-10-CM

## 2024-03-28 PROBLEM — N39.0 URINARY TRACT INFECTION WITHOUT HEMATURIA: Status: RESOLVED | Noted: 2020-09-24 | Resolved: 2024-03-28

## 2024-03-28 LAB
POC INR: 2.3
PROTHROMBIN TIME, POC: 28.1

## 2024-03-28 SDOH — ECONOMIC STABILITY: FOOD INSECURITY: WITHIN THE PAST 12 MONTHS, YOU WORRIED THAT YOUR FOOD WOULD RUN OUT BEFORE YOU GOT MONEY TO BUY MORE.: NEVER TRUE

## 2024-03-28 SDOH — ECONOMIC STABILITY: FOOD INSECURITY: WITHIN THE PAST 12 MONTHS, THE FOOD YOU BOUGHT JUST DIDN'T LAST AND YOU DIDN'T HAVE MONEY TO GET MORE.: NEVER TRUE

## 2024-03-28 SDOH — ECONOMIC STABILITY: INCOME INSECURITY: IN THE LAST 12 MONTHS, WAS THERE A TIME WHEN YOU WERE NOT ABLE TO PAY THE MORTGAGE OR RENT ON TIME?: NO

## 2024-03-28 SDOH — ECONOMIC STABILITY: HOUSING INSECURITY: IN THE LAST 12 MONTHS, HOW MANY PLACES HAVE YOU LIVED?: 1

## 2024-03-28 SDOH — HEALTH STABILITY: PHYSICAL HEALTH: ON AVERAGE, HOW MANY MINUTES DO YOU ENGAGE IN EXERCISE AT THIS LEVEL?: 0 MIN

## 2024-03-28 SDOH — HEALTH STABILITY: PHYSICAL HEALTH: ON AVERAGE, HOW MANY DAYS PER WEEK DO YOU ENGAGE IN MODERATE TO STRENUOUS EXERCISE (LIKE A BRISK WALK)?: 0 DAYS

## 2024-03-28 SDOH — ECONOMIC STABILITY: TRANSPORTATION INSECURITY
IN THE PAST 12 MONTHS, HAS THE LACK OF TRANSPORTATION KEPT YOU FROM MEDICAL APPOINTMENTS OR FROM GETTING MEDICATIONS?: NO

## 2024-03-28 SDOH — ECONOMIC STABILITY: TRANSPORTATION INSECURITY
IN THE PAST 12 MONTHS, HAS LACK OF TRANSPORTATION KEPT YOU FROM MEETINGS, WORK, OR FROM GETTING THINGS NEEDED FOR DAILY LIVING?: NO

## 2024-03-28 ASSESSMENT — SOCIAL DETERMINANTS OF HEALTH (SDOH)
WITHIN THE LAST YEAR, HAVE YOU BEEN AFRAID OF YOUR PARTNER OR EX-PARTNER?: NO
WITHIN THE LAST YEAR, HAVE YOU BEEN KICKED, HIT, SLAPPED, OR OTHERWISE PHYSICALLY HURT BY YOUR PARTNER OR EX-PARTNER?: NO
HOW OFTEN DO YOU ATTENT MEETINGS OF THE CLUB OR ORGANIZATION YOU BELONG TO?: NEVER
IN A TYPICAL WEEK, HOW MANY TIMES DO YOU TALK ON THE PHONE WITH FAMILY, FRIENDS, OR NEIGHBORS?: MORE THAN THREE TIMES A WEEK
WITHIN THE LAST YEAR, HAVE TO BEEN RAPED OR FORCED TO HAVE ANY KIND OF SEXUAL ACTIVITY BY YOUR PARTNER OR EX-PARTNER?: NO
WITHIN THE LAST YEAR, HAVE YOU BEEN HUMILIATED OR EMOTIONALLY ABUSED IN OTHER WAYS BY YOUR PARTNER OR EX-PARTNER?: NO
HOW OFTEN DO YOU ATTEND CHURCH OR RELIGIOUS SERVICES?: 1 TO 4 TIMES PER YEAR
DO YOU BELONG TO ANY CLUBS OR ORGANIZATIONS SUCH AS CHURCH GROUPS UNIONS, FRATERNAL OR ATHLETIC GROUPS, OR SCHOOL GROUPS?: NO
HOW OFTEN DO YOU GET TOGETHER WITH FRIENDS OR RELATIVES?: ONCE A WEEK
HOW HARD IS IT FOR YOU TO PAY FOR THE VERY BASICS LIKE FOOD, HOUSING, MEDICAL CARE, AND HEATING?: NOT HARD AT ALL

## 2024-03-28 ASSESSMENT — PATIENT HEALTH QUESTIONNAIRE - PHQ9
SUM OF ALL RESPONSES TO PHQ QUESTIONS 1-9: 0
2. FEELING DOWN, DEPRESSED OR HOPELESS: NOT AT ALL
1. LITTLE INTEREST OR PLEASURE IN DOING THINGS: NOT AT ALL
SUM OF ALL RESPONSES TO PHQ9 QUESTIONS 1 & 2: 0
SUM OF ALL RESPONSES TO PHQ QUESTIONS 1-9: 0

## 2024-03-28 ASSESSMENT — ANXIETY QUESTIONNAIRES
IF YOU CHECKED OFF ANY PROBLEMS ON THIS QUESTIONNAIRE, HOW DIFFICULT HAVE THESE PROBLEMS MADE IT FOR YOU TO DO YOUR WORK, TAKE CARE OF THINGS AT HOME, OR GET ALONG WITH OTHER PEOPLE: NOT DIFFICULT AT ALL
6. BECOMING EASILY ANNOYED OR IRRITABLE: NOT AT ALL
4. TROUBLE RELAXING: NOT AT ALL
7. FEELING AFRAID AS IF SOMETHING AWFUL MIGHT HAPPEN: NOT AT ALL
5. BEING SO RESTLESS THAT IT IS HARD TO SIT STILL: NOT AT ALL
1. FEELING NERVOUS, ANXIOUS, OR ON EDGE: NOT AT ALL
GAD7 TOTAL SCORE: 0
2. NOT BEING ABLE TO STOP OR CONTROL WORRYING: NOT AT ALL
3. WORRYING TOO MUCH ABOUT DIFFERENT THINGS: NOT AT ALL

## 2024-03-28 ASSESSMENT — LIFESTYLE VARIABLES
HOW MANY STANDARD DRINKS CONTAINING ALCOHOL DO YOU HAVE ON A TYPICAL DAY: PATIENT DOES NOT DRINK
HOW OFTEN DO YOU HAVE A DRINK CONTAINING ALCOHOL: NEVER

## 2024-03-28 NOTE — PROGRESS NOTES
[facility]  Gordy Lyles MD, FACP, CMD  [address] [place] [address]  Phone:  [phone]  Fax: [phone]       Chief Complaint   Patient presents with    Medicare AWV     Patient here for Annual Wellness Exam.    .      SUBJECTIVE:    Nuzhat Cano is a 89 y.o. female Patient returns with her son with a known history of paroxysmal atrial fibrillation, venous insufficiency, long term use of anticoagulants, gout, primary hypertension, encounter for long term use of high risk medication, cardiac pacemaker in situ and is seen for evaluation.  Patient comes with her son, there are no new complaints, she cannot think of anything bothering her in particular. Patient is seen for evaluation.             Current Outpatient Medications   Medication Sig Dispense Refill    amLODIPine (NORVASC) 5 MG tablet TAKE 1 TABLET BY MOUTH DAILY 90 tablet 1    atenolol (TENORMIN) 100 MG tablet Take 1 tablet by mouth daily 90 tablet 3    magnesium oxide (MAG-OX) 400 (240 Mg) MG tablet Take by mouth      calcium carbonate (TUMS) 500 MG chewable tablet Take 1 tablet by mouth daily      Multiple Vitamins-Minerals (MULTIVITAMIN ADULTS 50+) TABS Take by mouth Centrum Silver      vitamin D 25 MCG (1000 UT) CAPS Take by mouth daily      latanoprost (XALATAN) 0.005 % ophthalmic solution INT 1 GTT IN OU QD HS      simvastatin (ZOCOR) 20 MG tablet TAKE 1 TABLET BY MOUTH EVERY NIGHT      warfarin (COUMADIN) 2.5 MG tablet TAKE 1 TABLET BY MOUTH EVERY DAY EXCEPT TAKE 1/2 TABLETS ON TUESDAYS 90 tablet 0     No current facility-administered medications for this visit.     Past Medical History:   Diagnosis Date    Aortic valve disorders 4/29/2012    Atrial fibrillation (HCC) 03/22/2011    on coumadin followed by cardiology - Cooper Osborn MD    CAD (coronary artery disease)     Cor pulmonale, chronic (HCC) 4/29/2012    Dyslipidemia (high LDL; low HDL) 4/29/2012    Gout 4/30/2012    Gout, arthritis 2/8/2012    Gout,

## 2024-03-28 NOTE — PATIENT INSTRUCTIONS
help. What's holding you back?  Getting started.  Have a goal, but break it into easy tasks. Small steps build into big accomplishments.  Staying motivated.  If you feel like skipping your activity, remember your goal. Maybe you want to move better and stay independent. Every activity gets you one step closer.  Not feeling your best.  Start with 5 minutes of an activity you enjoy. Prove to yourself you can do it. As you get comfortable, increase your time.  You may not be where you want to be. But you're in the process of getting there. Everyone starts somewhere.  How can you find safe ways to stay active?  Talk with your doctor about any physical challenges you're facing. Make a plan with your doctor if you have a health problem or aren't sure how to get started with activity.  If you're already active, ask your doctor if there is anything you should change to stay safe as your body and health change.  If you tend to feel dizzy after you take medicine, avoid activity at that time. Try being active before you take your medicine. This will reduce your risk of falls.  If you plan to be active at home, make sure to clear your space before you get started. Remove things like TV cords, coffee tables, and throw rugs. It's safest to have plenty of space to move freely.  The key to getting more active is to take it slow and steady. Try to improve only a little bit at a time. Pick just one area to improve on at first. And if an activity hurts, stop and talk to your doctor.  Where can you learn more?  Go to https://www.[employer].net/patientEd and enter P600 to learn more about \"Learning About Being Active as an Older Adult.\"  Current as of: June 5, 2023               Content Version: 14.0  © 2097-9614 [employer].   Care instructions adapted under license by [employer]. If you have questions about a medical condition or this instruction, always ask your healthcare professional. [employer]

## 2024-03-28 NOTE — PROGRESS NOTES
Chief Complaint   Patient presents with    Medicare AWV     Patient here for Annual Wellness Exam.      \"Have you been to the ER, urgent care clinic since your last visit?  Hospitalized since your last visit?\"    NO    “Have you seen or consulted any other health care providers outside of [facility] since your last visit?”    NO  Results for orders placed or performed in visit on 03/28/24   AMB POC PT/INR   Result Value Ref Range    Prothrombin time, POC 28.1     POC INR 2.3        Patient reports taking Warfarin 2.5 mg daily except on Tuesdays she takes 1/2 pill.             Click Here for Release of Records Request

## 2024-03-28 NOTE — PROGRESS NOTES
Medicare Annual Wellness Visit    Nuzhat Cano is here for Medicare AWV (Patient here for Annual Wellness Exam. )    Assessment & Plan   Long term (current) use of anticoagulants  -     AMB POC PT/INR  Medicare annual wellness visit, subsequent    Recommendations for Preventive Services Due: see orders and patient instructions/AVS.  Recommended screening schedule for the next 5-10 years is provided to the patient in written form: see Patient Instructions/AVS.     No follow-ups on file.     Subjective       Patient's complete Health Risk Assessment and screening values have been reviewed and are found in Flowsheets. The following problems were reviewed today and where indicated follow up appointments were made and/or referrals ordered.    Positive Risk Factor Screenings with Interventions:    Fall Risk:  Do you feel unsteady or are you worried about falling? : (!) yes (walker)  2 or more falls in past year?: no  Fall with injury in past year?: no     Interventions:    Reviewed medications, home hazards, visual acuity, and co-morbidities that can increase risk for falls  See A/P for plan and any pertinent orders    Cognitive:   Clock Drawing Test (CDT): (!) Abnormal  Words recalled: 0 Words Recalled  Total Score: (!) 0  Total Score Interpretation: Abnormal Mini-Cog  Interventions:              Activity, Diet, and Weight:  On average, how many days per week do you engage in moderate to strenuous exercise (like a brisk walk)?: 0 days  On average, how many minutes do you engage in exercise at this level?: 0 min    Do you eat balanced/healthy meals regularly?: Yes    There is no height or weight on file to calculate BMI.      Inactivity Interventions:  See A/P for plan and any pertinent orders        Dentist Screen:  Have you seen the dentist within the past year?: (!) No    Intervention:  See A/P for any pertinent orders       ADL's:   Patient reports needing help with:  Select all that apply: (!)

## 2024-04-22 RX ORDER — SIMVASTATIN 20 MG
TABLET ORAL
Qty: 30 TABLET | Refills: 11 | Status: SHIPPED | OUTPATIENT
Start: 2024-04-22

## 2024-04-22 NOTE — PATIENT INSTRUCTIONS
clutter.  Rearrange furniture and electrical cords to keep them out of walking paths.  Keep stairways, porches, and outside walkways well lit. Use night-lights in hallways and bathrooms.  Be extra careful when you work with sharp tools or knives.    When should you call for help?  Call 911 anytime you think you may need emergency care. For example, call if:  You have a sudden, severe headache that is different from past headaches.  Call your doctor now or seek immediate medical care if:  You have any abnormal bleeding, such as:  Nosebleeds.  Vaginal bleeding that is different (heavier, more frequent, at a different time of the month) than what you are used to.  Bloody or black stools, or rectal bleeding.  Bloody or pink urine.  Watch closely for changes in your health, and be sure to contact your doctor if you have any problems.    Where can you learn more?  Go to http://www.[employer].net/[employer].  Enter N655 in the search box to learn more about \"Taking Warfarin Safely: Care Instructions.\"  Current as of: January 27, 2016  Content Version: 11.1  © 8457-4174 [employer]. Care instructions adapted under license by [employer] (which disclaims liability or warranty for this information). If you have questions about a medical condition or this instruction, always ask your healthcare professional. [employer] disclaims any warranty or liability for your use of this information.

## 2024-04-22 NOTE — PROGRESS NOTES
Pharmacy Progress Note - Anticoagulation Management     S/O:  Ms. Nuzhat Cano  is a 89 y.o. female seen today for anticoagulation management for the diagnosis of Atrial Fibrillation.      HPI: At last visit (3/12) INR was 2.4 and therapeutic for INR goal 2.0-3.0.  Patient recommended to continue 1.25 mg Tues; 2.5 mg daily ROW and recheck INR in 6 weeks.     Interim History:     Warfarin start date: Prior to October 2018  INR Goal:  2.0-3.0    Current warfarin regimen: 1.25 mg Tues; 2.5 mg daily ROW  Warfarin tablet strength:   2.5 mg   Duration of therapy: Indefinite    Today's pertinent positives includes:  No significant changes since last visit    INR 2.2  PT 26.7    Adherence:   Able to recall regimen? YES  Miss/extra dose? NO  Need refill? NO    Upcoming procedure(s):  NO    INR History:   (normal INR range 0.8-1.2)      Date                INR                  PT        Dose/Comments  04/23/24 2.2  26.7  No changes  03/12/24 2.4  28.4  No changes  01/30/24 2.3  27.6  No changes  12/19/23 2.8  33.0  No changes  11/17/23 2.3  28.0  No changes  10/09/23 2.9  35.4  No changes  09/11/23 2.4  28.7  No changes  08/14/23 2.4  28.2  No changes  07/17/23 2.3  27.6  No changes  06/19/23 2.2   26.5  No changes  05/22/23 2.0  23.9  5 mg x 1 dose then 1.25 mg Tues; 2.5 mg daily ROW  05/12/23 1.4   16.2  Resumed warfarin on 5/5 after procedure  04/11/23          2.7                   32.3     No changes  03/14/23          3.0                   35.9     Reduced to 1.25 mg Tues; 2.5 mg daily ROW  02/27/23          3.2                   38.6     2.5 mg daily  01/27/23          2.9                   34.8     1.25 mg x 1 dose then 2.5 mg daily  01/10/23          3.5                   41.8     No changes  12/06/22          2.6                   31.3     No changes  11/01/22          2.3                   28.0     No changes  10/04/22          2.5                   30.1     No changes  09/01/22          2.7                   32.9

## 2024-04-23 ENCOUNTER — ANTI-COAG VISIT (OUTPATIENT)
Facility: HOSPITAL | Age: 89
End: 2024-04-23
Payer: MEDICARE

## 2024-04-23 DIAGNOSIS — Z79.01 LONG TERM (CURRENT) USE OF ANTICOAGULANTS: ICD-10-CM

## 2024-04-23 DIAGNOSIS — I48.0 PAROXYSMAL ATRIAL FIBRILLATION (HCC): Primary | ICD-10-CM

## 2024-04-23 LAB — INTERNATIONAL NORMALIZATION RATIO, POC: 2.2

## 2024-04-23 PROCEDURE — 85610 PROTHROMBIN TIME: CPT

## 2024-04-23 PROCEDURE — 99211 OFF/OP EST MAY X REQ PHY/QHP: CPT

## 2024-05-28 ENCOUNTER — OFFICE VISIT (OUTPATIENT)
Age: 89
End: 2024-05-28
Payer: MEDICARE

## 2024-05-28 ENCOUNTER — PROCEDURE VISIT (OUTPATIENT)
Age: 89
End: 2024-05-28

## 2024-05-28 VITALS
DIASTOLIC BLOOD PRESSURE: 62 MMHG | HEART RATE: 60 BPM | HEIGHT: 64 IN | RESPIRATION RATE: 18 BRPM | WEIGHT: 164 LBS | SYSTOLIC BLOOD PRESSURE: 108 MMHG | BODY MASS INDEX: 28 KG/M2 | OXYGEN SATURATION: 97 %

## 2024-05-28 DIAGNOSIS — Z95.0 PACEMAKER: Primary | ICD-10-CM

## 2024-05-28 DIAGNOSIS — I47.19 PAT (PAROXYSMAL ATRIAL TACHYCARDIA) (HCC): ICD-10-CM

## 2024-05-28 DIAGNOSIS — I10 PRIMARY HYPERTENSION: ICD-10-CM

## 2024-05-28 DIAGNOSIS — Z95.0 CARDIAC PACEMAKER IN SITU: Primary | ICD-10-CM

## 2024-05-28 DIAGNOSIS — Z79.01 ANTICOAGULATED: ICD-10-CM

## 2024-05-28 DIAGNOSIS — I48.0 PAF (PAROXYSMAL ATRIAL FIBRILLATION) (HCC): ICD-10-CM

## 2024-05-28 DIAGNOSIS — I87.2 VENOUS INSUFFICIENCY: ICD-10-CM

## 2024-05-28 DIAGNOSIS — I49.5 SICK SINUS SYNDROME (HCC): ICD-10-CM

## 2024-05-28 PROCEDURE — 1090F PRES/ABSN URINE INCON ASSESS: CPT | Performed by: INTERNAL MEDICINE

## 2024-05-28 PROCEDURE — G8419 CALC BMI OUT NRM PARAM NOF/U: HCPCS | Performed by: INTERNAL MEDICINE

## 2024-05-28 PROCEDURE — 99214 OFFICE O/P EST MOD 30 MIN: CPT | Performed by: INTERNAL MEDICINE

## 2024-05-28 PROCEDURE — 1036F TOBACCO NON-USER: CPT | Performed by: INTERNAL MEDICINE

## 2024-05-28 PROCEDURE — G8427 DOCREV CUR MEDS BY ELIG CLIN: HCPCS | Performed by: INTERNAL MEDICINE

## 2024-05-28 PROCEDURE — 1123F ACP DISCUSS/DSCN MKR DOCD: CPT | Performed by: INTERNAL MEDICINE

## 2024-05-28 NOTE — PROGRESS NOTES
Cardiac Electrophysiology Office Note            Subjective:         Nuzhat Cano is a 89 y.o. woman who is here to follow up, is s/p [employer] dual chamber pacemaker (gen change 04/28/2014, leads 04/06/2006).   She is here with her son       Occasional brief palpitations that awaken her at night.  She is active and no falls even at age 89      Denies chest pain, PND, orthopnea, SOB, syncope, or edema.    + leg edema mild  Walker use  Numbness in hands, fingers      Anticoagulated with warfarin, denies bleeding issues.  She denies falls.  INR monitored her at [facility].         Previous:   [employer] dual chamber pacemaker originally implanted by Dr. Birmingham 04/06/2006, had gen change in 2014.      Previously saw Dr. Joey Brady.  He had prescribed Mg for leg spasm.          Review of Systems: All other review of systems otherwise negative.   Constitutional: Negative for fever, chills, weight loss, malaise/fatigue.    HEENT: Negative for nosebleeds, vision changes.    Respiratory: Negative for cough, hemoptysis, sputum production, and wheezing.    Cardiovascular: Negative for chest pain, + mild leg swelling, no syncope, and PND. + occasional brief palpitations.   Leg edema   Gastrointestinal: Negative for nausea, vomiting, diarrhea, blood in stool and melena.    Genitourinary: Negative for dysuria, and hematuria. + polyuria, urgency   Musculoskeletal: Negative for myalgias, + arthralgia right knee, mild discomfort left wrist with trace swelling.   Skin: Negative for rash.    Heme: Does not bleed but bruise when hitting some thing   Neurological: Negative for speech change and focal weakness.   + hand paresthesia      Past Medical History:   Diagnosis Date    Aortic valve disorders 4/29/2012    Atrial fibrillation (HCC) 03/22/2011    on coumadin followed by cardiology - Cooper Osborn MD    CAD (coronary artery disease)     Cor pulmonale, chronic (HCC) 4/29/2012    Dyslipidemia (high LDL; low HDL)

## 2024-05-31 NOTE — PATIENT INSTRUCTIONS
Today your INR was 2.8.      Your goal INR is  2.0-3.0.    You have a   2.5 mg tablet of Coumadin (warfarin).   Take Coumadin (warfarin) as follows:    Take 1.25 mg (0.5 tablets) on Tuesdays and 2.5 mg (1 tablet) daily rest of week    Come back in 6 week(s) for your next finger stick/INR blood test.        Avoid any over the counter items containing aspirin or ibuprofen, and avoid great swings in general diet.  Avoid alcohol consumption.  Please notify the INR nurse if you are started on any new medication including over the counter or herbal supplements. Also, please notify your INR nurse if any of your other prescription or over the counter medications have been discontinued.     Your Care Instructions  Warfarin is a medicine that you take to prevent blood clots. It is often called a blood thinner. Doctors give warfarin (such as Coumadin) to reduce the risk of blood clots. You may be at risk for blood clots if you have atrial fibrillation or deep vein thrombosis. Some other health problems may also put you at risk.  Warfarin slows the amount of time it takes for your blood to clot. It can cause bleeding problems. Even if you've been taking warfarin for a while, it's important to know how to take it safely.  Foods and other medicines can affect the way warfarin works. Some can make warfarin work too well. This can cause bleeding problems. And some can make it work poorly, so that it does not prevent blood clots very well.  You will need regular blood tests to check how long it takes for your blood to form a clot. This test is called a PT or prothrombin time test. The result of the test is called an INR level. Depending on the test results, your doctor or anticoagulation clinic may adjust your dose of warfarin.    Follow-up care is a key part of your treatment and safety. Be sure to make and go to all appointments, and call your doctor if you are having problems. It's also a good idea to know your test results and  Olanzapine Pregnancy And Lactation Text: This medication is pregnancy category C.   There are no adequate and well controlled trials with olanzapine in pregnant females.  Olanzapine should be used during pregnancy only if the potential benefit justifies the potential risk to the fetus.   In a study in lactating healthy women, olanzapine was excreted in breast milk.  It is recommended that women taking olanzapine should not breast feed.

## 2024-05-31 NOTE — PROGRESS NOTES
Pharmacy Progress Note - Anticoagulation Management     S/O:  Ms. Nuzhat Cano  is a 89 y.o. female seen today for anticoagulation management for the diagnosis of Atrial Fibrillation.      HPI: At last visit (4/23) INR was 2.2 and therapeutic for INR goal 2.0-3.0.  Patient recommended to continue 1.25 mg Tues; 2.5 mg daily ROW and recheck INR in 6 weeks.     Interim History:     Warfarin start date: Prior to October 2018  INR Goal:  2.0-3.0    Current warfarin regimen: 1.25 mg Tues; 2.5 mg daily ROW  Warfarin tablet strength:   2.5 mg   Duration of therapy: Indefinite    Today's pertinent positives includes:  No significant changes since last visit    INR 2.8  PT 33.7    Adherence:   Able to recall regimen? YES  Miss/extra dose? NO  Need refill? YES    Upcoming procedure(s):  NO    INR History:   (normal INR range 0.8-1.2)      Date                INR                  PT        Dose/Comments  06/04/24 2.8  33.7  No changes  04/23/24 2.2  26.7  No changes  03/12/24 2.4  28.4  No changes  01/30/24 2.3  27.6  No changes  12/19/23 2.8  33.0  No changes  11/17/23 2.3  28.0  No changes  10/09/23 2.9  35.4  No changes  09/11/23 2.4  28.7  No changes  08/14/23 2.4  28.2  No changes  07/17/23 2.3  27.6  No changes  06/19/23 2.2   26.5  No changes  05/22/23 2.0  23.9  5 mg x 1 dose then 1.25 mg Tues; 2.5 mg daily ROW  05/12/23 1.4   16.2  Resumed warfarin on 5/5 after procedure  04/11/23          2.7                   32.3     No changes  03/14/23          3.0                   35.9     Reduced to 1.25 mg Tues; 2.5 mg daily ROW  02/27/23          3.2                   38.6     2.5 mg daily  01/27/23          2.9                   34.8     1.25 mg x 1 dose then 2.5 mg daily  01/10/23          3.5                   41.8     No changes  12/06/22          2.6                   31.3     No changes  11/01/22          2.3                   28.0     No changes  10/04/22          2.5                   30.1     No changes  09/01/22

## 2024-06-03 RX ORDER — WARFARIN SODIUM 2.5 MG/1
TABLET ORAL
Qty: 90 TABLET | Refills: 0 | Status: SHIPPED | OUTPATIENT
Start: 2024-06-03 | End: 2024-06-04 | Stop reason: SDUPTHER

## 2024-06-04 ENCOUNTER — ANTI-COAG VISIT (OUTPATIENT)
Facility: HOSPITAL | Age: 89
End: 2024-06-04
Payer: MEDICARE

## 2024-06-04 DIAGNOSIS — I48.0 PAROXYSMAL ATRIAL FIBRILLATION (HCC): Primary | ICD-10-CM

## 2024-06-04 DIAGNOSIS — Z79.01 LONG TERM (CURRENT) USE OF ANTICOAGULANTS: ICD-10-CM

## 2024-06-04 LAB — INTERNATIONAL NORMALIZATION RATIO, POC: 2.8

## 2024-06-04 PROCEDURE — 85610 PROTHROMBIN TIME: CPT

## 2024-06-04 PROCEDURE — 99211 OFF/OP EST MAY X REQ PHY/QHP: CPT

## 2024-06-04 RX ORDER — WARFARIN SODIUM 2.5 MG/1
TABLET ORAL
Qty: 90 TABLET | Refills: 0 | OUTPATIENT
Start: 2024-06-04

## 2024-06-04 RX ORDER — WARFARIN SODIUM 2.5 MG/1
TABLET ORAL
Qty: 90 TABLET | Refills: 0 | Status: SHIPPED | OUTPATIENT
Start: 2024-06-04

## 2024-07-11 NOTE — PATIENT INSTRUCTIONS
Today your INR was 2.8.      Your goal INR is  2.0-3.0.    You have a   2.5 mg tablet of Coumadin (warfarin).   Take Coumadin (warfarin) as follows:    Take 1.25 mg (0.5 tablets) on Tuesdays and 2.5 mg (1 tablet) daily rest of week    Come back in 6 week(s) for your next finger stick/INR blood test.        Avoid any over the counter items containing aspirin or ibuprofen, and avoid great swings in general diet.  Avoid alcohol consumption.  Please notify the INR nurse if you are started on any new medication including over the counter or herbal supplements. Also, please notify your INR nurse if any of your other prescription or over the counter medications have been discontinued.     Your Care Instructions  Warfarin is a medicine that you take to prevent blood clots. It is often called a blood thinner. Doctors give warfarin (such as Coumadin) to reduce the risk of blood clots. You may be at risk for blood clots if you have atrial fibrillation or deep vein thrombosis. Some other health problems may also put you at risk.  Warfarin slows the amount of time it takes for your blood to clot. It can cause bleeding problems. Even if you've been taking warfarin for a while, it's important to know how to take it safely.  Foods and other medicines can affect the way warfarin works. Some can make warfarin work too well. This can cause bleeding problems. And some can make it work poorly, so that it does not prevent blood clots very well.  You will need regular blood tests to check how long it takes for your blood to form a clot. This test is called a PT or prothrombin time test. The result of the test is called an INR level. Depending on the test results, your doctor or anticoagulation clinic may adjust your dose of warfarin.    Follow-up care is a key part of your treatment and safety. Be sure to make and go to all appointments, and call your doctor if you are having problems. It's also a good idea to know your test results and

## 2024-07-11 NOTE — PROGRESS NOTES
Pharmacy Progress Note - Anticoagulation Management     S/O:  Ms. Nuzhat Cano  is a 89 y.o. female seen today for anticoagulation management for the diagnosis of Atrial Fibrillation.      HPI: At last visit (6/4) INR was 2.8 and therapeutic for INR goal 2.0-3.0.  Patient recommended to continue 1.25 mg Tues; 2.5 mg daily ROW and recheck INR in 6 weeks.       Interim History:     Warfarin start date: Prior to October 2018  INR Goal:  2.0-3.0    Current warfarin regimen: 1.25 mg Tues; 2.5 mg daily ROW  Warfarin tablet strength:   2.5 mg   Duration of therapy: Indefinite    Today's pertinent positives includes:  No significant changes since last visit    INR 2.8  PT 33.9    Adherence:   Able to recall regimen? YES  Miss/extra dose? NO  Need refill? NO    Upcoming procedure(s):  NO    INR History:   (normal INR range 0.8-1.2)      Date                INR                  PT        Dose/Comments  07/16/24 2.8  33.9  No changes  06/04/24 2.8  33.7  No changes  04/23/24 2.2  26.7  No changes  03/12/24 2.4  28.4  No changes  01/30/24 2.3  27.6  No changes  12/19/23 2.8  33.0  No changes  11/17/23 2.3  28.0  No changes  10/09/23 2.9  35.4  No changes  09/11/23 2.4  28.7  No changes  08/14/23 2.4  28.2  No changes  07/17/23 2.3  27.6  No changes  06/19/23 2.2   26.5  No changes  05/22/23 2.0  23.9  5 mg x 1 dose then 1.25 mg Tues; 2.5 mg daily ROW  05/12/23 1.4   16.2  Resumed warfarin on 5/5 after procedure  04/11/23          2.7                   32.3     No changes  03/14/23          3.0                   35.9     Reduced to 1.25 mg Tues; 2.5 mg daily ROW  02/27/23          3.2                   38.6     2.5 mg daily  01/27/23          2.9                   34.8     1.25 mg x 1 dose then 2.5 mg daily  01/10/23          3.5                   41.8     No changes  12/06/22          2.6                   31.3     No changes  11/01/22          2.3                   28.0     No changes  10/04/22          2.5

## 2024-07-16 ENCOUNTER — ANTI-COAG VISIT (OUTPATIENT)
Facility: HOSPITAL | Age: 89
End: 2024-07-16
Payer: MEDICARE

## 2024-07-16 DIAGNOSIS — Z79.01 LONG TERM (CURRENT) USE OF ANTICOAGULANTS: ICD-10-CM

## 2024-07-16 DIAGNOSIS — I48.0 PAROXYSMAL ATRIAL FIBRILLATION (HCC): Primary | ICD-10-CM

## 2024-07-16 LAB
INTERNATIONAL NORMALIZATION RATIO, POC: 2.8
PROTHROMBIN TIME, POC: NORMAL

## 2024-07-16 PROCEDURE — 85610 PROTHROMBIN TIME: CPT

## 2024-07-16 PROCEDURE — 99211 OFF/OP EST MAY X REQ PHY/QHP: CPT

## 2024-07-23 PROCEDURE — 93294 REM INTERROG EVL PM/LDLS PM: CPT | Performed by: INTERNAL MEDICINE

## 2024-08-20 RX ORDER — AMLODIPINE BESYLATE 5 MG/1
TABLET ORAL
Qty: 90 TABLET | Refills: 1 | Status: SHIPPED | OUTPATIENT
Start: 2024-08-20

## 2024-08-20 NOTE — TELEPHONE ENCOUNTER
VO per NP    Future Appointments   Date Time Provider Department Center   8/28/2024 11:00 AM [facility] MEDICATION MGMT [facility]   9/30/2024 11:00 AM Gordy Lyles MD [facility] MAIN [facility] ECC DEP   6/12/2025  3:00 PM [email or web address]   6/12/2025  3:20 PM Cooper Osborn MD [facility]

## 2024-08-22 RX ORDER — ATENOLOL 100 MG/1
100 TABLET ORAL DAILY
Qty: 90 TABLET | Refills: 3 | Status: SHIPPED | OUTPATIENT
Start: 2024-08-22

## 2024-08-23 RX ORDER — WARFARIN SODIUM 2.5 MG/1
TABLET ORAL
Qty: 90 TABLET | Refills: 0 | Status: SHIPPED | OUTPATIENT
Start: 2024-08-23

## 2024-08-26 NOTE — PROGRESS NOTES
Pharmacy Progress Note - Anticoagulation Management     S/O:  Ms. Nuzhat Cano  is a 89 y.o. female seen today for anticoagulation management for the diagnosis of Atrial Fibrillation.      HPI: At last visit (7/16) INR was 2.8 and therapeutic for INR goal 2.0-3.0.  Patient recommended to continue 1.25 mg Tues; 2.5 mg daily ROW and recheck INR in 6 weeks.     Interim History:     Warfarin start date: Prior to October 2018  INR Goal:  2.0-3.0    Current warfarin regimen: 1.25 mg Tues; 2.5 mg daily ROW  Warfarin tablet strength:   2.5 mg   Duration of therapy: Indefinite    Today's pertinent positives includes:  No significant changes since last visit    INR 2.0  PT 24.5    Adherence:   Able to recall regimen? YES  Miss/extra dose? NO  Need refill? NO    Upcoming procedure(s):  NO    INR History:   (normal INR range 0.8-1.2)      Date                INR                  PT        Dose/Comments  08/28/24 2.0  24.5  No changes  07/16/24 2.8  33.9  No changes  06/04/24 2.8  33.7  No changes  04/23/24 2.2  26.7  No changes  03/12/24 2.4  28.4  No changes  01/30/24 2.3  27.6  No changes  12/19/23 2.8  33.0  No changes  11/17/23 2.3  28.0  No changes  10/09/23 2.9  35.4  No changes  09/11/23 2.4  28.7  No changes  08/14/23 2.4  28.2  No changes  07/17/23 2.3  27.6  No changes  06/19/23 2.2   26.5  No changes  05/22/23 2.0  23.9  5 mg x 1 dose then 1.25 mg Tues; 2.5 mg daily ROW  05/12/23 1.4   16.2  Resumed warfarin on 5/5 after procedure  04/11/23          2.7                   32.3     No changes  03/14/23          3.0                   35.9     Reduced to 1.25 mg Tues; 2.5 mg daily ROW  02/27/23          3.2                   38.6     2.5 mg daily  01/27/23          2.9                   34.8     1.25 mg x 1 dose then 2.5 mg daily  01/10/23          3.5                   41.8     No changes  12/06/22          2.6                   31.3     No changes  11/01/22          2.3                   28.0     No changes  10/04/22

## 2024-08-26 NOTE — PATIENT INSTRUCTIONS
Today your INR was 2.0.      Your goal INR is  2.0-3.0.    You have a   2.5 mg tablet of Coumadin (warfarin).   Take Coumadin (warfarin) as follows:    Take 1.25 mg (0.5 tablets) on Tuesdays and 2.5 mg (1 tablet) daily rest of week    Come back in 6 week(s) for your next finger stick/INR blood test.        Avoid any over the counter items containing aspirin or ibuprofen, and avoid great swings in general diet.  Avoid alcohol consumption.  Please notify the INR nurse if you are started on any new medication including over the counter or herbal supplements. Also, please notify your INR nurse if any of your other prescription or over the counter medications have been discontinued.     Your Care Instructions  Warfarin is a medicine that you take to prevent blood clots. It is often called a blood thinner. Doctors give warfarin (such as Coumadin) to reduce the risk of blood clots. You may be at risk for blood clots if you have atrial fibrillation or deep vein thrombosis. Some other health problems may also put you at risk.  Warfarin slows the amount of time it takes for your blood to clot. It can cause bleeding problems. Even if you've been taking warfarin for a while, it's important to know how to take it safely.  Foods and other medicines can affect the way warfarin works. Some can make warfarin work too well. This can cause bleeding problems. And some can make it work poorly, so that it does not prevent blood clots very well.  You will need regular blood tests to check how long it takes for your blood to form a clot. This test is called a PT or prothrombin time test. The result of the test is called an INR level. Depending on the test results, your doctor or anticoagulation clinic may adjust your dose of warfarin.    Follow-up care is a key part of your treatment and safety. Be sure to make and go to all appointments, and call your doctor if you are having problems. It's also a good idea to know your test results and  clutter.  Rearrange furniture and electrical cords to keep them out of walking paths.  Keep stairways, porches, and outside walkways well lit. Use night-lights in hallways and bathrooms.  Be extra careful when you work with sharp tools or knives.    When should you call for help?  Call 911 anytime you think you may need emergency care. For example, call if:  You have a sudden, severe headache that is different from past headaches.  Call your doctor now or seek immediate medical care if:  You have any abnormal bleeding, such as:  Nosebleeds.  Vaginal bleeding that is different (heavier, more frequent, at a different time of the month) than what you are used to.  Bloody or black stools, or rectal bleeding.  Bloody or pink urine.  Watch closely for changes in your health, and be sure to contact your doctor if you have any problems.    Where can you learn more?  Go to http://www.[employer].net/DigitalTownpConnections.  Enter N655 in the search box to learn more about \"Taking Warfarin Safely: Care Instructions.\"  Current as of: January 27, 2016  Content Version: 11.1  © 5816-0125 [employer]. Care instructions adapted under license by [employer] (which disclaims liability or warranty for this information). If you have questions about a medical condition or this instruction, always ask your healthcare professional. [employer] disclaims any warranty or liability for your use of this information.

## 2024-08-28 ENCOUNTER — ANTI-COAG VISIT (OUTPATIENT)
Facility: HOSPITAL | Age: 89
End: 2024-08-28
Payer: MEDICARE

## 2024-08-28 DIAGNOSIS — I48.0 PAROXYSMAL ATRIAL FIBRILLATION (HCC): Primary | ICD-10-CM

## 2024-08-28 DIAGNOSIS — Z79.01 LONG TERM (CURRENT) USE OF ANTICOAGULANTS: ICD-10-CM

## 2024-08-28 LAB
INTERNATIONAL NORMALIZATION RATIO, POC: 2
PROTHROMBIN TIME, POC: 0

## 2024-08-28 PROCEDURE — 85610 PROTHROMBIN TIME: CPT

## 2024-08-28 PROCEDURE — 99211 OFF/OP EST MAY X REQ PHY/QHP: CPT

## 2024-09-30 ENCOUNTER — OFFICE VISIT (OUTPATIENT)
Facility: CLINIC | Age: 89
End: 2024-09-30
Payer: MEDICARE

## 2024-09-30 VITALS
WEIGHT: 163.1 LBS | RESPIRATION RATE: 18 BRPM | DIASTOLIC BLOOD PRESSURE: 77 MMHG | BODY MASS INDEX: 27.84 KG/M2 | OXYGEN SATURATION: 98 % | HEIGHT: 64 IN | TEMPERATURE: 97.6 F | SYSTOLIC BLOOD PRESSURE: 132 MMHG | HEART RATE: 63 BPM

## 2024-09-30 DIAGNOSIS — D68.69 SECONDARY HYPERCOAGULABLE STATE (HCC): ICD-10-CM

## 2024-09-30 DIAGNOSIS — M10.00 IDIOPATHIC GOUT, UNSPECIFIED CHRONICITY, UNSPECIFIED SITE: ICD-10-CM

## 2024-09-30 DIAGNOSIS — R73.02 IGT (IMPAIRED GLUCOSE TOLERANCE): ICD-10-CM

## 2024-09-30 DIAGNOSIS — I10 ESSENTIAL HYPERTENSION, BENIGN: Primary | ICD-10-CM

## 2024-09-30 DIAGNOSIS — I87.2 VENOUS INSUFFICIENCY: ICD-10-CM

## 2024-09-30 DIAGNOSIS — Z95.0 CARDIAC PACEMAKER IN SITU: ICD-10-CM

## 2024-09-30 DIAGNOSIS — I48.0 PAROXYSMAL ATRIAL FIBRILLATION (HCC): ICD-10-CM

## 2024-09-30 DIAGNOSIS — E78.5 DYSLIPIDEMIA: ICD-10-CM

## 2024-09-30 DIAGNOSIS — Z23 NEED FOR VACCINATION: ICD-10-CM

## 2024-09-30 LAB
ALBUMIN SERPL-MCNC: 3.8 G/DL (ref 3.5–5)
ALBUMIN/GLOB SERPL: 1.2 (ref 1.1–2.2)
ALP SERPL-CCNC: 81 U/L (ref 45–117)
ALT SERPL-CCNC: 15 U/L (ref 12–78)
ANION GAP SERPL CALC-SCNC: 2 MMOL/L (ref 2–12)
APPEARANCE UR: CLEAR
AST SERPL-CCNC: 17 U/L (ref 15–37)
BACTERIA URNS QL MICRO: NEGATIVE /HPF
BASOPHILS # BLD: 0.1 K/UL (ref 0–0.1)
BASOPHILS NFR BLD: 1 % (ref 0–1)
BILIRUB SERPL-MCNC: 0.7 MG/DL (ref 0.2–1)
BILIRUB UR QL: NEGATIVE
BUN SERPL-MCNC: 15 MG/DL (ref 6–20)
BUN/CREAT SERPL: 15 (ref 12–20)
CALCIUM SERPL-MCNC: 9.7 MG/DL (ref 8.5–10.1)
CHLORIDE SERPL-SCNC: 100 MMOL/L (ref 97–108)
CHOLEST SERPL-MCNC: 129 MG/DL
CO2 SERPL-SCNC: 32 MMOL/L (ref 21–32)
COLOR UR: ABNORMAL
CREAT SERPL-MCNC: 0.99 MG/DL (ref 0.55–1.02)
DIFFERENTIAL METHOD BLD: ABNORMAL
EOSINOPHIL # BLD: 0.1 K/UL (ref 0–0.4)
EOSINOPHIL NFR BLD: 1 % (ref 0–7)
EPITH CASTS URNS QL MICRO: ABNORMAL /LPF
ERYTHROCYTE [DISTWIDTH] IN BLOOD BY AUTOMATED COUNT: 14.6 % (ref 11.5–14.5)
EST. AVERAGE GLUCOSE BLD GHB EST-MCNC: 120 MG/DL
GLOBULIN SER CALC-MCNC: 3.2 G/DL (ref 2–4)
GLUCOSE SERPL-MCNC: 89 MG/DL (ref 65–100)
GLUCOSE UR STRIP.AUTO-MCNC: NEGATIVE MG/DL
HBA1C MFR BLD: 5.8 % (ref 4–5.6)
HCT VFR BLD AUTO: 35.4 % (ref 35–47)
HDLC SERPL-MCNC: 81 MG/DL
HDLC SERPL: 1.6 (ref 0–5)
HGB BLD-MCNC: 12.3 G/DL (ref 11.5–16)
HGB UR QL STRIP: NEGATIVE
IMM GRANULOCYTES # BLD AUTO: 0 K/UL (ref 0–0.04)
IMM GRANULOCYTES NFR BLD AUTO: 0 % (ref 0–0.5)
KETONES UR QL STRIP.AUTO: NEGATIVE MG/DL
LDLC SERPL CALC-MCNC: 34.4 MG/DL (ref 0–100)
LEUKOCYTE ESTERASE UR QL STRIP.AUTO: ABNORMAL
LYMPHOCYTES # BLD: 1.2 K/UL (ref 0.8–3.5)
LYMPHOCYTES NFR BLD: 19 % (ref 12–49)
MCH RBC QN AUTO: 28.4 PG (ref 26–34)
MCHC RBC AUTO-ENTMCNC: 34.7 G/DL (ref 30–36.5)
MCV RBC AUTO: 81.8 FL (ref 80–99)
MONOCYTES # BLD: 0.9 K/UL (ref 0–1)
MONOCYTES NFR BLD: 14 % (ref 5–13)
NEUTS SEG # BLD: 4.2 K/UL (ref 1.8–8)
NEUTS SEG NFR BLD: 65 % (ref 32–75)
NITRITE UR QL STRIP.AUTO: NEGATIVE
NRBC # BLD: 0 K/UL (ref 0–0.01)
NRBC BLD-RTO: 0 PER 100 WBC
PH UR STRIP: 7.5 (ref 5–8)
PLATELET # BLD AUTO: 213 K/UL (ref 150–400)
PMV BLD AUTO: 10.8 FL (ref 8.9–12.9)
POTASSIUM SERPL-SCNC: 4 MMOL/L (ref 3.5–5.1)
PROT SERPL-MCNC: 7 G/DL (ref 6.4–8.2)
PROT UR STRIP-MCNC: NEGATIVE MG/DL
RBC # BLD AUTO: 4.33 M/UL (ref 3.8–5.2)
RBC #/AREA URNS HPF: ABNORMAL /HPF (ref 0–5)
SODIUM SERPL-SCNC: 134 MMOL/L (ref 136–145)
SP GR UR REFRACTOMETRY: 1.01 (ref 1–1.03)
TRIGL SERPL-MCNC: 68 MG/DL
TSH SERPL DL<=0.05 MIU/L-ACNC: 1.14 UIU/ML (ref 0.36–3.74)
UROBILINOGEN UR QL STRIP.AUTO: 0.2 EU/DL (ref 0.2–1)
VLDLC SERPL CALC-MCNC: 13.6 MG/DL
WBC # BLD AUTO: 6.5 K/UL (ref 3.6–11)
WBC URNS QL MICRO: ABNORMAL /HPF (ref 0–4)

## 2024-09-30 PROCEDURE — G8419 CALC BMI OUT NRM PARAM NOF/U: HCPCS | Performed by: INTERNAL MEDICINE

## 2024-09-30 PROCEDURE — G0008 ADMIN INFLUENZA VIRUS VAC: HCPCS | Performed by: INTERNAL MEDICINE

## 2024-09-30 PROCEDURE — 1090F PRES/ABSN URINE INCON ASSESS: CPT | Performed by: INTERNAL MEDICINE

## 2024-09-30 PROCEDURE — 99214 OFFICE O/P EST MOD 30 MIN: CPT | Performed by: INTERNAL MEDICINE

## 2024-09-30 PROCEDURE — 90653 IIV ADJUVANT VACCINE IM: CPT | Performed by: INTERNAL MEDICINE

## 2024-09-30 PROCEDURE — 1036F TOBACCO NON-USER: CPT | Performed by: INTERNAL MEDICINE

## 2024-09-30 PROCEDURE — 1123F ACP DISCUSS/DSCN MKR DOCD: CPT | Performed by: INTERNAL MEDICINE

## 2024-09-30 PROCEDURE — G8427 DOCREV CUR MEDS BY ELIG CLIN: HCPCS | Performed by: INTERNAL MEDICINE

## 2024-09-30 NOTE — PROGRESS NOTES
[facility]  Gordy Lyles MD, FACP, CMD  [address] [facility] [address]  Phone:  [phone]  Fax: [phone]       Chief Complaint   Patient presents with    Follow-up    Hypertension     Patient here for follow up high blood pressure.    .      SUBJECTIVE:    Nuzhat Cano is a 89 y.o. female Patient returns today with a known history of sinoatrial node dysfunction, paroxysmal atrial fibrillation, on Coumadin, followed by cardiology, Cooper Osborn MD, secondary hypercoagulable state, hyperuricemia, primary hypertension, venous insufficiency, gout, cardiac pacemaker in situ, and is seen for evaluation.      Patient is concerned about the hypopigmentation on her arms.  \"I tell you, I love sweets.\"  Patient is seen for evaluation. Her son is present.             Current Outpatient Medications   Medication Sig Dispense Refill    warfarin (COUMADIN) 2.5 MG tablet TAKE 1/2 TABLET TUESDAY AND TAKE 1 TABLET DAILY FOR THE REST OF WEEK OR AS DIRECTED 90 tablet 0    atenolol (TENORMIN) 100 MG tablet Take 1 tablet by mouth daily 90 tablet 3    amLODIPine (NORVASC) 5 MG tablet TAKE 1 TABLET BY MOUTH DAILY 90 tablet 1    simvastatin (ZOCOR) 20 MG tablet TAKE 1 TABLET BY MOUTH EVERY NIGHT 30 tablet 11    magnesium oxide (MAG-OX) 400 (240 Mg) MG tablet Take by mouth      calcium carbonate (TUMS) 500 MG chewable tablet Take 1 tablet by mouth daily      Multiple Vitamins-Minerals (MULTIVITAMIN ADULTS 50+) TABS Take by mouth Centrum Silver      vitamin D 25 MCG (1000 UT) CAPS Take by mouth daily      latanoprost (XALATAN) 0.005 % ophthalmic solution INT 1 GTT IN OU QD HS       No current facility-administered medications for this visit.     Past Medical History:   Diagnosis Date    Aortic valve disorders 4/29/2012    Atrial fibrillation (HCC) 03/22/2011    on coumadin followed by cardiology - Cooper Osborn MD    CAD (coronary artery disease)     Cor pulmonale, chronic (HCC) 4/29/2012    Dyslipidemia

## 2024-09-30 NOTE — PROGRESS NOTES
Chief Complaint   Patient presents with    Follow-up    Hypertension     Patient here for follow up high blood pressure.      \"Have you been to the ER, urgent care clinic since your last visit?  Hospitalized since your last visit?\"    NO    “Have you seen or consulted any other health care providers outside our system since your last visit?”    NO

## 2024-10-02 NOTE — PROGRESS NOTES
Pharmacy Progress Note - Anticoagulation Management     S/O:  Ms. Nuzhat Cano  is a 89 y.o. female seen today for anticoagulation management for the diagnosis of Atrial Fibrillation.      HPI: At last visit (8/28) INR was 2.0 and therapeutic for INR goal 2.0-3.0.  Patient recommended to continue 1.25 mg Tues; 2.5 mg daily ROW and recheck INR in 6 weeks.      Interim History:     Warfarin start date: Prior to October 2018  INR Goal:  2.0-3.0    Current warfarin regimen: 1.25 mg Tues; 2.5 mg daily ROW  Warfarin tablet strength:   2.5 mg   Duration of therapy: Indefinite    Today's pertinent positives includes:  No significant changes since last visit    INR 2.4  PT 28.5    Adherence:   Able to recall regimen? YES  Miss/extra dose? NO  Need refill? NO    Upcoming procedure(s):  NO    INR History:   (normal INR range 0.8-1.2)      Date                INR                  PT        Dose/Comments  10/09/24 2.4  28.5  No changes  08/28/24 2.0  24.5  No changes  07/16/24 2.8  33.9  No changes  06/04/24 2.8  33.7  No changes  04/23/24 2.2  26.7  No changes  03/12/24 2.4  28.4  No changes  01/30/24 2.3  27.6  No changes  12/19/23 2.8  33.0  No changes  11/17/23 2.3  28.0  No changes  10/09/23 2.9  35.4  No changes  09/11/23 2.4  28.7  No changes  08/14/23 2.4  28.2  No changes  07/17/23 2.3  27.6  No changes  06/19/23 2.2   26.5  No changes  05/22/23 2.0  23.9  5 mg x 1 dose then 1.25 mg Tues; 2.5 mg daily ROW  05/12/23 1.4   16.2  Resumed warfarin on 5/5 after procedure  04/11/23          2.7                   32.3     No changes  03/14/23          3.0                   35.9     Reduced to 1.25 mg Tues; 2.5 mg daily ROW  02/27/23          3.2                   38.6     2.5 mg daily  01/27/23          2.9                   34.8     1.25 mg x 1 dose then 2.5 mg daily  01/10/23          3.5                   41.8     No changes  12/06/22          2.6                   31.3     No changes  11/01/22          2.3

## 2024-10-09 ENCOUNTER — ANTI-COAG VISIT (OUTPATIENT)
Facility: HOSPITAL | Age: 89
End: 2024-10-09
Payer: MEDICARE

## 2024-10-09 DIAGNOSIS — I48.0 PAROXYSMAL ATRIAL FIBRILLATION (HCC): Primary | ICD-10-CM

## 2024-10-09 DIAGNOSIS — Z79.01 LONG TERM (CURRENT) USE OF ANTICOAGULANTS: ICD-10-CM

## 2024-10-09 LAB
INTERNATIONAL NORMALIZATION RATIO, POC: 2.4
PROTHROMBIN TIME, POC: 0

## 2024-10-09 PROCEDURE — 85610 PROTHROMBIN TIME: CPT

## 2024-10-09 PROCEDURE — 99211 OFF/OP EST MAY X REQ PHY/QHP: CPT

## 2024-10-26 PROCEDURE — 93294 REM INTERROG EVL PM/LDLS PM: CPT | Performed by: INTERNAL MEDICINE

## 2024-11-18 RX ORDER — AMLODIPINE BESYLATE 5 MG/1
TABLET ORAL
Qty: 90 TABLET | Refills: 1 | Status: SHIPPED | OUTPATIENT
Start: 2024-11-18

## 2024-11-18 NOTE — TELEPHONE ENCOUNTER
VO per NP    Future Appointments   Date Time Provider Department Center   11/20/2024 11:00 AM [facility] MEDICATION MGMT [facility]   3/31/2025 11:00 AM Gordy Lyles MD [facility] MAIN [facility] ECC DEP   6/12/2025  3:00 PM [email or web address] AMB   6/12/2025  3:20 PM Cooper Osborn MD [facility] AMB

## 2024-11-19 NOTE — PATIENT INSTRUCTIONS
or shoes with nonskid soles.  Remove throw rugs and clutter.  Rearrange furniture and electrical cords to keep them out of walking paths.  Keep stairways, porches, and outside walkways well lit. Use night-lights in hallways and bathrooms.  Be extra careful when you work with sharp tools or knives.    When should you call for help?  Call 911 anytime you think you may need emergency care. For example, call if:  You have a sudden, severe headache that is different from past headaches.  Call your doctor now or seek immediate medical care if:  You have any abnormal bleeding, such as:  Nosebleeds.  Vaginal bleeding that is different (heavier, more frequent, at a different time of the month) than what you are used to.  Bloody or black stools, or rectal bleeding.  Bloody or pink urine.  Watch closely for changes in your health, and be sure to contact your doctor if you have any problems.    Where can you learn more?  Go to http://www.[employer].net/videScreen NetworkspConnections.  Enter N655 in the search box to learn more about \"Taking Warfarin Safely: Care Instructions.\"  Current as of: January 27, 2016  Content Version: 11.1  © 6046-9673 [employer]. Care instructions adapted under license by [employer] (which disclaims liability or warranty for this information). If you have questions about a medical condition or this instruction, always ask your healthcare professional. [employer] disclaims any warranty or liability for your use of this information.

## 2024-11-19 NOTE — PROGRESS NOTES
changes  11/01/22          2.3                   28.0     No changes  10/04/22          2.5                   30.1     No changes  09/01/22          2.7                   32.9     Increased to 2.5 mg daily  08/16/22          1.9                   22.8     No changes  07/19/22          2.1                   24.9     No changes  06/21/22          2.2                   26.6     Increased to 1.25 mg Mon; 2.5 mg daily ROW  05/31/22          1.8                   21.7     No changes  05/16/22          1.9                   22.4     No changes  04/18/22          2.3                   27.6     No changes  03/21/22          2.3                   28.1     No changes  02/21/22          2.3                   28.1     No changes  02/02/22          1.9                   22.4     No changes, increased greens  01/19/22          3.3                   39.7     Held x 1 day, then 1.25 mg Thur, 2.5 mg daily ROW  01/10/22          3.9                   46.3     No changes  12/27/21          3.2                   38.0     No changes  11/22/21          2.2                               No changes  10/25/21          2.1                               1.25 mg every Thu; 2.5 mg all other days     A/P:        Anticoagulation:  Considering Ms. Cano's past history, todays findings, and per Anticoagulation Collaborative Practice Agreement/Protocol:     Fingerstick POC INR (1.6) is Subtherapeutic for INR goal today.   Change warfarin plan and take 3.75 mg tomorrow (11/21).  On 11/22 resume taking 1.25 mg Tues; 2.5 mg daily ROW  INR is 1.6 and subtherapeutic for INR goal 2.0-3.0.  Alvaro (son) recalled warfarin plan from last visit (10/9) and denies missed or extra doses of warfarin.  Alvaro denies changes to patient's other medications and reports typical, consistent intake of vitamin K foods.  Alvaro reports patient has already taken warfarin dose today (11/20).  Patient recommended to take 3.75 mg tomorrow (11/21) instead of 2.5 mg as planned.  Since INR

## 2024-11-20 ENCOUNTER — ANTI-COAG VISIT (OUTPATIENT)
Facility: HOSPITAL | Age: 89
End: 2024-11-20
Payer: MEDICARE

## 2024-11-20 DIAGNOSIS — Z79.01 LONG TERM (CURRENT) USE OF ANTICOAGULANTS: ICD-10-CM

## 2024-11-20 DIAGNOSIS — I48.0 PAROXYSMAL ATRIAL FIBRILLATION (HCC): Primary | ICD-10-CM

## 2024-11-20 LAB
INTERNATIONAL NORMALIZATION RATIO, POC: 1.6
PROTHROMBIN TIME, POC: 0

## 2024-11-20 PROCEDURE — 85610 PROTHROMBIN TIME: CPT

## 2024-11-20 PROCEDURE — 99212 OFFICE O/P EST SF 10 MIN: CPT

## 2024-11-26 NOTE — PROGRESS NOTES
Pharmacy Progress Note - Anticoagulation Management     S/O:  Ms. Nuzhat Cano  is a 89 y.o. female seen today for anticoagulation management for the diagnosis of Atrial Fibrillation.      HPI: At last visit (11/20) INR was 1.6 and subtherapeutic for INR goal 2.0-3.0.  Patient recommended to take 3.75 mg on 11/21 instead of 2.5 mg as planned.  Since INR previously therapeutic on this weekly dose, on 11/22 patient instructed to resume taking 1.25 mg Tues; 2.5 mg daily ROW and recheck INR in 1 to 2 weeks.  Follow up INR scheduled on 12/4.  Ron called and requested to reschedule INR to 12/2.     Interim History:     Warfarin start date: Prior to October 2018  INR Goal:  2.0-3.0    Current warfarin regimen: 3.75 mg x 1 dose then 1.25 mg Tues; 2.5 mg daily ROW  Warfarin tablet strength:   2.5 mg   Duration of therapy: Indefinite    Today's pertinent positives includes:  No significant changes since last visit    INR 2.1  PT 24.6    Adherence:   Able to recall regimen? YES  Miss/extra dose? NO  Need refill? NO    Upcoming procedure(s):  NO    INR History:   (normal INR range 0.8-1.2)      Date                INR                  PT        Dose/Comments  12/02/24 2.1  24.6  3.75 mg x 1 dose then 1.25 mg Tues; 2.5 mg daily ROW  11/20/24 1.6  19.5  No changes  10/09/24 2.4  28.5  No changes  08/28/24 2.0  24.5  No changes  07/16/24 2.8  33.9  No changes  06/04/24 2.8  33.7  No changes  04/23/24 2.2  26.7  No changes  03/12/24 2.4  28.4  No changes  01/30/24 2.3  27.6  No changes  12/19/23 2.8  33.0  No changes  11/17/23 2.3  28.0  No changes  10/09/23 2.9  35.4  No changes  09/11/23 2.4  28.7  No changes  08/14/23 2.4  28.2  No changes  07/17/23 2.3  27.6  No changes  06/19/23 2.2   26.5  No changes  05/22/23 2.0  23.9  5 mg x 1 dose then 1.25 mg Tues; 2.5 mg daily ROW  05/12/23 1.4   16.2  Resumed warfarin on 5/5 after procedure  04/11/23          2.7                   32.3     No changes  03/14/23          3.0

## 2024-11-26 NOTE — PATIENT INSTRUCTIONS
Today your INR was 2.1.      Your goal INR is  2.0-3.0.    You have a   2.5 mg tablet of Coumadin (warfarin).   Take Coumadin (warfarin) as follows:    Take 1.25 mg (0.5 tablets) on Tuesdays and 2.5 mg (1 tablet) daily rest of week    Come back in 4 week(s) for your next finger stick/INR blood test.      Avoid any over the counter items containing aspirin or ibuprofen, and avoid great swings in general diet.  Avoid alcohol consumption.  Please notify the INR nurse if you are started on any new medication including over the counter or herbal supplements. Also, please notify your INR nurse if any of your other prescription or over the counter medications have been discontinued.     Your Care Instructions  Warfarin is a medicine that you take to prevent blood clots. It is often called a blood thinner. Doctors give warfarin (such as Coumadin) to reduce the risk of blood clots. You may be at risk for blood clots if you have atrial fibrillation or deep vein thrombosis. Some other health problems may also put you at risk.  Warfarin slows the amount of time it takes for your blood to clot. It can cause bleeding problems. Even if you've been taking warfarin for a while, it's important to know how to take it safely.  Foods and other medicines can affect the way warfarin works. Some can make warfarin work too well. This can cause bleeding problems. And some can make it work poorly, so that it does not prevent blood clots very well.  You will need regular blood tests to check how long it takes for your blood to form a clot. This test is called a PT or prothrombin time test. The result of the test is called an INR level. Depending on the test results, your doctor or anticoagulation clinic may adjust your dose of warfarin.    Follow-up care is a key part of your treatment and safety. Be sure to make and go to all appointments, and call your doctor if you are having problems. It's also a good idea to know your test results and

## 2024-12-02 ENCOUNTER — ANTI-COAG VISIT (OUTPATIENT)
Facility: HOSPITAL | Age: 88
End: 2024-12-02
Payer: MEDICARE

## 2024-12-02 DIAGNOSIS — I48.0 PAROXYSMAL ATRIAL FIBRILLATION (HCC): Primary | ICD-10-CM

## 2024-12-02 DIAGNOSIS — Z79.01 LONG TERM (CURRENT) USE OF ANTICOAGULANTS: ICD-10-CM

## 2024-12-02 LAB
INTERNATIONAL NORMALIZATION RATIO, POC: 2.1
PROTHROMBIN TIME, POC: 0

## 2024-12-02 PROCEDURE — 85610 PROTHROMBIN TIME: CPT

## 2024-12-02 PROCEDURE — 99211 OFF/OP EST MAY X REQ PHY/QHP: CPT

## 2024-12-19 ENCOUNTER — TELEPHONE (OUTPATIENT)
Age: 88
End: 2024-12-19

## 2024-12-19 RX ORDER — FUROSEMIDE 20 MG/1
20 TABLET ORAL DAILY PRN
Qty: 30 TABLET | Refills: 3 | Status: SHIPPED | OUTPATIENT
Start: 2024-12-19

## 2024-12-19 NOTE — TELEPHONE ENCOUNTER
Patient son ( Bryn ) called in stating that they are having some questions about patient swelling . They are requesting to please have an urgent call back .       Patient ( Bryn ) #  [phone]

## 2024-12-19 NOTE — TELEPHONE ENCOUNTER
Verified patient with two types of identifiers. Spoke with Bryn, verified on PHI. Notified of NP recommendations. He would like to try Furosemide 20 mg daily PRN. Verified pharmacy. He will continue to monitor and call if symptoms do not improve. Patient verbalized understanding and will call with any other questions.      Future Appointments   Date Time Provider Department Center   1/8/2025 11:00 AM [facility] MEDICATION MGMT [facility]   3/31/2025 11:00 AM Gordy Lyles MD [facility] MAIN [facility] ECC DEP   6/12/2025  3:00 PM [facility] AMB   6/12/2025  3:20 PM Cooper Osborn MD [facility] AMB

## 2024-12-19 NOTE — TELEPHONE ENCOUNTER
Patient is on warfarin, so likelihood of DVT I slow.  Amlodipine is likely contributing.  She can try furosemide 20 mg po daily prn, but they should be aware that it would cause increased urination. If BP is controlled, she can try reducing amlodipine to 2.5 mg po daily to see if that's helpful.  Also recommend elevation of lower extremities.    Echo was last done in 04/2023, showed normal LVEF.  I don't recommend a repeat, as it wouldn't really  much if any.

## 2025-01-02 ENCOUNTER — OFFICE VISIT (OUTPATIENT)
Facility: CLINIC | Age: 89
End: 2025-01-02

## 2025-01-02 VITALS
HEART RATE: 59 BPM | OXYGEN SATURATION: 96 % | HEIGHT: 64 IN | WEIGHT: 160 LBS | BODY MASS INDEX: 27.31 KG/M2 | TEMPERATURE: 97.6 F | DIASTOLIC BLOOD PRESSURE: 75 MMHG | RESPIRATION RATE: 18 BRPM | SYSTOLIC BLOOD PRESSURE: 130 MMHG

## 2025-01-02 DIAGNOSIS — I48.0 PAROXYSMAL ATRIAL FIBRILLATION (HCC): ICD-10-CM

## 2025-01-02 DIAGNOSIS — D68.69 SECONDARY HYPERCOAGULABLE STATE (HCC): ICD-10-CM

## 2025-01-02 DIAGNOSIS — I10 ESSENTIAL HYPERTENSION, BENIGN: ICD-10-CM

## 2025-01-02 DIAGNOSIS — Z79.01 LONG TERM (CURRENT) USE OF ANTICOAGULANTS: Primary | ICD-10-CM

## 2025-01-02 ASSESSMENT — PATIENT HEALTH QUESTIONNAIRE - PHQ9
SUM OF ALL RESPONSES TO PHQ QUESTIONS 1-9: 0
SUM OF ALL RESPONSES TO PHQ QUESTIONS 1-9: 0
SUM OF ALL RESPONSES TO PHQ9 QUESTIONS 1 & 2: 0
1. LITTLE INTEREST OR PLEASURE IN DOING THINGS: NOT AT ALL
2. FEELING DOWN, DEPRESSED OR HOPELESS: NOT AT ALL
SUM OF ALL RESPONSES TO PHQ QUESTIONS 1-9: 0
SUM OF ALL RESPONSES TO PHQ QUESTIONS 1-9: 0

## 2025-01-02 ASSESSMENT — ANXIETY QUESTIONNAIRES
1. FEELING NERVOUS, ANXIOUS, OR ON EDGE: NOT AT ALL
7. FEELING AFRAID AS IF SOMETHING AWFUL MIGHT HAPPEN: NOT AT ALL
6. BECOMING EASILY ANNOYED OR IRRITABLE: NOT AT ALL
GAD7 TOTAL SCORE: 0
5. BEING SO RESTLESS THAT IT IS HARD TO SIT STILL: NOT AT ALL
IF YOU CHECKED OFF ANY PROBLEMS ON THIS QUESTIONNAIRE, HOW DIFFICULT HAVE THESE PROBLEMS MADE IT FOR YOU TO DO YOUR WORK, TAKE CARE OF THINGS AT HOME, OR GET ALONG WITH OTHER PEOPLE: NOT DIFFICULT AT ALL
3. WORRYING TOO MUCH ABOUT DIFFERENT THINGS: NOT AT ALL
2. NOT BEING ABLE TO STOP OR CONTROL WORRYING: NOT AT ALL
4. TROUBLE RELAXING: NOT AT ALL

## 2025-01-02 NOTE — PROGRESS NOTES
Chief Complaint   Patient presents with    Follow-up    Hypertension     Patient here for follow up high blood pressure and knot on wrist.      \"Have you been to the ER, urgent care clinic since your last visit?  Hospitalized since your last visit?\"    NO    “Have you seen or consulted any other health care providers outside our system since your last visit?”    NO             
tablet TAKE 1 TABLET BY MOUTH DAILY 90 tablet 1    warfarin (COUMADIN) 2.5 MG tablet TAKE 1/2 TABLET TUESDAY AND TAKE 1 TABLET DAILY FOR THE REST OF WEEK OR AS DIRECTED 90 tablet 0    atenolol (TENORMIN) 100 MG tablet Take 1 tablet by mouth daily 90 tablet 3    simvastatin (ZOCOR) 20 MG tablet TAKE 1 TABLET BY MOUTH EVERY NIGHT 30 tablet 11    magnesium oxide (MAG-OX) 400 (240 Mg) MG tablet Take by mouth      calcium carbonate (TUMS) 500 MG chewable tablet Take 1 tablet by mouth daily      Multiple Vitamins-Minerals (MULTIVITAMIN ADULTS 50+) TABS Take by mouth Centrum Silver      vitamin D 25 MCG (1000 UT) CAPS Take by mouth daily      latanoprost (XALATAN) 0.005 % ophthalmic solution INT 1 GTT IN OU QD HS       No current facility-administered medications for this visit.     Past Medical History:   Diagnosis Date    Aortic valve disorders 4/29/2012    Atrial fibrillation (HCC) 03/22/2011    on coumadin followed by cardiology - Cooper Osborn MD    CAD (coronary artery disease)     Cor pulmonale, chronic (HCC) 4/29/2012    Dyslipidemia (high LDL; low HDL) 4/29/2012    Gout 4/30/2012    Gout, arthritis 2/8/2012    Gout, joint     Hypertension     Hyperuricemia 4/30/2012    Obstructive sleep apnea 4/29/2012    Pacemaker 04/02/2006    Dr Birmingham placed the original pacer 4/6/2006.  It is replaced in 2014.  It is [employer]    Recurrent epistaxis 4/29/2012    Sinoatrial node dysfunction (HCC) 3/22/2011    Venous insufficiency      Past Surgical History:   Procedure Laterality Date    BREAST SURGERY      lumpectomy    GEN INSERT/REPLACE ONLY DUAL  4/28/2014         PACEMAKER       No Known Allergies      REVIEW OF SYSTEMS:  General: negative for - chills or fever  ENT: negative for - headaches, nasal congestion or tinnitus  Respiratory: negative for - cough, hemoptysis, shortness of breath or wheezing  Cardiovascular : negative for - chest pain, edema, palpitations or shortness of breath  Gastrointestinal: negative for -

## 2025-01-14 NOTE — PROGRESS NOTES
Pharmacy Progress Note - Anticoagulation Management     S/O:  Ms. Nuzhat Cano  is a 89 y.o. female seen today for anticoagulation management for the diagnosis of Atrial Fibrillation.      HPI: At last visit (12/2) INR was  2.1 and therapeutic for INR goal 2.0-3.0.  Patient recommended to continue 1.25 mg Tues; 2.5 mg daily ROW and recheck INR in 4 weeks.  Alvaro (son) requested to schedule follow up INR on 1/8/2025.  Ron called and requested to reschedule INR to 1/17 at 12 noon.     Interim History:     Warfarin start date: Prior to October 2018  INR Goal:  2.0-3.0    Current warfarin regimen: 1.25 mg Tues; 2.5 mg daily ROW  Warfarin tablet strength:   2.5 mg   Duration of therapy: Indefinite    Today's pertinent positives includes:  No significant changes since last visit    INR 2.2  PT 26.9    Adherence:   Able to recall regimen? YES  Miss/extra dose? NO  Need refill? NO    Upcoming procedure(s):  NO    INR History:   (normal INR range 0.8-1.2)      Date                INR                  PT        Dose/Comments  01/17/25 2.2  26.9  1.25 mg Tues; 2.5 mg daily ROW  12/02/24 2.1  24.6  3.75 mg x 1 dose then 1.25 mg Tues; 2.5 mg daily ROW  11/20/24 1.6  19.5  No changes  10/09/24 2.4  28.5  No changes  08/28/24 2.0  24.5  No changes  07/16/24 2.8  33.9  No changes  06/04/24 2.8  33.7  No changes  04/23/24 2.2  26.7  No changes  03/12/24 2.4  28.4  No changes  01/30/24 2.3  27.6  No changes  12/19/23 2.8  33.0  No changes  11/17/23 2.3  28.0  No changes  10/09/23 2.9  35.4  No changes  09/11/23 2.4  28.7  No changes  08/14/23 2.4  28.2  No changes  07/17/23 2.3  27.6  No changes  06/19/23 2.2   26.5  No changes  05/22/23 2.0  23.9  5 mg x 1 dose then 1.25 mg Tues; 2.5 mg daily ROW  05/12/23 1.4   16.2  Resumed warfarin on 5/5 after procedure  04/11/23          2.7                   32.3     No changes  03/14/23          3.0                   35.9     Reduced to 1.25 mg Tues; 2.5 mg daily ROW  02/27/23          3.2

## 2025-01-14 NOTE — PATIENT INSTRUCTIONS
keep a list of the medicines you take.    How can you care for yourself at home?  Take warfarin safely  Take your warfarin at the same time each day.  If you miss a dose of warfarin, don't take an extra dose to make up for it. Your doctor can tell you exactly what to do so you don't take too much or too little.  Wear medical alert jewelry that lets others know that you take warfarin. You can buy this at most drugstores.  Don't take warfarin if you are pregnant or planning to get pregnant. Talk to your doctor about how you can prevent getting pregnant while you are taking it.  Don't change your dose or stop taking warfarin unless your doctor tells you to.  Effects of medicines and food on warfarin  Don't start or stop taking any medicines, vitamins, or natural remedies unless you first talk to your doctor. Many medicines can affect how warfarin works. These include aspirin and other pain relievers, over-the-counter medicines, multivitamins, dietary supplements, and herbal products.  Tell all of your doctors and pharmacists that you take warfarin. Some prescription medicines can affect how warfarin works.  Keep the amount of vitamin K in your diet about the same from day to day. Do not suddenly eat a lot more or a lot less food that is rich in vitamin K than you usually do. Vitamin K affects how warfarin works and how your blood clots. Talk with your doctor before making big changes in your diet. Vitamin K is in many foods, such as:  Leafy greens, such as kale, cabbage, spinach, Swiss chard, and lettuce.  Canola and soybean oils.  Green vegetables, such as asparagus, broccoli, and Marks sprouts.  Vegetable drinks, green tea leaves, and some dietary supplement drinks.  Avoid cranberry juice and other cranberry products. They can increase the effects of warfarin.  Limit your use of alcohol.    Avoid bleeding by preventing falls and injuries  Wear slippers or shoes with nonskid soles.  Remove throw rugs and

## 2025-01-17 ENCOUNTER — ANTI-COAG VISIT (OUTPATIENT)
Facility: HOSPITAL | Age: 89
End: 2025-01-17
Payer: MEDICARE

## 2025-01-17 DIAGNOSIS — I48.0 PAROXYSMAL ATRIAL FIBRILLATION (HCC): Primary | ICD-10-CM

## 2025-01-17 DIAGNOSIS — Z79.01 LONG TERM (CURRENT) USE OF ANTICOAGULANTS: ICD-10-CM

## 2025-01-17 LAB
INTERNATIONAL NORMALIZATION RATIO, POC: 2.2
PROTHROMBIN TIME, POC: 0

## 2025-01-17 PROCEDURE — 99211 OFF/OP EST MAY X REQ PHY/QHP: CPT

## 2025-01-17 PROCEDURE — 85610 PROTHROMBIN TIME: CPT

## 2025-01-17 RX ORDER — WARFARIN SODIUM 2.5 MG/1
TABLET ORAL
Qty: 90 TABLET | Refills: 0 | Status: SHIPPED | OUTPATIENT
Start: 2025-01-17

## 2025-02-21 ENCOUNTER — TELEPHONE (OUTPATIENT)
Age: 89
End: 2025-02-21

## 2025-02-21 RX ORDER — AMLODIPINE BESYLATE 5 MG/1
5 TABLET ORAL DAILY
Qty: 90 TABLET | Refills: 1 | Status: SHIPPED | OUTPATIENT
Start: 2025-02-21

## 2025-02-21 RX ORDER — AMLODIPINE BESYLATE 5 MG/1
5 TABLET ORAL DAILY
Qty: 90 TABLET | Refills: 1 | Status: SHIPPED | OUTPATIENT
Start: 2025-02-21 | End: 2025-02-21

## 2025-02-21 NOTE — TELEPHONE ENCOUNTER
Patient's son Bryn is calling because his mother needs a refill of Amlodipine 5 mg.Please assist.    Pharmacy:    [id number] [place] [id number] [phone] - F [phone] Bryn (son)

## 2025-02-21 NOTE — TELEPHONE ENCOUNTER
Med refilled per VO by MD.    Future Appointments   Date Time Provider Department Center   2/28/2025 10:45 AM [facility] MEDICATION MGMT [facility]   6/12/2025  3:00 PM ROYER SCHMITT [facility]   6/12/2025  3:20 PM OsbornCooper florentino MD CAVRE[facility]   7/2/2025 11:30 AM Gordy Lyles MD [facility] MAIN [facility] DEP

## 2025-02-24 NOTE — PROGRESS NOTES
Pharmacy Progress Note - Anticoagulation Management     S/O:  Ms. Nuzhat Cano  is a 89 y.o. female seen today for anticoagulation management for the diagnosis of Atrial Fibrillation.      HPI: At last visit (1/17) INR was 2.2 and therapeutic for INR goal 2.0-3.0.  Patient recommended to continue 1.25 mg Tues; 2.5 mg daily ROW and recheck INR in 4 weeks.  Alvaro requested to schedule follow up INR on 2/21 at 10 AM.  INR was rescheduled to 2/28.     Interim History:     Warfarin start date: Prior to October 2018  INR Goal:  2.0-3.0    Current warfarin regimen: 1.25 mg Tues; 2.5 mg daily ROW  Warfarin tablet strength:   2.5 mg   Duration of therapy: Indefinite    Today's pertinent positives includes:  No significant changes since last visit    INR 2.3  PT 27.5    Adherence:   Able to recall regimen? YES  Miss/extra dose? NO  Need refill? NO    Upcoming procedure(s):  NO    INR History:   (normal INR range 0.8-1.2)      Date                INR                  PT        Dose/Comments  02/28/25 2.3  27.5  No changes  01/17/25 2.2  26.9  1.25 mg Tues; 2.5 mg daily ROW  12/02/24 2.1  24.6  3.75 mg x 1 dose then 1.25 mg Tues; 2.5 mg daily ROW  11/20/24 1.6  19.5  No changes  10/09/24 2.4  28.5  No changes  08/28/24 2.0  24.5  No changes  07/16/24 2.8  33.9  No changes  06/04/24 2.8  33.7  No changes  04/23/24 2.2  26.7  No changes  03/12/24 2.4  28.4  No changes  01/30/24 2.3  27.6  No changes  12/19/23 2.8  33.0  No changes  11/17/23 2.3  28.0  No changes  10/09/23 2.9  35.4  No changes  09/11/23 2.4  28.7  No changes  08/14/23 2.4  28.2  No changes  07/17/23 2.3  27.6  No changes  06/19/23 2.2   26.5  No changes  05/22/23 2.0  23.9  5 mg x 1 dose then 1.25 mg Tues; 2.5 mg daily ROW  05/12/23 1.4   16.2  Resumed warfarin on 5/5 after procedure  04/11/23          2.7                   32.3     No changes  03/14/23          3.0                   35.9     Reduced to 1.25 mg Tues; 2.5 mg daily ROW  02/27/23          3.2

## 2025-02-26 ENCOUNTER — TELEPHONE (OUTPATIENT)
Age: 89
End: 2025-02-26

## 2025-02-28 ENCOUNTER — ANTI-COAG VISIT (OUTPATIENT)
Facility: HOSPITAL | Age: 89
End: 2025-02-28
Payer: MEDICARE

## 2025-02-28 DIAGNOSIS — Z79.01 LONG TERM (CURRENT) USE OF ANTICOAGULANTS: ICD-10-CM

## 2025-02-28 DIAGNOSIS — I48.0 PAROXYSMAL ATRIAL FIBRILLATION (HCC): Primary | ICD-10-CM

## 2025-02-28 LAB
INTERNATIONAL NORMALIZATION RATIO, POC: 2.3
PROTHROMBIN TIME, POC: 0

## 2025-02-28 PROCEDURE — 85610 PROTHROMBIN TIME: CPT

## 2025-02-28 PROCEDURE — 99211 OFF/OP EST MAY X REQ PHY/QHP: CPT

## 2025-04-01 NOTE — PROGRESS NOTES
Pharmacy Progress Note - Anticoagulation Management     S/O:  Ms. Nuzhat Cano  is a 89 y.o. female seen today for anticoagulation management for the diagnosis of Atrial Fibrillation.      HPI: At last visit (2/28) INR was  2.3 and therapeutic for INR goal 2.0-3.0.  Patient recommended to continue 1.25 mg Tues; 2.5 mg daily ROW and recheck INR in 4 weeks.  Alvaro (son) requested to schedule follow up INR on 4/2.     Interim History:     Warfarin start date: Prior to October 2018  INR Goal:  2.0-3.0    Current warfarin regimen: 1.25 mg Tues; 2.5 mg daily ROW  Warfarin tablet strength:   2.5 mg   Duration of therapy: Indefinite    Today's pertinent positives includes:  No significant changes since last visit    INR 2.2  PT 26.3    Adherence:   Able to recall regimen? YES  Miss/extra dose? NO  Need refill? NO    Upcoming procedure(s):  NO    INR History:   (normal INR range 0.8-1.2)      Date                INR                  PT        Dose/Comments  04/02/25 2.2  26.3  No changes  02/28/25 2.3  27.5  No changes  01/17/25 2.2  26.9  1.25 mg Tues; 2.5 mg daily ROW  12/02/24 2.1  24.6  3.75 mg x 1 dose then 1.25 mg Tues; 2.5 mg daily ROW  11/20/24 1.6  19.5  No changes  10/09/24 2.4  28.5  No changes  08/28/24 2.0  24.5  No changes  07/16/24 2.8  33.9  No changes  06/04/24 2.8  33.7  No changes  04/23/24 2.2  26.7  No changes  03/12/24 2.4  28.4  No changes  01/30/24 2.3  27.6  No changes  12/19/23 2.8  33.0  No changes  11/17/23 2.3  28.0  No changes  10/09/23 2.9  35.4  No changes  09/11/23 2.4  28.7  No changes  08/14/23 2.4  28.2  No changes  07/17/23 2.3  27.6  No changes  06/19/23 2.2   26.5  No changes  05/22/23 2.0  23.9  5 mg x 1 dose then 1.25 mg Tues; 2.5 mg daily ROW  05/12/23 1.4   16.2  Resumed warfarin on 5/5 after procedure  04/11/23          2.7                   32.3     No changes  03/14/23          3.0                   35.9     Reduced to 1.25 mg Tues; 2.5 mg daily ROW  02/27/23          3.2

## 2025-04-02 ENCOUNTER — ANTI-COAG VISIT (OUTPATIENT)
Facility: HOSPITAL | Age: 89
End: 2025-04-02
Payer: MEDICARE

## 2025-04-02 DIAGNOSIS — I48.0 PAROXYSMAL ATRIAL FIBRILLATION (HCC): Primary | ICD-10-CM

## 2025-04-02 DIAGNOSIS — Z79.01 LONG TERM (CURRENT) USE OF ANTICOAGULANTS: ICD-10-CM

## 2025-04-02 LAB
INTERNATIONAL NORMALIZATION RATIO, POC: 2.2
PROTHROMBIN TIME, POC: 0

## 2025-04-02 PROCEDURE — 99211 OFF/OP EST MAY X REQ PHY/QHP: CPT

## 2025-04-02 PROCEDURE — 85610 PROTHROMBIN TIME: CPT

## 2025-04-16 RX ORDER — SIMVASTATIN 20 MG
20 TABLET ORAL NIGHTLY
Qty: 30 TABLET | Refills: 11 | Status: SHIPPED | OUTPATIENT
Start: 2025-04-16

## 2025-04-17 RX ORDER — WARFARIN SODIUM 2.5 MG/1
TABLET ORAL
Qty: 90 TABLET | Refills: 0 | Status: SHIPPED | OUTPATIENT
Start: 2025-04-17

## 2025-05-16 RX ORDER — SIMVASTATIN 20 MG
20 TABLET ORAL NIGHTLY
Qty: 90 TABLET | Refills: 3 | Status: SHIPPED | OUTPATIENT
Start: 2025-05-16

## 2025-05-18 PROCEDURE — 93294 REM INTERROG EVL PM/LDLS PM: CPT | Performed by: INTERNAL MEDICINE

## 2025-05-21 NOTE — PROGRESS NOTES
Pharmacy Progress Note - Anticoagulation Management     S/O:  Ms. Nuzhat Cano  is a 89 y.o. female seen today for anticoagulation management for the diagnosis of Atrial Fibrillation.      HPI: At last visit (4/2) INR was 2.2 and therapeutic for INR goal 2.0-3.0.  Patient recommended to continue 1.25 mg Tues; 2.5 mg daily ROW and recheck INR in 4 to 5 weeks.  Alvaro scheduled follow up INR on 5/14, then rescheduled to 5/21, then again to 5/23.     Interim History:     Warfarin start date: Prior to October 2018  INR Goal:  2.0-3.0    Current warfarin regimen: 1.25 mg Tues; 2.5 mg daily ROW  Warfarin tablet strength:   2.5 mg   Duration of therapy: Indefinite    Today's pertinent positives includes:  No significant changes since last visit    INR 2.5  PT 29.4    Adherence:   Able to recall regimen? YES  Miss/extra dose? NO  Need refill? NO    Upcoming procedure(s):  NO    INR History:   (normal INR range 0.8-1.2)      Date                INR                  PT        Dose/Comments  05/23/25 2.5  29.4  No changes  04/02/25 2.2  26.3  No changes  02/28/25 2.3  27.5  No changes  01/17/25 2.2  26.9  1.25 mg Tues; 2.5 mg daily ROW  12/02/24 2.1  24.6  3.75 mg x 1 dose then 1.25 mg Tues; 2.5 mg daily ROW  11/20/24 1.6  19.5  No changes  10/09/24 2.4  28.5  No changes  08/28/24 2.0  24.5  No changes  07/16/24 2.8  33.9  No changes  06/04/24 2.8  33.7  No changes  04/23/24 2.2  26.7  No changes  03/12/24 2.4  28.4  No changes  01/30/24 2.3  27.6  No changes  12/19/23 2.8  33.0  No changes  11/17/23 2.3  28.0  No changes  10/09/23 2.9  35.4  No changes  09/11/23 2.4  28.7  No changes  08/14/23 2.4  28.2  No changes  07/17/23 2.3  27.6  No changes  06/19/23 2.2   26.5  No changes  05/22/23 2.0  23.9  5 mg x 1 dose then 1.25 mg Tues; 2.5 mg daily ROW  05/12/23 1.4   16.2  Resumed warfarin on 5/5 after procedure  04/11/23          2.7                   32.3     No changes  03/14/23          3.0                   35.9     Reduced

## 2025-05-23 ENCOUNTER — ANTI-COAG VISIT (OUTPATIENT)
Facility: HOSPITAL | Age: 89
End: 2025-05-23
Payer: MEDICARE

## 2025-05-23 DIAGNOSIS — Z79.01 LONG TERM (CURRENT) USE OF ANTICOAGULANTS: ICD-10-CM

## 2025-05-23 DIAGNOSIS — I48.0 PAROXYSMAL ATRIAL FIBRILLATION (HCC): Primary | ICD-10-CM

## 2025-05-23 LAB
INTERNATIONAL NORMALIZATION RATIO, POC: 2.5
PROTHROMBIN TIME, POC: 0

## 2025-05-23 PROCEDURE — 99211 OFF/OP EST MAY X REQ PHY/QHP: CPT

## 2025-05-23 PROCEDURE — 85610 PROTHROMBIN TIME: CPT

## 2025-06-03 NOTE — TELEPHONE ENCOUNTER
Prior to admission this patient was being followed by a Care Transition RN.  This team follows patients who are at high risk for 30 a day readmission.   If this patient discharges to home or assisted living the Care Transition RN will again follow this patient for 30 days after discharge.    Spoke to patient's son regarding appointment on 06/12/2025. Provider is out of office. Patient will be seeing Mehran Sanchez NP and time changed. Confirmed time and provider change with patient's son.     Future Appointments   Date Time Provider Department Center   2/28/2025 10:45 AM [facility]   6/12/2025  2:20 PM [email or web address], LINTON ESTEE [facility]   6/12/2025  2:40 PM Memo Sanchez APRN - KIERRA FONTANEZ [facility]   7/2/2025 11:30 AM Gordy Lyles MD [facility] MAIN [facility] DEP       [facility]

## 2025-06-12 ENCOUNTER — OFFICE VISIT (OUTPATIENT)
Age: 89
End: 2025-06-12
Payer: MEDICARE

## 2025-06-12 ENCOUNTER — PROCEDURE VISIT (OUTPATIENT)
Age: 89
End: 2025-06-12
Payer: MEDICARE

## 2025-06-12 VITALS
HEART RATE: 71 BPM | DIASTOLIC BLOOD PRESSURE: 80 MMHG | OXYGEN SATURATION: 93 % | SYSTOLIC BLOOD PRESSURE: 134 MMHG | WEIGHT: 168.3 LBS | BODY MASS INDEX: 28.89 KG/M2

## 2025-06-12 DIAGNOSIS — Z95.0 PACEMAKER: Primary | ICD-10-CM

## 2025-06-12 DIAGNOSIS — Z79.01 ANTICOAGULATED: ICD-10-CM

## 2025-06-12 DIAGNOSIS — I48.0 PAROXYSMAL ATRIAL FIBRILLATION (HCC): ICD-10-CM

## 2025-06-12 DIAGNOSIS — I10 PRIMARY HYPERTENSION: ICD-10-CM

## 2025-06-12 PROCEDURE — 1090F PRES/ABSN URINE INCON ASSESS: CPT

## 2025-06-12 PROCEDURE — 1036F TOBACCO NON-USER: CPT

## 2025-06-12 PROCEDURE — G8427 DOCREV CUR MEDS BY ELIG CLIN: HCPCS

## 2025-06-12 PROCEDURE — G8419 CALC BMI OUT NRM PARAM NOF/U: HCPCS

## 2025-06-12 PROCEDURE — 1126F AMNT PAIN NOTED NONE PRSNT: CPT

## 2025-06-12 PROCEDURE — 99214 OFFICE O/P EST MOD 30 MIN: CPT

## 2025-06-12 PROCEDURE — 1123F ACP DISCUSS/DSCN MKR DOCD: CPT

## 2025-06-12 PROCEDURE — 93280 PM DEVICE PROGR EVAL DUAL: CPT | Performed by: INTERNAL MEDICINE

## 2025-06-12 PROCEDURE — 1160F RVW MEDS BY RX/DR IN RCRD: CPT

## 2025-06-12 PROCEDURE — 1159F MED LIST DOCD IN RCRD: CPT

## 2025-06-12 ASSESSMENT — PATIENT HEALTH QUESTIONNAIRE - PHQ9
1. LITTLE INTEREST OR PLEASURE IN DOING THINGS: NOT AT ALL
SUM OF ALL RESPONSES TO PHQ QUESTIONS 1-9: 0
2. FEELING DOWN, DEPRESSED OR HOPELESS: NOT AT ALL
SUM OF ALL RESPONSES TO PHQ QUESTIONS 1-9: 0

## 2025-06-12 NOTE — PROGRESS NOTES
Cardiac Electrophysiology Office Note            Subjective:         Nuzhat Cano is a 89 y.o. woman who is here to follow up, is s/p [employer] dual chamber pacemaker (gen change 04/28/2014, leads 04/06/2006).     She is here with her son      She reports doing well from cardiac standpoint. Walks with her cane without GARCIA, fatigue. Denies chest pain,palpitations, dizziness,syncope, orthopnea.  She is active and no falls even at age 89        Anticoagulated with warfarin, denies bleeding issues.  She denies falls.  INR monitored her at [facility].         Previous:   [employer] dual chamber pacemaker originally implanted by Dr. Birmingham 04/06/2006, had gen change in 2014.      Previously saw Dr. Joey Brady.  He had prescribed Mg for leg spasm.          Review of Systems: All other review of systems otherwise negative.   Constitutional: Negative for fever, chills, weight loss, malaise/fatigue.    HEENT: Negative for nosebleeds, vision changes.    Respiratory: Negative for cough, hemoptysis, sputum production, and wheezing.    Cardiovascular: Negative for chest pain, + mild leg swelling, no syncope, and PND.    Leg edema   Gastrointestinal: Negative for nausea, vomiting, diarrhea, blood in stool and melena.    Genitourinary: Negative for dysuria, and hematuria. + polyuria, urgency   Musculoskeletal: Negative for myalgias, + arthralgia right knee, mild discomfort left wrist with trace swelling.   Skin: Negative for rash.    Heme: Does not bleed but bruise when hitting some thing   Neurological: Negative for speech change and focal weakness.   + hand paresthesia      Past Medical History:   Diagnosis Date    Aortic valve disorders 4/29/2012    Atrial fibrillation (HCC) 03/22/2011    on coumadin followed by cardiology - Cooper Osborn MD    CAD (coronary artery disease)     Cor pulmonale, chronic (HCC) 4/29/2012    Dyslipidemia (high LDL; low HDL) 4/29/2012    Gout 4/30/2012    Gout, arthritis 2/8/2012

## 2025-06-12 NOTE — PROGRESS NOTES
1. Have you been to the ER, urgent care clinic since your last visit?  Hospitalized since your last visit?No    2. Have you seen or consulted any other health care providers outside of the [facility] since your last visit?  Include any pap smears or colon screening. No

## 2025-06-30 NOTE — PROGRESS NOTES
Pharmacy Progress Note - Anticoagulation Management     S/O:  Ms. Nuzhat Cano  is a 89 y.o. female seen today for anticoagulation management for the diagnosis of Atrial Fibrillation.      HPI: At last visit (5/23) INR was 2.5 and therapeutic for INR goal 2.0-3.0.  Patient recommended to continue 1.25 mg Tues; 2.5 mg daily ROW and recheck INR in 5 to 6 weeks.  Alvaro (son) scheduled follow up INR on 7/1.     Interim History:     Warfarin start date: Prior to October 2018  INR Goal:  2.0-3.0    Current warfarin regimen: 1.25 mg Tues; 2.5 mg daily ROW  Warfarin tablet strength:   2.5 mg   Duration of therapy: Indefinite    Today's pertinent positives includes:  Change in diet/appetite    Alvaro (son) reports patient has serving of broccoli this past week.    INR 1.8  PT 21.6    Adherence:   Able to recall regimen? YES  Miss/extra dose? NO  Need refill? NO    Upcoming procedure(s):  NO    INR History:   (normal INR range 0.8-1.2)      Date                INR                  PT        Dose/Comments  07/01/25 1.8  21.6  Broccoli  05/23/25 2.5  29.4  No changes  04/02/25 2.2  26.3  No changes  02/28/25 2.3  27.5  No changes  01/17/25 2.2  26.9  1.25 mg Tues; 2.5 mg daily ROW  12/02/24 2.1  24.6  3.75 mg x 1 dose then 1.25 mg Tues; 2.5 mg daily ROW  11/20/24 1.6  19.5  No changes  10/09/24 2.4  28.5  No changes  08/28/24 2.0  24.5  No changes  07/16/24 2.8  33.9  No changes  06/04/24 2.8  33.7  No changes  04/23/24 2.2  26.7  No changes  03/12/24 2.4  28.4  No changes  01/30/24 2.3  27.6  No changes  12/19/23 2.8  33.0  No changes  11/17/23 2.3  28.0  No changes  10/09/23 2.9  35.4  No changes  09/11/23 2.4  28.7  No changes  08/14/23 2.4  28.2  No changes  07/17/23 2.3  27.6  No changes  06/19/23 2.2   26.5  No changes  05/22/23 2.0  23.9  5 mg x 1 dose then 1.25 mg Tues; 2.5 mg daily ROW  05/12/23 1.4   16.2  Resumed warfarin on 5/5 after procedure  04/11/23          2.7                   32.3     No changes  03/14/23

## 2025-07-01 ENCOUNTER — ANTI-COAG VISIT (OUTPATIENT)
Facility: HOSPITAL | Age: 89
End: 2025-07-01
Payer: MEDICARE

## 2025-07-01 DIAGNOSIS — Z79.01 LONG TERM (CURRENT) USE OF ANTICOAGULANTS: ICD-10-CM

## 2025-07-01 DIAGNOSIS — I48.0 PAROXYSMAL ATRIAL FIBRILLATION (HCC): Primary | ICD-10-CM

## 2025-07-01 LAB
INTERNATIONAL NORMALIZATION RATIO, POC: 1.8
PROTHROMBIN TIME, POC: 0

## 2025-07-01 PROCEDURE — 99212 OFFICE O/P EST SF 10 MIN: CPT

## 2025-07-01 PROCEDURE — 85610 PROTHROMBIN TIME: CPT

## 2025-07-17 ENCOUNTER — OFFICE VISIT (OUTPATIENT)
Facility: CLINIC | Age: 89
End: 2025-07-17

## 2025-07-17 VITALS
TEMPERATURE: 98.4 F | WEIGHT: 167.8 LBS | HEART RATE: 60 BPM | HEIGHT: 64 IN | BODY MASS INDEX: 28.65 KG/M2 | RESPIRATION RATE: 18 BRPM | DIASTOLIC BLOOD PRESSURE: 74 MMHG | SYSTOLIC BLOOD PRESSURE: 129 MMHG | OXYGEN SATURATION: 97 %

## 2025-07-17 DIAGNOSIS — R73.02 IGT (IMPAIRED GLUCOSE TOLERANCE): ICD-10-CM

## 2025-07-17 DIAGNOSIS — E78.5 DYSLIPIDEMIA: ICD-10-CM

## 2025-07-17 DIAGNOSIS — Z00.00 MEDICARE ANNUAL WELLNESS VISIT, SUBSEQUENT: Primary | ICD-10-CM

## 2025-07-17 DIAGNOSIS — Z95.0 CARDIAC PACEMAKER IN SITU: ICD-10-CM

## 2025-07-17 DIAGNOSIS — I48.0 PAROXYSMAL ATRIAL FIBRILLATION (HCC): ICD-10-CM

## 2025-07-17 DIAGNOSIS — I10 ESSENTIAL HYPERTENSION, BENIGN: ICD-10-CM

## 2025-07-17 DIAGNOSIS — D68.69 SECONDARY HYPERCOAGULABLE STATE: ICD-10-CM

## 2025-07-17 DIAGNOSIS — M10.00 IDIOPATHIC GOUT, UNSPECIFIED CHRONICITY, UNSPECIFIED SITE: ICD-10-CM

## 2025-07-17 DIAGNOSIS — I87.2 VENOUS INSUFFICIENCY: ICD-10-CM

## 2025-07-17 RX ORDER — WARFARIN SODIUM 2.5 MG/1
TABLET ORAL
Qty: 90 TABLET | Refills: 1 | Status: SHIPPED | OUTPATIENT
Start: 2025-07-17

## 2025-07-17 RX ORDER — PREDNISONE 20 MG/1
40 TABLET ORAL DAILY
Qty: 20 TABLET | Refills: 0 | Status: SHIPPED | OUTPATIENT
Start: 2025-07-17 | End: 2025-07-27

## 2025-07-17 RX ORDER — ATENOLOL 100 MG/1
100 TABLET ORAL DAILY
Qty: 90 TABLET | Refills: 3 | Status: SHIPPED | OUTPATIENT
Start: 2025-07-17

## 2025-07-17 SDOH — ECONOMIC STABILITY: FOOD INSECURITY: WITHIN THE PAST 12 MONTHS, YOU WORRIED THAT YOUR FOOD WOULD RUN OUT BEFORE YOU GOT MONEY TO BUY MORE.: NEVER TRUE

## 2025-07-17 SDOH — ECONOMIC STABILITY: FOOD INSECURITY: WITHIN THE PAST 12 MONTHS, THE FOOD YOU BOUGHT JUST DIDN'T LAST AND YOU DIDN'T HAVE MONEY TO GET MORE.: NEVER TRUE

## 2025-07-17 ASSESSMENT — PATIENT HEALTH QUESTIONNAIRE - PHQ9
SUM OF ALL RESPONSES TO PHQ QUESTIONS 1-9: 0
1. LITTLE INTEREST OR PLEASURE IN DOING THINGS: NOT AT ALL
SUM OF ALL RESPONSES TO PHQ QUESTIONS 1-9: 0
SUM OF ALL RESPONSES TO PHQ QUESTIONS 1-9: 0
2. FEELING DOWN, DEPRESSED OR HOPELESS: NOT AT ALL
SUM OF ALL RESPONSES TO PHQ QUESTIONS 1-9: 0

## 2025-07-17 ASSESSMENT — LIFESTYLE VARIABLES
HOW OFTEN DO YOU HAVE A DRINK CONTAINING ALCOHOL: NEVER
HOW MANY STANDARD DRINKS CONTAINING ALCOHOL DO YOU HAVE ON A TYPICAL DAY: PATIENT DOES NOT DRINK

## 2025-07-17 NOTE — PROGRESS NOTES
Pharmacy Progress Note - Anticoagulation Management     S/O:  Ms. Nuzhat Cano  is a 89 y.o. female seen today for anticoagulation management for the diagnosis of Atrial Fibrillation.      HPI: At last visit (7/1) INR was 1.8 and subtherapeutic for INR goal 2.0-3.0.  Patient recommended to take 2.5 mg on 7/1 instead of 1.25 mg as planned.  Since INR previously therapeutic on this weekly dose, on 7/2 patient recommended to resume taking 1.25 mg Tues; 2.5 mg daily ROW and recheck INR in 2 to 3 weeks.  Alvaro (son) scheduled follow up INR on 7/22.     Interim History:     Warfarin start date: Prior to October 2018  INR Goal:  2.0-3.0    Current warfarin regimen: 2.5 mg x 1 dose then 1.25 mg Tues; 2.5 mg daily ROW  Warfarin tablet strength:   2.5 mg   Duration of therapy: Indefinite    Today's pertinent positives includes:  Medication change    Alvaro (son) reports patient started prednisone taper on Thursday (7/17) and will complete in the next week.    INR 2.8  PT 34.0    Adherence:   Able to recall regimen? YES  Miss/extra dose? NO  Need refill? NO    Upcoming procedure(s):  NO    INR History:   (normal INR range 0.8-1.2)      Date                INR                  PT        Dose/Comments  07/22/25 2.8  34.0  2.5 mg x 1 dose then 1.25 mg Tues; 2.5 mg daily ROW, prednisone taper  07/01/25 1.8  21.6  Broccoli  05/23/25 2.5  29.4  No changes  04/02/25 2.2  26.3  No changes  02/28/25 2.3  27.5  No changes  01/17/25 2.2  26.9  1.25 mg Tues; 2.5 mg daily ROW  12/02/24 2.1  24.6  3.75 mg x 1 dose then 1.25 mg Tues; 2.5 mg daily ROW  11/20/24 1.6  19.5  No changes  10/09/24 2.4  28.5  No changes  08/28/24 2.0  24.5  No changes  07/16/24 2.8  33.9  No changes  06/04/24 2.8  33.7  No changes  04/23/24 2.2  26.7  No changes  03/12/24 2.4  28.4  No changes  01/30/24 2.3  27.6  No changes  12/19/23 2.8  33.0  No changes  11/17/23 2.3  28.0  No changes  10/09/23 2.9  35.4  No changes  09/11/23 2.4  28.7  No

## 2025-07-17 NOTE — PROGRESS NOTES
Chief Complaint   Patient presents with    Medicare AWV     Have you been to the ER, urgent care clinic since your last visit?  Hospitalized since your last visit?   NO    Have you seen or consulted any other health care providers outside our system since your last visit?   NO             
Medicare Annual Wellness Visit    Nuzhat Cano is here for Medicare AWV    Assessment & Plan   Medicare annual wellness visit, subsequent     No follow-ups on file.     Subjective       Patient's complete Health Risk Assessment and screening values have been reviewed and are found in Flowsheets. The following problems were reviewed today and where indicated follow up appointments were made and/or referrals ordered.    Positive Risk Factor Screenings with Interventions:    Fall Risk:  Do you feel unsteady or are you worried about falling? : (!) yes  2 or more falls in past year?: no  Fall with injury in past year?: no  Interventions:    Reviewed medications, home hazards, visual acuity, and co-morbidities that can increase risk for falls  See A/P for plan and any pertinent orders             Inactivity:  On average, how many days per week do you engage in moderate to strenuous exercise (like a brisk walk)?: 0 days (!) Abnormal  On average, how many minutes do you engage in exercise at this level?: 0 min  Interventions:  See A/P for plan and any pertinent orders      Dentist Screen:  Have you seen the dentist within the past year?: (!) No    Intervention:  See A/P for any pertinent orders                       Objective   Vitals:    07/17/25 1326   BP: 129/74   BP Site: Right Upper Arm   Patient Position: Sitting   BP Cuff Size: Large Adult   Pulse: 60   Resp: 18   Temp: 98.4 °F (36.9 °C)   TempSrc: Oral   SpO2: 97%   Weight: 76.1 kg (167 lb 12.8 oz)   Height: 1.626 m (5' 4\")      Body mass index is 28.8 kg/m².        General Appearance: alert and oriented to person, place and time, well developed and well- nourished, in no acute distress  Skin: warm and dry, no rash or erythema  Head: normocephalic and atraumatic  Eyes: pupils equal, round, and reactive to light, extraocular eye movements intact, conjunctivae normal  ENT: tympanic membrane, external ear and ear canal normal bilaterally, nose without deformity, nasal 
Stable.  - Monitored by cardiologist.  - No issues reported with the pacemaker.    5. Secondary hypercoagulable state: Stable.  - Related to Coumadin use.  - Coumadin dosage will be adjusted during prednisone course.  - PT level will be checked to monitor effectiveness.    6. Osteoarthritis: Acute.  - Symptoms suggest osteoarthritis rather than gout.  - Blood sample will be taken for confirmation.  - Initiating prednisone 40 mg daily for 5 days.  - If discomfort persists, 1 tablet daily for the subsequent 5 days.  - Coumadin dosage will be reduced to 1.25 mg daily during prednisone course.    7. Cholesterol management.  - Lipid panel will be conducted today.  - Continues with current cholesterol management plan.    Follow-up  - PT level will be checked next week to monitor effects of prednisone on Coumadin.     1. Medicare annual wellness visit, subsequent    2. Paroxysmal atrial fibrillation (HCC)    3. Essential hypertension, benign    4. Venous insufficiency    5. Cardiac pacemaker in situ    6. Secondary hypercoagulable state    7. Idiopathic gout, unspecified chronicity, unspecified site    8. IGT (impaired glucose tolerance)    9. Dyslipidemia        I have discussed the diagnosis with the patient and the intended plan as seen in the  Orders.  The patient understands and agees with the plan.  The patient has   received an after visit summary and questions were answered concerning  future plans  Patient labs and/or xrays were reviewed  Past records were reviewed.    PLAN:  Orders Placed This Encounter   Procedures    Hemoglobin A1C     Standing Status:   Future     Number of Occurrences:   1     Expected Date:   7/17/2025     Expiration Date:   7/17/2026    TSH     Standing Status:   Future     Number of Occurrences:   1     Expected Date:   7/17/2025     Expiration Date:   7/17/2026    Lipid Panel     Standing Status:   Future     Number of Occurrences:   1     Expected Date:   7/17/2025     Expiration Date:

## 2025-07-18 LAB
ALBUMIN SERPL-MCNC: 3.9 G/DL (ref 3.5–5)
ALBUMIN/GLOB SERPL: 1.3 (ref 1.1–2.2)
ALP SERPL-CCNC: 72 U/L (ref 45–117)
ALT SERPL-CCNC: 22 U/L (ref 12–78)
ANION GAP SERPL CALC-SCNC: 7 MMOL/L (ref 2–12)
AST SERPL-CCNC: 17 U/L (ref 15–37)
BASOPHILS # BLD: 0.03 K/UL (ref 0–0.1)
BASOPHILS NFR BLD: 0.5 % (ref 0–1)
BILIRUB SERPL-MCNC: 0.5 MG/DL (ref 0.2–1)
BUN SERPL-MCNC: 13 MG/DL (ref 6–20)
BUN/CREAT SERPL: 14 (ref 12–20)
CALCIUM SERPL-MCNC: 9.3 MG/DL (ref 8.5–10.1)
CHLORIDE SERPL-SCNC: 101 MMOL/L (ref 97–108)
CHOLEST SERPL-MCNC: 132 MG/DL
CO2 SERPL-SCNC: 29 MMOL/L (ref 21–32)
CREAT SERPL-MCNC: 0.96 MG/DL (ref 0.55–1.02)
DIFFERENTIAL METHOD BLD: ABNORMAL
EOSINOPHIL # BLD: 0.08 K/UL (ref 0–0.4)
EOSINOPHIL NFR BLD: 1.3 % (ref 0–7)
ERYTHROCYTE [DISTWIDTH] IN BLOOD BY AUTOMATED COUNT: 14.6 % (ref 11.5–14.5)
EST. AVERAGE GLUCOSE BLD GHB EST-MCNC: 108 MG/DL
GLOBULIN SER CALC-MCNC: 3.1 G/DL (ref 2–4)
GLUCOSE SERPL-MCNC: 86 MG/DL (ref 65–100)
HBA1C MFR BLD: 5.4 % (ref 4–5.6)
HCT VFR BLD AUTO: 36.4 % (ref 35–47)
HDLC SERPL-MCNC: 78 MG/DL
HDLC SERPL: 1.7 (ref 0–5)
HGB BLD-MCNC: 12.3 G/DL (ref 11.5–16)
IMM GRANULOCYTES # BLD AUTO: 0.01 K/UL (ref 0–0.04)
IMM GRANULOCYTES NFR BLD AUTO: 0.2 % (ref 0–0.5)
LDLC SERPL CALC-MCNC: 39.2 MG/DL (ref 0–100)
LYMPHOCYTES # BLD: 1.29 K/UL (ref 0.8–3.5)
LYMPHOCYTES NFR BLD: 21.4 % (ref 12–49)
MCH RBC QN AUTO: 28.3 PG (ref 26–34)
MCHC RBC AUTO-ENTMCNC: 33.8 G/DL (ref 30–36.5)
MCV RBC AUTO: 83.7 FL (ref 80–99)
MONOCYTES # BLD: 0.71 K/UL (ref 0–1)
MONOCYTES NFR BLD: 11.8 % (ref 5–13)
NEUTS SEG # BLD: 3.91 K/UL (ref 1.8–8)
NEUTS SEG NFR BLD: 64.8 % (ref 32–75)
NRBC # BLD: 0 K/UL (ref 0–0.01)
NRBC BLD-RTO: 0 PER 100 WBC
PLATELET # BLD AUTO: 218 K/UL (ref 150–400)
PMV BLD AUTO: 11.5 FL (ref 8.9–12.9)
POTASSIUM SERPL-SCNC: 4 MMOL/L (ref 3.5–5.1)
PROT SERPL-MCNC: 7 G/DL (ref 6.4–8.2)
RBC # BLD AUTO: 4.35 M/UL (ref 3.8–5.2)
SODIUM SERPL-SCNC: 137 MMOL/L (ref 136–145)
TRIGL SERPL-MCNC: 74 MG/DL
TSH SERPL DL<=0.05 MIU/L-ACNC: 1 UIU/ML (ref 0.36–3.74)
URATE SERPL-MCNC: 5.3 MG/DL (ref 2.6–6)
VLDLC SERPL CALC-MCNC: 14.8 MG/DL
WBC # BLD AUTO: 6 K/UL (ref 3.6–11)

## 2025-07-19 ENCOUNTER — RESULTS FOLLOW-UP (OUTPATIENT)
Facility: CLINIC | Age: 89
End: 2025-07-19

## 2025-07-22 ENCOUNTER — ANTI-COAG VISIT (OUTPATIENT)
Facility: HOSPITAL | Age: 89
End: 2025-07-22
Payer: MEDICARE

## 2025-07-22 DIAGNOSIS — I48.0 PAROXYSMAL ATRIAL FIBRILLATION (HCC): Primary | ICD-10-CM

## 2025-07-22 DIAGNOSIS — Z79.01 LONG TERM (CURRENT) USE OF ANTICOAGULANTS: ICD-10-CM

## 2025-07-22 LAB
INTERNATIONAL NORMALIZATION RATIO, POC: 2.8
PROTHROMBIN TIME, POC: 0

## 2025-07-22 PROCEDURE — 99211 OFF/OP EST MAY X REQ PHY/QHP: CPT

## 2025-07-22 PROCEDURE — 85610 PROTHROMBIN TIME: CPT

## 2025-08-12 ENCOUNTER — ANTI-COAG VISIT (OUTPATIENT)
Facility: HOSPITAL | Age: 89
End: 2025-08-12
Payer: MEDICARE

## 2025-08-12 DIAGNOSIS — Z79.01 LONG TERM (CURRENT) USE OF ANTICOAGULANTS: ICD-10-CM

## 2025-08-12 DIAGNOSIS — I48.0 PAROXYSMAL ATRIAL FIBRILLATION (HCC): Primary | ICD-10-CM

## 2025-08-12 LAB
INTERNATIONAL NORMALIZATION RATIO, POC: 2.9
PROTHROMBIN TIME, POC: 34.6

## 2025-08-12 PROCEDURE — 85610 PROTHROMBIN TIME: CPT | Performed by: PHARMACIST

## 2025-08-12 PROCEDURE — 99211 OFF/OP EST MAY X REQ PHY/QHP: CPT | Performed by: PHARMACIST

## (undated) DEVICE — AGENT HEMSTAT 3GM PURIFIED PLNT STARCH PWD ABSRB ARISTA AH

## (undated) DEVICE — MEDI-TRACE CADENCE ADULT, DEFIBRILLATION ELECTRODE -RTS  (10 PR/PK) - PHYSIO-CONTROL: Brand: MEDI-TRACE CADENCE

## (undated) DEVICE — SUTURE STRATAFIX SPRL MCRYL + SZ 4-0 L12IN ABSRB UD PS-2 SXMP1B117

## (undated) DEVICE — DRSG GENTL AG SA POSTOP 4X6IN -- OPTIFOAM

## (undated) DEVICE — 3M™ IOBAN™ 2 ANTIMICROBIAL INCISE DRAPE 6650EZ: Brand: IOBAN™ 2

## (undated) DEVICE — ABSORBABLE WOUND CLOSURE DEVICE: Brand: V-LOC 90

## (undated) DEVICE — PLASMABLADE PS210-030S 3.0S LOCK: Brand: PLASMABLADE™

## (undated) DEVICE — LIMB HOLDER, WRIST/ANKLE: Brand: DEROYAL

## (undated) DEVICE — REM POLYHESIVE ADULT PATIENT RETURN ELECTRODE: Brand: VALLEYLAB

## (undated) DEVICE — PACER PACK: Brand: MEDLINE INDUSTRIES, INC.

## (undated) DEVICE — SUTURE ETHBND EXCEL SZ 2 L30IN NONABSORBABLE GRN L40MM V-37 MX69G